# Patient Record
Sex: MALE | Race: WHITE | NOT HISPANIC OR LATINO | Employment: OTHER | URBAN - NONMETROPOLITAN AREA
[De-identification: names, ages, dates, MRNs, and addresses within clinical notes are randomized per-mention and may not be internally consistent; named-entity substitution may affect disease eponyms.]

---

## 2017-01-10 NOTE — H&P
DATE OF CLINIC VISIT:  01/09/2017    CHIEF COMPLAINT: Umbilical hernia.     HISTORY OF PRESENT ILLNESS: The patient is a 76-year-old gentleman who presents complaining of umbilical hernia. He states it has progressively increased in size over time and intermittently he has to reduce it. He denies any GI symptoms or overlying skin changes.     PAST MEDICAL HISTORY:  1.  Chronic anemia.   2.  History of colonic polyps.   3.  Hypertension.  4.  Diabetes mellitus type 2.   5.  Gastroesophageal reflux disease.   6.  Chronic low back pain.   7.  Mitral valve prolapse.   8.  Hyperlipidemia.   9.  Vitamin D deficiency.     PAST SURGICAL HISTORY:  1.  Tonsillectomy.   2.  Left inguinal hernia repair.    HOME MEDICATIONS:  1.  Calcium.  2.  Aspirin.   3.  Atorvastatin.    4.  Potassium.    5.  Chloride.    6.  Verapamil.    7.  Hydrochlorothiazide.    8.  Spironolactone.    9.  Losartan.    10.  Fluticasone.    11.  Glipizide.    12.  Temazepam.     ALLERGIES: No known drug allergies.     SOCIAL HISTORY:  He denies tobacco or illicit drug use.     FAMILY HISTORY: His father has history of myocardial infarction.     REVIEW OF SYSTEMS:  CONSTITUTIONAL: No weight changes, fevers, or chills.   HEENT: Sinusitis. No vision or hearing changes.   PULMONARY: No shortness of breath, cough, or hoarseness.   CARDIOVASCULAR: No chest pain, palpitations, or arrhythmias.   GASTROINTESTINAL: No hematemesis, bright red blood per rectum, melena, or hematochezia.   GENITOURINARY: No dysuria or hematuria.   ENDOCRINE: Diabetes mellitus. No history of thyroid disorder.   PSYCHIATRIC: No complaint.   NEUROLOGIC: No complaint.   MUSCULOSKELETAL: No complaint.   INTEGUMENTARY:  No complaint.   HEMATOLOGIC: No history of bleeding tendency or hypercoagulable state.     PHYSICAL EXAMINATION:  VITAL SIGNS: The patient is 6 feet tall, weighs 205 pounds, BMI is 27.8.  Temperature is 92.1, blood pressure 122/78, and pulse 88.   GENERAL: The patient is a  well-developed, well-nourished gentleman, in no acute distress.   HEENT: Normocephalic, atraumatic.   NECK: Supple.   PULMONARY: Clear to auscultation bilaterally.   CARDIOVASCULAR: Regular rate and rhythm.   GASTROINTESTINAL: The abdomen is soft.  Very shallow umbilical ring.  Easily reducible 1.5 to 2 cm fascial defect without overlying skin changes.   EXTREMITIES: No pedal edema.   NEUROLOGIC: Alert and oriented. No gross sensory changes.     ASSESSMENT: Umbilical hernia.     PLAN:   The patient will present to [facility] for open umbilical hernia repair with possible placement of mesh. The risks, benefits, complications, and possible alternatives of the above procedure were discussed with the patient who agreed to proceed.         cc:          Kavitha CUEVA/[id number]  D:  01/10/2017 11:59:00(Eastern Time)  T:  01/10/2017 12:51:15(Eastern Time)  Voice ID:  [id number]/Document ID:  [id number]

## 2017-01-11 ENCOUNTER — APPOINTMENT (OUTPATIENT)
Dept: PREADMISSION TESTING | Facility: HOSPITAL | Age: 77
End: 2017-01-11

## 2017-01-11 VITALS
RESPIRATION RATE: 14 BRPM | DIASTOLIC BLOOD PRESSURE: 92 MMHG | BODY MASS INDEX: 29.54 KG/M2 | HEART RATE: 95 BPM | SYSTOLIC BLOOD PRESSURE: 147 MMHG | OXYGEN SATURATION: 94 % | WEIGHT: 211 LBS | HEIGHT: 71 IN

## 2017-01-11 LAB
ANION GAP SERPL CALCULATED.3IONS-SCNC: 17 MMOL/L (ref 4–13)
BUN BLD-MCNC: 23 MG/DL (ref 5–21)
BUN/CREAT SERPL: 23.7 (ref 7–25)
CALCIUM SPEC-SCNC: 9.8 MG/DL (ref 8.4–10.4)
CHLORIDE SERPL-SCNC: 96 MMOL/L (ref 98–110)
CO2 SERPL-SCNC: 24 MMOL/L (ref 24–31)
CREAT BLD-MCNC: 0.97 MG/DL (ref 0.5–1.4)
DEPRECATED RDW RBC AUTO: 38.6 FL (ref 40–54)
ERYTHROCYTE [DISTWIDTH] IN BLOOD BY AUTOMATED COUNT: 12.6 % (ref 12–15)
GFR SERPL CREATININE-BSD FRML MDRD: 76 ML/MIN/1.73
GLUCOSE BLD-MCNC: 288 MG/DL (ref 70–100)
HCT VFR BLD AUTO: 40.1 % (ref 40–52)
HGB BLD-MCNC: 14.1 G/DL (ref 14–18)
MCH RBC QN AUTO: 29.9 PG (ref 28–32)
MCHC RBC AUTO-ENTMCNC: 35.2 G/DL (ref 33–36)
MCV RBC AUTO: 85 FL (ref 82–95)
PLATELET # BLD AUTO: 138 10*3/MM3 (ref 130–400)
PMV BLD AUTO: 10.5 FL (ref 6–12)
POTASSIUM BLD-SCNC: 4.2 MMOL/L (ref 3.5–5.3)
RBC # BLD AUTO: 4.72 10*6/MM3 (ref 4.8–5.9)
SODIUM BLD-SCNC: 137 MMOL/L (ref 135–145)
WBC NRBC COR # BLD: 5.92 10*3/MM3 (ref 4.8–10.8)

## 2017-01-11 PROCEDURE — 93010 ELECTROCARDIOGRAM REPORT: CPT | Performed by: INTERNAL MEDICINE

## 2017-01-11 PROCEDURE — 80048 BASIC METABOLIC PNL TOTAL CA: CPT | Performed by: SPECIALIST

## 2017-01-11 PROCEDURE — 85027 COMPLETE CBC AUTOMATED: CPT | Performed by: SPECIALIST

## 2017-01-11 PROCEDURE — 36415 COLL VENOUS BLD VENIPUNCTURE: CPT

## 2017-01-11 PROCEDURE — 93005 ELECTROCARDIOGRAM TRACING: CPT

## 2017-01-11 RX ORDER — SPIRONOLACTONE 25 MG/1
25 TABLET ORAL DAILY
COMMUNITY

## 2017-01-11 RX ORDER — VERAPAMIL HYDROCHLORIDE 240 MG/1
240 TABLET, FILM COATED, EXTENDED RELEASE ORAL 2 TIMES DAILY
COMMUNITY
Start: 2016-05-04

## 2017-01-11 RX ORDER — POTASSIUM CHLORIDE 750 MG/1
10 TABLET, EXTENDED RELEASE ORAL 2 TIMES DAILY
COMMUNITY
Start: 2016-05-04

## 2017-01-11 RX ORDER — ATORVASTATIN CALCIUM 20 MG/1
10 TABLET, FILM COATED ORAL DAILY
COMMUNITY
Start: 2016-05-04

## 2017-01-11 RX ORDER — HYDROCHLOROTHIAZIDE 25 MG/1
25 TABLET ORAL DAILY
COMMUNITY
Start: 2016-05-04

## 2017-01-11 RX ORDER — FLUTICASONE PROPIONATE 50 MCG
1 SPRAY, SUSPENSION (ML) NASAL DAILY PRN
COMMUNITY
Start: 2016-05-04

## 2017-01-11 RX ORDER — LOSARTAN POTASSIUM 100 MG/1
100 TABLET ORAL DAILY
COMMUNITY
Start: 2016-05-04

## 2017-01-11 RX ORDER — PANTOPRAZOLE SODIUM 40 MG/1
40 TABLET, DELAYED RELEASE ORAL DAILY PRN
COMMUNITY
End: 2021-12-28

## 2017-01-11 NOTE — MR AVS SNAPSHOT
Mike Kay   2017 1:30 PM   Appointment    Provider:  [facility] PAT 34 RM 4   Department:  [facility] PREADMISSION T   Dept Phone:  [phone]                Your Full Care Plan           To Do List     2017     ECG:  ECG 12 Lead             Your Updated Medication List          This list is accurate as of: 17  1:44 PM.  Always use your most recent med list.                aspirin 81 MG tablet       atorvastatin 20 MG tablet   Commonly known as:  LIPITOR       fluticasone 50 MCG/ACT nasal spray   Commonly known as:  FLONASE       hydrochlorothiazide 25 MG tablet   Commonly known as:  HYDRODIURIL       losartan 100 MG tablet   Commonly known as:  COZAAR       pantoprazole 40 MG EC tablet   Commonly known as:  PROTONIX       potassium chloride 10 MEQ CR tablet   Commonly known as:  K-DUR,KLOR-CON       spironolactone 25 MG tablet   Commonly known as:  ALDACTONE       verapamil  MG CR tablet   Commonly known as:  CALAN-SR               [employer] Signup     [facility] App [employer] allows you to send messages to your doctor, view your test results, renew your prescriptions, schedule appointments, and more. To sign up, go to [employer] and click on the Sign Up Now link in the New User? box. Enter your [employer] Activation Code exactly as it appears below along with the last four digits of your Social Security Number and your Date of Birth () to complete the sign-up process. If you do not sign up before the expiration date, you must request a new code.    [employer] Activation Code: [email or web address]  Expires: 2017  1:44 PM    If you have questions, you can email [email or web address] or call [phone] to talk to our [employer] staff. Remember, [employer] is NOT to be used for urgent needs. For medical emergencies, dial 911.               Other Info from Your Visit           Allergies     No Known Allergies      Vital Signs     Smoking Status                   Never Smoker             Discharge Instructions         DAY OF SURGERY INSTRUCTIONS        YOUR SURGEON: Kavitha FLORES    PROCEDURE: UMBILICAL HERNIA REPAIR WITH POSSIBLE MESH     DATE OF SURGERY: 01/12/17    DAY OF SURGERY TAKE ONLY THESE MEDICATIONS: VERAPAMIL        BEFORE YOU COME TO THE HOSPITAL  (Pre-op instructions)  • Do not eat, drink, smoke or chew gum after midnight the night before surgery.  This also includes no mints.  • Morning of surgery take only the medicines you have been instructed with a sip of water unless otherwise instructed  by your physician.  • Do not shave, wear makeup or dark nail polish.  • Remove all jewelry including rings.  • Leave anything you consider valuable at home.  • Leave your suitcase in the car until after your surgery.  • Bring the following with you if applicable:  o Picture ID and insurance, Medicare or Medicaid cards  o Co-pay/deductible required by insurance (cash, check, credit card)  o Medications (no narcotics) in original bottles (not a list) including all over-the-counter medications.  o Copy of advance directive, living will or power-of- documents if not brought to PAT  o CPAP or BIPAP mask and tubing  o Skin prep instruction sheet  o Relaxation aids (MP3 player, book, magazine)  • Confirm your arrival time with you surgeon the day before your surgery (surgery times are subject to change)  • On the day of surgery check in at registration located at the main entrance of the hospital.       Outpatient Surgery Guidelines, Adult  Outpatient procedures are those for which the person having the procedure is allowed to go home the same day as the procedure. Various procedures are done on an outpatient basis. You should follow some general guidelines if you will be having an outpatient procedure.  LET YOUR HEALTH CARE PROVIDER KNOW ABOUT:  · Any allergies you have.  · All medicines you are taking, including vitamins, herbs, eye drops, creams, and  over-the-counter medicines.  · Previous problems you or members of your family have had with the use of anesthetics.  · Any blood disorders you have.  · Previous surgeries you have had.  · Medical conditions you have.  RISKS AND COMPLICATIONS  Your health care provider will discuss possible risks and complications with you before surgery. Common risks and complications include:    · Problems due to the use of anesthetics.  · Blood loss and replacement (does not apply to minor surgical procedures).  · Temporary increase in pain due to surgery.  · Uncorrected pain or problems that the surgery was meant to correct.  · Infection.  · New damage.  BEFORE THE PROCEDURE  · Ask your health care provider about changing or stopping your regular medicines. You may need to stop taking certain medicines in the days or weeks before the procedure.  · Stop smoking at least 2 weeks before surgery. This lowers your risk for complications during and after surgery. Ask your health care provider for help with this if needed.  · Eat your usual meals and a light supper the day before surgery. Continue fluid intake. Do not drink alcohol.  · Do not eat or drink after midnight the night before your surgery.   · Arrange for someone to take you home and to stay with you for 24 hours after the procedure. Medicine given for your procedure may affect your ability to drive or to care for yourself.  · Call your health care provider's office if you develop an illness or problem that may prevent you from safely having your procedure.  AFTER THE PROCEDURE  After surgery, you will be taken to a recovery area, where your progress will be monitored. If there are no complications, you will be allowed to go home when you are awake, stable, and taking fluids well. You may have numbness around the surgical site. Healing will take some time. You will have tenderness at the surgical site and may have some swelling and bruising. You may also have some  nausea.  HOME CARE INSTRUCTIONS  · Do not drive for 24 hours, or as directed by your health care provider. Do not drive while taking prescription pain medicines.  · Do not drink alcohol for 24 hours.  · Do not make important decisions or sign legal documents for 24 hours.  · You may resume a normal diet and activities as directed.  · Do not lift anything heavier than 10 pounds (4.5 kg) or play contact sports until your health care provider says it is okay.  · Change your bandages (dressings) as directed.  · Only take over-the-counter or prescription medicines as directed by your health care provider.  · Follow up with your health care provider as directed.  SEEK MEDICAL CARE IF:  · You have increased bleeding (more than a small spot) from the surgical site.  · You have redness, swelling, or increasing pain in the wound.  · You see pus coming from the wound.  · You have a fever.  · You notice a bad smell coming from the wound or dressing.  · You feel lightheaded or faint.  · You develop a rash.  · You have trouble breathing.  · You develop allergies.  MAKE SURE YOU:  · Understand these instructions.  · Will watch your condition.  · Will get help right away if you are not doing well or get worse.     This information is not intended to replace advice given to you by your health care provider. Make sure you discuss any questions you have with your health care provider.     Document Released: 09/12/2002 Document Revised: 05/03/2016 Document Reviewed: 05/22/2014  [employer] Interactive Patient Education ©2016 [employer]       Fall Prevention in Hospitals, Adult  As a hospital patient, your condition and the treatments you receive can increase your risk for falls. Some additional risk factors for falls in a hospital include:  · Being in an unfamiliar environment.  · Being on bed rest.  · Your surgery.  · Taking certain medicines.  · Your tubing requirements, such as intravenous (IV) therapy or catheters.  It is important  that you learn how to decrease fall risks while at the hospital. Below are important tips that can help prevent falls.  SAFETY TIPS FOR PREVENTING FALLS  Talk about your risk of falling.  · Ask your health care provider why you are at risk for falling. Is it your medicine, illness, tubing placement, or something else?  · Make a plan with your health care provider to keep you safe from falls.  · Ask your health care provider or pharmacist about side effects of your medicines. Some medicines can make you dizzy or affect your coordination.  Ask for help.  · Ask for help before getting out of bed. You may need to press your call button.  · Ask for assistance in getting safely to the toilet.  · Ask for a walker or cane to be put at your bedside. Ask that most of the side rails on your bed be placed up before your health care provider leaves the room.  · Ask family or friends to sit with you.  · Ask for things that are out of your reach, such as your glasses, hearing aids, telephone, bedside table, or call button.  Follow these tips to avoid falling:  · Stay lying or seated, rather than standing, while waiting for help.  · Wear rubber-soled slippers or shoes whenever you walk in the hospital.  · Avoid quick, sudden movements.  ¨ Change positions slowly.  ¨ Sit on the side of your bed before standing.  ¨ Stand up slowly and wait before you start to walk.  · Let your health care provider know if there is a spill on the floor.  · Pay careful attention to the medical equipment, electrical cords, and tubes around you.  · When you need help, use your call button by your bed or in the bathroom. Wait for one of your health care providers to help you.  · If you feel dizzy or unsure of your footing, return to bed and wait for assistance.  · Avoid being distracted by the TV, telephone, or another person in your room.  · Do not lean or support yourself on rolling objects, such as IV poles or bedside tables.     This information is  not intended to replace advice given to you by your health care provider. Make sure you discuss any questions you have with your health care provider.     Document Released: 12/15/2001 Document Revised: 01/08/2016 Document Reviewed: 08/25/2013  [employer] Interactive Patient Education ©2016 [employer]       Surgical Site Infections FAQs  What is a Surgical Site Infection (SSI)?  A surgical site infection is an infection that occurs after surgery in the part of the body where the surgery took place. Most patients who have surgery do not develop an infection. However, infections develop in about 1 to 3 out of every 100 patients who have surgery.  Some of the common symptoms of a surgical site infection are:  · Redness and pain around the area where you had surgery  · Drainage of cloudy fluid from your surgical wound  · Fever  Can SSIs be treated?  Yes. Most surgical site infections can be treated with antibiotics. The antibiotic given to you depends on the bacteria (germs) causing the infection. Sometimes patients with SSIs also need another surgery to treat the infection.  What are some of the things that hospitals are doing to prevent SSIs?  To prevent SSIs, doctors, nurses, and other healthcare providers:  · Clean their hands and arms up to their elbows with an antiseptic agent just before the surgery.  · Clean their hands with soap and water or an alcohol-based hand rub before and after caring for each patient.  · May remove some of your hair immediately before your surgery using electric clippers if the hair is in the same area where the procedure will occur. They should not shave you with a razor.  · Wear special hair covers, masks, gowns, and gloves during surgery to keep the surgery area clean.  · Give you antibiotics before your surgery starts. In most cases, you should get antibiotics within 60 minutes before the surgery starts and the antibiotics should be stopped within 24 hours after surgery.  · Clean  the skin at the site of your surgery with a special soap that kills germs.  What can I do to help prevent SSIs?  Before your surgery:  · Tell your doctor about other medical problems you may have. Health problems such as allergies, diabetes, and obesity could affect your surgery and your treatment.  · Quit smoking. Patients who smoke get more infections. Talk to your doctor about how you can quit before your surgery.  · Do not shave near where you will have surgery. Shaving with a razor can irritate your skin and make it easier to develop an infection.  At the time of your surgery:  · Speak up if someone tries to shave you with a razor before surgery. Ask why you need to be shaved and talk with your surgeon if you have any concerns.  · Ask if you will get antibiotics before surgery.  After your surgery:  · Make sure that your healthcare providers clean their hands before examining you, either with soap and water or an alcohol-based hand rub.  · If you do not see your providers clean their hands, please ask them to do so.  · Family and friends who visit you should not touch the surgical wound or dressings.  · Family and friends should clean their hands with soap and water or an alcohol-based hand rub before and after visiting you. If you do not see them clean their hands, ask them to clean their hands.  What do I need to do when I go home from the hospital?  · Before you go home, your doctor or nurse should explain everything you need to know about taking care of your wound. Make sure you understand how to care for your wound before you leave the hospital.  · Always clean your hands before and after caring for your wound.  · Before you go home, make sure you know who to contact if you have questions or problems after you get home.  · If you have any symptoms of an infection, such as redness and pain at the surgery site, drainage, or fever, call your doctor immediately.  If you have additional questions, please ask  your doctor or nurse.  Developed and co-sponsored by The Society for Healthcare Epidemiology of [place] (SHEA); Infectious Diseases Society of [place] (IDSA); American Hospital Association; Association for Professionals in Infection Control and Epidemiology (APIC); Centers for Disease Control and Prevention (CDC); and The Joint Commission.     This information is not intended to replace advice given to you by your health care provider. Make sure you discuss any questions you have with your health care provider.     Document Released: 12/23/2014 Document Revised: 01/08/2016 Document Reviewed: 03/02/2016  [employer] Interactive Patient Education ©2016 [employer]       [facility]  CHG 4% Patient Instruction Sheet    Preparing the Skin Before Surgery  Preparing or “prepping” skin before surgery can reduce the risk of infection at the surgical site. To make the process easier,[facility] has chosen 4% Chlorhexidine Gluconate (CHG) antiseptic solution.   The steps below outline the prepping process and should be carefully followed.                                                                                                                                                      Prep the skin at the following time(s):                                                      We recommend you shower the night before surgery, and again the morning of surgery with the 4% CHG antiseptic solution using half of the bottle and a cloth each time.  Dress in clean clothes/sleepwear after showering.  See instructions below for application.          Do not apply any lotions or moisturizers.       Do not shave the area to be prepped for at least 2 days prior to surgery.    Clipping the hair may be done immediately prior to your surgery at the hospital    if needed.    Directions:  Thoroughly rinse your body with water.  Apply 4% CHG to a cloth and wash skin gently, paying special attention to the operative site.  Rinse again  thoroughly.  Once you have begun using this product do not apply anything else to your skin. If itching or redness persists, rinse affected areas and discontinue use.    When using this product:  • Keep out of eyes, ears, and mouth.  • If solution should contact these areas, rinse out promptly and thoroughly with water.  • For external use only.  • Do not use in genital area, on your face or head.      PATIENT VERBALIZES UNDERSTANDING OF ABOVE EDUCATION       SYMPTOMS OF A STROKE    Call 911 or have someone take you to the Emergency Department if you have any of the following:    · Sudden numbness or weakness of your face, arm or leg especially on one side of the body  · Sudden confusion, diffiiculty speaking or trouble understanding   · Changes in your vision or loss of sight in one eye  · Sudden severe headache with no known cause  · sudden dizziness, trouble walking, loss of balance or coordination    It is important to seek emergency care right away if you have further stroke symptoms. If you get emergency help quickly, the powerful clot-dissolving medicines can reduce the disabilities caused by a stroke.     For more information:    American Stroke Association  4-683-0-STROKE  www.strokeassociation.org           IF YOU SMOKE OR USE TOBACCO PLEASE READ THE FOLLOWING:    Why is smoking bad for me?  Smoking increases the risk of heart disease, lung disease, vascular disease, stroke, and cancer.     If you smoke, STOP!    If you would like more information on quitting smoking, please visit the [employer] website: [email or web address]/healthier-[email or web address]/smoke   This link will provide additional resources including the QUIT line and the [employer] support groups.     For more information:    American Cancer Society  ([phone] 586-3564    American Heart Association  1-704.422.5185

## 2017-01-11 NOTE — DISCHARGE INSTRUCTIONS
DAY OF SURGERY INSTRUCTIONS        YOUR SURGEON: April MARK    PROCEDURE: UMBILICAL HERNIA REPAIR WITH POSSIBLE MESH     DATE OF SURGERY: 01/12/17    DAY OF SURGERY TAKE ONLY THESE MEDICATIONS: VERAPAMIL        BEFORE YOU COME TO THE HOSPITAL  (Pre-op instructions)  • Do not eat, drink, smoke or chew gum after midnight the night before surgery.  This also includes no mints.  • Morning of surgery take only the medicines you have been instructed with a sip of water unless otherwise instructed  by your physician.  • Do not shave, wear makeup or dark nail polish.  • Remove all jewelry including rings.  • Leave anything you consider valuable at home.  • Leave your suitcase in the car until after your surgery.  • Bring the following with you if applicable:  o Picture ID and insurance, Medicare or Medicaid cards  o Co-pay/deductible required by insurance (cash, check, credit card)  o Medications (no narcotics) in original bottles (not a list) including all over-the-counter medications.  o Copy of advance directive, living will or power-of- documents if not brought to PAT  o CPAP or BIPAP mask and tubing  o Skin prep instruction sheet  o Relaxation aids (MP3 player, book, magazine)  • Confirm your arrival time with you surgeon the day before your surgery (surgery times are subject to change)  • On the day of surgery check in at registration located at the main entrance of the hospital.       Outpatient Surgery Guidelines, Adult  Outpatient procedures are those for which the person having the procedure is allowed to go home the same day as the procedure. Various procedures are done on an outpatient basis. You should follow some general guidelines if you will be having an outpatient procedure.  LET YOUR HEALTH CARE PROVIDER KNOW ABOUT:  · Any allergies you have.  · All medicines you are taking, including vitamins, herbs, eye drops, creams, and over-the-counter medicines.  · Previous problems you or members of  your family have had with the use of anesthetics.  · Any blood disorders you have.  · Previous surgeries you have had.  · Medical conditions you have.  RISKS AND COMPLICATIONS  Your health care provider will discuss possible risks and complications with you before surgery. Common risks and complications include:    · Problems due to the use of anesthetics.  · Blood loss and replacement (does not apply to minor surgical procedures).  · Temporary increase in pain due to surgery.  · Uncorrected pain or problems that the surgery was meant to correct.  · Infection.  · New damage.  BEFORE THE PROCEDURE  · Ask your health care provider about changing or stopping your regular medicines. You may need to stop taking certain medicines in the days or weeks before the procedure.  · Stop smoking at least 2 weeks before surgery. This lowers your risk for complications during and after surgery. Ask your health care provider for help with this if needed.  · Eat your usual meals and a light supper the day before surgery. Continue fluid intake. Do not drink alcohol.  · Do not eat or drink after midnight the night before your surgery.   · Arrange for someone to take you home and to stay with you for 24 hours after the procedure. Medicine given for your procedure may affect your ability to drive or to care for yourself.  · Call your health care provider's office if you develop an illness or problem that may prevent you from safely having your procedure.  AFTER THE PROCEDURE  After surgery, you will be taken to a recovery area, where your progress will be monitored. If there are no complications, you will be allowed to go home when you are awake, stable, and taking fluids well. You may have numbness around the surgical site. Healing will take some time. You will have tenderness at the surgical site and may have some swelling and bruising. You may also have some nausea.  HOME CARE INSTRUCTIONS  · Do not drive for 24 hours, or as directed  by your health care provider. Do not drive while taking prescription pain medicines.  · Do not drink alcohol for 24 hours.  · Do not make important decisions or sign legal documents for 24 hours.  · You may resume a normal diet and activities as directed.  · Do not lift anything heavier than 10 pounds (4.5 kg) or play contact sports until your health care provider says it is okay.  · Change your bandages (dressings) as directed.  · Only take over-the-counter or prescription medicines as directed by your health care provider.  · Follow up with your health care provider as directed.  SEEK MEDICAL CARE IF:  · You have increased bleeding (more than a small spot) from the surgical site.  · You have redness, swelling, or increasing pain in the wound.  · You see pus coming from the wound.  · You have a fever.  · You notice a bad smell coming from the wound or dressing.  · You feel lightheaded or faint.  · You develop a rash.  · You have trouble breathing.  · You develop allergies.  MAKE SURE YOU:  · Understand these instructions.  · Will watch your condition.  · Will get help right away if you are not doing well or get worse.     This information is not intended to replace advice given to you by your health care provider. Make sure you discuss any questions you have with your health care provider.     Document Released: 09/12/2002 Document Revised: 05/03/2016 Document Reviewed: 05/22/2014  [employer] Interactive Patient Education ©2016 [employer]       Fall Prevention in Hospitals, Adult  As a hospital patient, your condition and the treatments you receive can increase your risk for falls. Some additional risk factors for falls in a hospital include:  · Being in an unfamiliar environment.  · Being on bed rest.  · Your surgery.  · Taking certain medicines.  · Your tubing requirements, such as intravenous (IV) therapy or catheters.  It is important that you learn how to decrease fall risks while at the hospital. Below are  important tips that can help prevent falls.  SAFETY TIPS FOR PREVENTING FALLS  Talk about your risk of falling.  · Ask your health care provider why you are at risk for falling. Is it your medicine, illness, tubing placement, or something else?  · Make a plan with your health care provider to keep you safe from falls.  · Ask your health care provider or pharmacist about side effects of your medicines. Some medicines can make you dizzy or affect your coordination.  Ask for help.  · Ask for help before getting out of bed. You may need to press your call button.  · Ask for assistance in getting safely to the toilet.  · Ask for a walker or cane to be put at your bedside. Ask that most of the side rails on your bed be placed up before your health care provider leaves the room.  · Ask family or friends to sit with you.  · Ask for things that are out of your reach, such as your glasses, hearing aids, telephone, bedside table, or call button.  Follow these tips to avoid falling:  · Stay lying or seated, rather than standing, while waiting for help.  · Wear rubber-soled slippers or shoes whenever you walk in the hospital.  · Avoid quick, sudden movements.  ¨ Change positions slowly.  ¨ Sit on the side of your bed before standing.  ¨ Stand up slowly and wait before you start to walk.  · Let your health care provider know if there is a spill on the floor.  · Pay careful attention to the medical equipment, electrical cords, and tubes around you.  · When you need help, use your call button by your bed or in the bathroom. Wait for one of your health care providers to help you.  · If you feel dizzy or unsure of your footing, return to bed and wait for assistance.  · Avoid being distracted by the TV, telephone, or another person in your room.  · Do not lean or support yourself on rolling objects, such as IV poles or bedside tables.     This information is not intended to replace advice given to you by your health care provider.  Make sure you discuss any questions you have with your health care provider.     Document Released: 12/15/2001 Document Revised: 01/08/2016 Document Reviewed: 08/25/2013  [employer] Interactive Patient Education ©2016 [employer]       Surgical Site Infections FAQs  What is a Surgical Site Infection (SSI)?  A surgical site infection is an infection that occurs after surgery in the part of the body where the surgery took place. Most patients who have surgery do not develop an infection. However, infections develop in about 1 to 3 out of every 100 patients who have surgery.  Some of the common symptoms of a surgical site infection are:  · Redness and pain around the area where you had surgery  · Drainage of cloudy fluid from your surgical wound  · Fever  Can SSIs be treated?  Yes. Most surgical site infections can be treated with antibiotics. The antibiotic given to you depends on the bacteria (germs) causing the infection. Sometimes patients with SSIs also need another surgery to treat the infection.  What are some of the things that hospitals are doing to prevent SSIs?  To prevent SSIs, doctors, nurses, and other healthcare providers:  · Clean their hands and arms up to their elbows with an antiseptic agent just before the surgery.  · Clean their hands with soap and water or an alcohol-based hand rub before and after caring for each patient.  · May remove some of your hair immediately before your surgery using electric clippers if the hair is in the same area where the procedure will occur. They should not shave you with a razor.  · Wear special hair covers, masks, gowns, and gloves during surgery to keep the surgery area clean.  · Give you antibiotics before your surgery starts. In most cases, you should get antibiotics within 60 minutes before the surgery starts and the antibiotics should be stopped within 24 hours after surgery.  · Clean the skin at the site of your surgery with a special soap that kills  germs.  What can I do to help prevent SSIs?  Before your surgery:  · Tell your doctor about other medical problems you may have. Health problems such as allergies, diabetes, and obesity could affect your surgery and your treatment.  · Quit smoking. Patients who smoke get more infections. Talk to your doctor about how you can quit before your surgery.  · Do not shave near where you will have surgery. Shaving with a razor can irritate your skin and make it easier to develop an infection.  At the time of your surgery:  · Speak up if someone tries to shave you with a razor before surgery. Ask why you need to be shaved and talk with your surgeon if you have any concerns.  · Ask if you will get antibiotics before surgery.  After your surgery:  · Make sure that your healthcare providers clean their hands before examining you, either with soap and water or an alcohol-based hand rub.  · If you do not see your providers clean their hands, please ask them to do so.  · Family and friends who visit you should not touch the surgical wound or dressings.  · Family and friends should clean their hands with soap and water or an alcohol-based hand rub before and after visiting you. If you do not see them clean their hands, ask them to clean their hands.  What do I need to do when I go home from the hospital?  · Before you go home, your doctor or nurse should explain everything you need to know about taking care of your wound. Make sure you understand how to care for your wound before you leave the hospital.  · Always clean your hands before and after caring for your wound.  · Before you go home, make sure you know who to contact if you have questions or problems after you get home.  · If you have any symptoms of an infection, such as redness and pain at the surgery site, drainage, or fever, call your doctor immediately.  If you have additional questions, please ask your doctor or nurse.  Developed and co-sponsored by The Society for  Healthcare Epidemiology of [place] (SHEA); Infectious Diseases Society of [place] (IDSA); American Hospital Association; Association for Professionals in Infection Control and Epidemiology (APIC); Centers for Disease Control and Prevention (CDC); and The Joint Commission.     This information is not intended to replace advice given to you by your health care provider. Make sure you discuss any questions you have with your health care provider.     Document Released: 12/23/2014 Document Revised: 01/08/2016 Document Reviewed: 03/02/2016  [employer] Interactive Patient Education ©2016 [employer]       [facility]  CHG 4% Patient Instruction Sheet    Preparing the Skin Before Surgery  Preparing or “prepping” skin before surgery can reduce the risk of infection at the surgical site. To make the process easier,[facility] has chosen 4% Chlorhexidine Gluconate (CHG) antiseptic solution.   The steps below outline the prepping process and should be carefully followed.                                                                                                                                                      Prep the skin at the following time(s):                                                      We recommend you shower the night before surgery, and again the morning of surgery with the 4% CHG antiseptic solution using half of the bottle and a cloth each time.  Dress in clean clothes/sleepwear after showering.  See instructions below for application.          Do not apply any lotions or moisturizers.       Do not shave the area to be prepped for at least 2 days prior to surgery.    Clipping the hair may be done immediately prior to your surgery at the hospital    if needed.    Directions:  Thoroughly rinse your body with water.  Apply 4% CHG to a cloth and wash skin gently, paying special attention to the operative site.  Rinse again thoroughly.  Once you have begun using this product do not apply anything else  to your skin. If itching or redness persists, rinse affected areas and discontinue use.    When using this product:  • Keep out of eyes, ears, and mouth.  • If solution should contact these areas, rinse out promptly and thoroughly with water.  • For external use only.  • Do not use in genital area, on your face or head.      PATIENT VERBALIZES UNDERSTANDING OF ABOVE EDUCATION

## 2017-01-12 ENCOUNTER — ANESTHESIA (OUTPATIENT)
Dept: PERIOP | Facility: HOSPITAL | Age: 77
End: 2017-01-12

## 2017-01-12 ENCOUNTER — ANESTHESIA EVENT (OUTPATIENT)
Dept: PERIOP | Facility: HOSPITAL | Age: 77
End: 2017-01-12

## 2017-01-12 ENCOUNTER — HOSPITAL ENCOUNTER (OUTPATIENT)
Facility: HOSPITAL | Age: 77
Setting detail: HOSPITAL OUTPATIENT SURGERY
Discharge: HOME | End: 2017-01-12
Attending: SPECIALIST | Admitting: SPECIALIST

## 2017-01-12 VITALS
TEMPERATURE: 98.1 F | DIASTOLIC BLOOD PRESSURE: 73 MMHG | SYSTOLIC BLOOD PRESSURE: 140 MMHG | OXYGEN SATURATION: 92 % | HEART RATE: 87 BPM | RESPIRATION RATE: 14 BRPM

## 2017-01-12 LAB
GLUCOSE BLDC GLUCOMTR-MCNC: 229 MG/DL (ref 70–130)
GLUCOSE BLDC GLUCOMTR-MCNC: 312 MG/DL (ref 70–130)

## 2017-01-12 PROCEDURE — 82962 GLUCOSE BLOOD TEST: CPT

## 2017-01-12 PROCEDURE — 25010000002 PROPOFOL 10 MG/ML EMULSION: Performed by: NURSE ANESTHETIST, CERTIFIED REGISTERED

## 2017-01-12 PROCEDURE — 25010000002 MIDAZOLAM PER 1 MG: Performed by: ANESTHESIOLOGY

## 2017-01-12 PROCEDURE — 25010000002 FENTANYL CITRATE (PF) 100 MCG/2ML SOLUTION: Performed by: NURSE ANESTHETIST, CERTIFIED REGISTERED

## 2017-01-12 PROCEDURE — 63710000001 INSULIN REGULAR HUMAN PER 5 UNITS: Performed by: ANESTHESIOLOGY

## 2017-01-12 PROCEDURE — 25010000002 DEXAMETHASONE PER 1 MG: Performed by: NURSE ANESTHETIST, CERTIFIED REGISTERED

## 2017-01-12 PROCEDURE — 25010000002 ONDANSETRON PER 1 MG: Performed by: NURSE ANESTHETIST, CERTIFIED REGISTERED

## 2017-01-12 PROCEDURE — 25010000002 NEOSTIGMINE PER 0.5 MG: Performed by: NURSE ANESTHETIST, CERTIFIED REGISTERED

## 2017-01-12 RX ORDER — SODIUM CHLORIDE 0.9 % (FLUSH) 0.9 %
1-10 SYRINGE (ML) INJECTION AS NEEDED
Status: DISCONTINUED | OUTPATIENT
Start: 2017-01-12 | End: 2017-01-12 | Stop reason: HOSPADM

## 2017-01-12 RX ORDER — FENTANYL CITRATE 50 UG/ML
INJECTION, SOLUTION INTRAMUSCULAR; INTRAVENOUS AS NEEDED
Status: DISCONTINUED | OUTPATIENT
Start: 2017-01-12 | End: 2017-01-12 | Stop reason: SURG

## 2017-01-12 RX ORDER — HYDROCODONE BITARTRATE AND ACETAMINOPHEN 5; 325 MG/1; MG/1
1-2 TABLET ORAL EVERY 4 HOURS PRN
Qty: 30 TABLET | Refills: 0 | Status: SHIPPED | OUTPATIENT
Start: 2017-01-12 | End: 2021-12-28

## 2017-01-12 RX ORDER — SODIUM CHLORIDE, SODIUM LACTATE, POTASSIUM CHLORIDE, CALCIUM CHLORIDE 600; 310; 30; 20 MG/100ML; MG/100ML; MG/100ML; MG/100ML
100 INJECTION, SOLUTION INTRAVENOUS CONTINUOUS
Status: DISCONTINUED | OUTPATIENT
Start: 2017-01-12 | End: 2017-01-12 | Stop reason: HOSPADM

## 2017-01-12 RX ORDER — ONDANSETRON 2 MG/ML
4 INJECTION INTRAMUSCULAR; INTRAVENOUS AS NEEDED
Status: DISCONTINUED | OUTPATIENT
Start: 2017-01-12 | End: 2017-01-12 | Stop reason: HOSPADM

## 2017-01-12 RX ORDER — MIDAZOLAM HYDROCHLORIDE 1 MG/ML
1 INJECTION INTRAMUSCULAR; INTRAVENOUS
Status: DISCONTINUED | OUTPATIENT
Start: 2017-01-12 | End: 2017-01-12 | Stop reason: HOSPADM

## 2017-01-12 RX ORDER — PROPOFOL 10 MG/ML
VIAL (ML) INTRAVENOUS AS NEEDED
Status: DISCONTINUED | OUTPATIENT
Start: 2017-01-12 | End: 2017-01-12 | Stop reason: SURG

## 2017-01-12 RX ORDER — GLYCOPYRROLATE 0.2 MG/ML
INJECTION INTRAMUSCULAR; INTRAVENOUS AS NEEDED
Status: DISCONTINUED | OUTPATIENT
Start: 2017-01-12 | End: 2017-01-12 | Stop reason: SURG

## 2017-01-12 RX ORDER — IPRATROPIUM BROMIDE AND ALBUTEROL SULFATE 2.5; .5 MG/3ML; MG/3ML
3 SOLUTION RESPIRATORY (INHALATION) ONCE AS NEEDED
Status: DISCONTINUED | OUTPATIENT
Start: 2017-01-12 | End: 2017-01-12 | Stop reason: HOSPADM

## 2017-01-12 RX ORDER — DEXAMETHASONE SODIUM PHOSPHATE 4 MG/ML
INJECTION, SOLUTION INTRA-ARTICULAR; INTRALESIONAL; INTRAMUSCULAR; INTRAVENOUS; SOFT TISSUE AS NEEDED
Status: DISCONTINUED | OUTPATIENT
Start: 2017-01-12 | End: 2017-01-12 | Stop reason: SURG

## 2017-01-12 RX ORDER — METOCLOPRAMIDE HYDROCHLORIDE 5 MG/ML
5 INJECTION INTRAMUSCULAR; INTRAVENOUS
Status: DISCONTINUED | OUTPATIENT
Start: 2017-01-12 | End: 2017-01-12 | Stop reason: HOSPADM

## 2017-01-12 RX ORDER — MEPERIDINE HYDROCHLORIDE 25 MG/ML
12.5 INJECTION INTRAMUSCULAR; INTRAVENOUS; SUBCUTANEOUS
Status: DISCONTINUED | OUTPATIENT
Start: 2017-01-12 | End: 2017-01-12 | Stop reason: HOSPADM

## 2017-01-12 RX ORDER — FLUMAZENIL 0.1 MG/ML
0.2 INJECTION INTRAVENOUS AS NEEDED
Status: DISCONTINUED | OUTPATIENT
Start: 2017-01-12 | End: 2017-01-12 | Stop reason: HOSPADM

## 2017-01-12 RX ORDER — ONDANSETRON 2 MG/ML
INJECTION INTRAMUSCULAR; INTRAVENOUS AS NEEDED
Status: DISCONTINUED | OUTPATIENT
Start: 2017-01-12 | End: 2017-01-12 | Stop reason: SURG

## 2017-01-12 RX ORDER — FENTANYL CITRATE 50 UG/ML
25 INJECTION, SOLUTION INTRAMUSCULAR; INTRAVENOUS AS NEEDED
Status: DISCONTINUED | OUTPATIENT
Start: 2017-01-12 | End: 2017-01-12 | Stop reason: HOSPADM

## 2017-01-12 RX ORDER — MAGNESIUM HYDROXIDE 1200 MG/15ML
LIQUID ORAL AS NEEDED
Status: DISCONTINUED | OUTPATIENT
Start: 2017-01-12 | End: 2017-01-12 | Stop reason: HOSPADM

## 2017-01-12 RX ORDER — HYDROCODONE BITARTRATE AND ACETAMINOPHEN 5; 325 MG/1; MG/1
1 TABLET ORAL EVERY 4 HOURS PRN
Status: DISCONTINUED | OUTPATIENT
Start: 2017-01-12 | End: 2017-01-12 | Stop reason: HOSPADM

## 2017-01-12 RX ORDER — BUPIVACAINE HYDROCHLORIDE AND EPINEPHRINE 2.5; 5 MG/ML; UG/ML
INJECTION, SOLUTION INFILTRATION; PERINEURAL AS NEEDED
Status: DISCONTINUED | OUTPATIENT
Start: 2017-01-12 | End: 2017-01-12 | Stop reason: HOSPADM

## 2017-01-12 RX ORDER — PHENYLEPHRINE HCL IN 0.9% NACL 0.8MG/10ML
SYRINGE (ML) INTRAVENOUS AS NEEDED
Status: DISCONTINUED | OUTPATIENT
Start: 2017-01-12 | End: 2017-01-12 | Stop reason: SURG

## 2017-01-12 RX ORDER — ROCURONIUM BROMIDE 10 MG/ML
INJECTION, SOLUTION INTRAVENOUS AS NEEDED
Status: DISCONTINUED | OUTPATIENT
Start: 2017-01-12 | End: 2017-01-12 | Stop reason: SURG

## 2017-01-12 RX ORDER — MIDAZOLAM HYDROCHLORIDE 1 MG/ML
2 INJECTION INTRAMUSCULAR; INTRAVENOUS
Status: DISCONTINUED | OUTPATIENT
Start: 2017-01-12 | End: 2017-01-12 | Stop reason: HOSPADM

## 2017-01-12 RX ORDER — MORPHINE SULFATE 2 MG/ML
2 INJECTION, SOLUTION INTRAMUSCULAR; INTRAVENOUS AS NEEDED
Status: DISCONTINUED | OUTPATIENT
Start: 2017-01-12 | End: 2017-01-12 | Stop reason: HOSPADM

## 2017-01-12 RX ORDER — NALOXONE HCL 0.4 MG/ML
0.04 VIAL (ML) INJECTION AS NEEDED
Status: DISCONTINUED | OUTPATIENT
Start: 2017-01-12 | End: 2017-01-12 | Stop reason: HOSPADM

## 2017-01-12 RX ADMIN — DEXAMETHASONE SODIUM PHOSPHATE 4 MG: 4 INJECTION, SOLUTION INTRAMUSCULAR; INTRAVENOUS at 10:16

## 2017-01-12 RX ADMIN — MIDAZOLAM HYDROCHLORIDE 2 MG: 1 INJECTION, SOLUTION INTRAMUSCULAR; INTRAVENOUS at 09:37

## 2017-01-12 RX ADMIN — SODIUM CHLORIDE, POTASSIUM CHLORIDE, SODIUM LACTATE AND CALCIUM CHLORIDE 100 ML/HR: 600; 310; 30; 20 INJECTION, SOLUTION INTRAVENOUS at 09:35

## 2017-01-12 RX ADMIN — Medication 4 MG: at 10:33

## 2017-01-12 RX ADMIN — EPHEDRINE SULFATE 20 MG: 50 INJECTION INTRAMUSCULAR; INTRAVENOUS; SUBCUTANEOUS at 10:16

## 2017-01-12 RX ADMIN — LIDOCAINE HYDROCHLORIDE 0.5 ML: 10 INJECTION, SOLUTION EPIDURAL; INFILTRATION; INTRACAUDAL; PERINEURAL at 09:35

## 2017-01-12 RX ADMIN — ONDANSETRON 4 MG: 2 INJECTION INTRAMUSCULAR; INTRAVENOUS at 10:16

## 2017-01-12 RX ADMIN — Medication 160 MCG: at 10:16

## 2017-01-12 RX ADMIN — PROPOFOL 200 MG: 10 INJECTION, EMULSION INTRAVENOUS at 10:07

## 2017-01-12 RX ADMIN — GLYCOPYRROLATE 0.4 MG: 0.2 INJECTION, SOLUTION INTRAMUSCULAR; INTRAVENOUS at 10:33

## 2017-01-12 RX ADMIN — FENTANYL CITRATE 50 MCG: 50 INJECTION INTRAMUSCULAR; INTRAVENOUS at 10:36

## 2017-01-12 RX ADMIN — FENTANYL CITRATE 200 MCG: 50 INJECTION INTRAMUSCULAR; INTRAVENOUS at 10:07

## 2017-01-12 RX ADMIN — ROCURONIUM BROMIDE 25 MG: 10 INJECTION INTRAVENOUS at 10:07

## 2017-01-12 RX ADMIN — INSULIN HUMAN 9 UNITS: 100 INJECTION, SOLUTION PARENTERAL at 09:39

## 2017-01-12 RX ADMIN — CEFAZOLIN 1 G: 1 INJECTION, POWDER, FOR SOLUTION INTRAMUSCULAR; INTRAVENOUS; PARENTERAL at 10:13

## 2017-01-12 RX ADMIN — SODIUM CHLORIDE, POTASSIUM CHLORIDE, SODIUM LACTATE AND CALCIUM CHLORIDE 100 ML/HR: 600; 310; 30; 20 INJECTION, SOLUTION INTRAVENOUS at 11:22

## 2017-01-12 NOTE — ANESTHESIA PREPROCEDURE EVALUATION
Anesthesia Evaluation     Patient summary reviewed    History of anesthetic complications (slow to wake)   Airway   Mallampati: II  TM distance: >3 FB  Neck ROM: full  no difficulty expected  Dental      Comment: Permanent upper bridge    Pulmonary    (-) COPD, asthma, sleep apnea, not a smoker  Cardiovascular   Exercise tolerance: good (4-7 METS)  (+) hypertension, valvular problems/murmurs MVP,     ECG reviewed    Neuro/Psych  (-) seizures, TIA, CVA, weakness, numbness  GI/Hepatic/Renal/Endo    (+)  GERD, diabetes mellitus (prior diagnsosis, no meds following weight loss),   (-) liver disease, renal disease    Musculoskeletal (-) negative ROS    Abdominal    Substance History - negative use     OB/GYN          Other - negative ROS                            Anesthesia Plan    ASA 2     general     intravenous induction   Anesthetic plan and risks discussed with patient.

## 2017-01-12 NOTE — OP NOTE
DATE OF PROCEDURE:  01/12/2016     PREOPERATIVE DIAGNOSIS: Umbilical hernia.     POSTOPERATIVE DIAGNOSIS: Umbilical hernia.     PROCEDURE PERFORMED: Primary repair of umbilical hernia.     SURGEON: Kavitha Navarro MD     ANESTHESIA:  General endotracheal anesthesia with local.    ESTIMATED BLOOD LOSS: Minimal.     INTRAVENOUS FLUIDS: Please see anesthesias notes.    INDICATIONS:  The patient is a 76-year-old gentleman who presents complaining of a bulge in the area of the umbilicus. He does complain of some burning sensation he denies any overlying skin changes or GI symptoms. He presents today for repair and possible placement of mesh.  The risks benefits, complications and possible alternatives of the above procedure were discussed with the patient, who agreed to proceed.     DESCRIPTION OF PROCEDURE: The patient was taken to the operating room, laid supine on the operating room table. After general endotracheal anesthesia was obtained, the abdomen was prepped and draped in the usual sterile fashion. Using a marking pen, a curvilinear incision was drawn at the inferior umbilicus ring.  Then, 0.25% Marcaine with epinephrine was then used to  infiltrate the skin and subcutaneous tissue for local anesthesia.  The skin was then incised. Bovie electrocautery was then used to enter the deep dermis and subcutaneous tissues down to the fascial.  The defect was approximately 1 cm in diameter. It was grasped with Kocher clamps and reapproximated using 0 Prolene in a figure-of-eight fashion x4.  The wound bed was then copiously irrigated with sterile saline and suctioned dry. Deep dermis was then reapproximated with 3-0 Vicryl in interrupted fashion. The skin was approximated with 4-0 Vicryl in a running subcuticular fashion.  The wound was then cleansed, dried, Mastisol, Steri-Strips, dry dressings and Tegaderm were applied.     COMPLICATIONS: None.     DISPOSITION: Good, stable to the PACU.  FINDINGS: A 1 cm fascial  defect.          cc:               Kavitha CUEVA/[id number]  D:  01/12/2017 11:45:35(Eastern Time)  T:  01/12/2017 13:12:46(Eastern Time)  Voice ID:  [id number]/Document ID:  [id number]

## 2017-01-12 NOTE — PLAN OF CARE
Problem: Perioperative Period (Adult)  Goal: Signs and Symptoms of Listed Potential Problems Will be Absent or Manageable (Perioperative Period)  Outcome: Outcome(s) achieved Date Met:  01/12/17 01/12/17 9986   Perioperative Period   Problems Assessed (Perioperative Period) all   Problems Present (Perioperative Period) none   PT MEETS DISCHARGE CRITERIA, PT READY FOR DISCHARGE

## 2017-01-12 NOTE — DISCHARGE INSTRUCTIONS
General Anesthesia, Adult, Care After  Refer to this sheet in the next few weeks. These instructions provide you with information on caring for yourself after your procedure. Your health care provider may also give you more specific instructions. Your treatment has been planned according to current medical practices, but problems sometimes occur. Call your health care provider if you have any problems or questions after your procedure.  WHAT TO EXPECT AFTER THE PROCEDURE  After the procedure, it is typical to experience:  · Sleepiness.  · Nausea and vomiting.  HOME CARE INSTRUCTIONS  · For the first 24 hours after general anesthesia:  ¨ Have a responsible person with you.  ¨ Do not drive a car. If you are alone, do not take public transportation.  ¨ Do not drink alcohol.  ¨ Do not take medicine that has not been prescribed by your health care provider.  ¨ Do not sign important papers or make important decisions.  ¨ You may resume a normal diet and activities as directed by your health care provider.  · Change bandages (dressings) as directed.  · If you have questions or problems that seem related to general anesthesia, call the hospital and ask for the anesthetist or anesthesiologist on call.  SEEK MEDICAL CARE IF:  · You have nausea and vomiting that continue the day after anesthesia.  · You develop a rash.  SEEK IMMEDIATE MEDICAL CARE IF:    · You have difficulty breathing.  · You have chest pain.  · You have any allergic problems.     This information is not intended to replace advice given to you by your health care provider. Make sure you discuss any questions you have with your health care provider.     Document Released: 03/26/2002 Document Revised: 01/08/2016 Document Reviewed: 07/03/2014  [employer] Interactive Patient Education ©2016 [employer]    CALL YOUR PHYSICIAN IF YOU EXPERIENCE  INCREASED PAIN NOT HELPED BY YOUR PAIN MEDICATION.    IF  YOU HAVE QUESTIONS OR CONCERNS AFTER YOU ARE DISCHARGED CALL THE NURSE AT THE [facility] [phone].           Surgical Site Infections FAQs  What is a Surgical Site Infection (SSI)?  A surgical site infection is an infection that occurs after surgery in the part of the body where the surgery took place. Most patients who have surgery do not develop an infection. However, infections develop in about 1 to 3 out of every 100 patients who have surgery.  Some of the common symptoms of a surgical site infection are:  · Redness and pain around the area where you had surgery  · Drainage of cloudy fluid from your surgical wound  · Fever  Can SSIs be treated?  Yes. Most surgical site infections can be treated with antibiotics. The antibiotic given to you depends on the bacteria (germs) causing the infection. Sometimes patients with SSIs also need another surgery to treat the infection.  What are some of the things that hospitals are doing to prevent SSIs?  To prevent SSIs, doctors, nurses, and other healthcare providers:  · Clean their hands and arms up to their elbows with an antiseptic agent just before the surgery.  · Clean their hands with soap and water or an alcohol-based hand rub before and after caring for each patient.  · May remove some of your hair immediately before your surgery using electric clippers if the hair is in the same area where the procedure will occur. They should not shave you with a razor.  · Wear special hair covers, masks, gowns, and gloves during surgery to keep the surgery area clean.  · Give you antibiotics before your surgery starts. In most cases, you should get antibiotics within 60 minutes before the surgery starts and the antibiotics should be stopped within 24 hours after surgery.  · Clean the skin at the site of your surgery with a special soap that kills germs.  What can I do to help prevent SSIs?  Before your surgery:  · Tell your doctor about other medical problems you  may have. Health problems such as allergies, diabetes, and obesity could affect your surgery and your treatment.  · Quit smoking. Patients who smoke get more infections. Talk to your doctor about how you can quit before your surgery.  · Do not shave near where you will have surgery. Shaving with a razor can irritate your skin and make it easier to develop an infection.  At the time of your surgery:  · Speak up if someone tries to shave you with a razor before surgery. Ask why you need to be shaved and talk with your surgeon if you have any concerns.  · Ask if you will get antibiotics before surgery.  After your surgery:  · Make sure that your healthcare providers clean their hands before examining you, either with soap and water or an alcohol-based hand rub.  · If you do not see your providers clean their hands, please ask them to do so.  · Family and friends who visit you should not touch the surgical wound or dressings.  · Family and friends should clean their hands with soap and water or an alcohol-based hand rub before and after visiting you. If you do not see them clean their hands, ask them to clean their hands.  What do I need to do when I go home from the hospital?  · Before you go home, your doctor or nurse should explain everything you need to know about taking care of your wound. Make sure you understand how to care for your wound before you leave the hospital.  · Always clean your hands before and after caring for your wound.  · Before you go home, make sure you know who to contact if you have questions or problems after you get home.  · If you have any symptoms of an infection, such as redness and pain at the surgery site, drainage, or fever, call your doctor immediately.  If you have additional questions, please ask your doctor or nurse.  Developed and co-sponsored by The Society for Healthcare Epidemiology of [place] (SHEA); Infectious Diseases Society of [place] (IDSA); American Hospital Association;  Association for Professionals in Infection Control and Epidemiology (APIC); Centers for Disease Control and Prevention (CDC); and The Joint Commission.     This information is not intended to replace advice given to you by your health care provider. Make sure you discuss any questions you have with your health care provider.     Document Released: 12/23/2014 Document Revised: 01/08/2016 Document Reviewed: 03/02/2016  [employer] Interactive Patient Education ©2016 [employer]         Fall Prevention in the Home      Falls can cause injuries. They can happen to people of all ages. There are many things you can do to make your home safe and to help prevent falls.    WHAT CAN I DO ON THE OUTSIDE OF MY HOME?  · Regularly fix the edges of walkways and driveways and fix any cracks.  · Remove anything that might make you trip as you walk through a door, such as a raised step or threshold.  · Trim any bushes or trees on the path to your home.  · Use bright outdoor lighting.  · Clear any walking paths of anything that might make someone trip, such as rocks or tools.  · Regularly check to see if handrails are loose or broken. Make sure that both sides of any steps have handrails.  · Any raised decks and porches should have guardrails on the edges.  · Have any leaves, snow, or ice cleared regularly.  · Use sand or salt on walking paths during winter.  · Clean up any spills in your garage right away. This includes oil or grease spills.  WHAT CAN I DO IN THE BATHROOM?    · Use night lights.  · Install grab bars by the toilet and in the tub and shower. Do not use towel bars as grab bars.  · Use non-skid mats or decals in the tub or shower.  · If you need to sit down in the shower, use a plastic, non-slip stool.  · Keep the floor dry. Clean up any water that spills on the floor as soon as it happens.  · Remove soap buildup in the tub or shower regularly.  · Attach bath mats securely with double-sided non-slip rug tape.  · Do not  have throw rugs and other things on the floor that can make you trip.  WHAT CAN I DO IN THE BEDROOM?  · Use night lights.  · Make sure that you have a light by your bed that is easy to reach.  · Do not use any sheets or blankets that are too big for your bed. They should not hang down onto the floor.  · Have a firm chair that has side arms. You can use this for support while you get dressed.  · Do not have throw rugs and other things on the floor that can make you trip.  WHAT CAN I DO IN THE KITCHEN?  · Clean up any spills right away.  · Avoid walking on wet floors.  · Keep items that you use a lot in easy-to-reach places.  · If you need to reach something above you, use a strong step stool that has a grab bar.  · Keep electrical cords out of the way.  · Do not use floor polish or wax that makes floors slippery. If you must use wax, use non-skid floor wax.  · Do not have throw rugs and other things on the floor that can make you trip.  WHAT CAN I DO WITH MY STAIRS?  · Do not leave any items on the stairs.  · Make sure that there are handrails on both sides of the stairs and use them. Fix handrails that are broken or loose. Make sure that handrails are as long as the stairways.  · Check any carpeting to make sure that it is firmly attached to the stairs. Fix any carpet that is loose or worn.  · Avoid having throw rugs at the top or bottom of the stairs. If you do have throw rugs, attach them to the floor with carpet tape.  · Make sure that you have a light switch at the top of the stairs and the bottom of the stairs. If you do not have them, ask someone to add them for you.  WHAT ELSE CAN I DO TO HELP PREVENT FALLS?  · Wear shoes that:  ¨ Do not have high heels.  ¨ Have rubber bottoms.  ¨ Are comfortable and fit you well.  ¨ Are closed at the toe. Do not wear sandals.  · If you use a stepladder:  ¨ Make sure that it is fully opened. Do not climb a closed stepladder.  ¨ Make sure that both sides of the stepladder are  locked into place.  ¨ Ask someone to hold it for you, if possible.  · Clearly hamida and make sure that you can see:  ¨ Any grab bars or handrails.  ¨ First and last steps.  ¨ Where the edge of each step is.  · Use tools that help you move around (mobility aids) if they are needed. These include:  ¨ Canes.  ¨ Walkers.  ¨ Scooters.  ¨ Crutches.  · Turn on the lights when you go into a dark area. Replace any light bulbs as soon as they burn out.  · Set up your furniture so you have a clear path. Avoid moving your furniture around.  · If any of your floors are uneven, fix them.  · If there are any pets around you, be aware of where they are.  · Review your medicines with your doctor. Some medicines can make you feel dizzy. This can increase your chance of falling.  Ask your doctor what other things that you can do to help prevent falls.     This information is not intended to replace advice given to you by your health care provider. Make sure you discuss any questions you have with your health care provider.     Document Released: 10/14/2010 Document Revised: 05/03/2016 Document Reviewed: 01/22/2016  [employer] Interactive Patient Education ©2016 [employer]     PATIENT VERBALIZES UNDERSTANDING OF ABOVE EDUCATION

## 2017-01-12 NOTE — IP AVS SNAPSHOT
AFTER VISIT SUMMARY             Mike Kay           About your hospitalization     You were admitted on:  January 12, 2017 You last received care in the:  [facility] OR       Procedures & Surgeries      Procedure(s) (LRB):  UMBILICAL HERNIA REPAIR (N/A)     1/12/2017     Surgeon(s):  Kavitha Navarro MD  -------------------      Medications    If you or your caregiver advised us that you are currently taking a medication and that medication is marked below as “Resume”, this simply indicates that we have reviewed those medications to make sure our new therapy recommendations do not interfere.  If you have concerns about medications other than those new ones which we are prescribing today, please consult the physician who prescribed them (or your primary physician).  Our review of your home medications is not meant to indicate that we are directing their use.             Your Medications      START taking these medications     HYDROcodone-acetaminophen 5-325 MG per tablet   Take 1-2 tablets by mouth Every 4 (Four) Hours As Needed (Pain).   Commonly known as:  NORCO             CONTINUE taking these medications     aspirin 81 MG tablet   Take 81 mg by mouth Daily.           atorvastatin 20 MG tablet   Take 10 mg by mouth Daily. PT TAKES 1/2 20 MG TABLET TO EQUAL 10 MG DAILY   Commonly known as:  LIPITOR           fluticasone 50 MCG/ACT nasal spray   1 spray by Each Nare route Daily As Needed for rhinitis or allergies.   Commonly known as:  FLONASE           hydrochlorothiazide 25 MG tablet   Take 25 mg by mouth Daily.   Commonly known as:  HYDRODIURIL           losartan 100 MG tablet   Take 100 mg by mouth Daily.   Commonly known as:  COZAAR           pantoprazole 40 MG EC tablet   Take 40 mg by mouth Daily As Needed.   Commonly known as:  PROTONIX           potassium chloride 10 MEQ CR tablet   Take 10 mEq by mouth 2 (Two) Times a Day.   Commonly known as:  K-DUR,KLOR-CON           spironolactone  25 MG tablet   Take 25 mg by mouth Daily.   Commonly known as:  ALDACTONE           verapamil  MG CR tablet   Take 240 mg by mouth 2 (Two) Times a Day.   Commonly known as:  CALAN-SR                Where to Get Your Medications      You can get these medications from any pharmacy     Bring a paper prescription for each of these medications     HYDROcodone-acetaminophen 5-325 MG per tablet                  Your Medications      Your Medication List           Morning Noon Evening Bedtime As Needed    aspirin 81 MG tablet   Take 81 mg by mouth Daily.                                atorvastatin 20 MG tablet   Take 10 mg by mouth Daily. PT TAKES 1/2 20 MG TABLET TO EQUAL 10 MG DAILY   Commonly known as:  LIPITOR                                fluticasone 50 MCG/ACT nasal spray   1 spray by Each Nare route Daily As Needed for rhinitis or allergies.   Commonly known as:  FLONASE                                hydrochlorothiazide 25 MG tablet   Take 25 mg by mouth Daily.   Commonly known as:  HYDRODIURIL                                HYDROcodone-acetaminophen 5-325 MG per tablet   Take 1-2 tablets by mouth Every 4 (Four) Hours As Needed (Pain).   Commonly known as:  NORCO                                losartan 100 MG tablet   Take 100 mg by mouth Daily.   Commonly known as:  COZAAR                                pantoprazole 40 MG EC tablet   Take 40 mg by mouth Daily As Needed.   Commonly known as:  PROTONIX                                potassium chloride 10 MEQ CR tablet   Take 10 mEq by mouth 2 (Two) Times a Day.   Commonly known as:  K-DURKLOR-CON                                spironolactone 25 MG tablet   Take 25 mg by mouth Daily.   Commonly known as:  ALDACTONE                                verapamil  MG CR tablet   Take 240 mg by mouth 2 (Two) Times a Day.   Commonly known as:  CALAN-SR                                         Instructions for After Discharge        Other Instructions     Wound  Care       Remove dressings 48hr, leave steri strips               Discharge Instructions                                                                     General Anesthesia, Adult, Care After  Refer to this sheet in the next few weeks. These instructions provide you with information on caring for yourself after your procedure. Your health care provider may also give you more specific instructions. Your treatment has been planned according to current medical practices, but problems sometimes occur. Call your health care provider if you have any problems or questions after your procedure.  WHAT TO EXPECT AFTER THE PROCEDURE  After the procedure, it is typical to experience:  · Sleepiness.  · Nausea and vomiting.  HOME CARE INSTRUCTIONS  · For the first 24 hours after general anesthesia:  ¨ Have a responsible person with you.  ¨ Do not drive a car. If you are alone, do not take public transportation.  ¨ Do not drink alcohol.  ¨ Do not take medicine that has not been prescribed by your health care provider.  ¨ Do not sign important papers or make important decisions.  ¨ You may resume a normal diet and activities as directed by your health care provider.  · Change bandages (dressings) as directed.  · If you have questions or problems that seem related to general anesthesia, call the hospital and ask for the anesthetist or anesthesiologist on call.  SEEK MEDICAL CARE IF:  · You have nausea and vomiting that continue the day after anesthesia.  · You develop a rash.  SEEK IMMEDIATE MEDICAL CARE IF:    · You have difficulty breathing.  · You have chest pain.  · You have any allergic problems.     This information is not intended to replace advice given to you by your health care provider. Make sure you discuss any questions you have with your health care provider.     Document Released: 03/26/2002 Document Revised: 01/08/2016 Document Reviewed: 07/03/2014  Elsevier Interactive Patient Education ©2016 Elsevier  Inc.    CALL YOUR PHYSICIAN IF YOU EXPERIENCE  INCREASED PAIN NOT HELPED BY YOUR PAIN MEDICATION.    IF YOU HAVE QUESTIONS OR CONCERNS AFTER YOU ARE DISCHARGED CALL THE NURSE AT THE [facility] LINE [phone].           Surgical Site Infections FAQs  What is a Surgical Site Infection (SSI)?  A surgical site infection is an infection that occurs after surgery in the part of the body where the surgery took place. Most patients who have surgery do not develop an infection. However, infections develop in about 1 to 3 out of every 100 patients who have surgery.  Some of the common symptoms of a surgical site infection are:  · Redness and pain around the area where you had surgery  · Drainage of cloudy fluid from your surgical wound  · Fever  Can SSIs be treated?  Yes. Most surgical site infections can be treated with antibiotics. The antibiotic given to you depends on the bacteria (germs) causing the infection. Sometimes patients with SSIs also need another surgery to treat the infection.  What are some of the things that hospitals are doing to prevent SSIs?  To prevent SSIs, doctors, nurses, and other healthcare providers:  · Clean their hands and arms up to their elbows with an antiseptic agent just before the surgery.  · Clean their hands with soap and water or an alcohol-based hand rub before and after caring for each patient.  · May remove some of your hair immediately before your surgery using electric clippers if the hair is in the same area where the procedure will occur. They should not shave you with a razor.  · Wear special hair covers, masks, gowns, and gloves during surgery to keep the surgery area clean.  · Give you antibiotics before your surgery starts. In most cases, you should get antibiotics within 60 minutes before the surgery starts and the antibiotics should be stopped within 24 hours after surgery.  · Clean the skin at the site of your surgery with a special soap that kills germs.  What can  I do to help prevent SSIs?  Before your surgery:  · Tell your doctor about other medical problems you may have. Health problems such as allergies, diabetes, and obesity could affect your surgery and your treatment.  · Quit smoking. Patients who smoke get more infections. Talk to your doctor about how you can quit before your surgery.  · Do not shave near where you will have surgery. Shaving with a razor can irritate your skin and make it easier to develop an infection.  At the time of your surgery:  · Speak up if someone tries to shave you with a razor before surgery. Ask why you need to be shaved and talk with your surgeon if you have any concerns.  · Ask if you will get antibiotics before surgery.  After your surgery:  · Make sure that your healthcare providers clean their hands before examining you, either with soap and water or an alcohol-based hand rub.  · If you do not see your providers clean their hands, please ask them to do so.  · Family and friends who visit you should not touch the surgical wound or dressings.  · Family and friends should clean their hands with soap and water or an alcohol-based hand rub before and after visiting you. If you do not see them clean their hands, ask them to clean their hands.  What do I need to do when I go home from the hospital?  · Before you go home, your doctor or nurse should explain everything you need to know about taking care of your wound. Make sure you understand how to care for your wound before you leave the hospital.  · Always clean your hands before and after caring for your wound.  · Before you go home, make sure you know who to contact if you have questions or problems after you get home.  · If you have any symptoms of an infection, such as redness and pain at the surgery site, drainage, or fever, call your doctor immediately.  If you have additional questions, please ask your doctor or nurse.  Developed and co-sponsored by The Society for Healthcare  Epidemiology of [place] (SHEA); Infectious Diseases Society of [place] (IDSA); American Hospital Association; Association for Professionals in Infection Control and Epidemiology (APIC); Centers for Disease Control and Prevention (CDC); and The Joint Commission.     This information is not intended to replace advice given to you by your health care provider. Make sure you discuss any questions you have with your health care provider.     Document Released: 12/23/2014 Document Revised: 01/08/2016 Document Reviewed: 03/02/2016  [employer] Interactive Patient Education ©2016 Elsevier Inc.         Fall Prevention in the Home      Falls can cause injuries. They can happen to people of all ages. There are many things you can do to make your home safe and to help prevent falls.    WHAT CAN I DO ON THE OUTSIDE OF MY HOME?  · Regularly fix the edges of walkways and driveways and fix any cracks.  · Remove anything that might make you trip as you walk through a door, such as a raised step or threshold.  · Trim any bushes or trees on the path to your home.  · Use bright outdoor lighting.  · Clear any walking paths of anything that might make someone trip, such as rocks or tools.  · Regularly check to see if handrails are loose or broken. Make sure that both sides of any steps have handrails.  · Any raised decks and porches should have guardrails on the edges.  · Have any leaves, snow, or ice cleared regularly.  · Use sand or salt on walking paths during winter.  · Clean up any spills in your garage right away. This includes oil or grease spills.  WHAT CAN I DO IN THE BATHROOM?    · Use night lights.  · Install grab bars by the toilet and in the tub and shower. Do not use towel bars as grab bars.  · Use non-skid mats or decals in the tub or shower.  · If you need to sit down in the shower, use a plastic, non-slip stool.  · Keep the floor dry. Clean up any water that spills on the floor as soon as it happens.  · Remove soap buildup  in the tub or shower regularly.  · Attach bath mats securely with double-sided non-slip rug tape.  · Do not have throw rugs and other things on the floor that can make you trip.  WHAT CAN I DO IN THE BEDROOM?  · Use night lights.  · Make sure that you have a light by your bed that is easy to reach.  · Do not use any sheets or blankets that are too big for your bed. They should not hang down onto the floor.  · Have a firm chair that has side arms. You can use this for support while you get dressed.  · Do not have throw rugs and other things on the floor that can make you trip.  WHAT CAN I DO IN THE KITCHEN?  · Clean up any spills right away.  · Avoid walking on wet floors.  · Keep items that you use a lot in easy-to-reach places.  · If you need to reach something above you, use a strong step stool that has a grab bar.  · Keep electrical cords out of the way.  · Do not use floor polish or wax that makes floors slippery. If you must use wax, use non-skid floor wax.  · Do not have throw rugs and other things on the floor that can make you trip.  WHAT CAN I DO WITH MY STAIRS?  · Do not leave any items on the stairs.  · Make sure that there are handrails on both sides of the stairs and use them. Fix handrails that are broken or loose. Make sure that handrails are as long as the stairways.  · Check any carpeting to make sure that it is firmly attached to the stairs. Fix any carpet that is loose or worn.  · Avoid having throw rugs at the top or bottom of the stairs. If you do have throw rugs, attach them to the floor with carpet tape.  · Make sure that you have a light switch at the top of the stairs and the bottom of the stairs. If you do not have them, ask someone to add them for you.  WHAT ELSE CAN I DO TO HELP PREVENT FALLS?  · Wear shoes that:  ¨ Do not have high heels.  ¨ Have rubber bottoms.  ¨ Are comfortable and fit you well.  ¨ Are closed at the toe. Do not wear sandals.  · If you use a stepladder:  ¨ Make sure  that it is fully opened. Do not climb a closed stepladder.  ¨ Make sure that both sides of the stepladder are locked into place.  ¨ Ask someone to hold it for you, if possible.  · Clearly hamida and make sure that you can see:  ¨ Any grab bars or handrails.  ¨ First and last steps.  ¨ Where the edge of each step is.  · Use tools that help you move around (mobility aids) if they are needed. These include:  ¨ Canes.  ¨ Walkers.  ¨ Scooters.  ¨ Crutches.  · Turn on the lights when you go into a dark area. Replace any light bulbs as soon as they burn out.  · Set up your furniture so you have a clear path. Avoid moving your furniture around.  · If any of your floors are uneven, fix them.  · If there are any pets around you, be aware of where they are.  · Review your medicines with your doctor. Some medicines can make you feel dizzy. This can increase your chance of falling.  Ask your doctor what other things that you can do to help prevent falls.     This information is not intended to replace advice given to you by your health care provider. Make sure you discuss any questions you have with your health care provider.     Document Released: 10/14/2010 Document Revised: 05/03/2016 Document Reviewed: 01/22/2016  [employer] Interactive Patient Education ©2016 [employer]     PATIENT VERBALIZES UNDERSTANDING OF ABOVE EDUCATION    Discharge References/Attachments     UMBILICAL HERNIORRHAPHY, CARE AFTER (ENGLISH)    ACETAMINOPHEN; HYDROCODONE TABLETS OR CAPSULES (ENGLISH)       Follow-ups for After Discharge        Follow-up Information     Follow up with Kavitha Navarro MD Follow up on 2/6/2017.    Specialty:  General Surgery    Why:  YOUR APPT. TIME IS AT 9:00AM    Contact information:    [address] [place] [address]  [facility] [address]  [phone]        ReadWorks Signup     [facility] ReadWorks allows you to send messages to your doctor, view your test results, renew your prescriptions, schedule appointments,  and more. To sign up, go to [employer] and click on the Sign Up Now link in the New User? box. Enter your [employer] Activation Code exactly as it appears below along with the last four digits of your Social Security Number and your Date of Birth () to complete the sign-up process. If you do not sign up before the expiration date, you must request a new code.    [employer] Activation Code: [email or web address]  Expires: 2017  1:44 PM    If you have questions, you can email [email or web address] or call [phone] to talk to our [employer] staff. Remember, [employer] is NOT to be used for urgent needs. For medical emergencies, dial 911.           Summary of Your Hospitalization        Reason for Hospitalization     Your primary diagnosis was:  Not on File      Care Providers     Provider Service Role Specialty    Kavitha Navarro MD -- Attending Provider General Surgery    Kavitha Navarro MD -- Surgeon  General Surgery      Your Allergies  Date Reviewed: 2017    No active allergies      Patient Belongings Returned     Document Return of Belongings Flowsheet     Were the patient bedside belongings sent home?   --   Belongings Retrieved from Security & Sent Home   --    Belongings Sent to Safe   --   Medications Retrieved from Pharmacy & Sent Home   --              More Information      Umbilical Herniorrhaphy, Care After  Refer to this sheet in the next few weeks. These instructions provide you with information on caring for yourself after your procedure. Your health care provider may also give you more specific instructions. Your treatment has been planned according to current medical practices, but problems sometimes occur. Call your health care provider if you have any problems or questions after your procedure.  HOME CARE INSTRUCTIONS  · If you are given antibiotic medicine, take it as directed. Finish it even if you start to feel better.  · Only take over-the-counter or prescription  medicines for pain, fever, or discomfort as directed by your health care provider. Do not take aspirin. It can cause bleeding.  · Do not get your surgical cut (incision) area wet unless your health care provider says it is okay.  · Avoid lifting objects heavier than 10 lb (4.5 kg) for 8 weeks after surgery.  · Avoid sexual activity for 5 weeks after surgery or as directed by your health care provider.  · Do not drive while taking prescription pain medicine.  · You may return to your other normal, daily activities after 3 days or as directed by your health care provider.  SEEK MEDICAL CARE IF:  · You notice blood or fluid leaking from the surgical site.  · Your incision area becomes red or swollen.  · Your pain at the surgical site becomes worse or is not relieved by medicine.  · You have problems urinating.  · You feel nauseous or vomit more than 2 days after surgery.  · You notice the bulge in your abdomen returns after the procedure.  SEEK IMMEDIATE MEDICAL CARE IF:  · You have a fever.  · You have nausea or vomiting that will not stop.     This information is not intended to replace advice given to you by your health care provider. Make sure you discuss any questions you have with your health care provider.     Document Released: 06/18/2013 Document Revised: 01/08/2016 Document Reviewed: 06/18/2013  [employer] Interactive Patient Education ©2016 Elsevier Inc.          Acetaminophen; Hydrocodone tablets or capsules  What is this medicine?  ACETAMINOPHEN; HYDROCODONE (a set a DORIE gavin fen; tara droe KOE done) is a pain reliever. It is used to treat moderate to severe pain.  This medicine may be used for other purposes; ask your health care provider or pharmacist if you have questions.  What should I tell my health care provider before I take this medicine?  They need to know if you have any of these conditions:  -brain tumor  -Crohn's disease, inflammatory bowel disease, or ulcerative colitis  -drug abuse or  addiction  -head injury  -heart or circulation problems  -if you often drink alcohol  -kidney disease or problems going to the bathroom  -liver disease  -lung disease, asthma, or breathing problems  -an unusual or allergic reaction to acetaminophen, hydrocodone, other opioid analgesics, other medicines, foods, dyes, or preservatives  -pregnant or trying to get pregnant  -breast-feeding  How should I use this medicine?  Take this medicine by mouth. Swallow it with a full glass of water. Follow the directions on the prescription label. If the medicine upsets your stomach, take the medicine with food or milk. Do not take more than you are told to take.  Talk to your pediatrician regarding the use of this medicine in children. This medicine is not approved for use in children.  Patients over 65 years may have a stronger reaction and need a smaller dose.  Overdosage: If you think you have taken too much of this medicine contact a poison control center or emergency room at once.  NOTE: This medicine is only for you. Do not share this medicine with others.  What if I miss a dose?  If you miss a dose, take it as soon as you can. If it is almost time for your next dose, take only that dose. Do not take double or extra doses.  What may interact with this medicine?  -alcohol  -antihistamines  -isoniazid  -medicines for depression, anxiety, or psychotic disturbances  -medicines for sleep  -muscle relaxants  -naltrexone  -narcotic medicines (opiates) for pain  -phenobarbital  -ritonavir  -tramadol  This list may not describe all possible interactions. Give your health care provider a list of all the medicines, herbs, non-prescription drugs, or dietary supplements you use. Also tell them if you smoke, drink alcohol, or use illegal drugs. Some items may interact with your medicine.  What should I watch for while using this medicine?  Tell your doctor or health care professional if your pain does not go away, if it gets worse, or  if you have new or a different type of pain. You may develop tolerance to the medicine. Tolerance means that you will need a higher dose of the medicine for pain relief. Tolerance is normal and is expected if you take the medicine for a long time.  Do not suddenly stop taking your medicine because you may develop a severe reaction. Your body becomes used to the medicine. This does NOT mean you are addicted. Addiction is a behavior related to getting and using a drug for a non-medical reason. If you have pain, you have a medical reason to take pain medicine. Your doctor will tell you how much medicine to take. If your doctor wants you to stop the medicine, the dose will be slowly lowered over time to avoid any side effects.  You may get drowsy or dizzy when you first start taking the medicine or change doses. Do not drive, use machinery, or do anything that may be dangerous until you know how the medicine affects you. Stand or sit up slowly.  There are different types of narcotic medicines (opiates) for pain. If you take more than one type at the same time, you may have more side effects. Give your health care provider a list of all medicines you use. Your doctor will tell you how much medicine to take. Do not take more medicine than directed. Call emergency for help if you have problems breathing.  The medicine will cause constipation. Try to have a bowel movement at least every 2 to 3 days. If you do not have a bowel movement for 3 days, call your doctor or health care professional.  Too much acetaminophen can be very dangerous. Do not take Tylenol (acetaminophen) or medicines that contain acetaminophen with this medicine. Many non-prescription medicines contain acetaminophen. Always read the labels carefully.  What side effects may I notice from receiving this medicine?  Side effects that you should report to your doctor or health care professional as soon as possible:  -allergic reactions like skin rash, itching  or hives, swelling of the face, lips, or tongue  -breathing problems  -confusion  -feeling faint or lightheaded, falls  -stomach pain  -yellowing of the eyes or skin  Side effects that usually do not require medical attention (report to your doctor or health care professional if they continue or are bothersome):  -nausea, vomiting  -stomach upset  This list may not describe all possible side effects. Call your doctor for medical advice about side effects. You may report side effects to FDA at 4-587-ZWD-0599.  Where should I keep my medicine?  Keep out of the reach of children. This medicine can be abused. Keep your medicine in a safe place to protect it from theft. Do not share this medicine with anyone. Selling or giving away this medicine is dangerous and against the law.  This medicine may cause accidental overdose and death if it taken by other adults, children, or pets. Mix any unused medicine with a substance like cat litter or coffee grounds. Then throw the medicine away in a sealed container like a sealed bag or a coffee can with a lid. Do not use the medicine after the expiration date.  Store at room temperature between 15 and 30 degrees C (59 and 86 degrees F).  NOTE: This sheet is a summary. It may not cover all possible information. If you have questions about this medicine, talk to your doctor, pharmacist, or health care provider.     © 2016, Elsevier/Gold Standard. (2015-11-18 15:29:20)         PREVENTING SURGICAL SITE INFECTIONS     Surgical Site Infections FAQs  What is a Surgical Site Infection (SSI)?  A surgical site infection is an infection that occurs after surgery in the part of the body where the surgery took place. Most patients who have surgery do not develop an infection. However, infections develop in about 1 to 3 out of every 100 patients who have surgery.  Some of the common symptoms of a surgical site infection are:  · Redness and pain around the area where you had surgery  · Drainage  of cloudy fluid from your surgical wound  · Fever  Can SSIs be treated?  Yes. Most surgical site infections can be treated with antibiotics. The antibiotic given to you depends on the bacteria (germs) causing the infection. Sometimes patients with SSIs also need another surgery to treat the infection.  What are some of the things that hospitals are doing to prevent SSIs?  To prevent SSIs, doctors, nurses, and other healthcare providers:  · Clean their hands and arms up to their elbows with an antiseptic agent just before the surgery.  · Clean their hands with soap and water or an alcohol-based hand rub before and after caring for each patient.  · May remove some of your hair immediately before your surgery using electric clippers if the hair is in the same area where the procedure will occur. They should not shave you with a razor.  · Wear special hair covers, masks, gowns, and gloves during surgery to keep the surgery area clean.  · Give you antibiotics before your surgery starts. In most cases, you should get antibiotics within 60 minutes before the surgery starts and the antibiotics should be stopped within 24 hours after surgery.  · Clean the skin at the site of your surgery with a special soap that kills germs.  What can I do to help prevent SSIs?  Before your surgery:  · Tell your doctor about other medical problems you may have. Health problems such as allergies, diabetes, and obesity could affect your surgery and your treatment.  · Quit smoking. Patients who smoke get more infections. Talk to your doctor about how you can quit before your surgery.  · Do not shave near where you will have surgery. Shaving with a razor can irritate your skin and make it easier to develop an infection.  At the time of your surgery:  · Speak up if someone tries to shave you with a razor before surgery. Ask why you need to be shaved and talk with your surgeon if you have any concerns.  · Ask if you will get antibiotics before  surgery.  After your surgery:  · Make sure that your healthcare providers clean their hands before examining you, either with soap and water or an alcohol-based hand rub.    If you do not see your providers clean their hands, please ask them to do so.  · Family and friends who visit you should not touch the surgical wound or dressings.  · Family and friends should clean their hands with soap and water or an alcohol-based hand rub before and after visiting you. If you do not see them clean their hands, ask them to clean their hands.  What do I need to do when I go home from the hospital?  · Before you go home, your doctor or nurse should explain everything you need to know about taking care of your wound. Make sure you understand how to care for your wound before you leave the hospital.  · Always clean your hands before and after caring for your wound.  · Before you go home, make sure you know who to contact if you have questions or problems after you get home.  · If you have any symptoms of an infection, such as redness and pain at the surgery site, drainage, or fever, call your doctor immediately.  If you have additional questions, please ask your doctor or nurse.  Developed and co-sponsored by The Society for Healthcare Epidemiology of [place] (SHEA); Infectious Diseases Society of [place] (IDSA); American Hospital Association; Association for Professionals in Infection Control and Epidemiology (APIC); Centers for Disease Control and Prevention (CDC); and The Joint Commission.     This information is not intended to replace advice given to you by your health care provider. Make sure you discuss any questions you have with your health care provider.     Document Released: 12/23/2014 Document Revised: 01/08/2016 Document Reviewed: 03/02/2016  [employer] Interactive Patient Education ©2016 [employer]             SYMPTOMS OF A STROKE    Call 911 or have someone take you to the Emergency Department if you have any of  the following:    · Sudden numbness or weakness of your face, arm or leg especially on one side of the body  · Sudden confusion, diffiiculty speaking or trouble understanding   · Changes in your vision or loss of sight in one eye  · Sudden severe headache with no known cause  · sudden dizziness, trouble walking, loss of balance or coordination    It is important to seek emergency care right away if you have further stroke symptoms. If you get emergency help quickly, the powerful clot-dissolving medicines can reduce the disabilities caused by a stroke.     For more information:    American Stroke Association  7-449-2-STROKE  www.strokeassociation.org           IF YOU SMOKE OR USE TOBACCO PLEASE READ THE FOLLOWING:    Why is smoking bad for me?  Smoking increases the risk of heart disease, lung disease, vascular disease, stroke, and cancer.     If you smoke, STOP!    If you would like more information on quitting smoking, please visit the [employer] website: [email or web address]   This link will provide additional resources including the QUIT line and the [employer] support groups.     For more information:    American Cancer Society  ([phone]    American Heart Association  1-856.885.6935               YOU ARE THE MOST IMPORTANT FACTOR IN YOUR RECOVERY.     Follow all instructions carefully.     I have reviewed my discharge instructions with my nurse, including the following information, if applicable:     Information about my illness and diagnosis   Follow up appointments (including lab draws)   Wound Care   Equipment Needs   Medications (new and continuing) along with side effects   Preventative information such as vaccines and smoking cessations   Diet   Pain   I know when to contact my Doctor's office or seek emergency care      I want my nurse to describe the side effects of my medications: YES NO   If the answer is no, I understand the side effects of my  medications: YES NO   My nurse described the side effects of my medications in a way that I could understand: YES NO   I have taken my personal belongings and my own medications with me at discharge: YES NO            I have received this information and my questions have been answered. I have discussed any concerns I see with this plan with the nurse or physician. I understand these instructions.    Signature of Patient or Responsible Person: _____________________________________    Date: _________________  Time: __________________    Signature of Healthcare Provider: _______________________________________  Date: _________________  Time: __________________

## 2017-01-12 NOTE — ANESTHESIA POSTPROCEDURE EVALUATION
Patient: Mike Kay    Procedure Summary     Date Anesthesia Start Anesthesia Stop Room / Location    01/12/17 1006 1048  [facility] OR 06 / [facility] OR       Procedure Diagnosis Surgeon Provider    UMBILICAL HERNIA REPAIR (N/A Abdomen) (UMBILICAL HERNIA ) MD STEPHANIA Carson CRNA          Anesthesia Type: general  Last vitals  BP      Temp      Pulse     Resp      SpO2        Post Anesthesia Care and Evaluation    Patient location during evaluation: PACU  Patient participation: complete - patient participated  Level of consciousness: awake and alert  Pain management: adequate  Airway patency: patent  Anesthetic complications: No anesthetic complications    Cardiovascular status: acceptable and hemodynamically stable  Respiratory status: acceptable  Hydration status: acceptable

## 2017-01-12 NOTE — ANESTHESIA PROCEDURE NOTES
Airway  Urgency: elective    Airway not difficult    General Information and Staff    Patient location during procedure: OR  CRNA: STEPHANIA SNYDER    Indications and Patient Condition  Indications for airway management: airway protection    Preoxygenated: yes  MILS maintained throughout  Mask difficulty assessment: 1 - vent by mask    Final Airway Details  Final airway type: endotracheal airway      Successful airway: ETT  Cuffed: yes   Successful intubation technique: direct laryngoscopy and video laryngoscopy  Facilitating devices/methods: intubating stylet  Endotracheal tube insertion site: oral  Blade: López  Blade size: #2  ETT size: 7.0 mm  Placement verified by: chest auscultation and capnometry   Inital cuff pressure (cm H2O): 21  Cuff volume (mL): 7  Measured from: gums  Number of attempts at approach: 2    Additional Comments  Initial DL w López 2, grade3 view, called for glidesscope  Easy mask ventilation, Glidesscope w good view lta to cords, 7.0ett easliy passed .  Atraumatic teeath as preop

## 2017-01-23 ENCOUNTER — OFFICE VISIT (OUTPATIENT)
Dept: URGENT CARE | Age: 77
End: 2017-01-23
Payer: MEDICARE

## 2017-01-23 VITALS
DIASTOLIC BLOOD PRESSURE: 76 MMHG | OXYGEN SATURATION: 97 % | RESPIRATION RATE: 18 BRPM | HEART RATE: 76 BPM | SYSTOLIC BLOOD PRESSURE: 147 MMHG | TEMPERATURE: 97.8 F | HEIGHT: 72 IN | BODY MASS INDEX: 28.42 KG/M2 | WEIGHT: 209.8 LBS

## 2017-01-23 DIAGNOSIS — R05.9 COUGH: ICD-10-CM

## 2017-01-23 DIAGNOSIS — J01.11 ACUTE RECURRENT FRONTAL SINUSITIS: Primary | ICD-10-CM

## 2017-01-23 PROCEDURE — 1123F ACP DISCUSS/DSCN MKR DOCD: CPT | Performed by: NURSE PRACTITIONER

## 2017-01-23 PROCEDURE — G8420 CALC BMI NORM PARAMETERS: HCPCS | Performed by: NURSE PRACTITIONER

## 2017-01-23 PROCEDURE — G8427 DOCREV CUR MEDS BY ELIG CLIN: HCPCS | Performed by: NURSE PRACTITIONER

## 2017-01-23 PROCEDURE — 3017F COLORECTAL CA SCREEN DOC REV: CPT | Performed by: NURSE PRACTITIONER

## 2017-01-23 PROCEDURE — 96372 THER/PROPH/DIAG INJ SC/IM: CPT | Performed by: NURSE PRACTITIONER

## 2017-01-23 PROCEDURE — 4040F PNEUMOC VAC/ADMIN/RCVD: CPT | Performed by: NURSE PRACTITIONER

## 2017-01-23 PROCEDURE — 1036F TOBACCO NON-USER: CPT | Performed by: NURSE PRACTITIONER

## 2017-01-23 PROCEDURE — G8484 FLU IMMUNIZE NO ADMIN: HCPCS | Performed by: NURSE PRACTITIONER

## 2017-01-23 PROCEDURE — 99213 OFFICE O/P EST LOW 20 MIN: CPT | Performed by: NURSE PRACTITIONER

## 2017-01-23 RX ORDER — SPIRONOLACTONE 25 MG/1
25 TABLET ORAL DAILY
COMMUNITY
End: 2017-06-22

## 2017-01-23 RX ORDER — BENZONATATE 100 MG/1
100 CAPSULE ORAL 3 TIMES DAILY PRN
Qty: 21 CAPSULE | Refills: 0 | Status: SHIPPED | OUTPATIENT
Start: 2017-01-23 | End: 2017-01-30

## 2017-01-23 RX ORDER — ERGOCALCIFEROL 1.25 MG/1
50000 CAPSULE ORAL WEEKLY
COMMUNITY
End: 2017-06-26 | Stop reason: DRUGHIGH

## 2017-01-23 RX ORDER — AMOXICILLIN AND CLAVULANATE POTASSIUM 875; 125 MG/1; MG/1
1 TABLET, FILM COATED ORAL 2 TIMES DAILY
Qty: 20 TABLET | Refills: 0 | Status: SHIPPED | OUTPATIENT
Start: 2017-01-23 | End: 2017-02-02

## 2017-01-23 RX ORDER — CALCIUM CARBONATE 500(1250)
500 TABLET ORAL DAILY
COMMUNITY
End: 2018-07-03

## 2017-01-23 RX ORDER — CHLORAL HYDRATE 500 MG
1200 CAPSULE ORAL 2 TIMES DAILY
COMMUNITY

## 2017-01-23 RX ORDER — GLIPIZIDE 10 MG/1
10 TABLET, FILM COATED, EXTENDED RELEASE ORAL DAILY
COMMUNITY
End: 2018-01-16 | Stop reason: SDUPTHER

## 2017-01-23 RX ORDER — TEMAZEPAM 30 MG/1
30 CAPSULE ORAL NIGHTLY PRN
COMMUNITY
End: 2017-11-09 | Stop reason: SDUPTHER

## 2017-01-23 RX ORDER — INSULIN GLARGINE 100 [IU]/ML
INJECTION, SOLUTION SUBCUTANEOUS NIGHTLY
COMMUNITY
End: 2017-06-26 | Stop reason: DRUGHIGH

## 2017-01-23 RX ORDER — METHYLPREDNISOLONE ACETATE 80 MG/ML
80 INJECTION, SUSPENSION INTRA-ARTICULAR; INTRALESIONAL; INTRAMUSCULAR; SOFT TISSUE ONCE
Status: COMPLETED | OUTPATIENT
Start: 2017-01-23 | End: 2017-01-23

## 2017-01-23 RX ADMIN — METHYLPREDNISOLONE ACETATE 80 MG: 80 INJECTION, SUSPENSION INTRA-ARTICULAR; INTRALESIONAL; INTRAMUSCULAR; SOFT TISSUE at 11:11

## 2017-01-23 ASSESSMENT — ENCOUNTER SYMPTOMS
COUGH: 1
EYES NEGATIVE: 1
ABDOMINAL PAIN: 1

## 2017-06-10 ENCOUNTER — OFFICE VISIT (OUTPATIENT)
Dept: URGENT CARE | Age: 77
End: 2017-06-10
Payer: MEDICARE

## 2017-06-10 VITALS
WEIGHT: 220 LBS | RESPIRATION RATE: 18 BRPM | DIASTOLIC BLOOD PRESSURE: 70 MMHG | SYSTOLIC BLOOD PRESSURE: 135 MMHG | HEIGHT: 72 IN | OXYGEN SATURATION: 94 % | BODY MASS INDEX: 29.8 KG/M2 | HEART RATE: 78 BPM | TEMPERATURE: 98.5 F
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
## 2017-06-10 PROCEDURE — 99213 OFFICE O/P EST LOW 20 MIN: CPT | Performed by: PHYSICIAN ASSISTANT

## 2017-06-10 PROCEDURE — 3017F COLORECTAL CA SCREEN DOC REV: CPT | Performed by: PHYSICIAN ASSISTANT

## 2017-06-10 PROCEDURE — 1123F ACP DISCUSS/DSCN MKR DOCD: CPT | Performed by: PHYSICIAN ASSISTANT

## 2017-06-10 PROCEDURE — G8420 CALC BMI NORM PARAMETERS: HCPCS | Performed by: PHYSICIAN ASSISTANT

## 2017-06-10 PROCEDURE — 1036F TOBACCO NON-USER: CPT | Performed by: PHYSICIAN ASSISTANT

## 2017-06-10 PROCEDURE — 4040F PNEUMOC VAC/ADMIN/RCVD: CPT | Performed by: PHYSICIAN ASSISTANT

## 2017-06-10 PROCEDURE — G8427 DOCREV CUR MEDS BY ELIG CLIN: HCPCS | Performed by: PHYSICIAN ASSISTANT

## 2017-06-10 RX ORDER — DOXYCYCLINE HYCLATE 100 MG/1
100 CAPSULE ORAL 2 TIMES DAILY
Qty: 28 CAPSULE | Refills: 0 | Status: SHIPPED | OUTPATIENT
Start: 2017-06-10 | End: 2017-06-24

## 2017-06-10 ASSESSMENT — ENCOUNTER SYMPTOMS
GASTROINTESTINAL NEGATIVE: 1
RESPIRATORY NEGATIVE: 1

## 2017-06-19 LAB
ALBUMIN SERPL-MCNC: 4.2 G/DL (ref 3.5–5.2)
ALP BLD-CCNC: 57 U/L (ref 40–130)
ALT SERPL-CCNC: 37 U/L (ref 5–41)
ANION GAP SERPL CALCULATED.3IONS-SCNC: 17 MMOL/L (ref 7–19)
AST SERPL-CCNC: 27 U/L (ref 5–40)
BASOPHILS ABSOLUTE: 0.1 K/UL (ref 0–0.2)
BASOPHILS RELATIVE PERCENT: 0.8 % (ref 0–1)
BILIRUB SERPL-MCNC: 0.5 MG/DL (ref 0.2–1.2)
BUN BLDV-MCNC: 20 MG/DL (ref 8–23)
CALCIUM SERPL-MCNC: 9.2 MG/DL (ref 8.8–10.2)
CHLORIDE BLD-SCNC: 103 MMOL/L (ref 98–111)
CHOLESTEROL, TOTAL: 140 MG/DL (ref 160–199)
CO2: 26 MMOL/L (ref 22–29)
CREAT SERPL-MCNC: 1 MG/DL (ref 0.5–1.2)
EOSINOPHILS ABSOLUTE: 0.5 K/UL (ref 0–0.6)
EOSINOPHILS RELATIVE PERCENT: 7.6 % (ref 0–5)
GFR NON-AFRICAN AMERICAN: >60
GLUCOSE BLD-MCNC: 75 MG/DL (ref 74–109)
HBA1C MFR BLD: 5.9 %
HCT VFR BLD CALC: 40.9 % (ref 42–52)
HDLC SERPL-MCNC: 37 MG/DL (ref 55–121)
HEMOGLOBIN: 13.8 G/DL (ref 14–18)
LDL CHOLESTEROL CALCULATED: 72 MG/DL
LYMPHOCYTES ABSOLUTE: 1.8 K/UL (ref 1.1–4.5)
LYMPHOCYTES RELATIVE PERCENT: 30.6 % (ref 20–40)
MCH RBC QN AUTO: 29.8 PG (ref 27–31)
MCHC RBC AUTO-ENTMCNC: 33.7 G/DL (ref 33–37)
MCV RBC AUTO: 88.3 FL (ref 80–94)
MONOCYTES ABSOLUTE: 0.4 K/UL (ref 0–0.9)
MONOCYTES RELATIVE PERCENT: 7.3 % (ref 0–10)
NEUTROPHILS ABSOLUTE: 3.2 K/UL (ref 1.5–7.5)
NEUTROPHILS RELATIVE PERCENT: 53.5 % (ref 50–65)
PDW BLD-RTO: 12.8 % (ref 11.5–14.5)
PLATELET # BLD: 144 K/UL (ref 130–400)
PMV BLD AUTO: 10.6 FL (ref 9.4–12.4)
POTASSIUM SERPL-SCNC: 3.1 MMOL/L (ref 3.5–5)
RBC # BLD: 4.63 M/UL (ref 4.7–6.1)
SODIUM BLD-SCNC: 146 MMOL/L (ref 136–145)
TOTAL PROTEIN: 7 G/DL (ref 6.6–8.7)
TRIGL SERPL-MCNC: 156 MG/DL (ref 150–199)
VITAMIN D 25-HYDROXY: 54.9 NG/ML
WBC # BLD: 5.9 K/UL (ref 4.8–10.8)

## 2017-06-22 ENCOUNTER — TELEPHONE (OUTPATIENT)
Dept: INTERNAL MEDICINE | Age: 77
End: 2017-06-22

## 2017-06-22 DIAGNOSIS — E87.6 HYPOKALEMIA: Primary | ICD-10-CM

## 2017-06-22 RX ORDER — PANTOPRAZOLE SODIUM 40 MG/1
TABLET, DELAYED RELEASE ORAL
COMMUNITY
Start: 2017-05-05 | End: 2018-01-16 | Stop reason: SDUPTHER

## 2017-06-22 RX ORDER — VERAPAMIL HYDROCHLORIDE 240 MG/1
240 TABLET, FILM COATED, EXTENDED RELEASE ORAL
COMMUNITY
Start: 2016-05-04 | End: 2017-11-09 | Stop reason: SDUPTHER

## 2017-06-22 RX ORDER — HYDROCHLOROTHIAZIDE 25 MG/1
25 TABLET ORAL
COMMUNITY
Start: 2016-05-04 | End: 2017-06-22

## 2017-06-22 RX ORDER — POTASSIUM CHLORIDE 750 MG/1
10 TABLET, EXTENDED RELEASE ORAL
COMMUNITY
Start: 2016-05-04 | End: 2018-07-03

## 2017-06-22 RX ORDER — FLUTICASONE PROPIONATE 50 MCG
1 SPRAY, SUSPENSION (ML) NASAL
COMMUNITY
Start: 2016-05-04 | End: 2018-01-16 | Stop reason: SDUPTHER

## 2017-06-22 RX ORDER — HYDROCODONE BITARTRATE AND ACETAMINOPHEN 5; 325 MG/1; MG/1
TABLET ORAL
COMMUNITY
Start: 2017-05-18 | End: 2018-07-03

## 2017-06-22 RX ORDER — LOSARTAN POTASSIUM 100 MG/1
100 TABLET ORAL
COMMUNITY
Start: 2016-05-04 | End: 2018-05-21 | Stop reason: SDUPTHER

## 2017-06-22 RX ORDER — PANTOPRAZOLE SODIUM 40 MG/1
40 TABLET, DELAYED RELEASE ORAL
COMMUNITY
End: 2017-06-22

## 2017-06-22 RX ORDER — SPIRONOLACTONE 25 MG/1
25 TABLET ORAL
COMMUNITY
End: 2017-06-22

## 2017-06-22 RX ORDER — ATORVASTATIN CALCIUM 20 MG/1
10 TABLET, FILM COATED ORAL
COMMUNITY
Start: 2016-05-04 | End: 2018-01-16 | Stop reason: SDUPTHER

## 2017-06-26 ENCOUNTER — OFFICE VISIT (OUTPATIENT)
Dept: INTERNAL MEDICINE | Age: 77
End: 2017-06-26
Payer: MEDICARE

## 2017-06-26 VITALS
SYSTOLIC BLOOD PRESSURE: 128 MMHG | TEMPERATURE: 98.1 F | RESPIRATION RATE: 18 BRPM | OXYGEN SATURATION: 94 % | WEIGHT: 215 LBS | HEART RATE: 70 BPM | BODY MASS INDEX: 29.12 KG/M2 | DIASTOLIC BLOOD PRESSURE: 68 MMHG | HEIGHT: 72 IN

## 2017-06-26 DIAGNOSIS — E13.9 SECONDARY DM WITHOUT COMPLICATIONS (HCC): ICD-10-CM

## 2017-06-26 DIAGNOSIS — E55.9 VITAMIN D DEFICIENCY: ICD-10-CM

## 2017-06-26 DIAGNOSIS — E87.6 HYPOKALEMIA: ICD-10-CM

## 2017-06-26 DIAGNOSIS — Z00.00 HEALTH CARE MAINTENANCE: Primary | ICD-10-CM

## 2017-06-26 LAB
ANION GAP SERPL CALCULATED.3IONS-SCNC: 18 MMOL/L (ref 7–19)
BUN BLDV-MCNC: 21 MG/DL (ref 8–23)
CALCIUM SERPL-MCNC: 9.6 MG/DL (ref 8.8–10.2)
CHLORIDE BLD-SCNC: 102 MMOL/L (ref 98–111)
CO2: 25 MMOL/L (ref 22–29)
CREAT SERPL-MCNC: 1 MG/DL (ref 0.5–1.2)
GFR NON-AFRICAN AMERICAN: >60
GLUCOSE BLD-MCNC: 87 MG/DL (ref 74–109)
POTASSIUM SERPL-SCNC: 3.4 MMOL/L (ref 3.5–5)
SODIUM BLD-SCNC: 145 MMOL/L (ref 136–145)

## 2017-06-26 PROCEDURE — G8419 CALC BMI OUT NRM PARAM NOF/U: HCPCS | Performed by: NURSE PRACTITIONER

## 2017-06-26 PROCEDURE — 1123F ACP DISCUSS/DSCN MKR DOCD: CPT | Performed by: NURSE PRACTITIONER

## 2017-06-26 PROCEDURE — G8427 DOCREV CUR MEDS BY ELIG CLIN: HCPCS | Performed by: NURSE PRACTITIONER

## 2017-06-26 PROCEDURE — 99213 OFFICE O/P EST LOW 20 MIN: CPT | Performed by: NURSE PRACTITIONER

## 2017-06-26 PROCEDURE — 1036F TOBACCO NON-USER: CPT | Performed by: NURSE PRACTITIONER

## 2017-06-26 PROCEDURE — 3017F COLORECTAL CA SCREEN DOC REV: CPT | Performed by: NURSE PRACTITIONER

## 2017-06-26 PROCEDURE — 4040F PNEUMOC VAC/ADMIN/RCVD: CPT | Performed by: NURSE PRACTITIONER

## 2017-06-26 ASSESSMENT — PATIENT HEALTH QUESTIONNAIRE - PHQ9
1. LITTLE INTEREST OR PLEASURE IN DOING THINGS: 0
SUM OF ALL RESPONSES TO PHQ QUESTIONS 1-9: 0
SUM OF ALL RESPONSES TO PHQ9 QUESTIONS 1 & 2: 0
2. FEELING DOWN, DEPRESSED OR HOPELESS: 0

## 2017-06-26 ASSESSMENT — ENCOUNTER SYMPTOMS
EYE DISCHARGE: 0
BLOOD IN STOOL: 0
WHEEZING: 0
SORE THROAT: 0
DIARRHEA: 0
COLOR CHANGE: 0
ABDOMINAL DISTENTION: 0
CHOKING: 0
TROUBLE SWALLOWING: 0
STRIDOR: 0
EYE ITCHING: 0
SHORTNESS OF BREATH: 0
CONSTIPATION: 0
ABDOMINAL PAIN: 0
NAUSEA: 0
VOMITING: 0
COUGH: 0

## 2017-08-23 ENCOUNTER — OFFICE VISIT (OUTPATIENT)
Dept: URGENT CARE | Age: 77
End: 2017-08-23
Payer: MEDICARE

## 2017-08-23 VITALS
TEMPERATURE: 98.1 F | HEIGHT: 72 IN | WEIGHT: 215.8 LBS | SYSTOLIC BLOOD PRESSURE: 134 MMHG | DIASTOLIC BLOOD PRESSURE: 79 MMHG | RESPIRATION RATE: 18 BRPM | BODY MASS INDEX: 29.23 KG/M2 | OXYGEN SATURATION: 96 % | HEART RATE: 63 BPM

## 2017-08-23 DIAGNOSIS — J01.01 ACUTE RECURRENT MAXILLARY SINUSITIS: Primary | ICD-10-CM

## 2017-08-23 PROCEDURE — 1123F ACP DISCUSS/DSCN MKR DOCD: CPT | Performed by: PHYSICIAN ASSISTANT

## 2017-08-23 PROCEDURE — 99213 OFFICE O/P EST LOW 20 MIN: CPT | Performed by: PHYSICIAN ASSISTANT

## 2017-08-23 PROCEDURE — 1036F TOBACCO NON-USER: CPT | Performed by: PHYSICIAN ASSISTANT

## 2017-08-23 PROCEDURE — 96372 THER/PROPH/DIAG INJ SC/IM: CPT | Performed by: PHYSICIAN ASSISTANT

## 2017-08-23 PROCEDURE — G8419 CALC BMI OUT NRM PARAM NOF/U: HCPCS | Performed by: PHYSICIAN ASSISTANT

## 2017-08-23 PROCEDURE — 3017F COLORECTAL CA SCREEN DOC REV: CPT | Performed by: PHYSICIAN ASSISTANT

## 2017-08-23 PROCEDURE — 4040F PNEUMOC VAC/ADMIN/RCVD: CPT | Performed by: PHYSICIAN ASSISTANT

## 2017-08-23 PROCEDURE — G8427 DOCREV CUR MEDS BY ELIG CLIN: HCPCS | Performed by: PHYSICIAN ASSISTANT

## 2017-08-23 RX ORDER — CEFTRIAXONE 500 MG/1
1 INJECTION, POWDER, FOR SOLUTION INTRAMUSCULAR; INTRAVENOUS ONCE
Status: COMPLETED | OUTPATIENT
Start: 2017-08-23 | End: 2017-08-23

## 2017-08-23 RX ORDER — DEXAMETHASONE SODIUM PHOSPHATE 10 MG/ML
10 INJECTION INTRAMUSCULAR; INTRAVENOUS ONCE
Status: COMPLETED | OUTPATIENT
Start: 2017-08-23 | End: 2017-08-23

## 2017-08-23 RX ADMIN — CEFTRIAXONE 1 G: 500 INJECTION, POWDER, FOR SOLUTION INTRAMUSCULAR; INTRAVENOUS at 08:08

## 2017-08-23 RX ADMIN — DEXAMETHASONE SODIUM PHOSPHATE 10 MG: 10 INJECTION INTRAMUSCULAR; INTRAVENOUS at 08:09

## 2017-08-23 ASSESSMENT — ENCOUNTER SYMPTOMS
DIARRHEA: 0
ABDOMINAL PAIN: 0
NAUSEA: 0
SORE THROAT: 0
VOMITING: 0
COUGH: 1
SINUS PRESSURE: 1
RHINORRHEA: 0

## 2017-11-10 RX ORDER — VERAPAMIL HYDROCHLORIDE 240 MG/1
240 TABLET, FILM COATED, EXTENDED RELEASE ORAL NIGHTLY
Qty: 30 TABLET | Refills: 3 | Status: SHIPPED | OUTPATIENT
Start: 2017-11-10 | End: 2017-11-10 | Stop reason: SDUPTHER

## 2017-11-10 RX ORDER — TEMAZEPAM 30 MG/1
30 CAPSULE ORAL NIGHTLY PRN
Qty: 30 CAPSULE | Refills: 2 | Status: SHIPPED | OUTPATIENT
Start: 2017-11-10 | End: 2018-04-16 | Stop reason: SDUPTHER

## 2017-11-10 RX ORDER — TEMAZEPAM 30 MG/1
30 CAPSULE ORAL NIGHTLY PRN
Qty: 30 CAPSULE | Refills: 2 | Status: SHIPPED | OUTPATIENT
Start: 2017-11-10 | End: 2017-11-10 | Stop reason: SDUPTHER

## 2017-11-10 RX ORDER — VERAPAMIL HYDROCHLORIDE 240 MG/1
240 TABLET, FILM COATED, EXTENDED RELEASE ORAL NIGHTLY
Qty: 30 TABLET | Refills: 3 | Status: SHIPPED | OUTPATIENT
Start: 2017-11-10 | End: 2017-11-27 | Stop reason: SDUPTHER

## 2017-11-27 RX ORDER — VERAPAMIL HYDROCHLORIDE 240 MG/1
240 TABLET, FILM COATED, EXTENDED RELEASE ORAL 2 TIMES DAILY
Qty: 180 TABLET | Refills: 1 | Status: SHIPPED | OUTPATIENT
Start: 2017-11-27 | End: 2017-11-28 | Stop reason: SDUPTHER

## 2017-11-28 RX ORDER — VERAPAMIL HYDROCHLORIDE 240 MG/1
240 TABLET, FILM COATED, EXTENDED RELEASE ORAL 2 TIMES DAILY
Qty: 180 TABLET | Refills: 1 | Status: SHIPPED | OUTPATIENT
Start: 2017-11-28 | End: 2018-05-27 | Stop reason: SDUPTHER

## 2018-01-02 ENCOUNTER — OFFICE VISIT (OUTPATIENT)
Dept: INTERNAL MEDICINE | Age: 78
End: 2018-01-02
Payer: MEDICARE

## 2018-01-02 VITALS
HEART RATE: 62 BPM | HEIGHT: 72 IN | WEIGHT: 215 LBS | SYSTOLIC BLOOD PRESSURE: 126 MMHG | DIASTOLIC BLOOD PRESSURE: 78 MMHG | RESPIRATION RATE: 18 BRPM | BODY MASS INDEX: 29.12 KG/M2

## 2018-01-02 DIAGNOSIS — G47.00 INSOMNIA, UNSPECIFIED TYPE: ICD-10-CM

## 2018-01-02 DIAGNOSIS — I10 ESSENTIAL HYPERTENSION: ICD-10-CM

## 2018-01-02 DIAGNOSIS — G89.29 OTHER CHRONIC PAIN: ICD-10-CM

## 2018-01-02 DIAGNOSIS — I10 ESSENTIAL HYPERTENSION: Primary | ICD-10-CM

## 2018-01-02 DIAGNOSIS — Q67.0 FACIAL ASYMMETRY: ICD-10-CM

## 2018-01-02 DIAGNOSIS — Z00.00 HEALTH CARE MAINTENANCE: ICD-10-CM

## 2018-01-02 DIAGNOSIS — E78.2 MIXED HYPERLIPIDEMIA: ICD-10-CM

## 2018-01-02 DIAGNOSIS — R35.1 NOCTURIA: ICD-10-CM

## 2018-01-02 DIAGNOSIS — E11.9 TYPE 2 DIABETES MELLITUS WITHOUT COMPLICATION, WITHOUT LONG-TERM CURRENT USE OF INSULIN (HCC): ICD-10-CM

## 2018-01-02 LAB
ALBUMIN SERPL-MCNC: 4.5 G/DL (ref 3.5–5.2)
ALP BLD-CCNC: 70 U/L (ref 40–130)
ALT SERPL-CCNC: 35 U/L (ref 5–41)
ANION GAP SERPL CALCULATED.3IONS-SCNC: 17 MMOL/L (ref 7–19)
AST SERPL-CCNC: 28 U/L (ref 5–40)
BILIRUB SERPL-MCNC: 0.5 MG/DL (ref 0.2–1.2)
BUN BLDV-MCNC: 34 MG/DL (ref 8–23)
CALCIUM SERPL-MCNC: 9.8 MG/DL (ref 8.8–10.2)
CHLORIDE BLD-SCNC: 100 MMOL/L (ref 98–111)
CHOLESTEROL, TOTAL: 165 MG/DL (ref 160–199)
CO2: 26 MMOL/L (ref 22–29)
CREAT SERPL-MCNC: 1.1 MG/DL (ref 0.5–1.2)
GFR NON-AFRICAN AMERICAN: >60
GLUCOSE BLD-MCNC: 152 MG/DL (ref 74–109)
HBA1C MFR BLD: 6.6 %
HCT VFR BLD CALC: 42.5 % (ref 42–52)
HDLC SERPL-MCNC: 39 MG/DL (ref 55–121)
HEMOGLOBIN: 14.2 G/DL (ref 14–18)
LDL CHOLESTEROL CALCULATED: 88 MG/DL
MCH RBC QN AUTO: 29.8 PG (ref 27–31)
MCHC RBC AUTO-ENTMCNC: 33.4 G/DL (ref 33–37)
MCV RBC AUTO: 89.1 FL (ref 80–94)
PDW BLD-RTO: 13.1 % (ref 11.5–14.5)
PLATELET # BLD: 164 K/UL (ref 130–400)
PMV BLD AUTO: 10.4 FL (ref 9.4–12.4)
POTASSIUM SERPL-SCNC: 4.3 MMOL/L (ref 3.5–5)
RBC # BLD: 4.77 M/UL (ref 4.7–6.1)
SODIUM BLD-SCNC: 143 MMOL/L (ref 136–145)
TOTAL PROTEIN: 7.7 G/DL (ref 6.6–8.7)
TRIGL SERPL-MCNC: 188 MG/DL (ref 0–149)
WBC # BLD: 6.3 K/UL (ref 4.8–10.8)

## 2018-01-02 PROCEDURE — 99214 OFFICE O/P EST MOD 30 MIN: CPT | Performed by: NURSE PRACTITIONER

## 2018-01-02 RX ORDER — SPIRONOLACTONE 25 MG/1
TABLET ORAL
COMMUNITY
Start: 2017-11-09 | End: 2018-01-02

## 2018-01-02 RX ORDER — SUCRALFATE 1 G/10ML
SUSPENSION ORAL
COMMUNITY
Start: 2017-12-27 | End: 2018-07-03

## 2018-01-02 ASSESSMENT — PATIENT HEALTH QUESTIONNAIRE - PHQ9
SUM OF ALL RESPONSES TO PHQ QUESTIONS 1-9: 0
SUM OF ALL RESPONSES TO PHQ9 QUESTIONS 1 & 2: 0
1. LITTLE INTEREST OR PLEASURE IN DOING THINGS: 0
2. FEELING DOWN, DEPRESSED OR HOPELESS: 0

## 2018-01-02 ASSESSMENT — ENCOUNTER SYMPTOMS
CONSTIPATION: 0
BACK PAIN: 1
COLOR CHANGE: 0
ABDOMINAL DISTENTION: 0
CHOKING: 0
WHEEZING: 0
NAUSEA: 0
SHORTNESS OF BREATH: 0
VOMITING: 0
COUGH: 0
STRIDOR: 0
ABDOMINAL PAIN: 0
BLOOD IN STOOL: 0
EYE ITCHING: 0
SORE THROAT: 0
TROUBLE SWALLOWING: 0
DIARRHEA: 0
EYE DISCHARGE: 0
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
## 2018-01-17 RX ORDER — PANTOPRAZOLE SODIUM 40 MG/1
TABLET, DELAYED RELEASE ORAL
Qty: 90 TABLET | Refills: 3 | Status: SHIPPED | OUTPATIENT
Start: 2018-01-17

## 2018-01-17 RX ORDER — FLUTICASONE PROPIONATE 50 MCG
SPRAY, SUSPENSION (ML) NASAL
Qty: 48 G | Refills: 3 | Status: SHIPPED | OUTPATIENT
Start: 2018-01-17

## 2018-01-17 RX ORDER — ATORVASTATIN CALCIUM 20 MG/1
TABLET, FILM COATED ORAL
Qty: 90 TABLET | Refills: 3 | Status: SHIPPED | OUTPATIENT
Start: 2018-01-17 | End: 2019-07-01 | Stop reason: SDUPTHER

## 2018-01-17 RX ORDER — SPIRONOLACTONE 25 MG/1
TABLET ORAL
Qty: 90 TABLET | Refills: 3 | Status: SHIPPED | OUTPATIENT
Start: 2018-01-17 | End: 2019-04-01 | Stop reason: SDUPTHER

## 2018-01-17 RX ORDER — POTASSIUM CHLORIDE 750 MG/1
TABLET, FILM COATED, EXTENDED RELEASE ORAL
Qty: 180 TABLET | Refills: 3 | Status: SHIPPED | OUTPATIENT
Start: 2018-01-17 | End: 2018-08-03 | Stop reason: SDUPTHER

## 2018-01-17 RX ORDER — GLIPIZIDE 10 MG/1
TABLET, FILM COATED, EXTENDED RELEASE ORAL
Qty: 90 TABLET | Refills: 3 | Status: SHIPPED | OUTPATIENT
Start: 2018-01-17 | End: 2018-07-24 | Stop reason: ALTCHOICE

## 2018-01-17 RX ORDER — HYDROCHLOROTHIAZIDE 25 MG/1
TABLET ORAL
Qty: 90 TABLET | Refills: 3 | Status: SHIPPED | OUTPATIENT
Start: 2018-01-17 | End: 2019-04-01 | Stop reason: SDUPTHER

## 2018-02-21 ENCOUNTER — OFFICE VISIT (OUTPATIENT)
Dept: INTERNAL MEDICINE | Age: 78
End: 2018-02-21
Payer: MEDICARE

## 2018-02-21 VITALS
OXYGEN SATURATION: 96 % | DIASTOLIC BLOOD PRESSURE: 84 MMHG | WEIGHT: 218 LBS | SYSTOLIC BLOOD PRESSURE: 140 MMHG | RESPIRATION RATE: 16 BRPM | HEIGHT: 72 IN | BODY MASS INDEX: 29.53 KG/M2 | HEART RATE: 76 BPM

## 2018-02-21 DIAGNOSIS — I10 ESSENTIAL HYPERTENSION: ICD-10-CM

## 2018-02-21 DIAGNOSIS — R42 VERTIGO: Primary | ICD-10-CM

## 2018-02-21 PROCEDURE — 4040F PNEUMOC VAC/ADMIN/RCVD: CPT | Performed by: NURSE PRACTITIONER

## 2018-02-21 PROCEDURE — G8484 FLU IMMUNIZE NO ADMIN: HCPCS | Performed by: NURSE PRACTITIONER

## 2018-02-21 PROCEDURE — 96372 THER/PROPH/DIAG INJ SC/IM: CPT | Performed by: NURSE PRACTITIONER

## 2018-02-21 PROCEDURE — 1036F TOBACCO NON-USER: CPT | Performed by: NURSE PRACTITIONER

## 2018-02-21 PROCEDURE — 3017F COLORECTAL CA SCREEN DOC REV: CPT | Performed by: NURSE PRACTITIONER

## 2018-02-21 PROCEDURE — 1123F ACP DISCUSS/DSCN MKR DOCD: CPT | Performed by: NURSE PRACTITIONER

## 2018-02-21 PROCEDURE — G8419 CALC BMI OUT NRM PARAM NOF/U: HCPCS | Performed by: NURSE PRACTITIONER

## 2018-02-21 PROCEDURE — 99213 OFFICE O/P EST LOW 20 MIN: CPT | Performed by: NURSE PRACTITIONER

## 2018-02-21 PROCEDURE — G8427 DOCREV CUR MEDS BY ELIG CLIN: HCPCS | Performed by: NURSE PRACTITIONER

## 2018-02-21 RX ORDER — MECLIZINE HYDROCHLORIDE 25 MG/1
25 TABLET ORAL 3 TIMES DAILY PRN
Qty: 30 TABLET | Refills: 2 | Status: SHIPPED | OUTPATIENT
Start: 2018-02-21 | End: 2018-07-06 | Stop reason: SDUPTHER

## 2018-02-21 RX ORDER — METHYLPREDNISOLONE ACETATE 80 MG/ML
80 INJECTION, SUSPENSION INTRA-ARTICULAR; INTRALESIONAL; INTRAMUSCULAR; SOFT TISSUE ONCE
Status: COMPLETED | OUTPATIENT
Start: 2018-02-21 | End: 2018-02-21

## 2018-02-21 RX ADMIN — METHYLPREDNISOLONE ACETATE 80 MG: 80 INJECTION, SUSPENSION INTRA-ARTICULAR; INTRALESIONAL; INTRAMUSCULAR; SOFT TISSUE at 13:12

## 2018-02-21 ASSESSMENT — ENCOUNTER SYMPTOMS
DIARRHEA: 0
CHOKING: 0
CONSTIPATION: 0
COUGH: 0
COLOR CHANGE: 0
EYE ITCHING: 0
TROUBLE SWALLOWING: 0
SORE THROAT: 0
VOMITING: 0
STRIDOR: 0
EYE DISCHARGE: 0
WHEEZING: 0
BLOOD IN STOOL: 0
ABDOMINAL DISTENTION: 0
ABDOMINAL PAIN: 0
SHORTNESS OF BREATH: 0
NAUSEA: 0

## 2018-02-21 NOTE — PROGRESS NOTES
[facility]  [facility] [facility]  [address] [place] [address]  Dept: [phone]  Dept Fax: [phone]  Loc: [phone]    Beti Murillo is a 78 y.o. male who presents today for his medical conditions/complaints as noted below. Beti Murillo is c/o of Dizziness (Patient states having dizziness when going from laying postition to sitting position.)        HPI:     HPI   1. Vertigo; He was in the floor yesterday when trying to get up he felt he was going to pass out; Sitting to standing is not a problem; But at chiropractor had near syncope after getting  Up off the table   He is not orthostatic ;  Sitting 140/78 standing the same;    Chief Complaint   Patient presents with    Dizziness     Patient states having dizziness when going from laying postition to sitting position.        Past Medical History:   Diagnosis Date    BPH with obstruction/lower urinary tract symptoms     Hyperglycemia     Hyperlipidemia     Hypertension     Testicular hypofunction       Past Surgical History:   Procedure Laterality Date    ANKLE SURGERY      HERNIA REPAIR      TONSILLECTOMY         Vitals 2/21/2018 2/21/2018 2/21/2018 1/2/2018 8/23/2017 8/00/2809   SYSTOLIC 361 161 [phone]   DIASTOLIC 84 82 92 [phone] 79 68   Site - Left Arm Left Arm - Right Arm Left Arm   Position - Standing Sitting - Sitting Sitting   Cuff Size - Large Adult Large Adult - Large Adult Large Adult   Pulse - - 76 62 63 70   Temp - - - - 98.1 98.1   Resp - - 16 18 18 18   Weight - - 218 lb 215 lb 215 lb 12.8 oz 215 lb   Height - - 6' 0\" 6' 0\" 6' 0\" 6' 0\"   BMI (wt*703/ht~2) - - 29.56 kg/m2 29.16 kg/m2 29.26 kg/m2 29.16 kg/m2   Some recent data might be hidden       Family History   Problem Relation Age of Onset    No Known Problems Mother     Heart Attack Father        Social History   Substance Use Topics    Smoking status: Never Smoker    Smokeless tobacco: Never Used    Alcohol use No      Current Outpatient Prescriptions   Medication Sig Dispense Refill    meclizine (ANTIVERT) 25 MG tablet Take 1 tablet by mouth 3 times daily as needed for Dizziness 30 tablet 2    potassium chloride (KLOR-CON) 10 MEQ extended release tablet TAKE 2 TABLETS EVERY  tablet 3    atorvastatin (LIPITOR) 20 MG tablet TAKE 1 TABLET AT BEDTIME 90 tablet 3    glipiZIDE (GLUCOTROL XL) 10 MG extended release tablet TAKE 1 TABLET IN THE MORNING 90 tablet 3    fluticasone (FLONASE) 50 MCG/ACT nasal spray USE 1 SPRAY IN EACH NOSTRIL TWICE DAILY 48 g 3    pantoprazole (PROTONIX) 40 MG tablet TAKE 1 TABLET EVERY DAY 90 tablet 3    spironolactone (ALDACTONE) 25 MG tablet TAKE 1 TABLET EVERY DAY 90 tablet 3    hydrochlorothiazide (HYDRODIURIL) 25 MG tablet TAKE 1 TABLET EVERY DAY 90 tablet 3    CARAFATE 1 GM/10ML suspension       verapamil (CALAN SR) 240 MG extended release tablet Take 1 tablet by mouth 2 times daily 180 tablet 1    temazepam (RESTORIL) 30 MG capsule Take 1 capsule by mouth nightly as needed for Sleep . 30 capsule 2    losartan (COZAAR) 100 MG tablet Take 100 mg by mouth      HYDROcodone-acetaminophen (NORCO) 5-325 MG per tablet       potassium chloride (KLOR-CON M) 10 MEQ extended release tablet Take 10 mEq by mouth      aspirin 81 MG tablet Take 81 mg by mouth daily      calcium carbonate (OSCAL) 500 MG TABS tablet Take 500 mg by mouth daily      Omega-3 Fatty Acids (FISH OIL) 1000 MG CAPS Take 3,000 mg by mouth 3 times daily       No current facility-administered medications for this visit.       No Known Allergies    Health Maintenance   Topic Date Due    Diabetic foot exam  [birth year]16/1952    Diabetic retinal exam  12/16/1952    DTaP/Tdap/Td vaccine (1 - Tdap) 12/16/1961    Shingles Vaccine (1 of 2 - 2 Dose Series) 12/16/1992    Colon cancer screen colonoscopy  05/12/2019 (Originally 12/16/1992)    A1C test (Diabetic or Prediabetic)  01/02/2019    Lipid screen  01/02/2019   

## 2018-03-31 ENCOUNTER — OFFICE VISIT (OUTPATIENT)
Dept: URGENT CARE | Age: 78
End: 2018-03-31
Payer: MEDICARE

## 2018-03-31 VITALS
WEIGHT: 217 LBS | HEIGHT: 72 IN | RESPIRATION RATE: 18 BRPM | SYSTOLIC BLOOD PRESSURE: 140 MMHG | BODY MASS INDEX: 29.39 KG/M2 | OXYGEN SATURATION: 96 % | TEMPERATURE: 97.1 F | DIASTOLIC BLOOD PRESSURE: 89 MMHG | HEART RATE: 69 BPM

## 2018-03-31 DIAGNOSIS — R05.9 COUGH: ICD-10-CM

## 2018-03-31 DIAGNOSIS — J01.41 ACUTE RECURRENT PANSINUSITIS: Primary | ICD-10-CM

## 2018-03-31 LAB — S PYO AG THROAT QL: NORMAL

## 2018-03-31 PROCEDURE — 87880 STREP A ASSAY W/OPTIC: CPT | Performed by: NURSE PRACTITIONER

## 2018-03-31 PROCEDURE — 99213 OFFICE O/P EST LOW 20 MIN: CPT | Performed by: NURSE PRACTITIONER

## 2018-03-31 PROCEDURE — 96372 THER/PROPH/DIAG INJ SC/IM: CPT | Performed by: NURSE PRACTITIONER

## 2018-03-31 RX ORDER — DEXAMETHASONE SODIUM PHOSPHATE 10 MG/ML
10 INJECTION INTRAMUSCULAR; INTRAVENOUS ONCE
Status: COMPLETED | OUTPATIENT
Start: 2018-03-31 | End: 2018-03-31

## 2018-03-31 RX ORDER — CEFTRIAXONE 1 G/1
1 INJECTION, POWDER, FOR SOLUTION INTRAMUSCULAR; INTRAVENOUS ONCE
Status: COMPLETED | OUTPATIENT
Start: 2018-03-31 | End: 2018-03-31

## 2018-03-31 RX ADMIN — DEXAMETHASONE SODIUM PHOSPHATE 10 MG: 10 INJECTION INTRAMUSCULAR; INTRAVENOUS at 09:14

## 2018-03-31 RX ADMIN — CEFTRIAXONE 1 G: 1 INJECTION, POWDER, FOR SOLUTION INTRAMUSCULAR; INTRAVENOUS at 09:13

## 2018-03-31 ASSESSMENT — ENCOUNTER SYMPTOMS
SINUS PRESSURE: 1
NAUSEA: 0
VOMITING: 0
CHEST TIGHTNESS: 0
WHEEZING: 0
SORE THROAT: 1
ABDOMINAL PAIN: 0
COUGH: 1
EYES NEGATIVE: 1
SHORTNESS OF BREATH: 0

## 2018-03-31 NOTE — PROGRESS NOTES
[address] [facility] [place]  [address] [address]  Dept: [phone]  Loc: [phone]    Lorenzo Hill is a 78 y.o. male who presents today for his medical conditions/complaints as noted below. Lorenzo Hill is c/o of Nasal Congestion; Cough; and Pharyngitis (\"tickle in throat from drainage\")        HPI:     HPI  Radha Ojeda presents today with a complaint of nasal congestion, cough, and pharyngitis. Symptoms started about a week ago. He denies any fever. He denies any NVD. He does report a headache. He states that he has been using a nasal spray and some benadryl with some mild relief of symptoms. He states that an antibiotic shot and steroid shot are usually what he gets when these symptoms come on and that it fixes the problem.    Past Medical History:   Diagnosis Date    BPH with obstruction/lower urinary tract symptoms     Hyperglycemia     Hyperlipidemia     Hypertension     Testicular hypofunction     Type 2 diabetes mellitus without complication (HCC)       Past Surgical History:   Procedure Laterality Date    ANKLE SURGERY      HERNIA REPAIR      TONSILLECTOMY         Family History   Problem Relation Age of Onset    No Known Problems Mother     Heart Attack Father        Social History   Substance Use Topics    Smoking status: Never Smoker    Smokeless tobacco: Never Used    Alcohol use No      Current Outpatient Prescriptions   Medication Sig Dispense Refill    potassium chloride (KLOR-CON) 10 MEQ extended release tablet TAKE 2 TABLETS EVERY  tablet 3    atorvastatin (LIPITOR) 20 MG tablet TAKE 1 TABLET AT BEDTIME 90 tablet 3    glipiZIDE (GLUCOTROL XL) 10 MG extended release tablet TAKE 1 TABLET IN THE MORNING 90 tablet 3    fluticasone (FLONASE) 50 MCG/ACT nasal spray USE 1 SPRAY IN EACH NOSTRIL TWICE DAILY 48 g 3    pantoprazole (PROTONIX) 40 MG tablet TAKE 1 TABLET EVERY DAY 90 tablet 3    spironolactone (ALDACTONE) benadryl and flonase  4. Monitor for fever and treat as needed with tylenol or ibuprofen   5. Strep in office was negative. 6  If patient is not improving or developing any new/worsening symptoms then RTC, prn or go to ER. Patient is to follow up with PCP as needed. Patient verbalizes understanding. Orders Placed This Encounter   Procedures    POCT rapid strep A     Results for orders placed or performed in visit on 03/31/18   POCT rapid strep A   Result Value Ref Range    Strep A Ag None Detected None Detected       Return if symptoms worsen or fail to improve. Orders Placed This Encounter   Medications    dexamethasone (DECADRON) injection 10 mg    cefTRIAXone (ROCEPHIN) injection 1 g       Patient given educational materials - see patient instructions. Discussed use, benefit, and side effects of prescribed medications. All patient questions answered. Pt voiced understanding. Reviewed health maintenance. Instructed to continue current medications, diet and exercise. Patient agreed with treatment plan. Follow up as directed. Patient Instructions       Patient Education     1. Rest and increase fluid intak  2. Monitor for fever and treat as needed with tylenol or ibuprofen  3. If patient is not improving or developing any new/worsening symptoms then return to clinic as needed or follow up with PCP    Sinusitis: Care Instructions  Your Care Instructions    Sinusitis is an infection of the lining of the sinus cavities in your head. Sinusitis often follows a cold. It causes pain and pressure in your head and face. In most cases, sinusitis gets better on its own in 1 to 2 weeks. But some mild symptoms may last for several weeks. Sometimes antibiotics are needed. Follow-up care is a key part of your treatment and safety. Be sure to make and go to all appointments, and call your doctor if you are having problems.  It's also a good idea to know your test results and keep a list of the medicines you

## 2018-03-31 NOTE — PROGRESS NOTES
DEXAMETHASONE 10 MG GIVEN INTRAMUSCULARLY IN LEFT  GLUTEAL. PT TOLERATED WELL. PT given 1 gram Rocephin IM to , right gluteal. Pt observed for 15 minutes without any reaction noted.  Pt discharged in stable condition

## 2018-04-16 ENCOUNTER — OFFICE VISIT (OUTPATIENT)
Dept: INTERNAL MEDICINE | Age: 78
End: 2018-04-16
Payer: MEDICARE

## 2018-04-16 VITALS
HEART RATE: 68 BPM | OXYGEN SATURATION: 96 % | WEIGHT: 214 LBS | SYSTOLIC BLOOD PRESSURE: 138 MMHG | BODY MASS INDEX: 28.99 KG/M2 | HEIGHT: 72 IN | RESPIRATION RATE: 16 BRPM | DIASTOLIC BLOOD PRESSURE: 72 MMHG

## 2018-04-16 DIAGNOSIS — J20.9 ACUTE BRONCHITIS, UNSPECIFIED ORGANISM: ICD-10-CM

## 2018-04-16 DIAGNOSIS — L98.9 SKIN LESION: ICD-10-CM

## 2018-04-16 DIAGNOSIS — J01.90 ACUTE NON-RECURRENT SINUSITIS, UNSPECIFIED LOCATION: Primary | ICD-10-CM

## 2018-04-16 PROCEDURE — 1123F ACP DISCUSS/DSCN MKR DOCD: CPT | Performed by: NURSE PRACTITIONER

## 2018-04-16 PROCEDURE — G8419 CALC BMI OUT NRM PARAM NOF/U: HCPCS | Performed by: NURSE PRACTITIONER

## 2018-04-16 PROCEDURE — 4040F PNEUMOC VAC/ADMIN/RCVD: CPT | Performed by: NURSE PRACTITIONER

## 2018-04-16 PROCEDURE — G8427 DOCREV CUR MEDS BY ELIG CLIN: HCPCS | Performed by: NURSE PRACTITIONER

## 2018-04-16 PROCEDURE — 1036F TOBACCO NON-USER: CPT | Performed by: NURSE PRACTITIONER

## 2018-04-16 PROCEDURE — 3017F COLORECTAL CA SCREEN DOC REV: CPT | Performed by: NURSE PRACTITIONER

## 2018-04-16 PROCEDURE — 99214 OFFICE O/P EST MOD 30 MIN: CPT | Performed by: NURSE PRACTITIONER

## 2018-04-16 RX ORDER — TEMAZEPAM 30 MG/1
30 CAPSULE ORAL NIGHTLY PRN
Qty: 30 CAPSULE | Refills: 2 | Status: SHIPPED | OUTPATIENT
Start: 2018-04-16 | End: 2018-05-16

## 2018-04-16 RX ORDER — CEFDINIR 300 MG/1
300 CAPSULE ORAL 2 TIMES DAILY
Qty: 14 CAPSULE | Refills: 0 | Status: SHIPPED | OUTPATIENT
Start: 2018-04-16 | End: 2018-04-23

## 2018-04-16 ASSESSMENT — ENCOUNTER SYMPTOMS
ABDOMINAL DISTENTION: 0
SHORTNESS OF BREATH: 0
TROUBLE SWALLOWING: 0
VOMITING: 0
COUGH: 1
NAUSEA: 0
COLOR CHANGE: 0
CHOKING: 0
SORE THROAT: 0
STRIDOR: 0
WHEEZING: 0
EYE ITCHING: 0
DIARRHEA: 0
SINUS PRESSURE: 1
ABDOMINAL PAIN: 0
EYE DISCHARGE: 0
BLOOD IN STOOL: 0
CONSTIPATION: 0

## 2018-05-21 RX ORDER — LOSARTAN POTASSIUM 100 MG/1
TABLET ORAL
Qty: 90 TABLET | Refills: 3 | Status: SHIPPED | OUTPATIENT
Start: 2018-05-21 | End: 2018-12-21 | Stop reason: SDUPTHER

## 2018-05-29 RX ORDER — VERAPAMIL HYDROCHLORIDE 240 MG/1
TABLET, FILM COATED, EXTENDED RELEASE ORAL
Qty: 180 TABLET | Refills: 1 | Status: SHIPPED | OUTPATIENT
Start: 2018-05-29 | End: 2018-11-26 | Stop reason: SDUPTHER

## 2018-06-25 DIAGNOSIS — E78.2 MIXED HYPERLIPIDEMIA: ICD-10-CM

## 2018-06-25 DIAGNOSIS — I10 ESSENTIAL HYPERTENSION: ICD-10-CM

## 2018-06-25 DIAGNOSIS — R35.1 NOCTURIA: ICD-10-CM

## 2018-06-25 DIAGNOSIS — E11.9 TYPE 2 DIABETES MELLITUS WITHOUT COMPLICATION, WITHOUT LONG-TERM CURRENT USE OF INSULIN (HCC): ICD-10-CM

## 2018-06-25 DIAGNOSIS — Z00.00 HEALTH CARE MAINTENANCE: ICD-10-CM

## 2018-06-25 LAB
ALBUMIN SERPL-MCNC: 4.4 G/DL (ref 3.5–5.2)
ALP BLD-CCNC: 58 U/L (ref 40–130)
ALT SERPL-CCNC: 36 U/L (ref 5–41)
ANION GAP SERPL CALCULATED.3IONS-SCNC: 14 MMOL/L (ref 7–19)
AST SERPL-CCNC: 28 U/L (ref 5–40)
BILIRUB SERPL-MCNC: 0.5 MG/DL (ref 0.2–1.2)
BUN BLDV-MCNC: 19 MG/DL (ref 8–23)
CALCIUM SERPL-MCNC: 9.5 MG/DL (ref 8.8–10.2)
CHLORIDE BLD-SCNC: 101 MMOL/L (ref 98–111)
CHOLESTEROL, TOTAL: 191 MG/DL (ref 160–199)
CO2: 28 MMOL/L (ref 22–29)
CREAT SERPL-MCNC: 0.9 MG/DL (ref 0.5–1.2)
GFR NON-AFRICAN AMERICAN: >60
GLUCOSE BLD-MCNC: 159 MG/DL (ref 74–109)
HBA1C MFR BLD: 5.5 %
HCT VFR BLD CALC: 41.3 % (ref 42–52)
HDLC SERPL-MCNC: 37 MG/DL (ref 55–121)
HEMOGLOBIN: 13.8 G/DL (ref 14–18)
LDL CHOLESTEROL CALCULATED: 120 MG/DL
MCH RBC QN AUTO: 29.1 PG (ref 27–31)
MCHC RBC AUTO-ENTMCNC: 33.4 G/DL (ref 33–37)
MCV RBC AUTO: 87.1 FL (ref 80–94)
PDW BLD-RTO: 12.6 % (ref 11.5–14.5)
PLATELET # BLD: 144 K/UL (ref 130–400)
PMV BLD AUTO: 10.1 FL (ref 9.4–12.4)
POTASSIUM SERPL-SCNC: 3.3 MMOL/L (ref 3.5–5)
PROSTATE SPECIFIC ANTIGEN: 0.45 NG/ML (ref 0–4)
RBC # BLD: 4.74 M/UL (ref 4.7–6.1)
SODIUM BLD-SCNC: 143 MMOL/L (ref 136–145)
TOTAL PROTEIN: 6.9 G/DL (ref 6.6–8.7)
TRIGL SERPL-MCNC: 169 MG/DL (ref 0–149)
WBC # BLD: 5.8 K/UL (ref 4.8–10.8)

## 2018-07-03 ENCOUNTER — OFFICE VISIT (OUTPATIENT)
Dept: INTERNAL MEDICINE | Age: 78
End: 2018-07-03
Payer: MEDICARE

## 2018-07-03 VITALS
OXYGEN SATURATION: 98 % | WEIGHT: 205 LBS | HEART RATE: 74 BPM | DIASTOLIC BLOOD PRESSURE: 80 MMHG | HEIGHT: 72 IN | BODY MASS INDEX: 27.77 KG/M2 | SYSTOLIC BLOOD PRESSURE: 138 MMHG

## 2018-07-03 DIAGNOSIS — E78.2 MIXED HYPERLIPIDEMIA: ICD-10-CM

## 2018-07-03 DIAGNOSIS — M89.9 DISORDER OF BONE: ICD-10-CM

## 2018-07-03 DIAGNOSIS — Z23 NEED FOR PROPHYLACTIC VACCINATION AND INOCULATION AGAINST VARICELLA: ICD-10-CM

## 2018-07-03 DIAGNOSIS — Z23 NEED FOR PROPHYLACTIC VACCINATION AGAINST DIPHTHERIA-TETANUS-PERTUSSIS (DTP): ICD-10-CM

## 2018-07-03 DIAGNOSIS — Z00.00 HEALTHCARE MAINTENANCE: ICD-10-CM

## 2018-07-03 DIAGNOSIS — I10 ESSENTIAL HYPERTENSION: Primary | ICD-10-CM

## 2018-07-03 DIAGNOSIS — E11.9 TYPE 2 DIABETES MELLITUS WITHOUT COMPLICATION, WITHOUT LONG-TERM CURRENT USE OF INSULIN (HCC): ICD-10-CM

## 2018-07-03 PROCEDURE — 3044F HG A1C LEVEL LT 7.0%: CPT | Performed by: NURSE PRACTITIONER

## 2018-07-03 PROCEDURE — 1123F ACP DISCUSS/DSCN MKR DOCD: CPT | Performed by: NURSE PRACTITIONER

## 2018-07-03 PROCEDURE — G8427 DOCREV CUR MEDS BY ELIG CLIN: HCPCS | Performed by: NURSE PRACTITIONER

## 2018-07-03 PROCEDURE — 4040F PNEUMOC VAC/ADMIN/RCVD: CPT | Performed by: NURSE PRACTITIONER

## 2018-07-03 PROCEDURE — 2022F DILAT RTA XM EVC RTNOPTHY: CPT | Performed by: NURSE PRACTITIONER

## 2018-07-03 PROCEDURE — 3017F COLORECTAL CA SCREEN DOC REV: CPT | Performed by: NURSE PRACTITIONER

## 2018-07-03 PROCEDURE — 1036F TOBACCO NON-USER: CPT | Performed by: NURSE PRACTITIONER

## 2018-07-03 PROCEDURE — 99214 OFFICE O/P EST MOD 30 MIN: CPT | Performed by: NURSE PRACTITIONER

## 2018-07-03 PROCEDURE — G8419 CALC BMI OUT NRM PARAM NOF/U: HCPCS | Performed by: NURSE PRACTITIONER

## 2018-07-03 ASSESSMENT — ENCOUNTER SYMPTOMS
CONSTIPATION: 0
WHEEZING: 0
SHORTNESS OF BREATH: 0
SORE THROAT: 0
VOMITING: 0
CHOKING: 0
ABDOMINAL PAIN: 0
NAUSEA: 0
ABDOMINAL DISTENTION: 0
COLOR CHANGE: 0
STRIDOR: 0
EYE DISCHARGE: 0
DIARRHEA: 0
BLOOD IN STOOL: 0
EYE ITCHING: 0
COUGH: 0
TROUBLE SWALLOWING: 0

## 2018-07-03 NOTE — PROGRESS NOTES
Some recent data might be hidden       Family History   Problem Relation Age of Onset    No Known Problems Mother     Heart Attack Father        Social History   Substance Use Topics    Smoking status: Never Smoker    Smokeless tobacco: Never Used    Alcohol use No      Current Outpatient Prescriptions   Medication Sig Dispense Refill    zoster recombinant adjuvanted vaccine (SHINGRIX) 50 MCG SUSR injection Inject 0.5 mLs into the muscle once for 1 dose 1 each 0    verapamil (CALAN SR) 240 MG extended release tablet TAKE ONE TABLET BY MOUTH TWICE DAILY 180 tablet 1    losartan (COZAAR) 100 MG tablet TAKE ONE TABLET BY MOUTH ONCE DAILY 90 tablet 3    potassium chloride (KLOR-CON) 10 MEQ extended release tablet TAKE 2 TABLETS EVERY  tablet 3    atorvastatin (LIPITOR) 20 MG tablet TAKE 1 TABLET AT BEDTIME 90 tablet 3    glipiZIDE (GLUCOTROL XL) 10 MG extended release tablet TAKE 1 TABLET IN THE MORNING 90 tablet 3    fluticasone (FLONASE) 50 MCG/ACT nasal spray USE 1 SPRAY IN EACH NOSTRIL TWICE DAILY 48 g 3    pantoprazole (PROTONIX) 40 MG tablet TAKE 1 TABLET EVERY DAY 90 tablet 3    spironolactone (ALDACTONE) 25 MG tablet TAKE 1 TABLET EVERY DAY 90 tablet 3    hydrochlorothiazide (HYDRODIURIL) 25 MG tablet TAKE 1 TABLET EVERY DAY 90 tablet 3    aspirin 81 MG tablet Take 81 mg by mouth daily      Omega-3 Fatty Acids (FISH OIL) 1000 MG CAPS Take 1,200 mg by mouth 2 times daily       No current facility-administered medications for this visit.       No Known Allergies    Health Maintenance   Topic Date Due    Diabetic foot exam  12/16/1952    Diabetic retinal exam  12/16/1952    DTaP/Tdap/Td vaccine (1 - Tdap) 12/16/1961    Shingles Vaccine (1 of 2 - 2 Dose Series) 12/16/1992    Colon cancer screen colonoscopy  05/12/2019 (Originally 12/16/1992)    Flu vaccine (1) 09/01/2018    A1C test (Diabetic or Prediabetic)  06/25/2019    Lipid screen  06/25/2019    Potassium monitoring  06/25/2019 carotid at a life screening; Fu in 6 months wit labs     Electronically signed by MARISELA Ortiz on 7/3/2018 at 11:09 AM    EMR Dragon/transcription disclaimer:  Much of this encounter note is electronic transcription/translation of spoken language to printed texts. The electronic translation of spoken language may be erroneous, or at times, nonsensical words or phrases may be inadvertently transcribed.   Although I have reviewed the note for such errors, some may still exist.

## 2018-07-06 RX ORDER — MECLIZINE HYDROCHLORIDE 25 MG/1
25 TABLET ORAL 3 TIMES DAILY PRN
Qty: 30 TABLET | Refills: 2 | Status: SHIPPED | OUTPATIENT
Start: 2018-07-06 | End: 2018-07-16

## 2018-07-24 ENCOUNTER — OFFICE VISIT (OUTPATIENT)
Dept: INTERNAL MEDICINE | Age: 78
End: 2018-07-24
Payer: MEDICARE

## 2018-07-24 VITALS
WEIGHT: 199 LBS | BODY MASS INDEX: 26.95 KG/M2 | DIASTOLIC BLOOD PRESSURE: 72 MMHG | HEIGHT: 72 IN | HEART RATE: 58 BPM | RESPIRATION RATE: 16 BRPM | SYSTOLIC BLOOD PRESSURE: 122 MMHG | OXYGEN SATURATION: 98 %

## 2018-07-24 DIAGNOSIS — E87.6 HYPOKALEMIA: ICD-10-CM

## 2018-07-24 DIAGNOSIS — I10 ESSENTIAL HYPERTENSION: ICD-10-CM

## 2018-07-24 DIAGNOSIS — R42 VERTIGO: Primary | ICD-10-CM

## 2018-07-24 DIAGNOSIS — R63.4 WEIGHT LOSS: ICD-10-CM

## 2018-07-24 DIAGNOSIS — E11.9 TYPE 2 DIABETES MELLITUS WITHOUT COMPLICATION, WITHOUT LONG-TERM CURRENT USE OF INSULIN (HCC): ICD-10-CM

## 2018-07-24 PROCEDURE — 4040F PNEUMOC VAC/ADMIN/RCVD: CPT | Performed by: NURSE PRACTITIONER

## 2018-07-24 PROCEDURE — 1036F TOBACCO NON-USER: CPT | Performed by: NURSE PRACTITIONER

## 2018-07-24 PROCEDURE — G8427 DOCREV CUR MEDS BY ELIG CLIN: HCPCS | Performed by: NURSE PRACTITIONER

## 2018-07-24 PROCEDURE — 1101F PT FALLS ASSESS-DOCD LE1/YR: CPT | Performed by: NURSE PRACTITIONER

## 2018-07-24 PROCEDURE — G8419 CALC BMI OUT NRM PARAM NOF/U: HCPCS | Performed by: NURSE PRACTITIONER

## 2018-07-24 PROCEDURE — 3017F COLORECTAL CA SCREEN DOC REV: CPT | Performed by: NURSE PRACTITIONER

## 2018-07-24 PROCEDURE — 1123F ACP DISCUSS/DSCN MKR DOCD: CPT | Performed by: NURSE PRACTITIONER

## 2018-07-24 PROCEDURE — 99213 OFFICE O/P EST LOW 20 MIN: CPT | Performed by: NURSE PRACTITIONER

## 2018-07-24 PROCEDURE — 2022F DILAT RTA XM EVC RTNOPTHY: CPT | Performed by: NURSE PRACTITIONER

## 2018-07-24 PROCEDURE — 3044F HG A1C LEVEL LT 7.0%: CPT | Performed by: NURSE PRACTITIONER

## 2018-07-24 RX ORDER — MECLIZINE HYDROCHLORIDE 25 MG/1
25 TABLET ORAL 3 TIMES DAILY PRN
Qty: 21 TABLET | Refills: 3 | Status: SHIPPED | OUTPATIENT
Start: 2018-07-24 | End: 2018-07-31

## 2018-07-24 ASSESSMENT — ENCOUNTER SYMPTOMS
CHOKING: 0
SHORTNESS OF BREATH: 0
DIARRHEA: 0
CONSTIPATION: 0
ABDOMINAL PAIN: 0
TROUBLE SWALLOWING: 0
ABDOMINAL DISTENTION: 0
STRIDOR: 0
SORE THROAT: 0
BLOOD IN STOOL: 0
COLOR CHANGE: 0
EYE ITCHING: 0
WHEEZING: 0
COUGH: 0
EYE DISCHARGE: 0
VOMITING: 0
NAUSEA: 0

## 2018-07-24 NOTE — PROGRESS NOTES
[facility]  [facility] [address]  Dept: [phone]  Dept Fax: [phone]  Loc: [phone]    Abner Butcher is a 78 y.o. male who presents today for his medical conditions/complaints as noted below. Abner Butcher is c/o of Dizziness (Patient states dizziness in the mornings and he is concerned BP is running low.)        HPI:     HPI   1. Vertigo upon awakening ; It is after sleeping all night; Then yesterday was up on a scaffold and then going up and down he had some dizziness   2. Weight loss; He has intentionally lost weight has even stopped his DM meds ; he was doing the keto diet; and has even stopped those supplements;   3.  HTN;    HTN:  Stable on current meds; No side effects of the meds; Takes as directed; takes blood pressure 3-4 times a week   4. TYpe II DM; Has had fasting sugars 70-80 with weight loss;  ; has stopped his dm meds and doing well;    Chief Complaint   Patient presents with    Dizziness     Patient states dizziness in the mornings and he is concerned BP is running low.        Past Medical History:   Diagnosis Date    BPH with obstruction/lower urinary tract symptoms     Hyperglycemia     Hyperlipidemia     Hypertension     Testicular hypofunction     Type 2 diabetes mellitus without complication (HCC)       Past Surgical History:   Procedure Laterality Date    ANKLE SURGERY      HERNIA REPAIR      TONSILLECTOMY         Vitals 7/24/2018 7/3/2018 7/3/2018 4/16/2018 3/31/2018 1/10/6206   SYSTOLIC 261 186 [phone]   DIASTOLIC 72 80 84 72 89 78   Site - - - - - -   Position - - - - - -   Cuff Size - - - - - -   Pulse 58 - 74 68 - 69   Temp - - - - - 97.1   Resp 16 - - 16 - 18   Weight 199 lb - 205 lb 214 lb - 217 lb   Height 6' 0\" - 6' 0\" 6' 0\" - 6' 0\"   BMI (wt*703/ht~2) 26.99 kg/m2 - 27.8 kg/m2 29.02 kg/m2 - 29.43 kg/m2   Some recent data might be hidden       Family History unexpected weight change. HENT: Negative for ear discharge, ear pain, mouth sores, sore throat and trouble swallowing. Eyes: Negative for discharge, itching and visual disturbance. Respiratory: Negative for cough, choking, shortness of breath, wheezing and stridor. Cardiovascular: Negative for chest pain, palpitations and leg swelling. Gastrointestinal: Negative for abdominal distention, abdominal pain, blood in stool, constipation, diarrhea, nausea and vomiting. Endocrine: Negative for cold intolerance, polydipsia and polyuria. Genitourinary: Negative for difficulty urinating, dysuria, frequency and urgency. Musculoskeletal: Negative for arthralgias and gait problem. Skin: Negative for color change and rash. Allergic/Immunologic: Negative for food allergies and immunocompromised state. Neurological: Positive for light-headedness. Negative for dizziness, tremors, syncope, speech difficulty, weakness and headaches. Hematological: Negative for adenopathy. Does not bruise/bleed easily. Psychiatric/Behavioral: Negative for confusion and hallucinations. Objective:     Physical Exam   Constitutional: He is oriented to person, place, and time. He appears well-developed and well-nourished. No distress. HENT:   Head: Normocephalic and atraumatic. Eyes: Pupils are equal, round, and reactive to light. Right eye exhibits no discharge. Left eye exhibits no discharge. No scleral icterus. Neck: Normal range of motion. Neck supple. No JVD present. No thyromegaly present. Cardiovascular: Normal rate, regular rhythm and normal heart sounds. No murmur heard. Pulmonary/Chest: Effort normal and breath sounds normal. No respiratory distress. He has no wheezes. He has no rales. Abdominal: Soft. Bowel sounds are normal. He exhibits no distension and no mass. There is no tenderness. There is no rebound and no guarding. Musculoskeletal: Normal range of motion.  He exhibits no edema or tenderness. Neurological: He is alert and oriented to person, place, and time. He has normal reflexes. No cranial nerve deficit. Coordination normal.   Skin: Skin is warm and dry. No rash noted. No erythema. Psychiatric: He has a normal mood and affect. His behavior is normal. Judgment and thought content normal. He does not exhibit a depressed mood. /72   Pulse 58   Resp 16   Ht 6' (1.829 m)   Wt 199 lb (90.3 kg)   SpO2 98%   BMI 26.99 kg/m²     Assessment:       Diagnosis Orders   1. Vertigo     2. Hypokalemia  Comprehensive Metabolic Panel   3. Weight loss     4. Essential hypertension     5. Type 2 diabetes mellitus without complication, without long-term current use of insulin ([facility] [address]         Plan:        Patient given educational materials - see patient instructions. Discussed use, benefit, and side effects of prescribed medications. All patient questions answered. Pt voiced understanding. Reviewed health maintenance. Instructed to continue current medications, diet and exercise. Patient agreed with treatment plan. Follow up as directed. MEDICATIONS:  Orders Placed This Encounter   Medications    meclizine (ANTIVERT) 25 MG tablet     Sig: Take 1 tablet by mouth 3 times daily as needed (dizziness)     Dispense:  21 tablet     Refill:  3         ORDERS:  Orders Placed This Encounter   Procedures    Comprehensive Metabolic Panel       Follow-up:  Return in about 1 week (around 7/31/2018). PATIENT INSTRUCTIONS:  Patient Instructions   1  Vertigo;  Meclizine 25 up to 3 times a day as needed for dizziness  2. Weight loss; Stable  You are doing great   3. HTN;   Take 1/2 losartan , stop the spironalactone ;   4.  Type II DM;  Keep doing accuchecks to make sure you are stable   Recheck next week with CMP     Electronically signed by MARISELA Valladares on 7/24/2018 at 12:19 PM    EMR Dragon/transcription disclaimer:  Much of this encounter note is electronic transcription/translation of spoken language to printed texts. The electronic translation of spoken language may be erroneous, or at times, nonsensical words or phrases may be inadvertently transcribed.   Although I have reviewed the note for such errors, some may still exist.

## 2018-08-01 DIAGNOSIS — E87.6 HYPOKALEMIA: ICD-10-CM

## 2018-08-01 DIAGNOSIS — E87.6 HYPOKALEMIA: Primary | ICD-10-CM

## 2018-08-01 LAB
ALBUMIN SERPL-MCNC: 4.3 G/DL (ref 3.5–5.2)
ALP BLD-CCNC: 61 U/L (ref 40–130)
ALT SERPL-CCNC: 28 U/L (ref 5–41)
ANION GAP SERPL CALCULATED.3IONS-SCNC: 14 MMOL/L (ref 7–19)
AST SERPL-CCNC: 23 U/L (ref 5–40)
BILIRUB SERPL-MCNC: 0.5 MG/DL (ref 0.2–1.2)
BUN BLDV-MCNC: 20 MG/DL (ref 8–23)
CALCIUM SERPL-MCNC: 9.5 MG/DL (ref 8.8–10.2)
CHLORIDE BLD-SCNC: 99 MMOL/L (ref 98–111)
CO2: 29 MMOL/L (ref 22–29)
CREAT SERPL-MCNC: 0.8 MG/DL (ref 0.5–1.2)
GFR NON-AFRICAN AMERICAN: >60
GLUCOSE BLD-MCNC: 76 MG/DL (ref 74–109)
POTASSIUM SERPL-SCNC: 2.9 MMOL/L (ref 3.5–5)
SODIUM BLD-SCNC: 142 MMOL/L (ref 136–145)
TOTAL PROTEIN: 6.9 G/DL (ref 6.6–8.7)

## 2018-08-03 ENCOUNTER — OFFICE VISIT (OUTPATIENT)
Dept: INTERNAL MEDICINE | Age: 78
End: 2018-08-03
Payer: MEDICARE

## 2018-08-03 VITALS
DIASTOLIC BLOOD PRESSURE: 62 MMHG | RESPIRATION RATE: 16 BRPM | SYSTOLIC BLOOD PRESSURE: 116 MMHG | OXYGEN SATURATION: 96 % | HEART RATE: 54 BPM | BODY MASS INDEX: 27.5 KG/M2 | HEIGHT: 72 IN | WEIGHT: 203 LBS

## 2018-08-03 DIAGNOSIS — R42 CHRONIC VERTIGO: ICD-10-CM

## 2018-08-03 DIAGNOSIS — E87.6 CHRONIC HYPOKALEMIA: ICD-10-CM

## 2018-08-03 DIAGNOSIS — E87.6 HYPOKALEMIA: ICD-10-CM

## 2018-08-03 DIAGNOSIS — I10 ESSENTIAL HYPERTENSION: Primary | ICD-10-CM

## 2018-08-03 DIAGNOSIS — M89.9 DISORDER OF BONE: ICD-10-CM

## 2018-08-03 LAB
ANION GAP SERPL CALCULATED.3IONS-SCNC: 15 MMOL/L (ref 7–19)
BUN BLDV-MCNC: 21 MG/DL (ref 8–23)
CALCIUM SERPL-MCNC: 9.1 MG/DL (ref 8.8–10.2)
CHLORIDE BLD-SCNC: 102 MMOL/L (ref 98–111)
CO2: 28 MMOL/L (ref 22–29)
CREAT SERPL-MCNC: 0.9 MG/DL (ref 0.5–1.2)
GFR NON-AFRICAN AMERICAN: >60
GLUCOSE BLD-MCNC: 122 MG/DL (ref 74–109)
POTASSIUM SERPL-SCNC: 3.2 MMOL/L (ref 3.5–5)
SODIUM BLD-SCNC: 145 MMOL/L (ref 136–145)

## 2018-08-03 PROCEDURE — 4040F PNEUMOC VAC/ADMIN/RCVD: CPT | Performed by: NURSE PRACTITIONER

## 2018-08-03 PROCEDURE — 3017F COLORECTAL CA SCREEN DOC REV: CPT | Performed by: NURSE PRACTITIONER

## 2018-08-03 PROCEDURE — 1101F PT FALLS ASSESS-DOCD LE1/YR: CPT | Performed by: NURSE PRACTITIONER

## 2018-08-03 PROCEDURE — G8427 DOCREV CUR MEDS BY ELIG CLIN: HCPCS | Performed by: NURSE PRACTITIONER

## 2018-08-03 PROCEDURE — G8419 CALC BMI OUT NRM PARAM NOF/U: HCPCS | Performed by: NURSE PRACTITIONER

## 2018-08-03 PROCEDURE — 1036F TOBACCO NON-USER: CPT | Performed by: NURSE PRACTITIONER

## 2018-08-03 PROCEDURE — 1123F ACP DISCUSS/DSCN MKR DOCD: CPT | Performed by: NURSE PRACTITIONER

## 2018-08-03 PROCEDURE — 99214 OFFICE O/P EST MOD 30 MIN: CPT | Performed by: NURSE PRACTITIONER

## 2018-08-03 RX ORDER — POTASSIUM CHLORIDE 1500 MG/1
20 TABLET, FILM COATED, EXTENDED RELEASE ORAL 2 TIMES DAILY
Qty: 180 TABLET | Refills: 3 | Status: SHIPPED | OUTPATIENT
Start: 2018-08-03 | End: 2018-11-05 | Stop reason: SDUPTHER

## 2018-08-03 ASSESSMENT — ENCOUNTER SYMPTOMS
VOMITING: 0
CONSTIPATION: 0
SHORTNESS OF BREATH: 0
STRIDOR: 0
WHEEZING: 0
TROUBLE SWALLOWING: 0
BLOOD IN STOOL: 0
EYE ITCHING: 0
CHOKING: 0
COLOR CHANGE: 0
COUGH: 0
ABDOMINAL PAIN: 0
DIARRHEA: 0
EYE DISCHARGE: 0
NAUSEA: 0
SORE THROAT: 0
ABDOMINAL DISTENTION: 0

## 2018-08-03 NOTE — PROGRESS NOTES
constipation, diarrhea, nausea and vomiting. Endocrine: Negative for cold intolerance, polydipsia and polyuria. Genitourinary: Negative for difficulty urinating, dysuria, frequency and urgency. Musculoskeletal: Negative for arthralgias and gait problem. Skin: Negative for color change and rash. Allergic/Immunologic: Negative for food allergies and immunocompromised state. Neurological: Positive for dizziness. Negative for tremors, syncope, speech difficulty, weakness and headaches. Hematological: Negative for adenopathy. Does not bruise/bleed easily. Psychiatric/Behavioral: Negative for confusion and hallucinations. Objective:     Physical Exam   Constitutional: He is oriented to person, place, and time. He appears well-developed and well-nourished. No distress. HENT:   Head: Normocephalic and atraumatic. Eyes: Pupils are equal, round, and reactive to light. Right eye exhibits no discharge. Left eye exhibits no discharge. No scleral icterus. Neck: Normal range of motion. Neck supple. No JVD present. No thyromegaly present. Cardiovascular: Normal rate, regular rhythm and normal heart sounds. No murmur heard. Pulmonary/Chest: Effort normal and breath sounds normal. No respiratory distress. He has no wheezes. He has no rales. Abdominal: Soft. Bowel sounds are normal. He exhibits no distension and no mass. There is no tenderness. There is no rebound and no guarding. Musculoskeletal: Normal range of motion. He exhibits no edema or tenderness. Neurological: He is alert and oriented to person, place, and time. He has normal reflexes. No cranial nerve deficit. Coordination normal.   Skin: Skin is warm and dry. No rash noted. No erythema. Psychiatric: He has a normal mood and affect. His behavior is normal. Judgment and thought content normal. He does not exhibit a depressed mood.      /62   Pulse 54   Resp 16   Ht 6' (1.829 m)   Wt 203 lb (92.1 kg)   SpO2 96%   BMI 27.53 kg/m²     Assessment:       Diagnosis Orders   1. Essential hypertension     2. Chronic hypokalemia     3. Chronic vertigo       Labs reviewed from 2 days ago for repeat K+   Plan:        Patient given educational materials - see patient instructions. Discussed use, benefit, and side effects of prescribed medications. All patient questions answered. Pt voiced understanding. Reviewed health maintenance. Instructed to continue current medications, diet and exercise. Patient agreed with treatment plan. Follow up as directed. MEDICATIONS:  No orders of the defined types were placed in this encounter. ORDERS:  No orders of the defined types were placed in this encounter. Follow-up:  Return in about 3 months (around 11/3/2018). PATIENT INSTRUCTIONS:  Patient Instructions   1. HTN;  Stable today; If continues to drop you can cut the b/p med in 1/2  2. Chronic hypokalemia; Stable with addition of the extra K+  I have sent s new script for 20 BID;   3. Chronic vertigo; Stable no changes     Electronically signed by MARISELA Mendez on 8/3/2018 at 9:53 AM    EMR Dragon/transcription disclaimer:  Much of this encounter note is electronic transcription/translation of spoken language to printed texts. The electronic translation of spoken language may be erroneous, or at times, nonsensical words or phrases may be inadvertently transcribed.   Although I have reviewed the note for such errors, some may still exist.

## 2018-08-08 ENCOUNTER — OFFICE VISIT (OUTPATIENT)
Dept: INTERNAL MEDICINE | Age: 78
End: 2018-08-08
Payer: MEDICARE

## 2018-08-08 VITALS
OXYGEN SATURATION: 95 % | SYSTOLIC BLOOD PRESSURE: 138 MMHG | BODY MASS INDEX: 27.36 KG/M2 | RESPIRATION RATE: 16 BRPM | HEART RATE: 62 BPM | DIASTOLIC BLOOD PRESSURE: 78 MMHG | WEIGHT: 202 LBS | HEIGHT: 72 IN

## 2018-08-08 DIAGNOSIS — R42 VERTIGO: Primary | ICD-10-CM

## 2018-08-08 DIAGNOSIS — R42 CHRONIC VERTIGO: ICD-10-CM

## 2018-08-08 PROCEDURE — G8427 DOCREV CUR MEDS BY ELIG CLIN: HCPCS | Performed by: NURSE PRACTITIONER

## 2018-08-08 PROCEDURE — 4040F PNEUMOC VAC/ADMIN/RCVD: CPT | Performed by: NURSE PRACTITIONER

## 2018-08-08 PROCEDURE — 1101F PT FALLS ASSESS-DOCD LE1/YR: CPT | Performed by: NURSE PRACTITIONER

## 2018-08-08 PROCEDURE — 1036F TOBACCO NON-USER: CPT | Performed by: NURSE PRACTITIONER

## 2018-08-08 PROCEDURE — 3017F COLORECTAL CA SCREEN DOC REV: CPT | Performed by: NURSE PRACTITIONER

## 2018-08-08 PROCEDURE — 1123F ACP DISCUSS/DSCN MKR DOCD: CPT | Performed by: NURSE PRACTITIONER

## 2018-08-08 PROCEDURE — 99213 OFFICE O/P EST LOW 20 MIN: CPT | Performed by: NURSE PRACTITIONER

## 2018-08-08 PROCEDURE — G8419 CALC BMI OUT NRM PARAM NOF/U: HCPCS | Performed by: NURSE PRACTITIONER

## 2018-08-08 RX ORDER — GLIPIZIDE 10 MG/1
TABLET, FILM COATED, EXTENDED RELEASE ORAL
COMMUNITY
Start: 2018-08-06 | End: 2019-04-01 | Stop reason: SDUPTHER

## 2018-08-08 RX ORDER — TEMAZEPAM 30 MG/1
CAPSULE ORAL
COMMUNITY
Start: 2018-08-02 | End: 2018-11-05 | Stop reason: SDUPTHER

## 2018-08-08 ASSESSMENT — ENCOUNTER SYMPTOMS
TROUBLE SWALLOWING: 0
CHOKING: 0
CONSTIPATION: 0
COLOR CHANGE: 0
WHEEZING: 0
STRIDOR: 0
DIARRHEA: 0
SORE THROAT: 0
VOMITING: 0
EYE ITCHING: 0
BLOOD IN STOOL: 0
SHORTNESS OF BREATH: 0
ABDOMINAL PAIN: 0
COUGH: 0
EYE DISCHARGE: 0
ABDOMINAL DISTENTION: 0
NAUSEA: 0

## 2018-08-08 NOTE — PATIENT INSTRUCTIONS
1.  Dizziness;   We will refer to Pt for vestibular rehab  For a trial; if this is not helping then we may need to do MRI /MRA  Of the head and neck

## 2018-08-08 NOTE — PROGRESS NOTES
release tablet       potassium chloride (KLOR-CON M) 20 MEQ TBCR extended release tablet Take 1 tablet by mouth 2 times daily 180 tablet 3    verapamil (CALAN SR) 240 MG extended release tablet TAKE ONE TABLET BY MOUTH TWICE DAILY 180 tablet 1    losartan (COZAAR) 100 MG tablet TAKE ONE TABLET BY MOUTH ONCE DAILY 90 tablet 3    atorvastatin (LIPITOR) 20 MG tablet TAKE 1 TABLET AT BEDTIME 90 tablet 3    fluticasone (FLONASE) 50 MCG/ACT nasal spray USE 1 SPRAY IN EACH NOSTRIL TWICE DAILY 48 g 3    pantoprazole (PROTONIX) 40 MG tablet TAKE 1 TABLET EVERY DAY 90 tablet 3    spironolactone (ALDACTONE) 25 MG tablet TAKE 1 TABLET EVERY DAY 90 tablet 3    hydrochlorothiazide (HYDRODIURIL) 25 MG tablet TAKE 1 TABLET EVERY DAY 90 tablet 3    aspirin 81 MG tablet Take 81 mg by mouth daily      Omega-3 Fatty Acids (FISH OIL) 1000 MG CAPS Take 1,200 mg by mouth 2 times daily       No current facility-administered medications for this visit. No Known Allergies    Health Maintenance   Topic Date Due    Diabetic foot exam  12/16/1952    DTaP/Tdap/Td vaccine (1 - Tdap) 12/16/1961    Shingles Vaccine (1 of 2 - 2 Dose Series) 12/16/1992    Colon cancer screen colonoscopy  05/12/2019 (Originally 12/16/1992)    Flu vaccine (1) 09/01/2018    Diabetic retinal exam  05/10/2019    A1C test (Diabetic or Prediabetic)  06/25/2019    Lipid screen  06/25/2019    Potassium monitoring  08/03/2019    Creatinine monitoring  08/03/2019    Pneumococcal low/med risk  Completed       Subjective:      Review of Systems   Constitutional: Negative for fatigue, fever and unexpected weight change. HENT: Negative for ear discharge, ear pain, mouth sores, sore throat and trouble swallowing. Eyes: Negative for discharge, itching and visual disturbance. Respiratory: Negative for cough, choking, shortness of breath, wheezing and stridor. Cardiovascular: Negative for chest pain, palpitations and leg swelling.    Gastrointestinal: Pulse 62   Resp 16   Ht 6' (1.829 m)   Wt 202 lb (91.6 kg)   SpO2 95%   BMI 27.40 kg/m²     Assessment:       Diagnosis Orders   1. Vertigo  [facility]   2. Chronic vertigo         Diagnostics reviewed from march with normal carotids   Plan:        Patient given educational materials - see patient instructions. Discussed use, benefit, and side effects of prescribed medications. All patient questions answered. Pt voiced understanding. Reviewed health maintenance. Instructed to continue current medications, diet and exercise. Patient agreed with treatment plan. Follow up as directed. MEDICATIONS:  No orders of the defined types were placed in this encounter. ORDERS:  Orders Placed This Encounter   Procedures   [facility] Vestibular Treatment       Follow-up:  Return for keep fu appt. PATIENT INSTRUCTIONS:  Patient Instructions   1. Dizziness; We will refer to Pt for vestibular rehab  For a trial; if this is not helping then we may need to do MRI /MRA  Of the head and neck     Electronically signed by MARISELA Hanna on 8/8/2018 at 3:11 PM    EMR Dragon/transcription disclaimer:  Much of this encounter note is electronic transcription/translation of spoken language to printed texts. The electronic translation of spoken language may be erroneous, or at times, nonsensical words or phrases may be inadvertently transcribed.   Although I have reviewed the note for such errors, some may still exist.

## 2018-08-24 ENCOUNTER — HOSPITAL ENCOUNTER (OUTPATIENT)
Dept: PHYSICAL THERAPY | Age: 78
Setting detail: THERAPIES SERIES
Discharge: HOME | End: 2018-08-24
Payer: MEDICARE

## 2018-08-24 PROCEDURE — G8978 MOBILITY CURRENT STATUS: HCPCS

## 2018-08-24 PROCEDURE — 95992 CANALITH REPOSITIONING PROC: CPT

## 2018-08-24 PROCEDURE — G8979 MOBILITY GOAL STATUS: HCPCS

## 2018-08-24 PROCEDURE — 97162 PT EVAL MOD COMPLEX 30 MIN: CPT

## 2018-08-24 NOTE — PROGRESS NOTES
Physical Therapy  Initial Assessment  Date: 2018  Patient Name: Sang Ott  MRN: [id number]  : 1940     Treatment Diagnosis: vertigo          Subjective   General  Chart Reviewed: Yes  Patient assessed for rehabilitation services?: Yes  Response To Previous Treatment: Not applicable  Family / Caregiver Present: No  Referring Practitioner: Saúl Ernst  Referral Date : 18  Diagnosis: Vertigo R42  Follows Commands: Within Functional Limits  PT Visit Information  Onset Date: 18  PT Insurance Information: Medicare/ [employer] Supp  Total # of Visits Approved: 8  Total # of Visits to Date: 1  Plan of Care/Certification Expiration Date: 18  Progress Note Due Date: 18  Subjective  Subjective: Patient states that he has been having dizziness for the last 3 months. But, he says that it has been better the last few days and that he hasn't had a dizzy spell in about 3-4 days. Patient states he has has history of chronic sinus issues. He says the dizziness occurs when he looks up and if he has slept more than 5-6 hours. If he gets up and walks around the dizziness goes away.             Orientation  Orientation  Overall Orientation Status: Within Normal Limits    Social/Functional History  Social/Functional History  Lives With: Spouse  Type of Home: House  Home Layout: Two level  ADL Assistance: Independent (increased dizziness with closing eyes and washing hair)  Homemaking Assistance: Independent  Active : Yes  Occupation: Retired  IADL Comments: some increased dizziness with climbing up scaffolding when he bends over  Objective     Observation/Palpation  Posture: Fair (forward shoulders and head )  Observation: no nystagmus with smooth pursuit or gaze holding, patient had slight saccade noted with vor cancellation head movements, but no other saccades noted during fast or slow head movement or with horizontal nose to finger saccade test    Spine  Special Tests: increased dizziness with bilateral katy chalo pike for < 30 seconds, no dizziness with bilateral horizontal roll test    Strength RLE  R Hip Flexion: 5/5  R Knee Flexion: 5/5  R Knee Extension: 5/5  R Ankle Dorsiflexion: 5/5  R Ankle Plantar flexion: 5/5  Strength LLE  L Hip Flexion: 5/5  L Knee Flexion: 5/5  L Knee Extension: 5/5  L Ankle Dorsiflexion: 5/5  L Ankle Plantar Flexion: 5/5        Sensation  Overall Sensation Status: WNL (normal to light touch, but patient reports intermitten left LE numbness from back issues)           Balance  Posture: Fair  Sitting - Static: Good  Sitting - Dynamic: Good  Standing - Static: Good  Standing - Dynamic: Good  Comments: Patient had no lob with DGI, but had slight veering to side with horizontal and vertical head turns  Exercises  Exercise 1: cone weaves x 0   Exercise 2: box step with ball toss x0   Exercise 3: figure 8 with ball toss x 0   Exercise 4: 360 degree turns x0   Exercise 5: pertebations all directions x 0   Exercise 6: static stance on foam pads eyes closed x 0  Exercise 7: forward bend with cone weave between legs x 0  Exercise 8: sit to stand with 360 degree turn x 0   Exercise 9: gaze stabilization x 0   Exercise 10: advanced gaze stabilization x 0   Exercise 11: eye head coordination x0   Exercise 12: imaginary target x0   Exercise 13: ruiz daroff habituation x 0   Exercise 14: right canalith with seated 10 min rest                       Assessment   Conditions Requiring Skilled Therapeutic Intervention  Body structures, Functions, Activity limitations: Decreased functional mobility ; Decreased ADL status  Assessment: Patient has recent occurrance of dizziness that has occured multiple times. Patient has increased dizziness with positional changes and slight gait abnormality with DGI testing. Patient had increased dizziness with positional testing with bilateral katy chalo pik maneuver, but no nystagmus noted and right side was more symptomatic.   Patient will benefit from

## 2018-08-28 ENCOUNTER — HOSPITAL ENCOUNTER (OUTPATIENT)
Dept: PHYSICAL THERAPY | Age: 78
Setting detail: THERAPIES SERIES
Discharge: HOME | End: 2018-08-28
Payer: MEDICARE

## 2018-08-28 PROCEDURE — 97112 NEUROMUSCULAR REEDUCATION: CPT

## 2018-08-28 NOTE — PROGRESS NOTES
of ex's for home.   Treatment Diagnosis: vertigo                                                Goals:  Short term goals  Time Frame for Short term goals: 2 Weeks  Short term goal 1: Patient to report no occurrance of dizzines in one weeks time  Short term goal 2: Patient to be independent with habituation HEP   Short term goal 3: Patient to be indpendent with home canalith as needed  Long term goals  Time Frame for Long term goals : 4 Weeks  Long term goal 1: Patient to report no dizziness after sleeping > 5 hours   Long term goal 2: Patient to report no dizziness with looking up   Long term goal 3: Patient to demo increased function by scoring <=5% impairment on Dizziness Handicap Inventory  Patient Goals   Patient goals : decrease dizziness    Plan:    Plan  Times per week: 2x/week   Plan weeks: 4 weeks   Current Treatment Recommendations: ROM, Neuromuscular Re-education, Home Exercise Program, Safety Education & Training, Manual Therapy - Joint Manipulation, Modalities  Timed Code Treatment Minutes: 40 Minutes     Therapy Time   Individual Concurrent Group Co-treatment   Time In 3947         Time Out 1120         Minutes 40         Timed Code Treatment Minutes: Michelle Flores, PT     Electronically signed by Abhijeet Luna PT on 8/28/2018 at 11:31 AM

## 2018-08-31 ENCOUNTER — HOSPITAL ENCOUNTER (OUTPATIENT)
Dept: PHYSICAL THERAPY | Age: 78
Setting detail: THERAPIES SERIES
Discharge: HOME | End: 2018-08-31
Payer: MEDICARE

## 2018-08-31 PROCEDURE — 97112 NEUROMUSCULAR REEDUCATION: CPT

## 2018-08-31 NOTE — PROGRESS NOTES
Physical Therapy  Daily Treatment Note  Date: 2018  Patient Name: Carolina Pastrana  MRN: [id number]     :   1940    Subjective:   General  Chart Reviewed: Yes  Response To Previous Treatment: Not applicable  Family / Caregiver Present: No  Referring Practitioner: Makenna Rae  PT Visit Information  Onset Date: 18  PT Insurance Information: Medicare/ [employer] Supp  Total # of Visits Approved: 8  Total # of Visits to Date: 3  Plan of Care/Certification Expiration Date: 18  Subjective  Subjective: Patient states he has not had any dizziness and says he did sleep for about 5.5 hours without dizziness occurring. Treatment Activities:                                      Exercises  Exercise 1: cone weaves 1 x 10   Exercise 2: box step with ball toss 1 x 10   Exercise 3: figure 8 with 1x 10   Exercise 4: 360 degree turns x0   Exercise 5: pertebations all directions x 1 min on foam pads  Exercise 6: static stance on foam pads eyes closed x 1 min   Exercise 7: forward bend with cone weave between legs 1 x 5  Exercise 8: sit to stand with 360 degree turn x 3 ea direction   Exercise 9: gaze stabilization 1 x 10   Exercise 10: advanced gaze stabilization 1 x 10   Exercise 11: eye head coordination 1 x 3   Exercise 12: imaginary target 1 x 3 *difficulty to perform max v.c. and demo- unable to perform   Exercise 13: ruiz daroff habituation 1 x 10  Exercise 14: right canalith with seated 10 min rest - NOT TODAY                                    Assessment:   Conditions Requiring Skilled Therapeutic Intervention  Assessment: Patient did well with tx today with no lob and no dizziness. Patient has met all goals except slight dizziness with katy chalo pik at last visit. Patient reports he has had no dizziness and has been independent with HEP.   Treatment Diagnosis: vertigo                                                Goals:  Short term goals  Time Frame for Short term goals: 2 Weeks  Short term goal 1:

## 2018-11-01 DIAGNOSIS — R42 CHRONIC VERTIGO: ICD-10-CM

## 2018-11-01 DIAGNOSIS — E87.6 CHRONIC HYPOKALEMIA: ICD-10-CM

## 2018-11-01 DIAGNOSIS — M89.9 DISORDER OF BONE: ICD-10-CM

## 2018-11-01 DIAGNOSIS — I10 ESSENTIAL HYPERTENSION: ICD-10-CM

## 2018-11-01 LAB
ALBUMIN SERPL-MCNC: 4.3 G/DL (ref 3.5–5.2)
ALP BLD-CCNC: 64 U/L (ref 40–130)
ALT SERPL-CCNC: 28 U/L (ref 5–41)
ANION GAP SERPL CALCULATED.3IONS-SCNC: 13 MMOL/L (ref 7–19)
AST SERPL-CCNC: 26 U/L (ref 5–40)
BILIRUB SERPL-MCNC: 0.6 MG/DL (ref 0.2–1.2)
BUN BLDV-MCNC: 24 MG/DL (ref 8–23)
CALCIUM SERPL-MCNC: 9.5 MG/DL (ref 8.8–10.2)
CHLORIDE BLD-SCNC: 100 MMOL/L (ref 98–111)
CO2: 31 MMOL/L (ref 22–29)
CREAT SERPL-MCNC: 0.9 MG/DL (ref 0.5–1.2)
GFR NON-AFRICAN AMERICAN: >60
GLUCOSE BLD-MCNC: 140 MG/DL (ref 74–109)
HCT VFR BLD CALC: 40.9 % (ref 42–52)
HEMOGLOBIN: 13.9 G/DL (ref 14–18)
MCH RBC QN AUTO: 29.4 PG (ref 27–31)
MCHC RBC AUTO-ENTMCNC: 34 G/DL (ref 33–37)
MCV RBC AUTO: 86.5 FL (ref 80–94)
PDW BLD-RTO: 12.9 % (ref 11.5–14.5)
PLATELET # BLD: 148 K/UL (ref 130–400)
PMV BLD AUTO: 10.2 FL (ref 9.4–12.4)
POTASSIUM SERPL-SCNC: 3.3 MMOL/L (ref 3.5–5)
RBC # BLD: 4.73 M/UL (ref 4.7–6.1)
SODIUM BLD-SCNC: 144 MMOL/L (ref 136–145)
TOTAL PROTEIN: 6.9 G/DL (ref 6.6–8.7)
TSH SERPL DL<=0.05 MIU/L-ACNC: 2.67 UIU/ML (ref 0.27–4.2)
VITAMIN D 25-HYDROXY: 40.3 NG/ML
WBC # BLD: 5.5 K/UL (ref 4.8–10.8)

## 2018-11-05 ENCOUNTER — OFFICE VISIT (OUTPATIENT)
Dept: INTERNAL MEDICINE | Age: 78
End: 2018-11-05
Payer: MEDICARE

## 2018-11-05 VITALS
DIASTOLIC BLOOD PRESSURE: 76 MMHG | SYSTOLIC BLOOD PRESSURE: 132 MMHG | HEART RATE: 58 BPM | OXYGEN SATURATION: 97 % | HEIGHT: 72 IN | BODY MASS INDEX: 27.22 KG/M2 | WEIGHT: 201 LBS | RESPIRATION RATE: 16 BRPM

## 2018-11-05 DIAGNOSIS — E87.6 CHRONIC HYPOKALEMIA: ICD-10-CM

## 2018-11-05 DIAGNOSIS — I10 ESSENTIAL HYPERTENSION: ICD-10-CM

## 2018-11-05 DIAGNOSIS — G47.00 INSOMNIA, UNSPECIFIED TYPE: ICD-10-CM

## 2018-11-05 DIAGNOSIS — M89.9 DISORDER OF BONE: ICD-10-CM

## 2018-11-05 DIAGNOSIS — R42 CHRONIC VERTIGO: Primary | ICD-10-CM

## 2018-11-05 DIAGNOSIS — E11.9 TYPE 2 DIABETES MELLITUS WITHOUT COMPLICATION, WITHOUT LONG-TERM CURRENT USE OF INSULIN (HCC): ICD-10-CM

## 2018-11-05 DIAGNOSIS — E78.2 MIXED HYPERLIPIDEMIA: ICD-10-CM

## 2018-11-05 DIAGNOSIS — Z00.00 HEALTHCARE MAINTENANCE: ICD-10-CM

## 2018-11-05 LAB — HBA1C MFR BLD: 5.2 %

## 2018-11-05 PROCEDURE — 1036F TOBACCO NON-USER: CPT | Performed by: NURSE PRACTITIONER

## 2018-11-05 PROCEDURE — 3044F HG A1C LEVEL LT 7.0%: CPT | Performed by: NURSE PRACTITIONER

## 2018-11-05 PROCEDURE — 83036 HEMOGLOBIN GLYCOSYLATED A1C: CPT | Performed by: NURSE PRACTITIONER

## 2018-11-05 PROCEDURE — 4040F PNEUMOC VAC/ADMIN/RCVD: CPT | Performed by: NURSE PRACTITIONER

## 2018-11-05 PROCEDURE — 1123F ACP DISCUSS/DSCN MKR DOCD: CPT | Performed by: NURSE PRACTITIONER

## 2018-11-05 PROCEDURE — G8427 DOCREV CUR MEDS BY ELIG CLIN: HCPCS | Performed by: NURSE PRACTITIONER

## 2018-11-05 PROCEDURE — 99214 OFFICE O/P EST MOD 30 MIN: CPT | Performed by: NURSE PRACTITIONER

## 2018-11-05 PROCEDURE — 1101F PT FALLS ASSESS-DOCD LE1/YR: CPT | Performed by: NURSE PRACTITIONER

## 2018-11-05 PROCEDURE — 2022F DILAT RTA XM EVC RTNOPTHY: CPT | Performed by: NURSE PRACTITIONER

## 2018-11-05 PROCEDURE — G8419 CALC BMI OUT NRM PARAM NOF/U: HCPCS | Performed by: NURSE PRACTITIONER

## 2018-11-05 PROCEDURE — 3017F COLORECTAL CA SCREEN DOC REV: CPT | Performed by: NURSE PRACTITIONER

## 2018-11-05 PROCEDURE — G8484 FLU IMMUNIZE NO ADMIN: HCPCS | Performed by: NURSE PRACTITIONER

## 2018-11-05 RX ORDER — MECLIZINE HYDROCHLORIDE 25 MG/1
TABLET ORAL
COMMUNITY
Start: 2018-08-10 | End: 2020-02-11

## 2018-11-05 RX ORDER — POTASSIUM CHLORIDE 750 MG/1
20 TABLET, FILM COATED, EXTENDED RELEASE ORAL 2 TIMES DAILY
Qty: 360 TABLET | Refills: 2 | Status: SHIPPED | OUTPATIENT
Start: 2018-11-05 | End: 2018-12-21 | Stop reason: SDUPTHER

## 2018-11-05 RX ORDER — TEMAZEPAM 30 MG/1
30 CAPSULE ORAL NIGHTLY PRN
Qty: 30 CAPSULE | Refills: 0 | Status: SHIPPED | OUTPATIENT
Start: 2018-11-05 | End: 2018-12-03 | Stop reason: SDUPTHER

## 2018-11-05 ASSESSMENT — ENCOUNTER SYMPTOMS
STRIDOR: 0
COLOR CHANGE: 0
SHORTNESS OF BREATH: 0
ABDOMINAL DISTENTION: 0
TROUBLE SWALLOWING: 0
CONSTIPATION: 0
NAUSEA: 0
WHEEZING: 0
ABDOMINAL PAIN: 0
CHOKING: 0
COUGH: 0
EYE ITCHING: 0
VOMITING: 0
SORE THROAT: 0
BLOOD IN STOOL: 0
EYE DISCHARGE: 0
DIARRHEA: 0

## 2018-11-05 NOTE — PROGRESS NOTES
 Diabetic retinal exam  05/10/2019    A1C test (Diabetic or Prediabetic)  06/25/2019    Lipid screen  06/25/2019    Potassium monitoring  11/01/2019    Creatinine monitoring  11/01/2019    Colon cancer screen colonoscopy  05/12/2020    Pneumococcal low/med risk  Completed       Subjective:      Review of Systems   Constitutional: Negative for fatigue, fever and unexpected weight change. HENT: Negative for ear discharge, ear pain, mouth sores, sore throat and trouble swallowing. Eyes: Negative for discharge, itching and visual disturbance. Respiratory: Negative for cough, choking, shortness of breath, wheezing and stridor. Cardiovascular: Negative for chest pain, palpitations and leg swelling. Gastrointestinal: Negative for abdominal distention, abdominal pain, blood in stool, constipation, diarrhea, nausea and vomiting. Endocrine: Negative for cold intolerance, polydipsia and polyuria. Genitourinary: Negative for difficulty urinating, dysuria, frequency and urgency. Musculoskeletal: Negative for arthralgias and gait problem. Skin: Negative for color change and rash. Allergic/Immunologic: Negative for food allergies and immunocompromised state. Neurological: Positive for dizziness. Negative for tremors, syncope, speech difficulty, weakness and headaches. Insomnia     Hematological: Negative for adenopathy. Does not bruise/bleed easily. Psychiatric/Behavioral: Negative for confusion and hallucinations. Objective:     Physical Exam   Constitutional: He is oriented to person, place, and time. He appears well-developed and well-nourished. No distress. HENT:   Head: Normocephalic and atraumatic. Eyes: Pupils are equal, round, and reactive to light. Right eye exhibits no discharge. Left eye exhibits no discharge. No scleral icterus. Neck: Normal range of motion. Neck supple. No JVD present. No thyromegaly present.    Cardiovascular: Normal rate, regular rhythm and normal

## 2018-11-26 RX ORDER — VERAPAMIL HYDROCHLORIDE 240 MG/1
TABLET, FILM COATED, EXTENDED RELEASE ORAL
Qty: 180 TABLET | Refills: 1 | Status: SHIPPED | OUTPATIENT
Start: 2018-11-26 | End: 2018-12-21 | Stop reason: SDUPTHER

## 2018-12-03 DIAGNOSIS — G47.00 INSOMNIA, UNSPECIFIED TYPE: ICD-10-CM

## 2018-12-03 RX ORDER — TEMAZEPAM 30 MG/1
30 CAPSULE ORAL NIGHTLY PRN
Qty: 30 CAPSULE | Refills: 0 | Status: SHIPPED | OUTPATIENT
Start: 2018-12-05 | End: 2019-02-05 | Stop reason: SDUPTHER

## 2018-12-04 DIAGNOSIS — F51.01 PRIMARY INSOMNIA: Primary | ICD-10-CM

## 2018-12-04 RX ORDER — TEMAZEPAM 15 MG/1
CAPSULE ORAL
Qty: 60 CAPSULE | Refills: 0 | Status: SHIPPED | OUTPATIENT
Start: 2018-12-04 | End: 2020-01-07 | Stop reason: SDUPTHER

## 2018-12-21 RX ORDER — POTASSIUM CHLORIDE 750 MG/1
20 TABLET, FILM COATED, EXTENDED RELEASE ORAL 2 TIMES DAILY
Qty: 360 TABLET | Refills: 1 | Status: SHIPPED | OUTPATIENT
Start: 2018-12-21 | End: 2020-02-11

## 2018-12-21 RX ORDER — VERAPAMIL HYDROCHLORIDE 240 MG/1
TABLET, FILM COATED, EXTENDED RELEASE ORAL
Qty: 180 TABLET | Refills: 1 | Status: SHIPPED | OUTPATIENT
Start: 2018-12-21 | End: 2019-07-01 | Stop reason: SDUPTHER

## 2018-12-21 RX ORDER — LOSARTAN POTASSIUM 100 MG/1
TABLET ORAL
Qty: 90 TABLET | Refills: 1 | Status: SHIPPED | OUTPATIENT
Start: 2018-12-21 | End: 2019-07-01 | Stop reason: SDUPTHER

## 2019-01-31 DIAGNOSIS — Z00.00 HEALTHCARE MAINTENANCE: ICD-10-CM

## 2019-01-31 DIAGNOSIS — M89.9 DISORDER OF BONE: ICD-10-CM

## 2019-01-31 LAB
ALBUMIN SERPL-MCNC: 4.5 G/DL (ref 3.5–5.2)
ALP BLD-CCNC: 66 U/L (ref 40–130)
ALT SERPL-CCNC: 27 U/L (ref 5–41)
ANION GAP SERPL CALCULATED.3IONS-SCNC: 15 MMOL/L (ref 7–19)
AST SERPL-CCNC: 24 U/L (ref 5–40)
BILIRUB SERPL-MCNC: 0.8 MG/DL (ref 0.2–1.2)
BUN BLDV-MCNC: 23 MG/DL (ref 8–23)
CALCIUM SERPL-MCNC: 9.7 MG/DL (ref 8.8–10.2)
CHLORIDE BLD-SCNC: 101 MMOL/L (ref 98–111)
CHOLESTEROL, TOTAL: 131 MG/DL (ref 160–199)
CO2: 28 MMOL/L (ref 22–29)
CREAT SERPL-MCNC: 0.9 MG/DL (ref 0.5–1.2)
GFR NON-AFRICAN AMERICAN: >60
GLUCOSE BLD-MCNC: 122 MG/DL (ref 74–109)
HBA1C MFR BLD: 5 % (ref 4–6)
HCT VFR BLD CALC: 43 % (ref 42–52)
HDLC SERPL-MCNC: 42 MG/DL (ref 55–121)
HEMOGLOBIN: 14.5 G/DL (ref 14–18)
LDL CHOLESTEROL CALCULATED: 74 MG/DL
MCH RBC QN AUTO: 29.7 PG (ref 27–31)
MCHC RBC AUTO-ENTMCNC: 33.7 G/DL (ref 33–37)
MCV RBC AUTO: 88.1 FL (ref 80–94)
PDW BLD-RTO: 12.3 % (ref 11.5–14.5)
PLATELET # BLD: 144 K/UL (ref 130–400)
PMV BLD AUTO: 10.1 FL (ref 9.4–12.4)
POTASSIUM SERPL-SCNC: 3.9 MMOL/L (ref 3.5–5)
RBC # BLD: 4.88 M/UL (ref 4.7–6.1)
SODIUM BLD-SCNC: 144 MMOL/L (ref 136–145)
TOTAL PROTEIN: 7 G/DL (ref 6.6–8.7)
TRIGL SERPL-MCNC: 74 MG/DL (ref 0–149)
TSH SERPL DL<=0.05 MIU/L-ACNC: 2.41 UIU/ML (ref 0.27–4.2)
VITAMIN D 25-HYDROXY: 36.1 NG/ML
WBC # BLD: 5.9 K/UL (ref 4.8–10.8)

## 2019-02-05 ENCOUNTER — OFFICE VISIT (OUTPATIENT)
Dept: INTERNAL MEDICINE | Age: 79
End: 2019-02-05
Payer: MEDICARE

## 2019-02-05 VITALS
DIASTOLIC BLOOD PRESSURE: 80 MMHG | HEART RATE: 58 BPM | HEIGHT: 72 IN | WEIGHT: 201 LBS | RESPIRATION RATE: 16 BRPM | BODY MASS INDEX: 27.22 KG/M2 | SYSTOLIC BLOOD PRESSURE: 122 MMHG | OXYGEN SATURATION: 98 %

## 2019-02-05 DIAGNOSIS — E78.2 MIXED HYPERLIPIDEMIA: ICD-10-CM

## 2019-02-05 DIAGNOSIS — G47.00 INSOMNIA, UNSPECIFIED TYPE: ICD-10-CM

## 2019-02-05 DIAGNOSIS — I10 ESSENTIAL HYPERTENSION: Primary | ICD-10-CM

## 2019-02-05 DIAGNOSIS — Z12.5 ENCOUNTER FOR SCREENING FOR MALIGNANT NEOPLASM OF PROSTATE: ICD-10-CM

## 2019-02-05 DIAGNOSIS — E11.9 TYPE 2 DIABETES MELLITUS WITHOUT COMPLICATION, WITHOUT LONG-TERM CURRENT USE OF INSULIN (HCC): ICD-10-CM

## 2019-02-05 DIAGNOSIS — M89.9 DISORDER OF BONE: ICD-10-CM

## 2019-02-05 DIAGNOSIS — Z00.00 HEALTH CARE MAINTENANCE: ICD-10-CM

## 2019-02-05 PROCEDURE — 1101F PT FALLS ASSESS-DOCD LE1/YR: CPT | Performed by: NURSE PRACTITIONER

## 2019-02-05 PROCEDURE — 99214 OFFICE O/P EST MOD 30 MIN: CPT | Performed by: NURSE PRACTITIONER

## 2019-02-05 PROCEDURE — G8484 FLU IMMUNIZE NO ADMIN: HCPCS | Performed by: NURSE PRACTITIONER

## 2019-02-05 PROCEDURE — G8419 CALC BMI OUT NRM PARAM NOF/U: HCPCS | Performed by: NURSE PRACTITIONER

## 2019-02-05 PROCEDURE — 4040F PNEUMOC VAC/ADMIN/RCVD: CPT | Performed by: NURSE PRACTITIONER

## 2019-02-05 PROCEDURE — G8428 CUR MEDS NOT DOCUMENT: HCPCS | Performed by: NURSE PRACTITIONER

## 2019-02-05 PROCEDURE — 1036F TOBACCO NON-USER: CPT | Performed by: NURSE PRACTITIONER

## 2019-02-05 PROCEDURE — 1123F ACP DISCUSS/DSCN MKR DOCD: CPT | Performed by: NURSE PRACTITIONER

## 2019-02-05 RX ORDER — TEMAZEPAM 30 MG/1
30 CAPSULE ORAL NIGHTLY PRN
Qty: 30 CAPSULE | Refills: 0 | Status: SHIPPED | OUTPATIENT
Start: 2019-02-05 | End: 2019-03-19 | Stop reason: SDUPTHER

## 2019-02-05 ASSESSMENT — ENCOUNTER SYMPTOMS
DIARRHEA: 0
SHORTNESS OF BREATH: 0
ABDOMINAL DISTENTION: 0
COLOR CHANGE: 0
TROUBLE SWALLOWING: 0
ABDOMINAL PAIN: 0
EYE DISCHARGE: 0
STRIDOR: 0
CONSTIPATION: 0
EYE ITCHING: 0
VOMITING: 0
BLOOD IN STOOL: 0
CHOKING: 0
WHEEZING: 0
COUGH: 0
SORE THROAT: 0
NAUSEA: 0

## 2019-03-19 DIAGNOSIS — G47.00 INSOMNIA, UNSPECIFIED TYPE: ICD-10-CM

## 2019-03-19 RX ORDER — TEMAZEPAM 30 MG/1
30 CAPSULE ORAL NIGHTLY PRN
Qty: 30 CAPSULE | Refills: 0 | Status: SHIPPED | OUTPATIENT
Start: 2019-03-19 | End: 2019-07-12 | Stop reason: SDUPTHER

## 2019-04-01 RX ORDER — GLIPIZIDE 10 MG/1
TABLET, FILM COATED, EXTENDED RELEASE ORAL
Qty: 90 TABLET | Refills: 3 | Status: SHIPPED | OUTPATIENT
Start: 2019-04-01 | End: 2020-03-16

## 2019-04-01 RX ORDER — HYDROCHLOROTHIAZIDE 25 MG/1
TABLET ORAL
Qty: 90 TABLET | Refills: 3 | Status: SHIPPED | OUTPATIENT
Start: 2019-04-01 | End: 2020-03-16

## 2019-04-01 RX ORDER — SPIRONOLACTONE 25 MG/1
TABLET ORAL
Qty: 90 TABLET | Refills: 3 | Status: SHIPPED | OUTPATIENT
Start: 2019-04-01 | End: 2020-03-16

## 2019-04-01 NOTE — TELEPHONE ENCOUNTER
Kacy Rule called requesting a refill of the below medication which has been pended for you:     Requested Prescriptions     Pending Prescriptions Disp Refills    hydrochlorothiazide (HYDRODIURIL) 25 MG tablet [Pharmacy Med Name: HYDROCHLOROTHIAZIDE 25 MG Tablet] 90 tablet 3     Sig: TAKE 1 TABLET EVERY DAY    spironolactone (ALDACTONE) 25 MG tablet [Pharmacy Med Name: SPIRONOLACTONE 25 MG Tablet] 90 tablet 3     Sig: TAKE 1 TABLET EVERY DAY    glipiZIDE (GLUCOTROL XL) 10 MG extended release tablet [Pharmacy Med Name: GLIPIZIDE ER 10 MG Tablet Extended Release 24 Hour] 90 tablet 3     Sig: TAKE 1 TABLET IN THE MORNING       Last Appointment Date: 2/5/2019  Next Appointment Date: 5/6/2019    No Known Allergies

## 2019-05-02 DIAGNOSIS — Z12.5 ENCOUNTER FOR SCREENING FOR MALIGNANT NEOPLASM OF PROSTATE: ICD-10-CM

## 2019-05-02 DIAGNOSIS — I10 ESSENTIAL HYPERTENSION: ICD-10-CM

## 2019-05-02 DIAGNOSIS — E11.9 TYPE 2 DIABETES MELLITUS WITHOUT COMPLICATION, WITHOUT LONG-TERM CURRENT USE OF INSULIN (HCC): ICD-10-CM

## 2019-05-02 DIAGNOSIS — M89.9 DISORDER OF BONE: ICD-10-CM

## 2019-05-02 DIAGNOSIS — Z00.00 HEALTH CARE MAINTENANCE: ICD-10-CM

## 2019-05-02 LAB
ALBUMIN SERPL-MCNC: 4.2 G/DL (ref 3.5–5.2)
ALP BLD-CCNC: 64 U/L (ref 40–130)
ALT SERPL-CCNC: 25 U/L (ref 5–41)
ANION GAP SERPL CALCULATED.3IONS-SCNC: 10 MMOL/L (ref 7–19)
AST SERPL-CCNC: 20 U/L (ref 5–40)
BASOPHILS ABSOLUTE: 0 K/UL (ref 0–0.2)
BASOPHILS RELATIVE PERCENT: 0.7 % (ref 0–1)
BILIRUB SERPL-MCNC: 0.5 MG/DL (ref 0.2–1.2)
BUN BLDV-MCNC: 28 MG/DL (ref 8–23)
CALCIUM SERPL-MCNC: 9.1 MG/DL (ref 8.8–10.2)
CHLORIDE BLD-SCNC: 106 MMOL/L (ref 98–111)
CO2: 28 MMOL/L (ref 22–29)
CREAT SERPL-MCNC: 0.9 MG/DL (ref 0.5–1.2)
EOSINOPHILS ABSOLUTE: 0.4 K/UL (ref 0–0.6)
EOSINOPHILS RELATIVE PERCENT: 6.2 % (ref 0–5)
GFR NON-AFRICAN AMERICAN: >60
GLUCOSE BLD-MCNC: 101 MG/DL (ref 74–109)
HBA1C MFR BLD: 4.7 % (ref 4–6)
HCT VFR BLD CALC: 41.6 % (ref 42–52)
HEMOGLOBIN: 14.1 G/DL (ref 14–18)
LYMPHOCYTES ABSOLUTE: 1.4 K/UL (ref 1.1–4.5)
LYMPHOCYTES RELATIVE PERCENT: 24.1 % (ref 20–40)
MCH RBC QN AUTO: 29.7 PG (ref 27–31)
MCHC RBC AUTO-ENTMCNC: 33.9 G/DL (ref 33–37)
MCV RBC AUTO: 87.6 FL (ref 80–94)
MONOCYTES ABSOLUTE: 0.4 K/UL (ref 0–0.9)
MONOCYTES RELATIVE PERCENT: 7.2 % (ref 0–10)
NEUTROPHILS ABSOLUTE: 3.6 K/UL (ref 1.5–7.5)
NEUTROPHILS RELATIVE PERCENT: 61.5 % (ref 50–65)
PDW BLD-RTO: 12.4 % (ref 11.5–14.5)
PLATELET # BLD: 133 K/UL (ref 130–400)
PMV BLD AUTO: 10.3 FL (ref 9.4–12.4)
POTASSIUM SERPL-SCNC: 3.5 MMOL/L (ref 3.5–5)
PROSTATE SPECIFIC ANTIGEN: 0.41 NG/ML (ref 0–4)
RBC # BLD: 4.75 M/UL (ref 4.7–6.1)
SODIUM BLD-SCNC: 144 MMOL/L (ref 136–145)
TOTAL PROTEIN: 6.6 G/DL (ref 6.6–8.7)
TSH SERPL DL<=0.05 MIU/L-ACNC: 3.23 UIU/ML (ref 0.27–4.2)
VITAMIN D 25-HYDROXY: 35.8 NG/ML
WBC # BLD: 5.8 K/UL (ref 4.8–10.8)

## 2019-05-06 ENCOUNTER — OFFICE VISIT (OUTPATIENT)
Dept: INTERNAL MEDICINE | Age: 79
End: 2019-05-06
Payer: MEDICARE

## 2019-05-06 VITALS
BODY MASS INDEX: 25.6 KG/M2 | HEIGHT: 72 IN | HEART RATE: 56 BPM | WEIGHT: 189 LBS | RESPIRATION RATE: 18 BRPM | OXYGEN SATURATION: 97 % | SYSTOLIC BLOOD PRESSURE: 124 MMHG | DIASTOLIC BLOOD PRESSURE: 74 MMHG

## 2019-05-06 DIAGNOSIS — Z00.00 HEALTH CARE MAINTENANCE: ICD-10-CM

## 2019-05-06 DIAGNOSIS — E78.2 MIXED HYPERLIPIDEMIA: Primary | ICD-10-CM

## 2019-05-06 DIAGNOSIS — E11.9 TYPE 2 DIABETES MELLITUS WITHOUT COMPLICATION, WITHOUT LONG-TERM CURRENT USE OF INSULIN (HCC): ICD-10-CM

## 2019-05-06 DIAGNOSIS — I10 ESSENTIAL HYPERTENSION: ICD-10-CM

## 2019-05-06 DIAGNOSIS — K21.9 GASTROESOPHAGEAL REFLUX DISEASE WITHOUT ESOPHAGITIS: ICD-10-CM

## 2019-05-06 DIAGNOSIS — E87.6 CHRONIC HYPOKALEMIA: ICD-10-CM

## 2019-05-06 DIAGNOSIS — M89.9 DISORDER OF BONE: ICD-10-CM

## 2019-05-06 PROCEDURE — 4040F PNEUMOC VAC/ADMIN/RCVD: CPT | Performed by: NURSE PRACTITIONER

## 2019-05-06 PROCEDURE — G8427 DOCREV CUR MEDS BY ELIG CLIN: HCPCS | Performed by: NURSE PRACTITIONER

## 2019-05-06 PROCEDURE — 1123F ACP DISCUSS/DSCN MKR DOCD: CPT | Performed by: NURSE PRACTITIONER

## 2019-05-06 PROCEDURE — 99214 OFFICE O/P EST MOD 30 MIN: CPT | Performed by: NURSE PRACTITIONER

## 2019-05-06 PROCEDURE — 1036F TOBACCO NON-USER: CPT | Performed by: NURSE PRACTITIONER

## 2019-05-06 PROCEDURE — G8419 CALC BMI OUT NRM PARAM NOF/U: HCPCS | Performed by: NURSE PRACTITIONER

## 2019-05-06 ASSESSMENT — ENCOUNTER SYMPTOMS
SHORTNESS OF BREATH: 0
TROUBLE SWALLOWING: 0
EYE DISCHARGE: 0
COLOR CHANGE: 0
EYE ITCHING: 0
BLOOD IN STOOL: 0
ABDOMINAL PAIN: 0
DIARRHEA: 0
CHOKING: 0
CONSTIPATION: 0
SORE THROAT: 0
ABDOMINAL DISTENTION: 0
WHEEZING: 0
NAUSEA: 0
VOMITING: 0
STRIDOR: 0
COUGH: 0

## 2019-05-06 ASSESSMENT — PATIENT HEALTH QUESTIONNAIRE - PHQ9
SUM OF ALL RESPONSES TO PHQ QUESTIONS 1-9: 0
2. FEELING DOWN, DEPRESSED OR HOPELESS: 0
1. LITTLE INTEREST OR PLEASURE IN DOING THINGS: 0
SUM OF ALL RESPONSES TO PHQ QUESTIONS 1-9: 0
SUM OF ALL RESPONSES TO PHQ9 QUESTIONS 1 & 2: 0

## 2019-05-06 NOTE — PROGRESS NOTES
[facility]  [facility] [facility] [address]  Dept: [phone]  Dept Fax: [phone]: [phone]    Brigid Holter is a 70 y.o. male who presents today for his medical conditions/complaints as noted below. Brigid Holter is c/loly Hypertension (Patient is here for routine follow up visit and review of labs done 5/2/19.)        HPI:     HPI   1.  HTN;  HTN:  Stable on current meds; No side effects of the meds; Takes as directed; takes blood pressure 3-4 times a week   2. Health maintenance; he will be getting c-scope in June   3. TYPe II DM;  a1c is 4.7; He only takes glipizide occasionally ; he has had no hypoglycemia   He is on ARB  We will get microalbumin next visit   4. Hypokalemia; Stable on current meds  5. GERD;  Stable on current dose of protonix   6. Hyperlipidemia-  Stable on current meds; Takes as directed; No side effects of the meds; He is very active;  [facility]   Chief Complaint   Patient presents with    Hypertension     Patient is here for routine follow up visit and review of labs done 5/2/19.        Past Medical History:   Diagnosis Date    BPH with obstruction/lower urinary tract symptoms     Gastroesophageal reflux disease without esophagitis 5/6/2019    Hyperglycemia     Hyperlipidemia     Hypertension     Testicular hypofunction     Type 2 diabetes mellitus without complication [facility])       Past Surgical History:   Procedure Laterality Date    ANKLE SURGERY      HERNIA REPAIR      TONSILLECTOMY         Vitals 5/6/2019 2/5/2019 11/5/2018 8/8/2018 8/8/2018 2/5/5004   SYSTOLIC 209 714 199 [phone]   DIASTOLIC 74 80 76 78 78 62   Site - - - Left Arm Right Arm -   Position - - - Sitting Sitting -   Cuff Size - - - - - -   Pulse 56 58 58 - 62 54   Temp - - - - - -   Resp 18 16 16 - 16 16   Weight 189 lb 201 lb 201 lb - 202 lb 203 lb   Height 6' 0\" 6' 0\" 6' 0\" - 6' 0\" Lab Results   Component Value Date    LABA1C 4.7 05/02/2019     Lab Results   Component Value Date    PSA 0.41 05/02/2019    PSA 0.45 06/25/2018     TSH   Date Value Ref Range Status   05/02/2019 3.230 0.270 - 4.200 uIU/mL Final   ]  Lab Results   Component Value Date     05/02/2019    K 3.5 05/02/2019     05/02/2019    CO2 28 05/02/2019    BUN 28 (H) 05/02/2019    CREATININE 0.9 05/02/2019    GLUCOSE 101 05/02/2019    CALCIUM 9.1 05/02/2019    PROT 6.6 05/02/2019    LABALBU 4.2 05/02/2019    BILITOT 0.5 05/02/2019    ALKPHOS 64 05/02/2019    AST 20 05/02/2019    ALT 25 05/02/2019    LABGLOM >60 05/02/2019     Lab Results   Component Value Date    CHOL 131 (L) 01/31/2019    CHOL 191 06/25/2018    CHOL 165 01/02/2018     Lab Results   Component Value Date    TRIG 74 01/31/2019    TRIG 169 (H) 06/25/2018    TRIG 188 (H) 01/02/2018     Lab Results   Component Value Date    HDL 42 (L) 01/31/2019    HDL 37 (L) 06/25/2018    HDL 39 (L) 01/02/2018     Lab Results   Component Value Date    LDLCALC 74 01/31/2019    LDLCALC 120 06/25/2018    LDLCALC 88 01/02/2018     Lab Results   Component Value Date     05/02/2019    K 3.5 05/02/2019     05/02/2019    CO2 28 05/02/2019    BUN 28 05/02/2019    CREATININE 0.9 05/02/2019    GLUCOSE 101 05/02/2019    CALCIUM 9.1 05/02/2019      Lab Results   Component Value Date    WBC 5.8 05/02/2019    HGB 14.1 05/02/2019    HCT 41.6 (L) 05/02/2019    MCV 87.6 05/02/2019     05/02/2019    LABLYMP 1.36 04/07/2015    LYMPHOPCT 24.1 05/02/2019    RBC 4.75 05/02/2019    MCH 29.7 05/02/2019    MCHC 33.9 05/02/2019    RDW 12.4 05/02/2019     Lab Results   Component Value Date    VITD25 35.8 05/02/2019       Subjective:      Review of Systems   Constitutional: Negative for fatigue, fever and unexpected weight change. HENT: Negative for ear discharge, ear pain, mouth sores, sore throat and trouble swallowing.     Eyes: Negative for discharge, itching and visual disturbance. Respiratory: Negative for cough, choking, shortness of breath, wheezing and stridor. Cardiovascular: Negative for chest pain, palpitations and leg swelling. Gastrointestinal: Negative for abdominal distention, abdominal pain, blood in stool, constipation, diarrhea, nausea and vomiting. Endocrine: Negative for cold intolerance, polydipsia and polyuria. Genitourinary: Negative for difficulty urinating, dysuria, frequency and urgency. Musculoskeletal: Negative for arthralgias and gait problem. Chronic right foot pain from old injury   Skin: Negative for color change and rash. Allergic/Immunologic: Negative for food allergies and immunocompromised state. Neurological: Negative for dizziness, tremors, syncope, speech difficulty, weakness and headaches. Hematological: Negative for adenopathy. Does not bruise/bleed easily. Psychiatric/Behavioral: Negative for confusion and hallucinations. Objective:     Physical Exam   Constitutional: He is oriented to person, place, and time. He appears well-developed and well-nourished. No distress. HENT:   Head: Normocephalic and atraumatic. Eyes: Pupils are equal, round, and reactive to light. Right eye exhibits no discharge. Left eye exhibits no discharge. No scleral icterus. Neck: Normal range of motion. Neck supple. No JVD present. No thyromegaly present. Cardiovascular: Regular rhythm and normal heart sounds. No murmur heard. Sinus bradycardia   Pulmonary/Chest: Effort normal and breath sounds normal. No respiratory distress. He has no wheezes. He has no rales. Abdominal: Soft. Bowel sounds are normal. He exhibits no distension and no mass. There is no tenderness. There is no rebound and no guarding. Musculoskeletal: Normal range of motion. He exhibits no edema or tenderness. Neurological: He is alert and oriented to person, place, and time. He has normal reflexes. He displays normal reflexes. No cranial nerve deficit. Coordination normal.   Skin: Skin is warm and dry. No rash noted. No erythema. Psychiatric: His behavior is normal. Judgment and thought content normal. He does not exhibit a depressed mood. /74   Pulse 56   Resp 18   Ht 6' (1.829 m)   Wt 189 lb (85.7 kg)   SpO2 97%   BMI 25.63 kg/m²     Assessment:       Diagnosis Orders   1. Mixed hyperlipidemia  Lipid Panel   2. Essential hypertension  CBC Auto Differential    Comprehensive Metabolic Panel   3. Type 2 diabetes mellitus without complication, without long-term current use of insulin ([facility])  Hemoglobin A1C   4. Chronic hypokalemia     5. Gastroesophageal reflux disease without esophagitis     6. Health care maintenance  Vitamin D 25 Hydroxy    Urinalysis Reflex to Culture    TSH without Reflex   7. Disorder of bone   Vitamin D 25 Hydroxy     Labs reviewedfrom 4/30/19  Diagnostics reviewed from 2014  c-scope   Plan:        Patient given educational materials - see patient instructions. Discussed use, benefit, and side effects of prescribed medications. Allpatient questions answered. Pt voiced understanding. Reviewed health maintenance. Instructed to continue current medications, diet and exercise. Patient agreed with treatment plan. Follow up as directed. MEDICATIONS:  No orders of the defined types were placed in this encounter. ORDERS:  No orders of the defined types were placed in this encounter. Follow-up:  Return in about 3 months (around 8/6/2019) for yearly physical, awv. PATIENT INSTRUCTIONS:  Patient Instructions   1. HTN; The current medical regimen is effective;  continue present plan and medications. 2.  Health maintenance;  Keep appt with dr Sheron Wolff  3. TYpe II DM; Stable ; You can omit the glipizide;    4. Hypokalemia; The current medical regimen is effective;  continue present plan and medications. 5.  GERD; The current medical regimen is effective;  continue present plan and medications.   6. Hyperlipidemia; The current medical regimen is effective;  continue present plan and medications. Electronically signed by MARISELA Bryant on 5/6/2019 at 8:12 AM    EMRDragon/transcription disclaimer:  Much of this encounter note is electronic transcription/translation of spoken language to printed texts. The electronic translation of spoken language may be erroneous, or at times,nonsensical words or phrases may be inadvertently transcribed.   Although I have reviewed the note for such errors, some may still exist.

## 2019-06-04 ENCOUNTER — OFFICE VISIT (OUTPATIENT)
Dept: GASTROENTEROLOGY | Facility: CLINIC | Age: 79
End: 2019-06-04

## 2019-06-04 VITALS
OXYGEN SATURATION: 98 % | HEIGHT: 72 IN | WEIGHT: 191 LBS | DIASTOLIC BLOOD PRESSURE: 70 MMHG | HEART RATE: 59 BPM | BODY MASS INDEX: 25.87 KG/M2 | SYSTOLIC BLOOD PRESSURE: 128 MMHG

## 2019-06-04 DIAGNOSIS — I10 HTN (HYPERTENSION), BENIGN: ICD-10-CM

## 2019-06-04 DIAGNOSIS — Z86.010 HX OF ADENOMATOUS COLONIC POLYPS: Primary | ICD-10-CM

## 2019-06-04 PROBLEM — Z86.0101 HX OF ADENOMATOUS COLONIC POLYPS: Status: ACTIVE | Noted: 2019-06-04

## 2019-06-04 PROCEDURE — S0260 H&P FOR SURGERY: HCPCS | Performed by: CLINICAL NURSE SPECIALIST

## 2019-06-04 RX ORDER — SODIUM, POTASSIUM,MAG SULFATES 17.5-3.13G
SOLUTION, RECONSTITUTED, ORAL ORAL
Qty: 2 BOTTLE | Refills: 0 | Status: ON HOLD | OUTPATIENT
Start: 2019-06-04 | End: 2020-02-20

## 2019-06-04 NOTE — PROGRESS NOTES
Mike Kay  1940 6/4/2019  Chief Complaint   Patient presents with   • Colonoscopy     Subjective   HPI  Mike Kay is a 78 y.o. male who presents as a referral for preventative maintenance. He has no complaints of nausea or vomiting. No change in bowels. No wt loss. No BRBPR. No melena. There is NO family hx for colon cancer. No abdominal pain.  Past Medical History:   Diagnosis Date   • Acid reflux    • Diabetes mellitus type 2, diet-controlled (CMS/HCC)    • Hay fever    • High cholesterol     BORDERLINE   • Hx of colonic polyp    • Hypertension    • Mitral valve prolapse    • Umbilical hernia      Past Surgical History:   Procedure Laterality Date   • COLONOSCOPY W/ POLYPECTOMY  05/12/2014    Adenomatous polyp at 30 cm, Hyperplastic polyp cecum, Diverticulosis repeat exam in 5 years   • FOOT SURGERY Left 2015    R/T FRACTURE, PLATE AND SCREWS    • INGUINAL HERNIA REPAIR Left 2010   • OTHER SURGICAL HISTORY      VOCAL CORD    • TONSILLECTOMY  1991   • UMBILICAL HERNIA REPAIR N/A 1/12/2017    Procedure: UMBILICAL HERNIA REPAIR;  Surgeon: Kavitha Navarro MD;  Location: [facility] OR;  Service:      Outpatient Medications Marked as Taking for the 6/4/19 encounter (Office Visit) with Kassandra Pollard APRN   Medication Sig Dispense Refill   • aspirin 81 MG tablet Take 81 mg by mouth Daily.     • atorvastatin (LIPITOR) 20 MG tablet Take 10 mg by mouth Daily. PT TAKES 1/2 20 MG TABLET TO EQUAL 10 MG DAILY     • fluticasone (FLONASE) 50 MCG/ACT nasal spray 1 spray by Each Nare route Daily As Needed for rhinitis or allergies.     • glipiZIDE (GLUCOTROL XL) 10 MG 24 hr tablet Take 10 mg by mouth.     • hydrochlorothiazide (HYDRODIURIL) 25 MG tablet Take 25 mg by mouth Daily.     • losartan (COZAAR) 100 MG tablet Take 100 mg by mouth Daily.     • pantoprazole (PROTONIX) 40 MG EC tablet Take 40 mg by mouth Daily As Needed.     • potassium chloride (K-DUR,KLOR-CON) 10 MEQ CR tablet Take 10 mEq by mouth 2  "(Two) Times a Day.     • spironolactone (ALDACTONE) 25 MG tablet Take 25 mg by mouth Daily.     • verapamil SR (CALAN-SR) 240 MG CR tablet Take 240 mg by mouth 2 (Two) Times a Day.       No Known Allergies  Social History     Socioeconomic History   • Marital status: Single     Spouse name: Not on file   • Number of children: Not on file   • Years of education: Not on file   • Highest education level: Not on file   Tobacco Use   • Smoking status: Never Smoker   • Smokeless tobacco: Never Used   Substance and Sexual Activity   • Alcohol use: No   • Drug use: No     Family History   Problem Relation Age of Onset   • Colon cancer Neg Hx    • Colon polyps Neg Hx      Health Maintenance   Topic Date Due   • URINE MICROALBUMIN  1940   • TDAP/TD VACCINES (1 - Tdap) 12/16/1961   • ZOSTER VACCINE (1 of 2) 12/16/1992   • PNEUMOCOCCAL VACCINES (65+ LOW/MEDIUM RISK) (2 of 2 - PPSV23) 12/15/2016   • LIPID PANEL  12/27/2017   • MEDICARE ANNUAL WELLNESS  03/12/2019   • HEMOGLOBIN A1C  05/05/2019   • INFLUENZA VACCINE  08/01/2019   • COLONOSCOPY  05/12/2024       REVIEW OF SYSTEMS  General: well appearing, no fever chills or sweats, no unexplained wt loss  HEENT: no acute visual or hearing disturbances  Cardiovascular: No chest pain or palpitations  Pulmonary: No shortness of breath, coughing, wheezing or hemoptysis  : No burning, urgency, hematuria, or dysuria  Musculoskeletal: No joint pain or stiffness  Peripheral: no edema  Skin: No lesions or rashes  Neuro: No dizziness, headaches, stroke, syncope  Endocrine: No hot or cold intolerances  Hematological: No blood dyscrasias    Objective   Vitals:    06/04/19 0847   BP: 128/70   Pulse: 59   SpO2: 98%   Weight: 86.6 kg (191 lb)   Height: 182.9 cm (72\")     Body mass index is 25.9 kg/m².  Patient's Body mass index is 25.9 kg/m². BMI is above normal parameters. Recommendations include: nutrition counseling.      PHYSICAL EXAM  General: age appropriate well nourished well " appearing, no acute distress  Head: normocephalic and atraumatic  Global assessment-supple  Neck-No JVD noted, no lymphadenopathy  Pulmonary-clear to auscultation bilaterally, normal respiratory effort  Cardiovascular-normal rate and rhythm, normal heart sounds, S1 and S2 noted  Abdomen-soft, non tender, non distended, normal bowel sounds all 4 quadrants, no hepatosplenomegaly noted  Extremities-No clubbing cyanosis or edema  Neuro-Non focal, converses appropriately, awake, alert, oriented    Assessment/Plan     Mike was seen today for colonoscopy.    Diagnoses and all orders for this visit:    Hx of adenomatous colonic polyps  -     Case Request; Standing  -     Follow Anesthesia Guidelines / Standing Orders; Future  -     Obtain Informed Consent; Future  -     Implement Anesthesia Orders Day of Procedure; Standing  -     Obtain Informed Consent; Standing  -     Verify bowel prep was successful; Standing  -     Case Request        COLONOSCOPY WITH ANESTHESIA (N/A)  Body mass index is 25.9 kg/m².    Patient instructions on prep prior to procedure provided to the patient.    All risks, benefits, alternatives, and indications of colonoscopy procedure have been discussed with the patient. Risks to include perforation of the colon requiring possible surgery or colostomy, risk of bleeding from biopsies or removal of colon tissue, possibility of missing a colon polyp or cancer, or adverse drug reaction.  Benefits to include the diagnosis and management of disease of the colon and rectum. Alternatives to include barium enema, radiographic evaluation, lab testing or no intervention. Pt verbalizes understanding and agrees.     Kassandra Pollard, VILMA  2019  9:01 AM      IF YOU SMOKE OR USE TOBACCO PLEASE READ THE FOLLOWIN minutes reading provided    Why is smoking bad for me?  Smoking increases the risk of heart disease, lung disease, vascular disease, stroke, and cancer.     If you smoke, STOP!    If you would  like more information on quitting smoking, please visit the [employer] website: [email or web address]/[email or web address]   This link will provide additional resources including the QUIT line and the Beat the Pack support groups.     For more information:    Quit Now [place]  1-800-QUIT-NOW  https://[facility]uanH[email or web address] University of Pennsylvaniaregis.quitlogix.org/en-US/    Obesity, Adult  Obesity is the condition of having too much total body fat. Being overweight or obese means that your weight is greater than what is considered healthy for your body size. Obesity is determined by a measurement called BMI. BMI is an estimate of body fat and is calculated from height and weight. For adults, a BMI of 30 or higher is considered obese.  Obesity can eventually lead to other health concerns and major illnesses, including:  · Stroke.  · Coronary artery disease (CAD).  · Type 2 diabetes.  · Some types of cancer, including cancers of the colon, breast, uterus, and gallbladder.  · Osteoarthritis.  · High blood pressure (hypertension).  · High cholesterol.  · Sleep apnea.  · Gallbladder stones.  · Infertility problems.  What are the causes?  The main cause of obesity is taking in (consuming) more calories than your body uses for energy. Other factors that contribute to this condition may include:  · Being born with genes that make you more likely to become obese.  · Having a medical condition that causes obesity. These conditions include:  ¨ Hypothyroidism.  ¨ Polycystic ovarian syndrome (PCOS).  ¨ Binge-eating disorder.  ¨ Cushing syndrome.  · Taking certain medicines, such as steroids, antidepressants, and seizure medicines.  · Not being physically active (sedentary lifestyle).  · Living where there are limited places to exercise safely or buy healthy foods.  · Not getting enough sleep.  What increases the risk?  The following factors may increase your risk of this condition:  · Having a family history of obesity.  · Being a woman of  -American descent.  · Being a man of  descent.  What are the signs or symptoms?  Having excessive body fat is the main symptom of this condition.  How is this diagnosed?  This condition may be diagnosed based on:  · Your symptoms.  · Your medical history.  · A physical exam. Your health care provider may measure:  ¨ Your BMI. If you are an adult with a BMI between 25 and less than 30, you are considered overweight. If you are an adult with a BMI of 30 or higher, you are considered obese.  ¨ The distances around your hips and your waist (circumferences). These may be compared to each other to help diagnose your condition.  ¨ Your skinfold thickness. Your health care provider may gently pinch a fold of your skin and measure it.  How is this treated?  Treatment for this condition often includes changing your lifestyle. Treatment may include some or all of the following:  · Dietary changes. Work with your health care provider and a dietitian to set a weight-loss goal that is healthy and reasonable for you. Dietary changes may include eating:  ¨ Smaller portions. A portion size is the amount of a particular food that is healthy for you to eat at one time. This varies from person to person.  ¨ Low-calorie or low-fat options.  ¨ More whole grains, fruits, and vegetables.  · Regular physical activity. This may include aerobic activity (cardio) and strength training.  · Medicine to help you lose weight. Your health care provider may prescribe medicine if you are unable to lose 1 pound a week after 6 weeks of eating more healthily and doing more physical activity.  · Surgery. Surgical options may include gastric banding and gastric bypass. Surgery may be done if:  ¨ Other treatments have not helped to improve your condition.  ¨ You have a BMI of 40 or higher.  ¨ You have life-threatening health problems related to obesity.  Follow these instructions at home:     Eating and drinking     · Follow recommendations  from your health care provider about what you eat and drink. Your health care provider may advise you to:  ¨ Limit fast foods, sweets, and processed snack foods.  ¨ Choose low-fat options, such as low-fat milk instead of whole milk.  ¨ Eat 5 or more servings of fruits or vegetables every day.  ¨ Eat at home more often. This gives you more control over what you eat.  ¨ Choose healthy foods when you eat out.  ¨ Learn what a healthy portion size is.  ¨ Keep low-fat snacks on hand.  ¨ Avoid sugary drinks, such as soda, fruit juice, iced tea sweetened with sugar, and flavored milk.  ¨ Eat a healthy breakfast.  · Drink enough water to keep your urine clear or pale yellow.  · Do not go without eating for long periods of time (do not fast) or follow a fad diet. Fasting and fad diets can be unhealthy and even dangerous.  Physical Activity   · Exercise regularly, as told by your health care provider. Ask your health care provider what types of exercise are safe for you and how often you should exercise.  · Warm up and stretch before being active.  · Cool down and stretch after being active.  · Rest between periods of activity.  Lifestyle   · Limit the time that you spend in front of your TV, computer, or video game system.  · Find ways to reward yourself that do not involve food.  · Limit alcohol intake to no more than 1 drink a day for nonpregnant women and 2 drinks a day for men. One drink equals 12 oz of beer, 5 oz of wine, or 1½ oz of hard liquor.  General instructions   · Keep a weight loss journal to keep track of the food you eat and how much you exercise you get.  · Take over-the-counter and prescription medicines only as told by your health care provider.  · Take vitamins and supplements only as told by your health care provider.  · Consider joining a support group. Your health care provider may be able to recommend a support group.  · Keep all follow-up visits as told by your health care provider. This is  important.  Contact a health care provider if:  · You are unable to meet your weight loss goal after 6 weeks of dietary and lifestyle changes.  This information is not intended to replace advice given to you by your health care provider. Make sure you discuss any questions you have with your health care provider.  Document Released: 01/25/2006 Document Revised: 05/22/2017 Document Reviewed: 10/05/2016  Elsevier Interactive Patient Education © 2017 Elsevier Inc.

## 2019-07-01 RX ORDER — POTASSIUM CHLORIDE 750 MG/1
TABLET, EXTENDED RELEASE ORAL
Qty: 360 TABLET | Refills: 1 | Status: SHIPPED | OUTPATIENT
Start: 2019-07-01 | End: 2019-11-06 | Stop reason: SDUPTHER

## 2019-07-01 RX ORDER — LOSARTAN POTASSIUM 100 MG/1
TABLET ORAL
Qty: 90 TABLET | Refills: 1 | Status: SHIPPED | OUTPATIENT
Start: 2019-07-01 | End: 2019-12-30

## 2019-07-01 RX ORDER — VERAPAMIL HYDROCHLORIDE 240 MG/1
TABLET, FILM COATED, EXTENDED RELEASE ORAL
Qty: 180 TABLET | Refills: 1 | Status: SHIPPED | OUTPATIENT
Start: 2019-07-01 | End: 2019-12-30

## 2019-07-01 RX ORDER — ATORVASTATIN CALCIUM 20 MG/1
TABLET, FILM COATED ORAL
Qty: 90 TABLET | Refills: 3 | Status: SHIPPED | OUTPATIENT
Start: 2019-07-01 | End: 2020-09-28

## 2019-07-12 DIAGNOSIS — G47.00 INSOMNIA, UNSPECIFIED TYPE: ICD-10-CM

## 2019-07-12 RX ORDER — TEMAZEPAM 30 MG/1
30 CAPSULE ORAL NIGHTLY PRN
Qty: 30 CAPSULE | Refills: 0 | Status: SHIPPED | OUTPATIENT
Start: 2019-07-12 | End: 2019-10-09 | Stop reason: SDUPTHER

## 2019-08-01 DIAGNOSIS — E78.2 MIXED HYPERLIPIDEMIA: ICD-10-CM

## 2019-08-01 DIAGNOSIS — E11.9 TYPE 2 DIABETES MELLITUS WITHOUT COMPLICATION, WITHOUT LONG-TERM CURRENT USE OF INSULIN (HCC): ICD-10-CM

## 2019-08-01 DIAGNOSIS — Z00.00 HEALTH CARE MAINTENANCE: ICD-10-CM

## 2019-08-01 DIAGNOSIS — M89.9 DISORDER OF BONE: ICD-10-CM

## 2019-08-01 DIAGNOSIS — I10 ESSENTIAL HYPERTENSION: ICD-10-CM

## 2019-08-01 LAB
ALBUMIN SERPL-MCNC: 4.5 G/DL (ref 3.5–5.2)
ALP BLD-CCNC: 66 U/L (ref 40–130)
ALT SERPL-CCNC: 27 U/L (ref 5–41)
ANION GAP SERPL CALCULATED.3IONS-SCNC: 14 MMOL/L (ref 7–19)
AST SERPL-CCNC: 20 U/L (ref 5–40)
BASOPHILS ABSOLUTE: 0.1 K/UL (ref 0–0.2)
BASOPHILS RELATIVE PERCENT: 0.9 % (ref 0–1)
BILIRUB SERPL-MCNC: 0.5 MG/DL (ref 0.2–1.2)
BILIRUBIN URINE: NEGATIVE
BLOOD, URINE: NEGATIVE
BUN BLDV-MCNC: 31 MG/DL (ref 8–23)
CALCIUM SERPL-MCNC: 9.5 MG/DL (ref 8.8–10.2)
CHLORIDE BLD-SCNC: 103 MMOL/L (ref 98–111)
CHOLESTEROL, TOTAL: 145 MG/DL (ref 160–199)
CLARITY: CLEAR
CO2: 27 MMOL/L (ref 22–29)
COLOR: YELLOW
CREAT SERPL-MCNC: 0.9 MG/DL (ref 0.5–1.2)
CREATININE URINE: 124.4 MG/DL (ref 4.2–622)
EOSINOPHILS ABSOLUTE: 0.4 K/UL (ref 0–0.6)
EOSINOPHILS RELATIVE PERCENT: 7.5 % (ref 0–5)
GFR NON-AFRICAN AMERICAN: >60
GLUCOSE BLD-MCNC: 79 MG/DL (ref 74–109)
GLUCOSE URINE: NEGATIVE MG/DL
HBA1C MFR BLD: 5.1 % (ref 4–6)
HCT VFR BLD CALC: 44.6 % (ref 42–52)
HDLC SERPL-MCNC: 44 MG/DL (ref 55–121)
HEMOGLOBIN: 14.7 G/DL (ref 14–18)
KETONES, URINE: NEGATIVE MG/DL
LDL CHOLESTEROL CALCULATED: 87 MG/DL
LEUKOCYTE ESTERASE, URINE: NEGATIVE
LYMPHOCYTES ABSOLUTE: 1.5 K/UL (ref 1.1–4.5)
LYMPHOCYTES RELATIVE PERCENT: 27.6 % (ref 20–40)
MCH RBC QN AUTO: 29.8 PG (ref 27–31)
MCHC RBC AUTO-ENTMCNC: 33 G/DL (ref 33–37)
MCV RBC AUTO: 90.5 FL (ref 80–94)
MICROALBUMIN UR-MCNC: <1.2 MG/DL (ref 0–19)
MICROALBUMIN/CREAT UR-RTO: NORMAL MG/G
MONOCYTES ABSOLUTE: 0.5 K/UL (ref 0–0.9)
MONOCYTES RELATIVE PERCENT: 8.4 % (ref 0–10)
NEUTROPHILS ABSOLUTE: 3.1 K/UL (ref 1.5–7.5)
NEUTROPHILS RELATIVE PERCENT: 55.4 % (ref 50–65)
NITRITE, URINE: NEGATIVE
PDW BLD-RTO: 13 % (ref 11.5–14.5)
PH UA: 5.5 (ref 5–8)
PLATELET # BLD: 135 K/UL (ref 130–400)
PMV BLD AUTO: 9.9 FL (ref 9.4–12.4)
POTASSIUM SERPL-SCNC: 4 MMOL/L (ref 3.5–5)
PROTEIN UA: NEGATIVE MG/DL
RBC # BLD: 4.93 M/UL (ref 4.7–6.1)
SODIUM BLD-SCNC: 144 MMOL/L (ref 136–145)
SPECIFIC GRAVITY UA: 1.03 (ref 1–1.03)
TOTAL PROTEIN: 7.1 G/DL (ref 6.6–8.7)
TRIGL SERPL-MCNC: 69 MG/DL (ref 0–149)
TSH SERPL DL<=0.05 MIU/L-ACNC: 3.47 UIU/ML (ref 0.27–4.2)
URINE REFLEX TO CULTURE: NORMAL
UROBILINOGEN, URINE: 0.2 E.U./DL
VITAMIN D 25-HYDROXY: 38 NG/ML
WBC # BLD: 5.6 K/UL (ref 4.8–10.8)

## 2019-08-06 ENCOUNTER — OFFICE VISIT (OUTPATIENT)
Dept: INTERNAL MEDICINE | Age: 79
End: 2019-08-06
Payer: MEDICARE

## 2019-08-06 VITALS
SYSTOLIC BLOOD PRESSURE: 136 MMHG | HEIGHT: 72 IN | OXYGEN SATURATION: 96 % | HEART RATE: 62 BPM | BODY MASS INDEX: 26.01 KG/M2 | WEIGHT: 192 LBS | RESPIRATION RATE: 18 BRPM | DIASTOLIC BLOOD PRESSURE: 62 MMHG

## 2019-08-06 DIAGNOSIS — E11.9 TYPE 2 DIABETES MELLITUS WITHOUT COMPLICATION, WITHOUT LONG-TERM CURRENT USE OF INSULIN (HCC): Primary | ICD-10-CM

## 2019-08-06 DIAGNOSIS — K21.9 GASTROESOPHAGEAL REFLUX DISEASE WITHOUT ESOPHAGITIS: ICD-10-CM

## 2019-08-06 DIAGNOSIS — Z00.00 ROUTINE GENERAL MEDICAL EXAMINATION AT A HEALTH CARE FACILITY: ICD-10-CM

## 2019-08-06 DIAGNOSIS — I10 ESSENTIAL HYPERTENSION: ICD-10-CM

## 2019-08-06 DIAGNOSIS — E87.6 CHRONIC HYPOKALEMIA: ICD-10-CM

## 2019-08-06 DIAGNOSIS — E78.2 MIXED HYPERLIPIDEMIA: ICD-10-CM

## 2019-08-06 DIAGNOSIS — N40.0 BENIGN PROSTATIC HYPERPLASIA WITHOUT LOWER URINARY TRACT SYMPTOMS: ICD-10-CM

## 2019-08-06 PROCEDURE — 1036F TOBACCO NON-USER: CPT | Performed by: NURSE PRACTITIONER

## 2019-08-06 PROCEDURE — 99214 OFFICE O/P EST MOD 30 MIN: CPT | Performed by: NURSE PRACTITIONER

## 2019-08-06 PROCEDURE — 4040F PNEUMOC VAC/ADMIN/RCVD: CPT | Performed by: NURSE PRACTITIONER

## 2019-08-06 PROCEDURE — G0438 PPPS, INITIAL VISIT: HCPCS | Performed by: NURSE PRACTITIONER

## 2019-08-06 PROCEDURE — G8427 DOCREV CUR MEDS BY ELIG CLIN: HCPCS | Performed by: NURSE PRACTITIONER

## 2019-08-06 PROCEDURE — 1123F ACP DISCUSS/DSCN MKR DOCD: CPT | Performed by: NURSE PRACTITIONER

## 2019-08-06 PROCEDURE — G8419 CALC BMI OUT NRM PARAM NOF/U: HCPCS | Performed by: NURSE PRACTITIONER

## 2019-08-06 ASSESSMENT — ENCOUNTER SYMPTOMS
NAUSEA: 0
SORE THROAT: 0
VOMITING: 0
BLOOD IN STOOL: 0
WHEEZING: 0
ABDOMINAL DISTENTION: 0
EYE ITCHING: 0
CONSTIPATION: 0
TROUBLE SWALLOWING: 0
DIARRHEA: 0
COLOR CHANGE: 0
EYE DISCHARGE: 0
CHOKING: 0
COUGH: 0
STRIDOR: 0
SHORTNESS OF BREATH: 0
ABDOMINAL PAIN: 0

## 2019-08-06 ASSESSMENT — PATIENT HEALTH QUESTIONNAIRE - PHQ9
SUM OF ALL RESPONSES TO PHQ QUESTIONS 1-9: 0
SUM OF ALL RESPONSES TO PHQ QUESTIONS 1-9: 0

## 2019-08-06 ASSESSMENT — LIFESTYLE VARIABLES: HOW OFTEN DO YOU HAVE A DRINK CONTAINING ALCOHOL: 0

## 2019-08-06 NOTE — PROGRESS NOTES
[facility] INTERNAL MEDICINE  [address] [address] [place] [address]  Dept: [phone]  Dept Fax: [phone]  Loc: [phone]    Barron Carrel is a 70 y.o. male who presents today for his medical conditions/complaints as noted below. Barron Carrel is c/loly Medicare AWV (Patient is here for Medicare wellness visit.) and Annual Exam (Patient is here for yaerly physical and review of labs done 8/1/19.)        HPI:     HPI   1. Type  II DM a1c of 5.1  Only on gipizide   2. GERD;  Stable no changes takes meds as directed; No side effects    3. Hyperlipidemia-  Stable on current meds; Takes as directed; No side effects of the meds;  4. BPH;  Stable  Symptoms; On no meds;    5. Health maintenance; He has oct appt for colon with Dr Jasmyn Eugene;    6. Chronic hypokalemia; Stable with K+ 20 and aldactone;  \  7. HTN:  Stable on current meds; No side effects of the meds; Takes as directed; takes blood pressure 3-4 times a week   Chief Complaint   Patient presents with   [facility]     Patient is here for Medicare wellness visit.  Annual Exam     Patient is here for yaerly physical and review of labs done 8/1/19.        Past Medical History:   Diagnosis Date    BPH with obstruction/lower urinary tract symptoms     Gastroesophageal reflux disease without esophagitis 5/6/2019    Hyperglycemia     Hyperlipidemia     Hypertension     Testicular hypofunction     Type 2 diabetes mellitus without complication [facility])       Past Surgical History:   Procedure Laterality Date    ANKLE SURGERY      HERNIA REPAIR      TONSILLECTOMY         Vitals 8/6/2019 5/6/2019 2/5/2019 11/5/2018 8/8/2018 1/8/0683   SYSTOLIC [phone]   DIASTOLIC 62 74 80 76 78 78   Site - - - - Left Arm Right Arm   Position - - - - Sitting Sitting   Cuff Size - - - - - -   Pulse 62 56 58 58 - 62   Temp - - - - - -   Resp 18 18 16 16 - 16   SpO2 96 97 98 97 - 95   Weight 192 lb 189 necessary  7. Hypertension stable no changes are necessary    Electronically signed by MARISELA Rapp on 2019 at 10:18 AM    EMRDragon/transcription disclaimer:  Much of this encounter note is electronic transcription/translation of spoken language to printed texts. The electronic translation of spoken language may be erroneous, or at times,nonsensical words or phrases may be inadvertently transcribed. Although I have reviewed the note for such errors, some may still exist.  Medicare Annual Wellness Visit  Name: Diego Pollock Date: 2019   MRN: [id number] Sex: Male   Age: 70 y.o. Ethnicity: Non-/Non    : 1940 Race: Baldo Ahn is here for Medicare AWV (Patient is here for Medicare wellness visit.) and Annual Exam (Patient is here for [facility] physical and review of labs done 19.)    Screenings for behavioral, psychosocial and functional/safety risks, and cognitive dysfunction are all negative except as indicated below. These results, as well as other patient data from the [address] form, are documented in Flowsheets linked to this Encounter. No Known Allergies    Prior to Visit Medications    Medication Sig Taking? Authorizing Provider   temazepam (RESTORIL) 30 MG capsule Take 1 capsule by mouth nightly as needed for Sleep for up to 30 days.  Yes MARISELA Rapp   verapamil (CALAN SR) 240 MG extended release tablet TAKE 1 TABLET TWICE DAILY Yes MARISELA Rapp   potassium chloride (KLOR-CON M) 10 MEQ extended release tablet TAKE 2 TABLETS BY MOUTH 2 TIMES DAILY Yes MARISELA Rapp   losartan (COZAAR) 100 MG tablet TAKE 1 TABLET EVERY DAY Yes MARISELA Rapp   atorvastatin (LIPITOR) 20 MG tablet TAKE 1 TABLET AT BEDTIME Yes MARISELA Rapp   hydrochlorothiazide (HYDRODIURIL) 25 MG tablet TAKE 1 TABLET EVERY DAY Yes MARISELA Rapp   spironolactone (ALDACTONE) 25 MG tablet TAKE 1 TABLET EVERY DAY Yes Jb Acevedo MARISELA Petty   glipiZIDE (GLUCOTROL XL) 10 MG extended release tablet TAKE 1 TABLET IN THE MORNING Yes MARISELA Persaud   potassium chloride (KLOR-CON) 10 MEQ extended release tablet Take 2 tablets by mouth 2 times daily Yes MARISELA Persaud   meclizine (ANTIVERT) 25 MG tablet  Yes Historical Provider, MD   fluticasone (FLONASE) 50 MCG/ACT nasal spray USE 1 SPRAY IN EACH NOSTRIL TWICE DAILY Yes MARISELA Persaud   pantoprazole (PROTONIX) 40 MG tablet TAKE 1 TABLET EVERY DAY Yes MARISELA Persaud   aspirin 81 MG tablet Take 81 mg by mouth daily Yes Historical Provider, MD   Omega-3 Fatty Acids (FISH OIL) 1000 MG CAPS Take 1,200 mg by mouth 2 times daily Yes Historical Provider, MD       Past Medical History:   Diagnosis Date    BPH with obstruction/lower urinary tract symptoms     Gastroesophageal reflux disease without esophagitis 5/6/2019    Hyperglycemia     Hyperlipidemia     Hypertension     Testicular hypofunction     Type 2 diabetes mellitus without complication ([facility] [address]      Past Surgical History:   Procedure Laterality Date    ANKLE SURGERY      HERNIA REPAIR      TONSILLECTOMY         Family History   Problem Relation Age of Onset    No Known Problems Mother     Heart Attack Father        CareTeam (Including outside providers/suppliers regularly involved in providing care):   Patient Care Team:  MARISELA Persaud as PCP - General (Nurse Practitioner Acute Care)  MARISELA Persaud as PCP - REHABILITATION [facility] Empaneled Provider    Wt Readings from Last 3 Encounters:   08/06/19 192 lb (87.1 kg)   05/06/19 189 lb (85.7 kg)   02/05/19 201 lb (91.2 kg)     Vitals:    08/06/19 0939   BP: 136/62   Pulse: 62   Resp: 18   SpO2: 96%   Weight: 192 lb (87.1 kg)   Height: 6' (1.829 m)     Body mass index is 26.04 kg/m². Based upon direct observation of the patient, evaluation of cognition reveals recent and remote memory intact.         Patient's complete Health Risk Assessment and screening values have

## 2019-08-17 ENCOUNTER — OFFICE VISIT (OUTPATIENT)
Dept: URGENT CARE | Age: 79
End: 2019-08-17
Payer: MEDICARE

## 2019-08-17 VITALS
WEIGHT: 194.2 LBS | SYSTOLIC BLOOD PRESSURE: 149 MMHG | HEIGHT: 72 IN | OXYGEN SATURATION: 98 % | BODY MASS INDEX: 26.3 KG/M2 | RESPIRATION RATE: 18 BRPM | DIASTOLIC BLOOD PRESSURE: 80 MMHG | TEMPERATURE: 98.7 F | HEART RATE: 68 BPM

## 2019-08-17 DIAGNOSIS — J01.90 ACUTE SINUSITIS, RECURRENCE NOT SPECIFIED, UNSPECIFIED LOCATION: Primary | ICD-10-CM

## 2019-08-17 PROCEDURE — 96372 THER/PROPH/DIAG INJ SC/IM: CPT | Performed by: NURSE PRACTITIONER

## 2019-08-17 PROCEDURE — 1036F TOBACCO NON-USER: CPT | Performed by: NURSE PRACTITIONER

## 2019-08-17 PROCEDURE — G8427 DOCREV CUR MEDS BY ELIG CLIN: HCPCS | Performed by: NURSE PRACTITIONER

## 2019-08-17 PROCEDURE — 99213 OFFICE O/P EST LOW 20 MIN: CPT | Performed by: NURSE PRACTITIONER

## 2019-08-17 PROCEDURE — 4040F PNEUMOC VAC/ADMIN/RCVD: CPT | Performed by: NURSE PRACTITIONER

## 2019-08-17 PROCEDURE — 1123F ACP DISCUSS/DSCN MKR DOCD: CPT | Performed by: NURSE PRACTITIONER

## 2019-08-17 PROCEDURE — G8419 CALC BMI OUT NRM PARAM NOF/U: HCPCS | Performed by: NURSE PRACTITIONER

## 2019-08-17 RX ORDER — DEXAMETHASONE SODIUM PHOSPHATE 10 MG/ML
5 INJECTION INTRAMUSCULAR; INTRAVENOUS ONCE
Status: COMPLETED | OUTPATIENT
Start: 2019-08-17 | End: 2019-08-17

## 2019-08-17 RX ORDER — AMOXICILLIN AND CLAVULANATE POTASSIUM 875; 125 MG/1; MG/1
1 TABLET, FILM COATED ORAL 2 TIMES DAILY
Qty: 20 TABLET | Refills: 0 | Status: SHIPPED | OUTPATIENT
Start: 2019-08-17 | End: 2019-08-27

## 2019-08-17 RX ORDER — CEFTRIAXONE 1 G/1
1 INJECTION, POWDER, FOR SOLUTION INTRAMUSCULAR; INTRAVENOUS ONCE
Status: COMPLETED | OUTPATIENT
Start: 2019-08-17 | End: 2019-08-17

## 2019-08-17 RX ADMIN — CEFTRIAXONE 1 G: 1 INJECTION, POWDER, FOR SOLUTION INTRAMUSCULAR; INTRAVENOUS at 08:34

## 2019-08-17 RX ADMIN — DEXAMETHASONE SODIUM PHOSPHATE 5 MG: 10 INJECTION INTRAMUSCULAR; INTRAVENOUS at 08:36

## 2019-08-17 ASSESSMENT — ENCOUNTER SYMPTOMS
SINUS PRESSURE: 1
SORE THROAT: 0
COUGH: 1
SHORTNESS OF BREATH: 0

## 2019-08-17 NOTE — PROGRESS NOTES
[facility] [address] [facility] URGENT CARE  [address]  [address] [place] [place] [address]  Dept: [phone]  Loc: [phone]    Phong Qiu is a 70 y.o. male who presents today for his medical conditions/complaintsas noted below. Phong Qiu is c/o of Nasal Congestion        HPI:     Sinusitis   This is a new problem. The current episode started in the past 7 days. The problem has been gradually worsening since onset. There has been no fever. The pain is mild. Associated symptoms include congestion, coughing and sinus pressure. Pertinent negatives include no chills, ear pain, headaches, shortness of breath or sore throat. Past treatments include oral decongestants. The treatment provided no relief.        Past Medical History:   Diagnosis Date    BPH with obstruction/lower urinary tract symptoms     Gastroesophageal reflux disease without esophagitis 5/6/2019    Hyperglycemia     Hyperlipidemia     Hypertension     Testicular hypofunction     Type 2 diabetes mellitus without complication (HCC)      Past Surgical History:   Procedure Laterality Date    ANKLE SURGERY      HERNIA REPAIR      TONSILLECTOMY         Family History   Problem Relation Age of Onset    No Known Problems Mother     Heart Attack Father        Social History     Tobacco Use    Smoking status: Never Smoker    Smokeless tobacco: Never Used   Substance Use Topics    Alcohol use: No      Current Outpatient Medications   Medication Sig Dispense Refill    amoxicillin-clavulanate (AUGMENTIN) 875-125 MG per tablet Take 1 tablet by mouth 2 times daily for 10 days 20 tablet 0    verapamil (CALAN SR) 240 MG extended release tablet TAKE 1 TABLET TWICE DAILY 180 tablet 1    potassium chloride (KLOR-CON M) 10 MEQ extended release tablet TAKE 2 TABLETS BY MOUTH 2 TIMES DAILY 360 tablet 1    losartan (COZAAR) 100 MG tablet TAKE 1 TABLET EVERY DAY 90 tablet 1    atorvastatin (LIPITOR) 20 MG tablet TAKE 1 TABLET other systems reviewed and are negative.      :Objective      Physical Exam   Constitutional: He is oriented to person, place, and time. He appears well-developed and well-nourished. No distress. HENT:   Head: Normocephalic and atraumatic. Right Ear: Hearing, tympanic membrane, external ear and ear canal normal.   Left Ear: Hearing, tympanic membrane, external ear and ear canal normal.   Nose: Nose normal.   Mouth/Throat: Uvula is midline, oropharynx is clear and moist and mucous membranes are normal. No posterior oropharyngeal erythema. Eyes: Pupils are equal, round, and reactive to light. Cardiovascular: Normal rate, regular rhythm and normal heart sounds. No murmur heard. Pulmonary/Chest: Effort normal and breath sounds normal. No respiratory distress. He has no wheezes. Neurological: He is alert and oriented to person, place, and time. Skin: Skin is warm and dry. No rash noted. He is not diaphoretic. Psychiatric: He has a normal mood and affect. His behavior is normal.   Nursing note and vitals reviewed. BP (!) 149/80   Pulse 68   Temp 98.7 °F (37.1 °C) (Oral)   Resp 18   Ht 6' (1.829 m)   Wt 194 lb 3.2 oz (88.1 kg)   SpO2 98%   BMI 26.34 kg/m²     :Assessment       Diagnosis Orders   1. Acute sinusitis, recurrence not specified, unspecified location  cefTRIAXone (ROCEPHIN) injection 1 g    dexamethasone (DECADRON) injection 5 mg    amoxicillin-clavulanate (AUGMENTIN) 875-125 MG per tablet       :Plan   Pt requests antibiotic injection and a steroid injection. He states that it what it takes for him to \"get over this. \"     1. Take full course of antibiotics  2. Rest   3. Stay hydrated  4. Monitor fever and treat as needed  5. May use OTC claritin/zyrtec and nasal spray such as flonase to help symptoms  6.  If patient is not improving or developing any new/worsening symptoms then return to clinic as needed or follow up with PCP     No orders of the defined types were placed in this of antibiotics  2. Rest   3. Stay hydrated  4. Monitor fever and treat as needed  5. May use OTC claritin/zyrtec and nasal spray such as flonase to help symptoms  6.  If patient is not improving or developing any new/worsening symptoms then return to clinic as needed or follow up with PCP           Electronically signed by MARISELA Lutz on 8/17/2019 at 8:34 AM

## 2019-10-09 DIAGNOSIS — G47.00 INSOMNIA, UNSPECIFIED TYPE: ICD-10-CM

## 2019-10-10 RX ORDER — TEMAZEPAM 30 MG/1
30 CAPSULE ORAL NIGHTLY PRN
Qty: 30 CAPSULE | Refills: 1 | Status: SHIPPED | OUTPATIENT
Start: 2019-10-10 | End: 2019-11-09

## 2019-10-28 DIAGNOSIS — M89.9 DISORDER OF BONE: ICD-10-CM

## 2019-10-28 DIAGNOSIS — E87.6 CHRONIC HYPOKALEMIA: ICD-10-CM

## 2019-10-28 DIAGNOSIS — I10 ESSENTIAL HYPERTENSION: Primary | ICD-10-CM

## 2019-10-28 DIAGNOSIS — I10 ESSENTIAL HYPERTENSION: ICD-10-CM

## 2019-10-28 DIAGNOSIS — E11.9 TYPE 2 DIABETES MELLITUS WITHOUT COMPLICATION, WITHOUT LONG-TERM CURRENT USE OF INSULIN (HCC): ICD-10-CM

## 2019-10-28 LAB
ALBUMIN SERPL-MCNC: 4.3 G/DL (ref 3.5–5.2)
ALP BLD-CCNC: 65 U/L (ref 40–130)
ALT SERPL-CCNC: 38 U/L (ref 5–41)
ANION GAP SERPL CALCULATED.3IONS-SCNC: 11 MMOL/L (ref 7–19)
AST SERPL-CCNC: 23 U/L (ref 5–40)
BILIRUB SERPL-MCNC: 0.4 MG/DL (ref 0.2–1.2)
BUN BLDV-MCNC: 26 MG/DL (ref 8–23)
CALCIUM SERPL-MCNC: 9.3 MG/DL (ref 8.8–10.2)
CHLORIDE BLD-SCNC: 106 MMOL/L (ref 98–111)
CO2: 28 MMOL/L (ref 22–29)
CREAT SERPL-MCNC: 0.8 MG/DL (ref 0.5–1.2)
GFR NON-AFRICAN AMERICAN: >60
GLUCOSE BLD-MCNC: 138 MG/DL (ref 74–109)
HBA1C MFR BLD: 4.8 % (ref 4–6)
HCT VFR BLD CALC: 43.1 % (ref 42–52)
HEMOGLOBIN: 14.2 G/DL (ref 14–18)
MCH RBC QN AUTO: 29.7 PG (ref 27–31)
MCHC RBC AUTO-ENTMCNC: 32.9 G/DL (ref 33–37)
MCV RBC AUTO: 90.2 FL (ref 80–94)
PDW BLD-RTO: 12.3 % (ref 11.5–14.5)
PLATELET # BLD: 128 K/UL (ref 130–400)
PMV BLD AUTO: 9.9 FL (ref 9.4–12.4)
POTASSIUM SERPL-SCNC: 4.5 MMOL/L (ref 3.5–5)
RBC # BLD: 4.78 M/UL (ref 4.7–6.1)
SODIUM BLD-SCNC: 145 MMOL/L (ref 136–145)
TOTAL PROTEIN: 6.9 G/DL (ref 6.6–8.7)
VITAMIN D 25-HYDROXY: 35.9 NG/ML
WBC # BLD: 5.6 K/UL (ref 4.8–10.8)

## 2019-11-06 ENCOUNTER — OFFICE VISIT (OUTPATIENT)
Dept: INTERNAL MEDICINE | Age: 79
End: 2019-11-06
Payer: MEDICARE

## 2019-11-06 VITALS
BODY MASS INDEX: 26.68 KG/M2 | OXYGEN SATURATION: 98 % | SYSTOLIC BLOOD PRESSURE: 140 MMHG | HEIGHT: 72 IN | HEART RATE: 70 BPM | RESPIRATION RATE: 18 BRPM | DIASTOLIC BLOOD PRESSURE: 82 MMHG | WEIGHT: 197 LBS

## 2019-11-06 DIAGNOSIS — E11.9 TYPE 2 DIABETES MELLITUS WITHOUT COMPLICATION, WITHOUT LONG-TERM CURRENT USE OF INSULIN (HCC): Primary | ICD-10-CM

## 2019-11-06 DIAGNOSIS — E78.2 MIXED HYPERLIPIDEMIA: ICD-10-CM

## 2019-11-06 DIAGNOSIS — Z00.00 HEALTH CARE MAINTENANCE: ICD-10-CM

## 2019-11-06 DIAGNOSIS — K21.9 GASTROESOPHAGEAL REFLUX DISEASE WITHOUT ESOPHAGITIS: ICD-10-CM

## 2019-11-06 DIAGNOSIS — I10 ESSENTIAL HYPERTENSION: ICD-10-CM

## 2019-11-06 PROCEDURE — G8427 DOCREV CUR MEDS BY ELIG CLIN: HCPCS | Performed by: NURSE PRACTITIONER

## 2019-11-06 PROCEDURE — G8484 FLU IMMUNIZE NO ADMIN: HCPCS | Performed by: NURSE PRACTITIONER

## 2019-11-06 PROCEDURE — 1123F ACP DISCUSS/DSCN MKR DOCD: CPT | Performed by: NURSE PRACTITIONER

## 2019-11-06 PROCEDURE — G8417 CALC BMI ABV UP PARAM F/U: HCPCS | Performed by: NURSE PRACTITIONER

## 2019-11-06 PROCEDURE — 1036F TOBACCO NON-USER: CPT | Performed by: NURSE PRACTITIONER

## 2019-11-06 PROCEDURE — 4040F PNEUMOC VAC/ADMIN/RCVD: CPT | Performed by: NURSE PRACTITIONER

## 2019-11-06 PROCEDURE — 99214 OFFICE O/P EST MOD 30 MIN: CPT | Performed by: NURSE PRACTITIONER

## 2019-11-06 ASSESSMENT — ENCOUNTER SYMPTOMS
SORE THROAT: 0
ABDOMINAL PAIN: 0
STRIDOR: 0
WHEEZING: 0
DIARRHEA: 0
COLOR CHANGE: 0
COUGH: 0
TROUBLE SWALLOWING: 0
EYE DISCHARGE: 0
CHOKING: 0
ABDOMINAL DISTENTION: 0
CONSTIPATION: 0
VOMITING: 0
BLOOD IN STOOL: 0
SHORTNESS OF BREATH: 0
EYE ITCHING: 0
NAUSEA: 0

## 2019-12-30 RX ORDER — POTASSIUM CHLORIDE 750 MG/1
TABLET, EXTENDED RELEASE ORAL
Qty: 360 TABLET | Refills: 3 | Status: SHIPPED | OUTPATIENT
Start: 2019-12-30 | End: 2020-09-28

## 2019-12-30 RX ORDER — LOSARTAN POTASSIUM 100 MG/1
TABLET ORAL
Qty: 90 TABLET | Refills: 3 | Status: SHIPPED | OUTPATIENT
Start: 2019-12-30 | End: 2020-09-28

## 2019-12-30 RX ORDER — VERAPAMIL HYDROCHLORIDE 240 MG/1
TABLET, FILM COATED, EXTENDED RELEASE ORAL
Qty: 180 TABLET | Refills: 3 | Status: SHIPPED | OUTPATIENT
Start: 2019-12-30 | End: 2020-09-28

## 2020-01-07 RX ORDER — TEMAZEPAM 15 MG/1
30 CAPSULE ORAL NIGHTLY PRN
Qty: 60 CAPSULE | Refills: 0 | Status: SHIPPED | OUTPATIENT
Start: 2020-01-07 | End: 2020-05-11 | Stop reason: SDUPTHER

## 2020-02-07 DIAGNOSIS — E11.9 TYPE 2 DIABETES MELLITUS WITHOUT COMPLICATION, WITHOUT LONG-TERM CURRENT USE OF INSULIN (HCC): ICD-10-CM

## 2020-02-07 DIAGNOSIS — E78.2 MIXED HYPERLIPIDEMIA: ICD-10-CM

## 2020-02-07 DIAGNOSIS — I10 ESSENTIAL HYPERTENSION: ICD-10-CM

## 2020-02-07 LAB
ALBUMIN SERPL-MCNC: 4.8 G/DL (ref 3.5–5.2)
ALP BLD-CCNC: 76 U/L (ref 40–130)
ALT SERPL-CCNC: 34 U/L (ref 5–41)
ANION GAP SERPL CALCULATED.3IONS-SCNC: 18 MMOL/L (ref 7–19)
AST SERPL-CCNC: 25 U/L (ref 5–40)
BASOPHILS ABSOLUTE: 0.1 K/UL (ref 0–0.2)
BASOPHILS RELATIVE PERCENT: 1 % (ref 0–1)
BILIRUB SERPL-MCNC: 0.6 MG/DL (ref 0.2–1.2)
BUN BLDV-MCNC: 26 MG/DL (ref 8–23)
CALCIUM SERPL-MCNC: 9.7 MG/DL (ref 8.8–10.2)
CHLORIDE BLD-SCNC: 100 MMOL/L (ref 98–111)
CHOLESTEROL, TOTAL: 151 MG/DL (ref 160–199)
CO2: 26 MMOL/L (ref 22–29)
CREAT SERPL-MCNC: 0.8 MG/DL (ref 0.5–1.2)
EOSINOPHILS ABSOLUTE: 0.3 K/UL (ref 0–0.6)
EOSINOPHILS RELATIVE PERCENT: 4.5 % (ref 0–5)
GFR NON-AFRICAN AMERICAN: >60
GLUCOSE BLD-MCNC: 124 MG/DL (ref 74–109)
HBA1C MFR BLD: 5.1 % (ref 4–6)
HCT VFR BLD CALC: 46.6 % (ref 42–52)
HDLC SERPL-MCNC: 49 MG/DL (ref 55–121)
HEMOGLOBIN: 15.4 G/DL (ref 14–18)
IMMATURE GRANULOCYTES #: 0 K/UL
LDL CHOLESTEROL CALCULATED: 86 MG/DL
LYMPHOCYTES ABSOLUTE: 1.5 K/UL (ref 1.1–4.5)
LYMPHOCYTES RELATIVE PERCENT: 25.5 % (ref 20–40)
MCH RBC QN AUTO: 29.2 PG (ref 27–31)
MCHC RBC AUTO-ENTMCNC: 33 G/DL (ref 33–37)
MCV RBC AUTO: 88.4 FL (ref 80–94)
MONOCYTES ABSOLUTE: 0.4 K/UL (ref 0–0.9)
MONOCYTES RELATIVE PERCENT: 7.3 % (ref 0–10)
NEUTROPHILS ABSOLUTE: 3.7 K/UL (ref 1.5–7.5)
NEUTROPHILS RELATIVE PERCENT: 61.5 % (ref 50–65)
PDW BLD-RTO: 12.8 % (ref 11.5–14.5)
PLATELET # BLD: 153 K/UL (ref 130–400)
PMV BLD AUTO: 10.1 FL (ref 9.4–12.4)
POTASSIUM SERPL-SCNC: 3.8 MMOL/L (ref 3.5–5)
RBC # BLD: 5.27 M/UL (ref 4.7–6.1)
SODIUM BLD-SCNC: 144 MMOL/L (ref 136–145)
TOTAL PROTEIN: 7.2 G/DL (ref 6.6–8.7)
TRIGL SERPL-MCNC: 81 MG/DL (ref 0–149)
WBC # BLD: 6.1 K/UL (ref 4.8–10.8)

## 2020-02-11 ENCOUNTER — OFFICE VISIT (OUTPATIENT)
Dept: INTERNAL MEDICINE | Age: 80
End: 2020-02-11
Payer: MEDICARE

## 2020-02-11 VITALS
RESPIRATION RATE: 20 BRPM | HEIGHT: 72 IN | BODY MASS INDEX: 27.22 KG/M2 | SYSTOLIC BLOOD PRESSURE: 128 MMHG | DIASTOLIC BLOOD PRESSURE: 72 MMHG | OXYGEN SATURATION: 95 % | HEART RATE: 68 BPM | WEIGHT: 201 LBS

## 2020-02-11 PROBLEM — F51.01 PRIMARY INSOMNIA: Status: ACTIVE | Noted: 2020-02-11

## 2020-02-11 PROCEDURE — G8417 CALC BMI ABV UP PARAM F/U: HCPCS | Performed by: NURSE PRACTITIONER

## 2020-02-11 PROCEDURE — 1123F ACP DISCUSS/DSCN MKR DOCD: CPT | Performed by: NURSE PRACTITIONER

## 2020-02-11 PROCEDURE — 1036F TOBACCO NON-USER: CPT | Performed by: NURSE PRACTITIONER

## 2020-02-11 PROCEDURE — G8482 FLU IMMUNIZE ORDER/ADMIN: HCPCS | Performed by: NURSE PRACTITIONER

## 2020-02-11 PROCEDURE — G8427 DOCREV CUR MEDS BY ELIG CLIN: HCPCS | Performed by: NURSE PRACTITIONER

## 2020-02-11 PROCEDURE — 99214 OFFICE O/P EST MOD 30 MIN: CPT | Performed by: NURSE PRACTITIONER

## 2020-02-11 PROCEDURE — 4040F PNEUMOC VAC/ADMIN/RCVD: CPT | Performed by: NURSE PRACTITIONER

## 2020-02-11 ASSESSMENT — PATIENT HEALTH QUESTIONNAIRE - PHQ9
2. FEELING DOWN, DEPRESSED OR HOPELESS: 0
SUM OF ALL RESPONSES TO PHQ QUESTIONS 1-9: 0
SUM OF ALL RESPONSES TO PHQ QUESTIONS 1-9: 0
1. LITTLE INTEREST OR PLEASURE IN DOING THINGS: 0
SUM OF ALL RESPONSES TO PHQ9 QUESTIONS 1 & 2: 0

## 2020-02-11 ASSESSMENT — ENCOUNTER SYMPTOMS
COUGH: 0
EYE ITCHING: 0
STRIDOR: 0
DIARRHEA: 0
SORE THROAT: 0
ABDOMINAL DISTENTION: 0
VOMITING: 0
SHORTNESS OF BREATH: 0
TROUBLE SWALLOWING: 0
BLOOD IN STOOL: 0
CONSTIPATION: 0
NAUSEA: 0
CHOKING: 0
EYE DISCHARGE: 0
WHEEZING: 0
ABDOMINAL PAIN: 0
COLOR CHANGE: 0

## 2020-02-11 NOTE — PROGRESS NOTES
LABA1C 5.1 02/07/2020     Lab Results   Component Value Date    PSA 0.41 05/02/2019    PSA 0.45 06/25/2018     TSH   Date Value Ref Range Status   08/01/2019 3.470 0.270 - 4.200 uIU/mL Final   ]  Lab Results   Component Value Date     02/07/2020    K 3.8 02/07/2020     02/07/2020    CO2 26 02/07/2020    BUN 26 (H) 02/07/2020    CREATININE 0.8 02/07/2020    GLUCOSE 124 (H) 02/07/2020    CALCIUM 9.7 02/07/2020    PROT 7.2 02/07/2020    LABALBU 4.8 02/07/2020    BILITOT 0.6 02/07/2020    ALKPHOS 76 02/07/2020    AST 25 02/07/2020    ALT 34 02/07/2020    LABGLOM >60 02/07/2020     Lab Results   Component Value Date    CHOL 151 (L) 02/07/2020    CHOL 145 (L) 08/01/2019    CHOL 131 (L) 01/31/2019     Lab Results   Component Value Date    TRIG 81 02/07/2020    TRIG 69 08/01/2019    TRIG 74 01/31/2019     Lab Results   Component Value Date    HDL 49 (L) 02/07/2020    HDL 44 (L) 08/01/2019    HDL 42 (L) 01/31/2019     Lab Results   Component Value Date    LDLCALC 86 02/07/2020    LDLCALC 87 08/01/2019    LDLCALC 74 01/31/2019     Lab Results   Component Value Date     02/07/2020    K 3.8 02/07/2020     02/07/2020    CO2 26 02/07/2020    BUN 26 02/07/2020    CREATININE 0.8 02/07/2020    GLUCOSE 124 02/07/2020    CALCIUM 9.7 02/07/2020      Lab Results   Component Value Date    WBC 6.1 02/07/2020    HGB 15.4 02/07/2020    HCT 46.6 02/07/2020    MCV 88.4 02/07/2020     02/07/2020    LABLYMP 1.36 04/07/2015    LYMPHOPCT 25.5 02/07/2020    RBC 5.27 02/07/2020    MCH 29.2 02/07/2020    MCHC 33.0 02/07/2020    RDW 12.8 02/07/2020     Lab Results   Component Value Date    VITD25 35.9 10/28/2019       Subjective:      Review of Systems   Constitutional: Negative for fatigue, fever and unexpected weight change. HENT: Negative for ear discharge, ear pain, mouth sores, sore throat and trouble swallowing. Eyes: Negative for discharge, itching and visual disturbance.    Respiratory: Negative for cough, choking, shortness of breath, wheezing and stridor. Cardiovascular: Negative for chest pain, palpitations and leg swelling. Gastrointestinal: Negative for abdominal distention, abdominal pain, blood in stool, constipation, diarrhea, nausea and vomiting. Endocrine: Negative for cold intolerance, polydipsia and polyuria. Genitourinary: Negative for difficulty urinating, dysuria, frequency and urgency. Musculoskeletal: Negative for arthralgias and gait problem. Skin: Negative for color change and rash. Allergic/Immunologic: Negative for food allergies and immunocompromised state. Neurological: Negative for dizziness, tremors, syncope, speech difficulty, weakness and headaches. Hematological: Negative for adenopathy. Does not bruise/bleed easily. Psychiatric/Behavioral: Negative for confusion and hallucinations. Objective:     Physical Exam  Constitutional:       General: He is not in acute distress. Appearance: He is well-developed. HENT:      Head: Normocephalic and atraumatic. Eyes:      General: No scleral icterus. Right eye: No discharge. Left eye: No discharge. Pupils: Pupils are equal, round, and reactive to light. Neck:      Musculoskeletal: Normal range of motion and neck supple. Thyroid: No thyromegaly. Vascular: No JVD. Cardiovascular:      Rate and Rhythm: Normal rate and regular rhythm. Heart sounds: Normal heart sounds. No murmur. Pulmonary:      Effort: Pulmonary effort is normal. No respiratory distress. Breath sounds: Normal breath sounds. No wheezing or rales. Abdominal:      General: Bowel sounds are normal. There is no distension. Palpations: Abdomen is soft. There is no mass. Tenderness: There is no abdominal tenderness. There is no guarding or rebound. Musculoskeletal: Normal range of motion. General: No tenderness. Skin:     General: Skin is warm and dry. Findings: No erythema or rash. Neurological:      Mental Status: He is alert and oriented to person, place, and time. Cranial Nerves: No cranial nerve deficit. Coordination: Coordination normal.      Deep Tendon Reflexes: Reflexes are normal and symmetric. Reflexes normal.   Psychiatric:         Mood and Affect: Mood is not depressed. Behavior: Behavior normal.         Thought Content: Thought content normal.         Judgment: Judgment normal.       /72   Pulse 68   Resp 20   Ht 6' (1.829 m)   Wt 201 lb (91.2 kg)   SpO2 95%   BMI 27.26 kg/m²     Assessment:       Diagnosis Orders   1. Essential hypertension  Comprehensive Metabolic Panel    CBC Auto Differential    Vitamin D 25 Hydroxy    Urinalysis Reflex to Culture    TSH without Reflex   2. Mixed hyperlipidemia  Lipid Panel   3. Type 2 diabetes mellitus without complication, without long-term current use of insulin (HCC)  Hemoglobin A1C   4. Primary insomnia     5. Screening for prostate cancer  Psa screening   6. Disorder of bone, unspecified   Vitamin D 25 Hydroxy     Labs reviewedfrom 2/7/2020    Plan:        Patient given educational materials - see patient instructions. Discussed use, benefit, and side effects of prescribed medications. Allpatient questions answered. Pt voiced understanding. Reviewed health maintenance. Instructed to continue current medications, diet and exercise. Patient agreed with treatment plan. Follow up as directed. MEDICATIONS:  No orders of the defined types were placed in this encounter. ORDERS:  Orders Placed This Encounter   Procedures    Comprehensive Metabolic Panel    CBC Auto Differential    Hemoglobin A1C    Lipid Panel    Vitamin D 25 Hydroxy    Urinalysis Reflex to Culture    TSH without Reflex    Psa screening       Follow-up:  Return in about 3 months (around 5/11/2020) for have labs done prior to appt. PATIENT INSTRUCTIONS:  Patient Instructions   1.   Type 2 diabetes mellitus with his weight loss and diet control he he essentially has normal blood sugars at this point  2. Hyperlipidemia continue to take medication as directed no changes are necessary  3. Hypertension blood pressure is good no changes are necessary  4. Insomnia we will check on the Restoril for you with [facility] about the 90-day prescription and let you know. Electronically signed by MARISELA Zaidi on 2/11/2020 at 9:20 AM    EMRDragon/transcription disclaimer:  Much of this encounter note is electronic transcription/translation of spoken language to printed texts. The electronic translation of spoken language may be erroneous, or at times,nonsensical words or phrases may be inadvertently transcribed.   Although I have reviewed the note for such errors, some may still exist.

## 2020-02-20 ENCOUNTER — ANESTHESIA (OUTPATIENT)
Dept: GASTROENTEROLOGY | Facility: HOSPITAL | Age: 80
End: 2020-02-20

## 2020-02-20 ENCOUNTER — ANESTHESIA EVENT (OUTPATIENT)
Dept: GASTROENTEROLOGY | Facility: HOSPITAL | Age: 80
End: 2020-02-20

## 2020-02-20 ENCOUNTER — HOSPITAL ENCOUNTER (OUTPATIENT)
Facility: HOSPITAL | Age: 80
Setting detail: HOSPITAL OUTPATIENT SURGERY
Discharge: HOME | End: 2020-02-20
Attending: INTERNAL MEDICINE | Admitting: INTERNAL MEDICINE

## 2020-02-20 VITALS
TEMPERATURE: 97.8 F | OXYGEN SATURATION: 99 % | DIASTOLIC BLOOD PRESSURE: 73 MMHG | BODY MASS INDEX: 26.14 KG/M2 | HEIGHT: 72 IN | WEIGHT: 193 LBS | SYSTOLIC BLOOD PRESSURE: 121 MMHG | HEART RATE: 63 BPM | RESPIRATION RATE: 16 BRPM

## 2020-02-20 DIAGNOSIS — Z86.010 HX OF ADENOMATOUS COLONIC POLYPS: ICD-10-CM

## 2020-02-20 LAB — GLUCOSE BLDC GLUCOMTR-MCNC: 99 MG/DL (ref 70–130)

## 2020-02-20 PROCEDURE — 25010000002 PROPOFOL 10 MG/ML EMULSION: Performed by: NURSE ANESTHETIST, CERTIFIED REGISTERED

## 2020-02-20 PROCEDURE — 45385 COLONOSCOPY W/LESION REMOVAL: CPT | Performed by: INTERNAL MEDICINE

## 2020-02-20 PROCEDURE — 88305 TISSUE EXAM BY PATHOLOGIST: CPT | Performed by: INTERNAL MEDICINE

## 2020-02-20 PROCEDURE — 82962 GLUCOSE BLOOD TEST: CPT

## 2020-02-20 DEVICE — DEV CLIP ENDO RESOLUTION360 CONTRL ROT 235CM: Type: IMPLANTABLE DEVICE | Site: ASCENDING COLON | Status: FUNCTIONAL

## 2020-02-20 RX ORDER — PROPOFOL 10 MG/ML
VIAL (ML) INTRAVENOUS AS NEEDED
Status: DISCONTINUED | OUTPATIENT
Start: 2020-02-20 | End: 2020-02-20 | Stop reason: SURG

## 2020-02-20 RX ORDER — SODIUM CHLORIDE 0.9 % (FLUSH) 0.9 %
10 SYRINGE (ML) INJECTION AS NEEDED
Status: DISCONTINUED | OUTPATIENT
Start: 2020-02-20 | End: 2020-02-20 | Stop reason: HOSPADM

## 2020-02-20 RX ORDER — SODIUM CHLORIDE 9 MG/ML
500 INJECTION, SOLUTION INTRAVENOUS CONTINUOUS PRN
Status: DISCONTINUED | OUTPATIENT
Start: 2020-02-20 | End: 2020-02-20 | Stop reason: HOSPADM

## 2020-02-20 RX ADMIN — PROPOFOL 50 MG: 10 INJECTION, EMULSION INTRAVENOUS at 10:08

## 2020-02-20 RX ADMIN — PROPOFOL 50 MG: 10 INJECTION, EMULSION INTRAVENOUS at 09:51

## 2020-02-20 RX ADMIN — PROPOFOL 50 MG: 10 INJECTION, EMULSION INTRAVENOUS at 10:10

## 2020-02-20 RX ADMIN — PROPOFOL 50 MG: 10 INJECTION, EMULSION INTRAVENOUS at 10:04

## 2020-02-20 RX ADMIN — PROPOFOL 50 MG: 10 INJECTION, EMULSION INTRAVENOUS at 09:58

## 2020-02-20 RX ADMIN — SODIUM CHLORIDE 500 ML: 9 INJECTION, SOLUTION INTRAVENOUS at 08:30

## 2020-02-20 RX ADMIN — PROPOFOL 50 MG: 10 INJECTION, EMULSION INTRAVENOUS at 09:53

## 2020-02-20 RX ADMIN — PROPOFOL 50 MG: 10 INJECTION, EMULSION INTRAVENOUS at 10:01

## 2020-02-20 RX ADMIN — PROPOFOL 50 MG: 10 INJECTION, EMULSION INTRAVENOUS at 10:06

## 2020-02-20 RX ADMIN — PROPOFOL 50 MG: 10 INJECTION, EMULSION INTRAVENOUS at 09:55

## 2020-02-20 RX ADMIN — PROPOFOL 100 MG: 10 INJECTION, EMULSION INTRAVENOUS at 09:48

## 2020-02-20 RX ADMIN — LIDOCAINE HYDROCHLORIDE 100 MG: 20 INJECTION, SOLUTION INTRAVENOUS at 09:48

## 2020-02-20 NOTE — ANESTHESIA PREPROCEDURE EVALUATION
Anesthesia Evaluation     Patient summary reviewed   history of anesthetic complications (slow to wake): prolonged sedation  NPO Solid Status: > 8 hours  NPO Liquid Status: > 2 hours           Airway   Mallampati: II  TM distance: >3 FB  Neck ROM: full  no difficulty expected  Dental    (+) partials    Comment: Permanent upper bridge    Pulmonary    (-) COPD, asthma, sleep apnea, not a smoker  Cardiovascular   Exercise tolerance: good (4-7 METS)    ECG reviewed    (+) hypertension, valvular problems/murmurs MVP, hyperlipidemia,   (-) past MI, dysrhythmias, CHF, cardiac stents      Neuro/Psych  (-) seizures, TIA, CVA, weakness, numbness  GI/Hepatic/Renal/Endo    (+)  GERD,  diabetes mellitus,   (-) liver disease, no renal disease    Musculoskeletal (-) negative ROS    Abdominal    Substance History - negative use     OB/GYN          Other - negative ROS                         Anesthesia Plan    ASA 2     MAC     intravenous induction     Anesthetic plan, all risks, benefits, and alternatives have been provided, discussed and informed consent has been obtained with: patient.

## 2020-02-20 NOTE — DISCHARGE INSTRUCTIONS
Clip placed at the ascending colon, instructions give to pt & girlfriend.  Repeat colonoscopy in one year: office will notify you.

## 2020-02-20 NOTE — H&P
[facility]  Pre Procedure History & Physical    Chief Complaint:   History of polyps    Subjective     HPI:   Here for colonoscopy.  History of polyps    Past Medical History:   Past Medical History:   Diagnosis Date   • Acid reflux    • Diabetes mellitus type 2, diet-controlled (CMS/HCC)    • Hay fever    • High cholesterol     BORDERLINE   • Hx of colonic polyp    • Hypertension    • Mitral valve prolapse    • Umbilical hernia        Past Surgical History:  Past Surgical History:   Procedure Laterality Date   • COLONOSCOPY W/ POLYPECTOMY  05/12/2014    Adenomatous polyp at 30 cm, Hyperplastic polyp cecum, Diverticulosis repeat exam in 5 years   • FOOT SURGERY Left 2015    R/T FRACTURE, PLATE AND SCREWS    • INGUINAL HERNIA REPAIR Left 2010   • OTHER SURGICAL HISTORY      VOCAL CORD    • TONSILLECTOMY  1991   • UMBILICAL HERNIA REPAIR N/A 1/12/2017    Procedure: UMBILICAL HERNIA REPAIR;  Surgeon: Kavitha Navarro MD;  Location: [facility];  Service:        Family History:  Family History   Problem Relation Age of Onset   • Colon cancer Neg Hx    • Colon polyps Neg Hx        Social History:   reports that he has never smoked. He has never used smokeless tobacco. He reports that he does not drink alcohol or use drugs.    Medications:   Prior to Admission medications    Medication Sig Start Date End Date Taking? Authorizing Provider   losartan (COZAAR) 100 MG tablet Take 100 mg by mouth Daily. 5/4/16  Yes Yasmin Oliveira MD   pantoprazole (PROTONIX) 40 MG EC tablet Take 40 mg by mouth Daily As Needed.   Yes Yasmin Oliveira MD   potassium chloride (K-DUR,KLOR-CON) 10 MEQ CR tablet Take 10 mEq by mouth 2 (Two) Times a Day. 5/4/16  Yes Yasmin Oliveira MD   verapamil SR (CALAN-SR) 240 MG CR tablet Take 240 mg by mouth 2 (Two) Times a Day. 5/4/16  Yes Yasmin Oliveira MD   SUPREP BOWEL PREP KIT 17.5-3.13-1.6 GM/177ML solution oral solution Take as directed by office instructions  "provided 6/4/19 2/20/20 Yes Kassandra Pollard APRN   aspirin 81 MG tablet Take 81 mg by mouth Daily.    ProviderYasmin MD   atorvastatin (LIPITOR) 20 MG tablet Take 10 mg by mouth Daily. PT TAKES 1/2 20 MG TABLET TO EQUAL 10 MG DAILY 5/4/16   Yasmin Oliveira MD   fluticasone (FLONASE) 50 MCG/ACT nasal spray 1 spray by Each Nare route Daily As Needed for rhinitis or allergies. 5/4/16   Yasmin Oliveira MD   glipiZIDE (GLUCOTROL XL) 10 MG 24 hr tablet Take 10 mg by mouth.    Emergency, Nurse Epic, RN   hydrochlorothiazide (HYDRODIURIL) 25 MG tablet Take 25 mg by mouth Daily. 5/4/16   Yasmin Oliveira MD   HYDROcodone-acetaminophen (NORCO) 5-325 MG per tablet Take 1-2 tablets by mouth Every 4 (Four) Hours As Needed (Pain). 1/12/17   Kavitha Navarro MD   spironolactone (ALDACTONE) 25 MG tablet Take 25 mg by mouth Daily.    ProviderYasmin MD   sucralfate (CARAFATE) 1 GM/10ML suspension Take 10 mL by mouth 4 (Four) Times a Day With Meals & at Bedtime. 12/27/17   Naveen Zuniga MD       Allergies:  Patient has no known allergies.    Objective     Blood pressure 149/59, pulse 62, temperature 97.8 °F (36.6 °C), temperature source Temporal, resp. rate 20, height 182.9 cm (72\"), weight 87.5 kg (193 lb), SpO2 96 %.    Physical Exam   Constitutional: Pt is oriented to person, place, and in no distress.   HENT: Mouth/Throat: Oropharynx is clear.   Cardiovascular: Normal rate, regular rhythm.    Pulmonary/Chest: Effort normal. No respiratory distress. No  wheezes.   Abdominal: Soft. Non-distended.  Skin: Skin is warm and dry.   Psychiatric: Mood, memory, affect and judgment appear normal.     Assessment/Plan     Diagnosis:  History of polyps    Anticipated Surgical Procedure:    Proceed with colonoscopy as scheduled    The following major R/B/A were discussed with the patient, however the list is not all inclusive . Risk:  Bleeding (immediate and delayed), perforation (rupture or tear), " reaction to medication, missed lesion/cancer, pain during the procedure, infection, need for surgery, need for ostomy, need for mechanical ventilation (breathing machine), death.  Benefits: removal of polyp/tissue, burn/clip/or inject to stop bleeding, removal of foreign body, dilate any stricture.  Alternatives: Xray or CT, surgery, do nothing with associated risk   The patient was given time to ask question and received explanation, and agrees to proceed as per History and Physical.   No guarantee given or expressed.    EMR Dragon/transcription disclaimer: Much of this encounter note is an electronic transcription/translation of spoken language to printed text.  The electronic translation of spoken language may permit erroneous, or at times, nonsensical words or phrases to be inadvertently transcribed.  Although I have reviewed the note for such errors, some may still exist.    Mohan Quinn MD  9:50 AM  2/20/2020

## 2020-02-20 NOTE — ANESTHESIA POSTPROCEDURE EVALUATION
Patient: Mike Kay    Procedure Summary     Date:  02/20/20 Room / Location:  [facility] ENDOSCOPY 6 /  [facility]    Anesthesia Start:  0942 Anesthesia Stop:  1015    Procedure:  COLONOSCOPY WITH ANESTHESIA (N/A ) Diagnosis:       Hx of adenomatous colonic polyps      (Hx of adenomatous colonic polyps [Z86.010])    Surgeon:  Mohan Quinn MD Provider:  STEPHANIA Dao CRNA    Anesthesia Type:  MAC ASA Status:  2          Anesthesia Type: MAC    Vitals  No vitals data found for the desired time range.          Post Anesthesia Care and Evaluation    Patient location during evaluation: PACU  Patient participation: complete - patient participated  Level of consciousness: awake and alert  Pain score: 0  Pain management: adequate  Airway patency: patent  Anesthetic complications: No anesthetic complications    Cardiovascular status: acceptable and stable  Respiratory status: acceptable and unassisted  Hydration status: acceptable

## 2020-02-21 ENCOUNTER — OFFICE VISIT (OUTPATIENT)
Dept: URGENT CARE | Age: 80
End: 2020-02-21
Payer: MEDICARE

## 2020-02-21 VITALS
RESPIRATION RATE: 18 BRPM | BODY MASS INDEX: 26.33 KG/M2 | TEMPERATURE: 98.9 F | OXYGEN SATURATION: 98 % | HEIGHT: 72 IN | HEART RATE: 64 BPM | WEIGHT: 194.4 LBS | SYSTOLIC BLOOD PRESSURE: 138 MMHG | DIASTOLIC BLOOD PRESSURE: 88 MMHG

## 2020-02-21 LAB
CYTO UR: NORMAL
INFLUENZA A ANTIBODY: NEGATIVE
INFLUENZA B ANTIBODY: NEGATIVE
LAB AP CASE REPORT: NORMAL
PATH REPORT.FINAL DX SPEC: NORMAL
PATH REPORT.GROSS SPEC: NORMAL
S PYO AG THROAT QL: NORMAL

## 2020-02-21 PROCEDURE — 4040F PNEUMOC VAC/ADMIN/RCVD: CPT | Performed by: NURSE PRACTITIONER

## 2020-02-21 PROCEDURE — 1036F TOBACCO NON-USER: CPT | Performed by: NURSE PRACTITIONER

## 2020-02-21 PROCEDURE — 87804 INFLUENZA ASSAY W/OPTIC: CPT | Performed by: NURSE PRACTITIONER

## 2020-02-21 PROCEDURE — 87880 STREP A ASSAY W/OPTIC: CPT | Performed by: NURSE PRACTITIONER

## 2020-02-21 PROCEDURE — G8427 DOCREV CUR MEDS BY ELIG CLIN: HCPCS | Performed by: NURSE PRACTITIONER

## 2020-02-21 PROCEDURE — 96372 THER/PROPH/DIAG INJ SC/IM: CPT | Performed by: NURSE PRACTITIONER

## 2020-02-21 PROCEDURE — 1123F ACP DISCUSS/DSCN MKR DOCD: CPT | Performed by: NURSE PRACTITIONER

## 2020-02-21 PROCEDURE — 99213 OFFICE O/P EST LOW 20 MIN: CPT | Performed by: NURSE PRACTITIONER

## 2020-02-21 PROCEDURE — G8482 FLU IMMUNIZE ORDER/ADMIN: HCPCS | Performed by: NURSE PRACTITIONER

## 2020-02-21 PROCEDURE — G8417 CALC BMI ABV UP PARAM F/U: HCPCS | Performed by: NURSE PRACTITIONER

## 2020-02-21 RX ORDER — AMOXICILLIN AND CLAVULANATE POTASSIUM 875; 125 MG/1; MG/1
1 TABLET, FILM COATED ORAL 2 TIMES DAILY
Qty: 20 TABLET | Refills: 0 | Status: SHIPPED | OUTPATIENT
Start: 2020-02-21 | End: 2020-03-02

## 2020-02-21 RX ORDER — DEXAMETHASONE SODIUM PHOSPHATE 10 MG/ML
5 INJECTION INTRAMUSCULAR; INTRAVENOUS ONCE
Status: COMPLETED | OUTPATIENT
Start: 2020-02-21 | End: 2020-02-21

## 2020-02-21 RX ORDER — CEFTRIAXONE 1 G/1
1 INJECTION, POWDER, FOR SOLUTION INTRAMUSCULAR; INTRAVENOUS ONCE
Status: COMPLETED | OUTPATIENT
Start: 2020-02-21 | End: 2020-02-21

## 2020-02-21 RX ADMIN — DEXAMETHASONE SODIUM PHOSPHATE 5 MG: 10 INJECTION INTRAMUSCULAR; INTRAVENOUS at 07:44

## 2020-02-21 RX ADMIN — CEFTRIAXONE 1 G: 1 INJECTION, POWDER, FOR SOLUTION INTRAMUSCULAR; INTRAVENOUS at 07:44

## 2020-02-21 ASSESSMENT — ENCOUNTER SYMPTOMS
COUGH: 1
RHINORRHEA: 0
CONSTIPATION: 0
ABDOMINAL PAIN: 0
SORE THROAT: 0
DIARRHEA: 0
VOMITING: 0
NAUSEA: 0
SINUS PRESSURE: 1
SHORTNESS OF BREATH: 0

## 2020-02-21 NOTE — PROGRESS NOTES
take them as directed. Do not stop taking them just because you feel better. You need to take the full course of antibiotics. · Be careful when taking over-the-counter cold or flu medicines and Tylenol at the same time. Many of these medicines have acetaminophen, which is Tylenol. Read the labels to make sure that you are not taking more than the recommended dose. Too much acetaminophen (Tylenol) can be harmful. · Breathe warm, moist air from a steamy shower, a hot bath, or a sink filled with hot water. Avoid cold, dry air. Using a humidifier in your home may help. Follow the directions for cleaning the machine. · Use saline (saltwater) nasal washes to help keep your nasal passages open and wash out mucus and bacteria. You can buy saline nose drops at a grocery store or drugstore. Or you can make your own at home by adding 1 teaspoon of salt and 1 teaspoon of baking soda to 2 cups of distilled water. If you make your own, fill a bulb syringe with the solution, insert the tip into your nostril, and squeeze gently. Octavia Janene your nose. · Put a hot, wet towel or a warm gel pack on your face 3 or 4 times a day for 5 to 10 minutes each time. · Try a decongestant nasal spray like oxymetazoline (Afrin). Do not use it for more than 3 days in a row. Using it for more than 3 days can make your congestion worse. When should you call for help? Call your doctor now or seek immediate medical care if:    · You have new or worse swelling or redness in your face or around your eyes.     · You have a new or higher fever.    Watch closely for changes in your health, and be sure to contact your doctor if:    · You have new or worse facial pain.     · The mucus from your nose becomes thicker (like pus) or has new blood in it.     · You are not getting better as expected. Where can you learn more? Go to [email or web address].[email or web address]. org and sign in to your [employer] account.  Enter [id number] in the [employer] box to

## 2020-03-16 RX ORDER — HYDROCHLOROTHIAZIDE 25 MG/1
TABLET ORAL
Qty: 90 TABLET | Refills: 3 | Status: SHIPPED | OUTPATIENT
Start: 2020-03-16 | End: 2021-04-28

## 2020-03-16 RX ORDER — GLIPIZIDE 10 MG/1
TABLET, FILM COATED, EXTENDED RELEASE ORAL
Qty: 90 TABLET | Refills: 3 | Status: SHIPPED | OUTPATIENT
Start: 2020-03-16 | End: 2021-04-28

## 2020-03-16 RX ORDER — SPIRONOLACTONE 25 MG/1
TABLET ORAL
Qty: 90 TABLET | Refills: 3 | Status: SHIPPED | OUTPATIENT
Start: 2020-03-16 | End: 2021-04-28

## 2020-03-26 ENCOUNTER — TELEPHONE (OUTPATIENT)
Dept: INTERNAL MEDICINE | Age: 80
End: 2020-03-26

## 2020-05-08 DIAGNOSIS — Z12.5 SCREENING FOR PROSTATE CANCER: ICD-10-CM

## 2020-05-08 DIAGNOSIS — E11.9 TYPE 2 DIABETES MELLITUS WITHOUT COMPLICATION, WITHOUT LONG-TERM CURRENT USE OF INSULIN (HCC): ICD-10-CM

## 2020-05-08 DIAGNOSIS — E78.2 MIXED HYPERLIPIDEMIA: ICD-10-CM

## 2020-05-08 DIAGNOSIS — M89.9 DISORDER OF BONE, UNSPECIFIED: ICD-10-CM

## 2020-05-08 DIAGNOSIS — I10 ESSENTIAL HYPERTENSION: ICD-10-CM

## 2020-05-08 LAB
ALBUMIN SERPL-MCNC: 4.8 G/DL (ref 3.5–5.2)
ALP BLD-CCNC: 72 U/L (ref 40–130)
ALT SERPL-CCNC: 48 U/L (ref 5–41)
ANION GAP SERPL CALCULATED.3IONS-SCNC: 15 MMOL/L (ref 7–19)
AST SERPL-CCNC: 34 U/L (ref 5–40)
BASOPHILS ABSOLUTE: 0.1 K/UL (ref 0–0.2)
BASOPHILS RELATIVE PERCENT: 1 % (ref 0–1)
BILIRUB SERPL-MCNC: 0.8 MG/DL (ref 0.2–1.2)
BILIRUBIN URINE: NEGATIVE
BLOOD, URINE: NEGATIVE
BUN BLDV-MCNC: 29 MG/DL (ref 8–23)
CALCIUM SERPL-MCNC: 9.9 MG/DL (ref 8.8–10.2)
CHLORIDE BLD-SCNC: 102 MMOL/L (ref 98–111)
CHOLESTEROL, TOTAL: 151 MG/DL (ref 160–199)
CLARITY: CLEAR
CO2: 26 MMOL/L (ref 22–29)
COLOR: YELLOW
CREAT SERPL-MCNC: 0.9 MG/DL (ref 0.5–1.2)
EOSINOPHILS ABSOLUTE: 0.4 K/UL (ref 0–0.6)
EOSINOPHILS RELATIVE PERCENT: 6.7 % (ref 0–5)
GFR NON-AFRICAN AMERICAN: >60
GLUCOSE BLD-MCNC: 133 MG/DL (ref 74–109)
GLUCOSE URINE: NEGATIVE MG/DL
HBA1C MFR BLD: 5.5 % (ref 4–6)
HCT VFR BLD CALC: 45.7 % (ref 42–52)
HDLC SERPL-MCNC: 44 MG/DL (ref 55–121)
HEMOGLOBIN: 15 G/DL (ref 14–18)
IMMATURE GRANULOCYTES #: 0 K/UL
KETONES, URINE: NEGATIVE MG/DL
LDL CHOLESTEROL CALCULATED: 86 MG/DL
LEUKOCYTE ESTERASE, URINE: NEGATIVE
LYMPHOCYTES ABSOLUTE: 1.7 K/UL (ref 1.1–4.5)
LYMPHOCYTES RELATIVE PERCENT: 29.8 % (ref 20–40)
MCH RBC QN AUTO: 29.1 PG (ref 27–31)
MCHC RBC AUTO-ENTMCNC: 32.8 G/DL (ref 33–37)
MCV RBC AUTO: 88.7 FL (ref 80–94)
MONOCYTES ABSOLUTE: 0.4 K/UL (ref 0–0.9)
MONOCYTES RELATIVE PERCENT: 7.6 % (ref 0–10)
NEUTROPHILS ABSOLUTE: 3.2 K/UL (ref 1.5–7.5)
NEUTROPHILS RELATIVE PERCENT: 54.6 % (ref 50–65)
NITRITE, URINE: NEGATIVE
PDW BLD-RTO: 12.4 % (ref 11.5–14.5)
PH UA: 5.5 (ref 5–8)
PLATELET # BLD: 145 K/UL (ref 130–400)
PMV BLD AUTO: 10 FL (ref 9.4–12.4)
POTASSIUM SERPL-SCNC: 4.5 MMOL/L (ref 3.5–5)
PROSTATE SPECIFIC ANTIGEN: 0.47 NG/ML (ref 0–4)
PROTEIN UA: NEGATIVE MG/DL
RBC # BLD: 5.15 M/UL (ref 4.7–6.1)
SODIUM BLD-SCNC: 143 MMOL/L (ref 136–145)
SPECIFIC GRAVITY UA: 1.02 (ref 1–1.03)
TOTAL PROTEIN: 7.3 G/DL (ref 6.6–8.7)
TRIGL SERPL-MCNC: 103 MG/DL (ref 0–149)
TSH SERPL DL<=0.05 MIU/L-ACNC: 2.16 UIU/ML (ref 0.27–4.2)
UROBILINOGEN, URINE: 0.2 E.U./DL
VITAMIN D 25-HYDROXY: 26.3 NG/ML
WBC # BLD: 5.8 K/UL (ref 4.8–10.8)

## 2020-05-11 ENCOUNTER — OFFICE VISIT (OUTPATIENT)
Dept: INTERNAL MEDICINE | Age: 80
End: 2020-05-11
Payer: MEDICARE

## 2020-05-11 VITALS
OXYGEN SATURATION: 97 % | BODY MASS INDEX: 28.07 KG/M2 | SYSTOLIC BLOOD PRESSURE: 136 MMHG | DIASTOLIC BLOOD PRESSURE: 72 MMHG | WEIGHT: 207 LBS | HEART RATE: 76 BPM

## 2020-05-11 PROBLEM — E55.9 VITAMIN D DEFICIENCY: Status: ACTIVE | Noted: 2020-05-11

## 2020-05-11 PROBLEM — R00.0 TACHYCARDIA: Status: ACTIVE | Noted: 2020-05-11

## 2020-05-11 PROCEDURE — 1123F ACP DISCUSS/DSCN MKR DOCD: CPT | Performed by: NURSE PRACTITIONER

## 2020-05-11 PROCEDURE — G8427 DOCREV CUR MEDS BY ELIG CLIN: HCPCS | Performed by: NURSE PRACTITIONER

## 2020-05-11 PROCEDURE — G8417 CALC BMI ABV UP PARAM F/U: HCPCS | Performed by: NURSE PRACTITIONER

## 2020-05-11 PROCEDURE — 99214 OFFICE O/P EST MOD 30 MIN: CPT | Performed by: NURSE PRACTITIONER

## 2020-05-11 PROCEDURE — 69210 REMOVE IMPACTED EAR WAX UNI: CPT | Performed by: NURSE PRACTITIONER

## 2020-05-11 PROCEDURE — 1036F TOBACCO NON-USER: CPT | Performed by: NURSE PRACTITIONER

## 2020-05-11 PROCEDURE — 4040F PNEUMOC VAC/ADMIN/RCVD: CPT | Performed by: NURSE PRACTITIONER

## 2020-05-11 RX ORDER — TEMAZEPAM 15 MG/1
30 CAPSULE ORAL NIGHTLY PRN
Qty: 60 CAPSULE | Refills: 0 | Status: SHIPPED | OUTPATIENT
Start: 2020-05-11 | End: 2020-07-01 | Stop reason: SDUPTHER

## 2020-05-11 RX ORDER — NYSTATIN 100000 U/G
CREAM TOPICAL
Qty: 30 G | Refills: 1 | Status: SHIPPED | OUTPATIENT
Start: 2020-05-11 | End: 2020-11-02

## 2020-05-11 ASSESSMENT — ENCOUNTER SYMPTOMS
COUGH: 0
BLOOD IN STOOL: 0
WHEEZING: 0
SORE THROAT: 0
EYE ITCHING: 0
VOMITING: 0
TROUBLE SWALLOWING: 0
COLOR CHANGE: 0
EYE DISCHARGE: 0
NAUSEA: 0
DIARRHEA: 0
STRIDOR: 0
SHORTNESS OF BREATH: 0
CHOKING: 0
ABDOMINAL DISTENTION: 0
ABDOMINAL PAIN: 0
CONSTIPATION: 0

## 2020-05-11 NOTE — PATIENT INSTRUCTIONS
1.  Hypertension; The current medical regimen is effective;  continue present plan and medications. 2.  Insomnia; The current medical regimen is effective;  continue present plan and medications. 3.  Type II DM; The current medical regimen is effective;  continue present plan and medications. 4. Vit d def  Get 2,000 daily   5. Mixed hyperlipideima; The current medical regimen is effective;  continue present plan and medications. 6.  Tachycardia; Stable with verapamil;   No changes

## 2020-05-11 NOTE — PROGRESS NOTES
26.72 kg/m2 26.34 kg/m2   Some recent data might be hidden       Family History   Problem Relation Age of Onset    No Known Problems Mother     Heart Attack Father        Social History     Tobacco Use    Smoking status: Never Smoker    Smokeless tobacco: Never Used   Substance Use Topics    Alcohol use: No      Current Outpatient Medications   Medication Sig Dispense Refill    temazepam (RESTORIL) 15 MG capsule Take 2 capsules by mouth nightly as needed for Sleep for up to 30 days. 60 capsule 0    nystatin (MYCOSTATIN) 972836 UNIT/GM cream Apply topically 2 times daily. 30 g 1    spironolactone (ALDACTONE) 25 MG tablet TAKE 1 TABLET EVERY DAY 90 tablet 3    hydroCHLOROthiazide (HYDRODIURIL) 25 MG tablet TAKE 1 TABLET EVERY DAY 90 tablet 3    glipiZIDE (GLUCOTROL XL) 10 MG extended release tablet TAKE 1 TABLET EVERY MORNING 90 tablet 3    losartan (COZAAR) 100 MG tablet TAKE 1 TABLET EVERY DAY 90 tablet 3    potassium chloride (KLOR-CON M) 10 MEQ extended release tablet TAKE 2 TABLETS  2 TIMES DAILY 360 tablet 3    verapamil (CALAN SR) 240 MG extended release tablet TAKE 1 TABLET TWICE DAILY 180 tablet 3    atorvastatin (LIPITOR) 20 MG tablet TAKE 1 TABLET AT BEDTIME 90 tablet 3    fluticasone (FLONASE) 50 MCG/ACT nasal spray USE 1 SPRAY IN EACH NOSTRIL TWICE DAILY 48 g 3    pantoprazole (PROTONIX) 40 MG tablet TAKE 1 TABLET EVERY DAY 90 tablet 3    aspirin 81 MG tablet Take 81 mg by mouth daily      Omega-3 Fatty Acids (FISH OIL) 1000 MG CAPS Take 1,200 mg by mouth 2 times daily       No current facility-administered medications for this visit.       No Known Allergies    Health Maintenance   Topic Date Due    DTaP/Tdap/Td vaccine (1 - Tdap) 06[birth year]01/2018 (Originally 12/16/1959)    Shingles Vaccine (1 of 2) 05/11/2021 (Originally [birth year]/16/1992)    Annual Wellness Visit (AWV)  08/06/2020    Lipid screen  05/08/2021    Potassium monitoring  05/08/2021    Creatinine monitoring  05/08/2021    Colon cancer screen colonoscopy  02/21/2025    Flu vaccine  Completed    Pneumococcal 65+ years Vaccine  Completed    Hepatitis A vaccine  Aged Out    Hib vaccine  Aged Out    Meningococcal (ACWY) vaccine  Aged Out       Lab Results   Component Value Date    LABA1C 5.5 05/08/2020     Lab Results   Component Value Date    PSA 0.47 05/08/2020    PSA 0.41 05/02/2019    PSA 0.45 06/25/2018     TSH   Date Value Ref Range Status   05/08/2020 2.160 0.270 - 4.200 uIU/mL Final   ]  Lab Results   Component Value Date     05/08/2020    K 4.5 05/08/2020     05/08/2020    CO2 26 05/08/2020    BUN 29 (H) 05/08/2020    CREATININE 0.9 05/08/2020    GLUCOSE 133 (H) 05/08/2020    CALCIUM 9.9 05/08/2020    PROT 7.3 05/08/2020    LABALBU 4.8 05/08/2020    BILITOT 0.8 05/08/2020    ALKPHOS 72 05/08/2020    AST 34 05/08/2020    ALT 48 (H) 05/08/2020    LABGLOM >60 05/08/2020     Lab Results   Component Value Date    CHOL 151 (L) 05/08/2020    CHOL 151 (L) 02/07/2020    CHOL 145 (L) 08/01/2019     Lab Results   Component Value Date    TRIG 103 05/08/2020    TRIG 81 02/07/2020    TRIG 69 08/01/2019     Lab Results   Component Value Date    HDL 44 (L) 05/08/2020    HDL 49 (L) 02/07/2020    HDL 44 (L) 08/01/2019     Lab Results   Component Value Date    LDLCALC 86 05/08/2020    LDLCALC 86 02/07/2020    LDLCALC 87 08/01/2019     Lab Results   Component Value Date     05/08/2020    K 4.5 05/08/2020     05/08/2020    CO2 26 05/08/2020    BUN 29 05/08/2020    CREATININE 0.9 05/08/2020    GLUCOSE 133 05/08/2020    CALCIUM 9.9 05/08/2020      Lab Results   Component Value Date    WBC 5.8 05/08/2020    HGB 15.0 05/08/2020    HCT 45.7 05/08/2020    MCV 88.7 05/08/2020     05/08/2020    LABLYMP 1.36 04/07/2015    LYMPHOPCT 29.8 05/08/2020    RBC 5.15 05/08/2020    MCH 29.1 05/08/2020    MCHC 32.8 (L) 05/08/2020    RDW 12.4 05/08/2020     Lab Results   Component Value Date    VITD25 26.3 (L) 05/08/2020       Subjective: Review of Systems   Constitutional: Negative for fatigue, fever and unexpected weight change. HENT: Negative for ear discharge, ear pain, mouth sores, sore throat and trouble swallowing. Eyes: Negative for discharge, itching and visual disturbance. Respiratory: Negative for cough, choking, shortness of breath, wheezing and stridor. Cardiovascular: Negative for chest pain, palpitations and leg swelling. Gastrointestinal: Negative for abdominal distention, abdominal pain, blood in stool, constipation, diarrhea, nausea and vomiting. Endocrine: Negative for cold intolerance, polydipsia and polyuria. Genitourinary: Negative for difficulty urinating, dysuria, frequency and urgency. Musculoskeletal: Negative for arthralgias and gait problem. Skin: Negative for color change and rash. Allergic/Immunologic: Negative for food allergies and immunocompromised state. Neurological: Negative for dizziness, tremors, syncope, speech difficulty, weakness and headaches. Hematological: Negative for adenopathy. Does not bruise/bleed easily. Psychiatric/Behavioral: Negative for confusion and hallucinations. Objective:     Physical Exam  Constitutional:       General: He is not in acute distress. Appearance: He is well-developed. HENT:      Head: Normocephalic and atraumatic. Ears:      Comments: Left cerumen impaction  Eyes:      General: No scleral icterus. Right eye: No discharge. Left eye: No discharge. Pupils: Pupils are equal, round, and reactive to light. Neck:      Musculoskeletal: Normal range of motion and neck supple. Thyroid: No thyromegaly. Vascular: No JVD. Cardiovascular:      Rate and Rhythm: Normal rate and regular rhythm. Heart sounds: Normal heart sounds. No murmur. Pulmonary:      Effort: Pulmonary effort is normal. No respiratory distress. Breath sounds: Normal breath sounds. No wheezing or rales.    Abdominal:      General: Bowel sounds are normal. There is no distension. Palpations: Abdomen is soft. There is no mass. Tenderness: There is no abdominal tenderness. There is no guarding or rebound. Musculoskeletal: Normal range of motion. General: No tenderness. Skin:     General: Skin is warm and dry. Findings: No erythema or rash. Neurological:      Mental Status: He is alert and oriented to person, place, and time. Cranial Nerves: No cranial nerve deficit. Coordination: Coordination normal.      Deep Tendon Reflexes: Reflexes are normal and symmetric. Reflexes normal.   Psychiatric:         Mood and Affect: Mood is not depressed. Behavior: Behavior normal.         Thought Content: Thought content normal.         Judgment: Judgment normal.     cerumen removed with currette left ear;  Right is normal   /72   Pulse 76   Wt 207 lb (93.9 kg)   SpO2 97%   BMI 28.07 kg/m²     Assessment:       Diagnosis Orders   1. Essential hypertension     2. Primary insomnia  temazepam (RESTORIL) 15 MG capsule   3. Type 2 diabetes mellitus without complication, without long-term current use of insulin ([facility] [address]     4. Vitamin D deficiency     5. Mixed hyperlipidemia     6. Tachycardia       Labs reviewedfrom 5/8/2020    Plan:        Patient given educational materials - see patient instructions. Discussed use, benefit, and side effects of prescribed medications. Allpatient questions answered. Pt voiced understanding. Reviewed health maintenance. Instructed to continue current medications, diet and exercise. Patient agreed with treatment plan. Follow up as directed. MEDICATIONS:  Orders Placed This Encounter   Medications    temazepam (RESTORIL) 15 MG capsule     Sig: Take 2 capsules by mouth nightly as needed for Sleep for up to 30 days. Dispense:  60 capsule     Refill:  0    nystatin (MYCOSTATIN) 604185 UNIT/GM cream     Sig: Apply topically 2 times daily.      Dispense:  30 g     Refill:

## 2020-06-17 ENCOUNTER — OFFICE VISIT (OUTPATIENT)
Age: 80
End: 2020-06-17

## 2020-06-17 VITALS — OXYGEN SATURATION: 96 % | TEMPERATURE: 98.5 F

## 2020-06-17 NOTE — PROGRESS NOTES
Patient was not seen//assessed by provider in the flu clinic today. Patient sent for Covid swab by Jeremy Ford due to possible exposure. Nasopharyngeal swab was collected and ordered through [place] vendor.

## 2020-06-18 LAB
REPORT: NORMAL
SARS-COV-2: NOT DETECTED
THIS TEST SENT TO: NORMAL

## 2020-06-19 ENCOUNTER — TELEPHONE (OUTPATIENT)
Age: 80
End: 2020-06-19

## 2020-06-30 NOTE — TELEPHONE ENCOUNTER
Linda Reyna called requesting a refill of the below medication which has been pended for you:     Requested Prescriptions      No prescriptions requested or ordered in this encounter       Last Appointment Date: 5/11/2020  Next Appointment Date: 8/17/2020    No Known Allergies

## 2020-07-01 RX ORDER — TEMAZEPAM 15 MG/1
30 CAPSULE ORAL NIGHTLY PRN
Qty: 60 CAPSULE | Refills: 0 | Status: SHIPPED | OUTPATIENT
Start: 2020-07-01 | End: 2020-08-17 | Stop reason: SDUPTHER

## 2020-08-05 DIAGNOSIS — R00.0 TACHYCARDIA: ICD-10-CM

## 2020-08-05 DIAGNOSIS — E55.9 VITAMIN D DEFICIENCY: ICD-10-CM

## 2020-08-05 DIAGNOSIS — E11.9 TYPE 2 DIABETES MELLITUS WITHOUT COMPLICATION, WITHOUT LONG-TERM CURRENT USE OF INSULIN (HCC): ICD-10-CM

## 2020-08-05 DIAGNOSIS — I10 ESSENTIAL HYPERTENSION: ICD-10-CM

## 2020-08-05 LAB
ALBUMIN SERPL-MCNC: 4.4 G/DL (ref 3.5–5.2)
ALP BLD-CCNC: 68 U/L (ref 40–130)
ALT SERPL-CCNC: 48 U/L (ref 5–41)
ANION GAP SERPL CALCULATED.3IONS-SCNC: 12 MMOL/L (ref 7–19)
AST SERPL-CCNC: 27 U/L (ref 5–40)
BILIRUB SERPL-MCNC: 0.5 MG/DL (ref 0.2–1.2)
BILIRUBIN URINE: NEGATIVE
BLOOD, URINE: NEGATIVE
BUN BLDV-MCNC: 28 MG/DL (ref 8–23)
CALCIUM SERPL-MCNC: 9.6 MG/DL (ref 8.8–10.2)
CHLORIDE BLD-SCNC: 105 MMOL/L (ref 98–111)
CLARITY: CLEAR
CO2: 27 MMOL/L (ref 22–29)
COLOR: YELLOW
CREAT SERPL-MCNC: 0.8 MG/DL (ref 0.5–1.2)
GFR AFRICAN AMERICAN: >59
GFR NON-AFRICAN AMERICAN: >60
GLUCOSE BLD-MCNC: 135 MG/DL (ref 74–109)
GLUCOSE URINE: NEGATIVE MG/DL
HBA1C MFR BLD: 5.5 % (ref 4–6)
HCT VFR BLD CALC: 42.8 % (ref 42–52)
HEMOGLOBIN: 14.3 G/DL (ref 14–18)
KETONES, URINE: NEGATIVE MG/DL
LEUKOCYTE ESTERASE, URINE: NEGATIVE
MCH RBC QN AUTO: 29.8 PG (ref 27–31)
MCHC RBC AUTO-ENTMCNC: 33.4 G/DL (ref 33–37)
MCV RBC AUTO: 89.2 FL (ref 80–94)
NITRITE, URINE: NEGATIVE
PDW BLD-RTO: 12.8 % (ref 11.5–14.5)
PH UA: 7 (ref 5–8)
PLATELET # BLD: 139 K/UL (ref 130–400)
PMV BLD AUTO: 10.3 FL (ref 9.4–12.4)
POTASSIUM SERPL-SCNC: 4.5 MMOL/L (ref 3.5–5)
PROTEIN UA: NEGATIVE MG/DL
RBC # BLD: 4.8 M/UL (ref 4.7–6.1)
SODIUM BLD-SCNC: 144 MMOL/L (ref 136–145)
SPECIFIC GRAVITY UA: 1.02 (ref 1–1.03)
TOTAL PROTEIN: 6.7 G/DL (ref 6.6–8.7)
TSH SERPL DL<=0.05 MIU/L-ACNC: 2.74 UIU/ML (ref 0.27–4.2)
UROBILINOGEN, URINE: 0.2 E.U./DL
VITAMIN D 25-HYDROXY: 50.3 NG/ML
WBC # BLD: 6.1 K/UL (ref 4.8–10.8)

## 2020-08-15 ENCOUNTER — APPOINTMENT (OUTPATIENT)
Dept: GENERAL RADIOLOGY | Age: 80
End: 2020-08-15
Payer: MEDICARE

## 2020-08-15 ENCOUNTER — HOSPITAL ENCOUNTER (EMERGENCY)
Age: 80
Discharge: HOME | End: 2020-08-15
Payer: MEDICARE

## 2020-08-15 VITALS
HEART RATE: 59 BPM | SYSTOLIC BLOOD PRESSURE: 156 MMHG | HEIGHT: 72 IN | WEIGHT: 200 LBS | RESPIRATION RATE: 20 BRPM | TEMPERATURE: 98.7 F | OXYGEN SATURATION: 93 % | DIASTOLIC BLOOD PRESSURE: 79 MMHG | BODY MASS INDEX: 27.09 KG/M2

## 2020-08-15 PROCEDURE — 99284 EMERGENCY DEPT VISIT MOD MDM: CPT

## 2020-08-15 PROCEDURE — 2500000003 HC RX 250 WO HCPCS: Performed by: PHYSICIAN ASSISTANT

## 2020-08-15 PROCEDURE — 6360000002 HC RX W HCPCS: Performed by: PHYSICIAN ASSISTANT

## 2020-08-15 PROCEDURE — 90715 TDAP VACCINE 7 YRS/> IM: CPT | Performed by: PHYSICIAN ASSISTANT

## 2020-08-15 PROCEDURE — 90471 IMMUNIZATION ADMIN: CPT | Performed by: PHYSICIAN ASSISTANT

## 2020-08-15 PROCEDURE — 12001 RPR S/N/AX/GEN/TRNK 2.5CM/<: CPT

## 2020-08-15 PROCEDURE — 73140 X-RAY EXAM OF FINGER(S): CPT

## 2020-08-15 RX ORDER — CEPHALEXIN 500 MG/1
500 CAPSULE ORAL 3 TIMES DAILY
Qty: 9 CAPSULE | Refills: 0 | Status: SHIPPED | OUTPATIENT
Start: 2020-08-15 | End: 2020-08-18

## 2020-08-15 RX ORDER — BUPIVACAINE HYDROCHLORIDE 5 MG/ML
30 INJECTION, SOLUTION EPIDURAL; INTRACAUDAL ONCE
Status: COMPLETED | OUTPATIENT
Start: 2020-08-15 | End: 2020-08-15

## 2020-08-15 RX ADMIN — TETANUS TOXOID, REDUCED DIPHTHERIA TOXOID AND ACELLULAR PERTUSSIS VACCINE, ADSORBED 0.5 ML: 5; 2.5; 8; 8; 2.5 SUSPENSION INTRAMUSCULAR at 15:47

## 2020-08-15 RX ADMIN — BUPIVACAINE HYDROCHLORIDE 150 MG: 5 INJECTION, SOLUTION EPIDURAL; INTRACAUDAL; PERINEURAL at 15:48

## 2020-08-15 ASSESSMENT — ENCOUNTER SYMPTOMS
EYE ITCHING: 0
COLOR CHANGE: 0
COUGH: 0
BACK PAIN: 0
SHORTNESS OF BREATH: 0
EYE DISCHARGE: 0
PHOTOPHOBIA: 0
APNEA: 0

## 2020-08-15 ASSESSMENT — PAIN SCALES - GENERAL: PAINLEVEL_OUTOF10: 3

## 2020-08-15 NOTE — ED PROVIDER NOTES
[facility] EMERGENCY DEPT  eMERGENCYdEPARTMENT eNCOUnter      Pt Name: Diego Vicente  MRN: [id number]  [place] 1940  Date of evaluation: 8/15/2020  Provider:ZEENAT Luke    CHIEF COMPLAINT       Chief Complaint   Patient presents with    Laceration     c/o left index finger laceration          HISTORY OF PRESENT ILLNESS  (Location/Symptom, Timing/Onset, Context/Setting, Quality, Duration, Modifying Factors, Severity.)   Diego Vicente is a 70 y.o. male who presents to the emergency department with  injury to left distal phalanx of index finger. Bleeding controlled. He thinks his tetanus is out of date. No ROM issues. No FB concern. No nail bed compromise. Bleeding is well controlled prior to coming to ER. He denies any bleeding disorders. [facility]    Nursing Notes were reviewed and I agree. REVIEW OF SYSTEMS    (2-9 systems for level 4, 10 or more for level 5)     Review of Systems   Constitutional: Negative for activity change, appetite change, chills and fever. HENT: Negative for congestion and dental problem. Eyes: Negative for photophobia, discharge and itching. Respiratory: Negative for apnea, cough and shortness of breath. Cardiovascular: Negative for chest pain. Musculoskeletal: Negative for arthralgias, back pain, gait problem, myalgias and neck pain. Skin: Positive for wound. Negative for color change, pallor and rash. Neurological: Negative for dizziness, seizures and syncope. Psychiatric/Behavioral: Negative for agitation. The patient is not nervous/anxious. Except as noted above the remainder of the review of systems was reviewed and negative.        PAST MEDICAL HISTORY     Past Medical History:   Diagnosis Date    BPH with obstruction/lower urinary tract symptoms     Gastroesophageal reflux disease without esophagitis 5/6/2019    Hyperglycemia     Hyperlipidemia     Hypertension     Testicular hypofunction     Type 2 diabetes mellitus without complication [facility])          SURGICAL HISTORY       Past Surgical History:   Procedure Laterality Date    ANKLE SURGERY      HERNIA REPAIR      TONSILLECTOMY           CURRENT MEDICATIONS       Discharge Medication List as of 8/15/2020  5:21 PM      CONTINUE these medications which have NOT CHANGED    Details   temazepam (RESTORIL) 15 MG capsule Take 2 capsules by mouth nightly as needed for Sleep for up to 30 days. , Disp-60 capsule,R-0Normal      nystatin (MYCOSTATIN) 240630 UNIT/GM cream Apply topically 2 times daily. , Disp-30 g, R-1, Normal      spironolactone (ALDACTONE) 25 MG tablet TAKE 1 TABLET EVERY DAY, Disp-90 tablet, R-3Normal      hydroCHLOROthiazide (HYDRODIURIL) 25 MG tablet TAKE 1 TABLET EVERY DAY, Disp-90 tablet, R-3Normal      glipiZIDE (GLUCOTROL XL) 10 MG extended release tablet TAKE 1 TABLET EVERY MORNING, Disp-90 tablet, R-3Normal      losartan (COZAAR) 100 MG tablet TAKE 1 TABLET EVERY DAY, Disp-90 tablet, R-3Normal      potassium chloride (KLOR-CON M) 10 MEQ extended release tablet TAKE 2 TABLETS  2 TIMES DAILY, Disp-360 tablet, R-3Normal      verapamil (CALAN SR) 240 MG extended release tablet TAKE 1 TABLET TWICE DAILY, Disp-180 tablet, R-3Normal      atorvastatin (LIPITOR) 20 MG tablet TAKE 1 TABLET AT BEDTIME, Disp-90 tablet, R-3Normal      fluticasone (FLONASE) 50 MCG/ACT nasal spray USE 1 SPRAY IN EACH NOSTRIL TWICE DAILY, Disp-48 g, R-3Normal      pantoprazole (PROTONIX) 40 MG tablet TAKE 1 TABLET EVERY DAY, Disp-90 tablet, R-3Normal      aspirin 81 MG tablet Take 81 mg by mouth daily      Omega-3 Fatty Acids (FISH OIL) 1000 MG CAPS Take 1,200 mg by mouth 2 times dailyHistorical Med             ALLERGIES     Patient has no known allergies.     FAMILY HISTORY       Family History   Problem Relation Age of Onset    No Known Problems Mother     Heart Attack Father           SOCIAL HISTORY       Social History     Socioeconomic History    Marital status:      Spouse name: None  Number of children: None    Years of education: None    Highest education level: None   Occupational History    None   Social Needs    Financial resource strain: None    Food insecurity     Worry: None     Inability: None    Transportation needs     Medical: None     Non-medical: None   Tobacco Use    Smoking status: Never Smoker    Smokeless tobacco: Never Used   Substance and Sexual Activity    Alcohol use: No    Drug use: No    Sexual activity: None   Lifestyle    Physical activity     Days per week: None     Minutes per session: None    Stress: None   Relationships    Social connections     Talks on phone: None     Gets together: None     Attends Yarsani service: None     Active member of club or organization: None     Attends meetings of clubs or organizations: None     Relationship status: None    Intimate partner violence     Fear of current or ex partner: None     Emotionally abused: None     Physically abused: None     Forced sexual activity: None   Other Topics Concern    None   Social History Narrative    None       SCREENINGS    [place] Coma Scale  Eye Opening: Spontaneous  Best Verbal Response: Oriented  Best Motor Response: Obeys commands  [place] Coma Scale Score: 15      PHYSICAL EXAM    (up to 7 forlevel 4, 8 or more for level 5)     ED Triage Vitals   BP Temp Temp Source Pulse Resp SpO2 Height Weight   08/15/20 1452 08/15/20 1452 08/15/20 1452 08/15/20 1452 08/15/20 1452 08/15/20 1452 08/15/20 1450 08/15/20 1450   (!) 160/83 98.7 °F (37.1 °C) Temporal 70 20 93 % 6' (1.829 m) 200 lb (90.7 kg)       Physical Exam  Vitals signs and nursing note reviewed. Constitutional:       General: He is not in acute distress. Appearance: Normal appearance. He is well-developed. He is not diaphoretic. HENT:      Head: Normocephalic and atraumatic.       Right Ear: Tympanic membrane, ear canal and external ear normal.      Left Ear: Tympanic membrane, ear canal and external ear normal. as of this dictation. RE-ASSESSMENT        EMERGENCY DEPARTMENT COURSE and DIFFERENTIAL DIAGNOSIS/MDM:   Vitals:    Vitals:    08/15/20 1450 08/15/20 1452 08/15/20 1742   BP:  (!) 160/83 (!) 156/79   Pulse:  70 59   Resp:  20    Temp:  98.7 °F (37.1 °C)    TempSrc:  Temporal    SpO2:  93%    Weight: 200 lb (90.7 kg)     Height: 6' (1.829 m)         MDM  Patient tolerated suturing well. There is no findings for foreign body or fracture on x-ray he has good range of motion actively and against resistance I am not at this time concern for any soft tissue injury or tendon involvement. Bleeding is well controlled no nailbed compromise plan for suture removal in 7 days he will go home with a few days for antibiotic prophylaxis we will discharge at this time. PROCEDURES:    Lac Repair    Date/Time: 8/15/2020 10:59 PM  Performed by: ZEENAT Hernández  Authorized by: ZEENAT Hernández     Consent:     Consent obtained:  Verbal    Consent given by:  Patient    Risks discussed:  Infection, pain and poor cosmetic result  Anesthesia (see MAR for exact dosages):      Anesthesia method:  Local infiltration    Local anesthetic:  Bupivacaine 0.5% w/o epi  Laceration details:     Location:  Finger    Finger location:  L index finger    Length (cm):  2  Repair type:     Repair type:  Simple  Pre-procedure details:     Preparation:  Patient was prepped and draped in usual sterile fashion  Exploration:     Hemostasis achieved with:  Direct pressure    Wound exploration: wound explored through full range of motion      Contaminated: no    Treatment:     Area cleansed with:  Betadine and saline    Amount of cleaning:  Standard    Visualized foreign bodies/material removed: no    Skin repair:     Repair method:  Sutures    Suture size:  5-0    Suture material:  Nylon    Suture technique:  Simple interrupted    Number of sutures:  4  Approximation:     Approximation:  Close  Post-procedure details:     Dressing:  Adhesive bandage and antibiotic ointment    Patient tolerance of procedure: Tolerated well, no immediate complications          FINAL IMPRESSION      1.  Laceration of left index finger without foreign body without damage to nail, initial encounter          DISPOSITION/PLAN   DISPOSITION Decision To Discharge 08/15/2020 05:20:39 PM      PATIENT REFERRED TO:  [facility]ER[facility]  S[place] Stroud  [phone]  In 1 week  For suture removal    [place] Joaqu[place], [address] [facility] [phone]      Keep appointment      DISCHARGE MEDICATIONS:  Discharge Medication List as of 8/15/2020  5:21 PM      START taking these medications    Details   cephALEXin (KEFLEX) 500 MG capsule Take 1 capsule by mouth 3 times daily for 3 days, Disp-9 capsule,R-0Print             (Please note that portions of this note were completed with a voice recognition program.  Efforts were made to edit the dictations but occasionallywords are mis-transcribed.)    Marcus Barrios [address] [place] [address]  08/15/20 [id number]

## 2020-08-17 ENCOUNTER — OFFICE VISIT (OUTPATIENT)
Dept: INTERNAL MEDICINE | Age: 80
End: 2020-08-17
Payer: MEDICARE

## 2020-08-17 VITALS
BODY MASS INDEX: 28.31 KG/M2 | HEIGHT: 72 IN | WEIGHT: 209 LBS | SYSTOLIC BLOOD PRESSURE: 138 MMHG | HEART RATE: 60 BPM | DIASTOLIC BLOOD PRESSURE: 68 MMHG

## 2020-08-17 PROCEDURE — G0439 PPPS, SUBSEQ VISIT: HCPCS | Performed by: NURSE PRACTITIONER

## 2020-08-17 PROCEDURE — 99214 OFFICE O/P EST MOD 30 MIN: CPT | Performed by: NURSE PRACTITIONER

## 2020-08-17 PROCEDURE — 1036F TOBACCO NON-USER: CPT | Performed by: NURSE PRACTITIONER

## 2020-08-17 PROCEDURE — 1123F ACP DISCUSS/DSCN MKR DOCD: CPT | Performed by: NURSE PRACTITIONER

## 2020-08-17 PROCEDURE — 4040F PNEUMOC VAC/ADMIN/RCVD: CPT | Performed by: NURSE PRACTITIONER

## 2020-08-17 PROCEDURE — G8417 CALC BMI ABV UP PARAM F/U: HCPCS | Performed by: NURSE PRACTITIONER

## 2020-08-17 PROCEDURE — G8427 DOCREV CUR MEDS BY ELIG CLIN: HCPCS | Performed by: NURSE PRACTITIONER

## 2020-08-17 RX ORDER — TEMAZEPAM 15 MG/1
30 CAPSULE ORAL NIGHTLY PRN
Qty: 60 CAPSULE | Refills: 0 | Status: SHIPPED | OUTPATIENT
Start: 2020-08-17 | End: 2020-09-30

## 2020-08-17 ASSESSMENT — ENCOUNTER SYMPTOMS
TROUBLE SWALLOWING: 0
SHORTNESS OF BREATH: 0
EYE ITCHING: 0
BLOOD IN STOOL: 0
ABDOMINAL DISTENTION: 0
CONSTIPATION: 0
STRIDOR: 0
ABDOMINAL PAIN: 0
NAUSEA: 0
CHOKING: 0
SORE THROAT: 0
COUGH: 0
VOMITING: 0
EYE DISCHARGE: 0
WHEEZING: 0
DIARRHEA: 0
COLOR CHANGE: 0

## 2020-08-17 ASSESSMENT — LIFESTYLE VARIABLES: HOW OFTEN DO YOU HAVE A DRINK CONTAINING ALCOHOL: 0

## 2020-08-17 NOTE — PATIENT INSTRUCTIONS
Personalized Preventive Plan for Ismael Ivan - 8/17/2020  Medicare offers a range of preventive health benefits. Some of the tests and screenings are paid in full while other may be subject to a deductible, co-insurance, and/or copay. Some of these benefits include a comprehensive review of your medical history including lifestyle, illnesses that may run in your family, and various assessments and screenings as appropriate. After reviewing your medical record and screening and assessments performed today your provider may have ordered immunizations, labs, imaging, and/or referrals for you. A list of these orders (if applicable) as well as your Preventive Care list are included within your After Visit Summary for your review. Other Preventive Recommendations:    · A preventive eye exam performed by an eye specialist is recommended every 1-2 years to screen for glaucoma; cataracts, macular degeneration, and other eye disorders. · A preventive dental visit is recommended every 6 months. · Try to get at least 150 minutes of exercise per week or 10,000 steps per day on a pedometer . · Order or download the FREE \"Exercise & Physical Activity: Your Everyday Guide\" from [employer]. Call 1-414.686.9341 or search [employer] online. · You need 8053-8388 mg of calcium and 1070-1487 IU of vitamin D per day. It is possible to meet your calcium requirement with diet alone, but a vitamin D supplement is usually necessary to meet this goal.  · When exposed to the sun, use a sunscreen that protects against both UVA and UVB radiation with an SPF of 30 or greater. Reapply every 2 to 3 hours or after sweating, drying off with a towel, or swimming. · Always wear a seat belt when traveling in a car. Always wear a helmet when riding a bicycle or motorcycle.

## 2020-08-17 NOTE — PROGRESS NOTES
[address] [facility]  [address] [address] [place] [address]  [address] [address]  Dept: [phone]  Dept Fax: [phone]  Loc: [phone]    Alex Rueda is a 70 y.o. male who presents today for his medical conditions/complaints as noted below. Alex Rueda is c/loly Medicare AWV (Patient is here for Medicare Wellness visit.) and Hypertension (Patient is here for routine follow up visit and review of labs done.)        HPI:     HPI   1. Recent lac repaired at [facility] [facility];  Looks good he is on keflex;  ;   2.  Hypertension; He is taking verapamil 240 mg BID ;  spironalactone 25 md daily losartian 100 gm dialy   3. GERD;  protonix daily he has had good results with this no breakthrough  4. Insomnia; he takes Restoril 50 mg at bedtime this works well for him he has no side effects of the meds takes as directed  5. Type II DM A1c remains at 5.5 he is doing an excellent job he is really staying on a keto type diet he has no hypoglycemia he is currently taking glipizide 10 daily  6. Left sided chest pain sharp in nature is around his left breast he says it is intermittent and is not necessarily exertional he happens several times a day. He has no shortness of breath or nausea associated with this his only cardiac history is mitral valve prolapse in the remote past  #6 mixed hyperlipidemia he is on atorvastatin 20 daily  Chief Complaint   Patient presents with    Medicare AWV     Patient is here for Medicare Wellness visit.  Hypertension     Patient is here for routine follow up visit and review of labs done.        Past Medical History:   Diagnosis Date    BPH with obstruction/lower urinary tract symptoms     Gastroesophageal reflux disease without esophagitis 5/6/2019    Hyperglycemia     Hyperlipidemia     Hypertension     Testicular hypofunction     Type 2 diabetes mellitus without complication [facility])       Past Surgical History:   Procedure Laterality Date    ANKLE SURGERY      HERNIA REPAIR      TONSILLECTOMY         Vitals 8/17/2020 8/17/2020 8/15/2020 8/15/2020 8/15/2020 2/67/1699   SYSTOLIC [phone] - 936 -   DIASTOLIC 68 68 79 - 83 -   Site - - - - - -   Position - - - - - -   Pulse - 60 59 - 70 -   Temp - - - - 98.7 -   Resp - - - - 20 -   SpO2 - - - - 93 -   Weight - 209 lb - - - 200 lb   Height - 6' 0\" - - - 6' 0\"   BMI (wt*703/ht~2) - 28.34 kg/m2 - - - 27.12 kg/m2   Pain Level - - - 3 - -   Some recent data might be hidden       Family History   Problem Relation Age of Onset    No Known Problems Mother     Heart Attack Father        Social History     Tobacco Use    Smoking status: Never Smoker    Smokeless tobacco: Never Used   Substance Use Topics    Alcohol use: No      Current Outpatient Medications   Medication Sig Dispense Refill    temazepam (RESTORIL) 15 MG capsule Take 2 capsules by mouth nightly as needed for Sleep for up to 30 days. 60 capsule 0    cephALEXin (KEFLEX) 500 MG capsule Take 1 capsule by mouth 3 times daily for 3 days 9 capsule 0    nystatin (MYCOSTATIN) 610710 UNIT/GM cream Apply topically 2 times daily.  30 g 1    spironolactone (ALDACTONE) 25 MG tablet TAKE 1 TABLET EVERY DAY 90 tablet 3    hydroCHLOROthiazide (HYDRODIURIL) 25 MG tablet TAKE 1 TABLET EVERY DAY 90 tablet 3    glipiZIDE (GLUCOTROL XL) 10 MG extended release tablet TAKE 1 TABLET EVERY MORNING 90 tablet 3    losartan (COZAAR) 100 MG tablet TAKE 1 TABLET EVERY DAY 90 tablet 3    potassium chloride (KLOR-CON M) 10 MEQ extended release tablet TAKE 2 TABLETS  2 TIMES DAILY 360 tablet 3    verapamil (CALAN SR) 240 MG extended release tablet TAKE 1 TABLET TWICE DAILY 180 tablet 3    atorvastatin (LIPITOR) 20 MG tablet TAKE 1 TABLET AT BEDTIME 90 tablet 3    fluticasone (FLONASE) 50 MCG/ACT nasal spray USE 1 SPRAY IN EACH NOSTRIL TWICE DAILY 48 g 3    pantoprazole (PROTONIX) 40 MG tablet TAKE 1 TABLET EVERY DAY 90 tablet 3    aspirin 81 MG tablet Take 81 mg by mouth daily      Omega-3 Fatty Acids (FISH OIL) 1000 MG CAPS Take 1,200 mg by mouth 2 times daily       No current facility-administered medications for this visit.       No Known Allergies    Health Maintenance   Topic Date Due    Annual Wellness Visit (AWV)  05/29/2019    Shingles Vaccine (1 of 2) 05/11/2021 (Originally 12/16/1990)    Flu vaccine (1) 09/01/2020    Lipid screen  05/08/2021    Potassium monitoring  08/05/2021    Creatinine monitoring  08/05/2021    Colon cancer screen colonoscopy  02/21/2025    DTaP/Tdap/Td vaccine (2 - Td) 08/15/2030    Pneumococcal 65+ years Vaccine  Completed    Hepatitis A vaccine  Aged Out    Hib vaccine  Aged Out    Meningococcal (ACWY) vaccine  Aged Out       Lab Results   Component Value Date    LABA1C 5.5 08/05/2020     Lab Results   Component Value Date    PSA 0.47 05/08/2020    PSA 0.41 05/02/2019    PSA 0.45 06/25/2018     TSH   Date Value Ref Range Status   08/05/2020 2.740 0.270 - 4.200 uIU/mL Final   ]  Lab Results   Component Value Date     08/05/2020    K 4.5 08/05/2020     08/05/2020    CO2 27 08/05/2020    BUN 28 (H) 08/05/2020    CREATININE 0.8 08/05/2020    GLUCOSE 135 (H) 08/05/2020    CALCIUM 9.6 08/05/2020    PROT 6.7 08/05/2020    LABALBU 4.4 08/05/2020    BILITOT 0.5 08/05/2020    ALKPHOS 68 08/05/2020    AST 27 08/05/2020    ALT 48 (H) 08/05/2020    LABGLOM >60 08/05/2020    GFRAA >59 08/05/2020     Lab Results   Component Value Date    CHOL 151 (L) 05/08/2020    CHOL 151 (L) 02/07/2020    CHOL 145 (L) 08/01/2019     Lab Results   Component Value Date    TRIG 103 05/08/2020    TRIG 81 02/07/2020    TRIG 69 08/01/2019     Lab Results   Component Value Date    HDL 44 (L) 05/08/2020    HDL 49 (L) 02/07/2020    HDL 44 (L) 08/01/2019     Lab Results   Component Value Date    LDLCALC 86 05/08/2020    LDLCALC 86 02/07/2020    LDLCALC 87 08/01/2019     Lab Results   Component Value Date     08/05/2020    K 4.5 08/05/2020     08/05/2020    CO2 27 08/05/2020    BUN 28 08/05/2020    CREATININE 0.8 08/05/2020    GLUCOSE 135 08/05/2020    CALCIUM 9.6 08/05/2020      Lab Results   Component Value Date    WBC 6.1 08/05/2020    HGB 14.3 08/05/2020    HCT 42.8 08/05/2020    MCV 89.2 08/05/2020     08/05/2020    LABLYMP 1.36 04/07/2015    LYMPHOPCT 29.8 05/08/2020    RBC 4.80 08/05/2020    MCH 29.8 08/05/2020    MCHC 33.4 08/05/2020    RDW 12.8 08/05/2020     Lab Results   Component Value Date    VITD25 50.3 08/05/2020       Subjective:      Review of Systems   Constitutional: Negative for fatigue, fever and unexpected weight change. HENT: Negative for ear discharge, ear pain, mouth sores, sore throat and trouble swallowing. Eyes: Negative for discharge, itching and visual disturbance. Respiratory: Negative for cough, choking, shortness of breath, wheezing and stridor. Cardiovascular: Positive for chest pain. Negative for palpitations and leg swelling. Gastrointestinal: Negative for abdominal distention, abdominal pain, blood in stool, constipation, diarrhea, nausea and vomiting. Endocrine: Negative for cold intolerance, polydipsia and polyuria. Genitourinary: Negative for difficulty urinating, dysuria, frequency and urgency. Musculoskeletal: Negative for arthralgias and gait problem. Skin: Negative for color change and rash. Allergic/Immunologic: Negative for food allergies and immunocompromised state. Neurological: Negative for dizziness, tremors, syncope, speech difficulty, weakness and headaches. Insomnia   Hematological: Negative for adenopathy. Does not bruise/bleed easily. Psychiatric/Behavioral: Negative for confusion and hallucinations. Objective:     Physical Exam  Constitutional:       General: He is not in acute distress. Appearance: He is well-developed. HENT:      Head: Normocephalic and atraumatic. Eyes:      General: No scleral icterus. Right eye: No discharge. Left eye: No discharge. Pupils: Pupils are equal, round, and reactive to light. Neck:      Musculoskeletal: Normal range of motion and neck supple. Thyroid: No thyromegaly. Vascular: No JVD. Cardiovascular:      Rate and Rhythm: Normal rate and regular rhythm. Heart sounds: Normal heart sounds. No murmur. Pulmonary:      Effort: Pulmonary effort is normal. No respiratory distress. Breath sounds: Normal breath sounds. No wheezing or rales. Abdominal:      General: Bowel sounds are normal. There is no distension. Palpations: Abdomen is soft. There is no mass. Tenderness: There is no abdominal tenderness. There is no guarding or rebound. Musculoskeletal: Normal range of motion. General: No tenderness. Skin:     General: Skin is warm and dry. Findings: No erythema or rash. Neurological:      Mental Status: He is alert and oriented to person, place, and time. Cranial Nerves: No cranial nerve deficit. Coordination: Coordination normal.      Deep Tendon Reflexes: Reflexes are normal and symmetric. Reflexes normal.   Psychiatric:         Mood and Affect: Mood is not depressed. Behavior: Behavior normal.         Thought Content: Thought content normal.         Judgment: Judgment normal.       /68   Pulse 60   Ht 6' (1.829 m)   Wt 209 lb (94.8 kg)   BMI 28.35 kg/m²     Assessment:       Diagnosis Orders   1. Essential hypertension  CBC Auto Differential    Comprehensive Metabolic Panel    TSH without Reflex    Urinalysis Reflex to Culture   2. Primary insomnia  temazepam (RESTORIL) 15 MG capsule   3. Chest pain, unspecified type  ECHO (Dobutamine) Pharmacological Stress Test   4. Type 2 diabetes mellitus without complication, without long-term current use of insulin ([facility])  Hemoglobin A1C   5. Vitamin D deficiency  Vitamin D 25 Hydroxy   6. Mixed hyperlipidemia  Lipid Panel   7.  Laceration of left index finger without foreign body, nail visit and review of labs done.)    Screenings for behavioral, psychosocial and functional/safety risks, and cognitive dysfunction are all negative except as indicated below. These results, as well as other patient data from the [address] form, are documented in Flowsheets linked to this Encounter. No Known Allergies      Prior to Visit Medications    Medication Sig Taking? Authorizing Provider   temazepam (RESTORIL) 15 MG capsule Take 2 capsules by mouth nightly as needed for Sleep for up to 30 days. Yes MARISELA Kessler   cephALEXin (KEFLEX) 500 MG capsule Take 1 capsule by mouth 3 times daily for 3 days Yes ZEENAT Wynn   nystatin (MYCOSTATIN) 048615 UNIT/GM cream Apply topically 2 times daily.  Yes MARISELA Kessler   spironolactone (ALDACTONE) 25 MG tablet TAKE 1 TABLET EVERY DAY Yes MARISELA Kessler   hydroCHLOROthiazide (HYDRODIURIL) 25 MG tablet TAKE 1 TABLET EVERY DAY Yes MARISELA Kessler   glipiZIDE (GLUCOTROL XL) 10 MG extended release tablet TAKE 1 TABLET EVERY MORNING Yes MARISELA Kessler   losartan (COZAAR) 100 MG tablet TAKE 1 TABLET EVERY DAY Yes MARISELA Kessler   potassium chloride (KLOR-CON M) 10 MEQ extended release tablet TAKE 2 TABLETS  2 TIMES DAILY Yes MARISELA Kessler   verapamil (CALAN SR) 240 MG extended release tablet TAKE 1 TABLET TWICE DAILY Yes MARISELA Kessler   atorvastatin (LIPITOR) 20 MG tablet TAKE 1 TABLET AT BEDTIME Yes MARISELA Kessler   fluticasone (FLONASE) 50 MCG/ACT nasal spray USE 1 SPRAY IN EACH NOSTRIL TWICE DAILY Yes MARISELA Kessler   pantoprazole (PROTONIX) 40 MG tablet TAKE 1 TABLET EVERY DAY Yes MARISELA Kessler   aspirin 81 MG tablet Take 81 mg by mouth daily Yes Historical Provider, MD   Omega-3 Fatty Acids (FISH OIL) 1000 MG CAPS Take 1,200 mg by mouth 2 times daily Yes Historical Provider, MD         Past Medical History:   Diagnosis Date    BPH with obstruction/lower urinary tract symptoms     Gastroesophageal reflux disease without esophagitis 5/6/2019    Hyperglycemia     Hyperlipidemia     Hypertension     Testicular hypofunction     Type 2 diabetes mellitus without complication (HCC)        Past Surgical History:   Procedure Laterality Date    ANKLE SURGERY      HERNIA REPAIR      TONSILLECTOMY           Family History   Problem Relation Age of Onset    No Known Problems Mother     Heart Attack Father        CareTeam (Including outside providers/suppliers regularly involved in providing care):   Patient Care Team:  MARISELA Cortez as PCP - General (Nurse Practitioner Acute Care)  [facility]    Wt Readings from Last 3 Encounters:   08/17/20 209 lb (94.8 kg)   08/15/20 200 lb (90.7 kg)   05/11/20 207 lb (93.9 kg)     Vitals:    08/17/20 0902 08/17/20 0903   BP: (!) 142/68 138/68   Pulse: 60    Weight: 209 lb (94.8 kg)    Height: 6' (1.829 m)      Body mass index is 28.35 kg/m². Based upon direct observation of the patient, evaluation of cognition reveals recent and remote memory intact. Review of Systems -   General no fever chills he has extreme fatigue and malaise  Diet he is on a low-sodium diet  Skin no rash eruption pigment changes  HEENT no sore throat or difficulty swallowing  Neck no complaint of masses or pain  Chest is persistent cough with shortness of breath and exertional dyspnea  Cardiovascular heart rate is regular murmurs noted  Abdomen his liver is enlarged ascites is apparent he has tenderness in the right upper quadrant with no gallbladder disease  Musculoskeletal has some  joint pain  No swelling  Neuro no numbness tingling clinical problems  Mental status no problems with  anxiety depression      General; alert, oriented, no acute distress. Skin no rashes or worrisome lesions. HEENT head is atraumatic. Pupils equal, reactive light and accommodation. Neck is supple without masses.   Chest is clear without rales, rhonchi. Cardiovascular S1, S2 without murmur. Blood vessels no cyanosis, clubbing peripheral edema. Abdomen is soft. Bowel sounds  present   Musculoskeletal adequate tone range of motion and strength   lymph there is no tenderness enlargement   Neuro cranial nerves II through XII are grossly intact with facial reflexes are intact psych   Blood vessels distal cyanosis clubbing or peripheral edema  Psych alert and oriented x3 appropriate mood and affect          Patient's complete Health Risk Assessment and screening values have been reviewed and are found in Flowsheets. The following problems were reviewed today and where indicated follow up appointments were made and/or referrals ordered. Positive Risk Factor Screenings with Interventions:     General Health:  General  In general, how would you say your health is?: Very Good  In the past 7 days, have you experienced any of the following? New or Increased Pain, New or Increased Fatigue, Loneliness, Social Isolation, Stress or Anger?: None of These  Do you get the social and emotional support that you need?: Yes  Do you have a Living Will?: (!) No  General Health Risk Interventions:  · No Living Will: patient declined    Health Habits/Nutrition:  Health Habits/Nutrition  Do you exercise for at least 20 minutes 2-3 times per week?: Yes  Have you lost any weight without trying in the past 3 months?: No  Do you eat fewer than 2 meals per day?: No  Have you seen a dentist within the past year?: (!) No  Body mass index is 28.35 kg/m².   Health Habits/Nutrition Interventions:  · Dental exam overdue:  patient encouraged to make appointment with his/her dentist    Hearing/Vision:  No exam data present  Hearing/Vision  Do you or your family notice any trouble with your hearing?: (!) Yes  Do you have difficulty driving, watching TV, or doing any of your daily activities because of your eyesight?: No  Have you had an eye exam within the past year?: Yes  Hearing/Vision D deficiency  -     Vitamin D 25 Hydroxy; Future    Mixed hyperlipidemia  -     Lipid Panel;  Future    Laceration of left index finger without foreign body, nail damage status unspecified, subsequent encounter    Gastroesophageal reflux disease without esophagitis

## 2020-08-25 ENCOUNTER — OFFICE VISIT (OUTPATIENT)
Dept: INTERNAL MEDICINE | Age: 80
End: 2020-08-25
Payer: MEDICARE

## 2020-08-25 VITALS — SYSTOLIC BLOOD PRESSURE: 136 MMHG | DIASTOLIC BLOOD PRESSURE: 73 MMHG | HEART RATE: 60 BPM

## 2020-08-25 PROCEDURE — G8427 DOCREV CUR MEDS BY ELIG CLIN: HCPCS | Performed by: NURSE PRACTITIONER

## 2020-08-25 PROCEDURE — 99211 OFF/OP EST MAY X REQ PHY/QHP: CPT | Performed by: NURSE PRACTITIONER

## 2020-08-25 PROCEDURE — G8417 CALC BMI ABV UP PARAM F/U: HCPCS | Performed by: NURSE PRACTITIONER

## 2020-08-25 NOTE — PATIENT INSTRUCTIONS
1.suture removal left index finger;    Keep clean ;  Wear a bandaid;   Take it off to wash your hands and replace when dry;

## 2020-08-27 ENCOUNTER — TRANSCRIBE ORDERS (OUTPATIENT)
Dept: ADMINISTRATIVE | Facility: HOSPITAL | Age: 80
End: 2020-08-27

## 2020-08-27 DIAGNOSIS — R07.9 CHEST PAIN, UNSPECIFIED TYPE: Primary | ICD-10-CM

## 2020-09-01 ENCOUNTER — HOSPITAL ENCOUNTER (OUTPATIENT)
Dept: CARDIOLOGY | Facility: HOSPITAL | Age: 80
Discharge: HOME | End: 2020-09-01
Admitting: NURSE PRACTITIONER

## 2020-09-01 VITALS
BODY MASS INDEX: 27.12 KG/M2 | DIASTOLIC BLOOD PRESSURE: 67 MMHG | HEART RATE: 60 BPM | SYSTOLIC BLOOD PRESSURE: 138 MMHG | WEIGHT: 200 LBS

## 2020-09-01 DIAGNOSIS — R07.9 CHEST PAIN, UNSPECIFIED TYPE: ICD-10-CM

## 2020-09-01 LAB
BH CV STRESS BP STAGE 1: NORMAL
BH CV STRESS BP STAGE 2: NORMAL
BH CV STRESS BP STAGE 3: NORMAL
BH CV STRESS BP STAGE 4: NORMAL
BH CV STRESS BP STAGE 5: NORMAL
BH CV STRESS DOSE DOBUTAMINE STAGE 1: 10
BH CV STRESS DOSE DOBUTAMINE STAGE 2: 20
BH CV STRESS DOSE DOBUTAMINE STAGE 3: 30
BH CV STRESS DOSE DOBUTAMINE STAGE 4: 40
BH CV STRESS DOSE DOBUTAMINE STAGE 5: 50
BH CV STRESS DURATION MIN STAGE 1: 3
BH CV STRESS DURATION MIN STAGE 2: 3
BH CV STRESS DURATION MIN STAGE 3: 3
BH CV STRESS DURATION MIN STAGE 4: 3
BH CV STRESS DURATION MIN STAGE 5: 3
BH CV STRESS DURATION SEC STAGE 1: 0
BH CV STRESS DURATION SEC STAGE 2: 0
BH CV STRESS DURATION SEC STAGE 3: 0
BH CV STRESS DURATION SEC STAGE 4: 0
BH CV STRESS DURATION SEC STAGE 5: 5
BH CV STRESS HR STAGE 1: 65
BH CV STRESS HR STAGE 2: 89
BH CV STRESS HR STAGE 3: 88
BH CV STRESS HR STAGE 4: 114
BH CV STRESS HR STAGE 5: 131
BH CV STRESS PROTOCOL 1: NORMAL
BH CV STRESS STAGE 1: 1
BH CV STRESS STAGE 2: 2
BH CV STRESS STAGE 3: 3
BH CV STRESS STAGE 4: 4
BH CV STRESS STAGE 5: 5
MAXIMAL PREDICTED HEART RATE: 143 BPM
STRESS TARGET HR: 122 BPM

## 2020-09-01 PROCEDURE — 93018 CV STRESS TEST I&R ONLY: CPT | Performed by: INTERNAL MEDICINE

## 2020-09-01 PROCEDURE — 93325 DOPPLER ECHO COLOR FLOW MAPG: CPT

## 2020-09-01 PROCEDURE — 25010000003 ATROPINE SULFATE: Performed by: NURSE PRACTITIONER

## 2020-09-01 PROCEDURE — 93017 CV STRESS TEST TRACING ONLY: CPT

## 2020-09-01 PROCEDURE — 25010000003 DOBUTAMINE PER 250 MG: Performed by: NURSE PRACTITIONER

## 2020-09-01 PROCEDURE — 93350 STRESS TTE ONLY: CPT

## 2020-09-01 PROCEDURE — 25010000002 PERFLUTREN 6.52 MG/ML SUSPENSION: Performed by: NURSE PRACTITIONER

## 2020-09-01 PROCEDURE — 93352 ADMIN ECG CONTRAST AGENT: CPT | Performed by: INTERNAL MEDICINE

## 2020-09-01 PROCEDURE — 93320 DOPPLER ECHO COMPLETE: CPT | Performed by: INTERNAL MEDICINE

## 2020-09-01 PROCEDURE — 93325 DOPPLER ECHO COLOR FLOW MAPG: CPT | Performed by: INTERNAL MEDICINE

## 2020-09-01 PROCEDURE — 93320 DOPPLER ECHO COMPLETE: CPT

## 2020-09-01 PROCEDURE — 93350 STRESS TTE ONLY: CPT | Performed by: INTERNAL MEDICINE

## 2020-09-01 RX ORDER — DOBUTAMINE HYDROCHLORIDE 100 MG/100ML
10-50 INJECTION INTRAVENOUS CONTINUOUS
Status: DISCONTINUED | OUTPATIENT
Start: 2020-09-01 | End: 2020-09-02 | Stop reason: HOSPADM

## 2020-09-01 RX ADMIN — Medication 10 MCG/KG/MIN: at 10:13

## 2020-09-01 RX ADMIN — PERFLUTREN: 6.52 INJECTION, SUSPENSION INTRAVENOUS at 09:57

## 2020-09-01 RX ADMIN — ATROPINE SULFATE 0.3 MG: 0.1 INJECTION, SOLUTION INTRAVENOUS at 10:24

## 2020-09-28 RX ORDER — LOSARTAN POTASSIUM 100 MG/1
TABLET ORAL
Qty: 90 TABLET | Refills: 3 | Status: SHIPPED | OUTPATIENT
Start: 2020-09-28 | End: 2021-11-08

## 2020-09-28 RX ORDER — POTASSIUM CHLORIDE 750 MG/1
TABLET, EXTENDED RELEASE ORAL
Qty: 360 TABLET | Refills: 3 | Status: SHIPPED | OUTPATIENT
Start: 2020-09-28 | End: 2021-11-08

## 2020-09-28 RX ORDER — VERAPAMIL HYDROCHLORIDE 240 MG/1
TABLET, FILM COATED, EXTENDED RELEASE ORAL
Qty: 180 TABLET | Refills: 3 | Status: SHIPPED | OUTPATIENT
Start: 2020-09-28 | End: 2021-11-08

## 2020-09-28 RX ORDER — ATORVASTATIN CALCIUM 20 MG/1
TABLET, FILM COATED ORAL
Qty: 90 TABLET | Refills: 3 | Status: SHIPPED | OUTPATIENT
Start: 2020-09-28 | End: 2021-11-08

## 2020-09-28 NOTE — TELEPHONE ENCOUNTER
Alea Ramirez called requesting a refill of the below medication which has been pended for you:     Requested Prescriptions     Pending Prescriptions Disp Refills    atorvastatin (LIPITOR) 20 MG tablet [Pharmacy Med Name: ATORVASTATIN CALCIUM 20 MG Tablet] 90 tablet 3     Sig: TAKE 1 TABLET AT BEDTIME    losartan (COZAAR) 100 MG tablet [Pharmacy Med Name: LOSARTAN POTASSIUM 100 MG Tablet] 90 tablet 3     Sig: TAKE 1 TABLET EVERY DAY    verapamil (CALAN SR) 240 MG extended release tablet [Pharmacy Med Name: VERAPAMIL HYDROCHLORIDE  MG Tablet Extended Release] 180 tablet 3     Sig: TAKE 1 TABLET TWICE DAILY    potassium chloride (KLOR-CON M) 10 MEQ extended release tablet [Pharmacy Med Name: POTASSIUM CHLORIDE ER 10 MEQ Tablet Extended Release] 360 tablet 3     Sig: TAKE 2 TABLETS  2 TIMES DAILY       Last Appointment Date: 8/25/2020  Next Appointment Date: 11/23/2020    No Known Allergies

## 2020-09-30 RX ORDER — TEMAZEPAM 15 MG/1
30 CAPSULE ORAL NIGHTLY PRN
Qty: 60 CAPSULE | Refills: 0 | Status: SHIPPED | OUTPATIENT
Start: 2020-09-30 | End: 2020-11-23 | Stop reason: SDUPTHER

## 2020-09-30 NOTE — TELEPHONE ENCOUNTER
Glenda Soto called requesting a refill of the below medication which has been pended for you:     Requested Prescriptions     Pending Prescriptions Disp Refills    temazepam (RESTORIL) 15 MG capsule [Pharmacy Med Name: Temazepam 15 MG Oral Capsule] 60 capsule 0     Sig: TAKE 2 CAPSULES BY MOUTH NIGHTLY AS NEEDED FOR SLEEP FOR UP TO 30 DAYS.        Last Appointment Date: 8/25/2020  Next Appointment Date: 11/23/2020    No Known Allergies

## 2020-10-28 PROCEDURE — 87635 SARS-COV-2 COVID-19 AMP PRB: CPT | Performed by: NURSE PRACTITIONER

## 2020-11-01 ENCOUNTER — OFFICE VISIT (OUTPATIENT)
Dept: URGENT CARE | Age: 80
End: 2020-11-01
Payer: MEDICARE

## 2020-11-01 VITALS
DIASTOLIC BLOOD PRESSURE: 72 MMHG | WEIGHT: 206 LBS | TEMPERATURE: 98.3 F | OXYGEN SATURATION: 94 % | BODY MASS INDEX: 27.94 KG/M2 | HEART RATE: 88 BPM | SYSTOLIC BLOOD PRESSURE: 150 MMHG | RESPIRATION RATE: 18 BRPM

## 2020-11-01 PROCEDURE — 99213 OFFICE O/P EST LOW 20 MIN: CPT | Performed by: NURSE PRACTITIONER

## 2020-11-01 PROCEDURE — 96372 THER/PROPH/DIAG INJ SC/IM: CPT | Performed by: NURSE PRACTITIONER

## 2020-11-01 RX ORDER — AMOXICILLIN AND CLAVULANATE POTASSIUM 875; 125 MG/1; MG/1
1 TABLET, FILM COATED ORAL 2 TIMES DAILY
Qty: 20 TABLET | Refills: 0 | Status: SHIPPED | OUTPATIENT
Start: 2020-11-01 | End: 2020-11-11

## 2020-11-01 RX ORDER — DEXAMETHASONE SODIUM PHOSPHATE 10 MG/ML
5 INJECTION INTRAMUSCULAR; INTRAVENOUS ONCE
Status: COMPLETED | OUTPATIENT
Start: 2020-11-01 | End: 2020-11-01

## 2020-11-01 RX ADMIN — DEXAMETHASONE SODIUM PHOSPHATE 5 MG: 10 INJECTION INTRAMUSCULAR; INTRAVENOUS at 13:17

## 2020-11-01 ASSESSMENT — ENCOUNTER SYMPTOMS
SINUS PRESSURE: 0
HOARSE VOICE: 0
ABDOMINAL PAIN: 0
SORE THROAT: 0
EYES NEGATIVE: 1
SHORTNESS OF BREATH: 0
NAUSEA: 0
SINUS COMPLAINT: 1
VOMITING: 0
COUGH: 0
DIARRHEA: 0

## 2020-11-01 ASSESSMENT — VISUAL ACUITY: OU: 1

## 2020-11-01 NOTE — PROGRESS NOTES
[address] [facility] URGENT [facility]ARE  [address] [address] [place] [address]  Dept: [phone]  Dept Fax: [phone]  Loc: [phone]    James Byrne is a 70 y.o. male who presents today for his medical conditions/complaintsas noted below. James Byrne is c/o of Drainage and Head Congestion        HPI:     Sinus Problem   This is a chronic problem. The current episode started 1 to 4 weeks ago (1-2 weeks ago, he says it is constant). The problem is unchanged. There has been no fever. The fever has been present for less than 1 day. Associated symptoms include congestion and ear pain. Pertinent negatives include no chills, coughing, headaches, hoarse voice, shortness of breath, sinus pressure, sneezing or sore throat. Treatments tried: Mucinex and Coricidin HBP. The treatment provided mild relief. He says he has a constant drainage problem with nasal and head congestion and doesn't take allergy medication regularly. His girlfriend has Skip and he was tested Wed this past week at [facility] and the test was negative. He says he has had this \"drainage\" problem for 2 weeks but really has it all the time and his PCP normally gives him an antibiotic shot and steroid shot for this. He has had no fever or cough and is eating and drinking well.   Past Medical History:   Diagnosis Date    BPH with obstruction/lower urinary tract symptoms     Gastroesophageal reflux disease without esophagitis 5/6/2019    Hyperglycemia     Hyperlipidemia     Hypertension     Testicular hypofunction     Type 2 diabetes mellitus without complication (HCC)      Past Surgical History:   Procedure Laterality Date    ANKLE SURGERY      HERNIA REPAIR      TONSILLECTOMY         Family History   Problem Relation Age of Onset    No Known Problems Mother     Heart Attack Father        Social History     Tobacco Use    Smoking status: Never Smoker    Smokeless tobacco: Never Used   Substance Use Topics    Alcohol use: No      Current Outpatient Medications   Medication Sig Dispense Refill    amoxicillin-clavulanate (AUGMENTIN) 875-125 MG per tablet Take 1 tablet by mouth 2 times daily for 10 days 20 tablet 0    atorvastatin (LIPITOR) 20 MG tablet TAKE 1 TABLET AT BEDTIME 90 tablet 3    losartan (COZAAR) 100 MG tablet TAKE 1 TABLET EVERY DAY 90 tablet 3    verapamil (CALAN SR) 240 MG extended release tablet TAKE 1 TABLET TWICE DAILY 180 tablet 3    potassium chloride (KLOR-CON M) 10 MEQ extended release tablet TAKE 2 TABLETS  2 TIMES DAILY 360 tablet 3    nystatin (MYCOSTATIN) 020459 UNIT/GM cream Apply topically 2 times daily. 30 g 1    spironolactone (ALDACTONE) 25 MG tablet TAKE 1 TABLET EVERY DAY 90 tablet 3    hydroCHLOROthiazide (HYDRODIURIL) 25 MG tablet TAKE 1 TABLET EVERY DAY 90 tablet 3    glipiZIDE (GLUCOTROL XL) 10 MG extended release tablet TAKE 1 TABLET EVERY MORNING 90 tablet 3    fluticasone (FLONASE) 50 MCG/ACT nasal spray USE 1 SPRAY IN EACH NOSTRIL TWICE DAILY 48 g 3    pantoprazole (PROTONIX) 40 MG tablet TAKE 1 TABLET EVERY DAY 90 tablet 3    aspirin 81 MG tablet Take 81 mg by mouth daily      Omega-3 Fatty Acids (FISH OIL) 1000 MG CAPS Take 1,200 mg by mouth 2 times daily      temazepam (RESTORIL) 15 MG capsule TAKE 2 CAPSULES BY MOUTH NIGHTLY AS NEEDED FOR SLEEP FOR UP TO 30 DAYS.  60 capsule 0     Current Facility-Administered Medications   Medication Dose Route Frequency Provider Last Rate Last Dose    dexamethasone (DECADRON) injection 5 mg  5 mg Intramuscular Once MARISELA Roberto - CNP         No Known Allergies    Health Maintenance   Topic Date Due    Flu vaccine (1) 09/01/2020    Shingles Vaccine (1 of 2) 05/11/2021 (Originally 12/16/1990)    Lipid screen  05/08/2021    Potassium monitoring  08/05/2021    Creatinine monitoring  08/05/2021    Annual Wellness Visit (AWV)  08/18/2021    Colon cancer screen colonoscopy  02/21/2025    DTaP/Tdap/Td vaccine (2 - Td) 08/15/2030    Pneumococcal 65+ years Vaccine  Completed    Hepatitis A vaccine  Aged Out    Hib vaccine  Aged Out    Meningococcal (ACWY) vaccine  Aged Out       Subjective:     Review of Systems   Constitutional: Negative for activity change, appetite change, chills and fever. HENT: Positive for congestion, ear pain and postnasal drip. Negative for ear discharge, hoarse voice, sinus pressure, sneezing and sore throat. Eyes: Negative. Respiratory: Negative for cough and shortness of breath. Cardiovascular: Negative. Gastrointestinal: Negative for abdominal pain, diarrhea, nausea and vomiting. Musculoskeletal: Negative for arthralgias and myalgias. Skin: Negative for rash. Allergic/Immunologic: Positive for environmental allergies. Neurological: Negative for headaches. Hematological: Negative.        :Objective      Physical Exam  Vitals signs and nursing note reviewed. Constitutional:       General: He is awake. He is not in acute distress. Appearance: Normal appearance. He is well-developed, well-groomed and normal weight. He is not ill-appearing or toxic-appearing. HENT:      Head: Normocephalic. Right Ear: Hearing, ear canal and external ear normal. A middle ear effusion is present. Tympanic membrane is erythematous and bulging. Left Ear: Hearing, ear canal and external ear normal. A middle ear effusion is present. Tympanic membrane is bulging. Nose: Congestion present. Right Sinus: No maxillary sinus tenderness or frontal sinus tenderness. Left Sinus: No maxillary sinus tenderness or frontal sinus tenderness. Mouth/Throat:      Lips: Pink. Mouth: Mucous membranes are moist.      Pharynx: Oropharynx is clear. Uvula midline. Posterior oropharyngeal erythema present. Tonsils: 0 on the right. 0 on the left. Eyes:      General: Vision grossly intact. Conjunctiva/sclera: Conjunctivae normal.   Neck:      Musculoskeletal: Neck supple. Trachea: Phonation normal.   Cardiovascular:      Rate and Rhythm: Normal rate and regular rhythm. Heart sounds: Normal heart sounds, S1 normal and S2 normal. No murmur. No friction rub. No gallop. Pulmonary:      Effort: Pulmonary effort is normal. No respiratory distress. Breath sounds: Normal breath sounds and air entry. No wheezing, rhonchi or rales. Abdominal:      General: Abdomen is flat. Palpations: Abdomen is soft. Musculoskeletal:         General: No tenderness or deformity. Lymphadenopathy:      Head:      Right side of head: No tonsillar adenopathy. Left side of head: No tonsillar adenopathy. Skin:     General: Skin is warm and dry. Capillary Refill: Capillary refill takes less than 2 seconds. Neurological:      General: No focal deficit present. Mental Status: He is alert, oriented to person, place, and time and easily aroused. Mental status is at baseline. Psychiatric:         Attention and Perception: Attention normal.         Mood and Affect: Mood normal.         Speech: Speech normal.         Behavior: Behavior normal. Behavior is cooperative. BP (!) 150/72   Pulse 88   Temp 98.3 °F (36.8 °C) (Temporal)   Resp 18   Wt 206 lb (93.4 kg)   SpO2 94%   BMI 27.94 kg/m²     :Assessment       Diagnosis Orders   1. Allergic rhinitis, unspecified seasonality, unspecified trigger  dexamethasone (DECADRON) injection 5 mg   2. Bilateral acute serous otitis media, recurrence not specified  amoxicillin-clavulanate (AUGMENTIN) 875-125 MG per tablet       :Plan    No orders of the defined types were placed in this encounter. Return if symptoms worsen or fail to improve.     Orders Placed This Encounter   Medications    dexamethasone (DECADRON) injection 5 mg    amoxicillin-clavulanate (AUGMENTIN) 875-125 MG per tablet     Sig: Take 1 tablet by mouth 2 times daily for 10 days     Dispense:  20 tablet     Refill:  0        Patient Instructions     Naseem of fluids  Rest  OTC Tylenol or Motrin as needed  OTC Zyrtec daily  OTC Flonase 2sprays each nostril at bedtime  Augmentin as directed  Dexamethasone 5 mg IM  Follow up with PCP or return to Urgent Care for worsening or unresolved symptoms. Patient Education        Seasonal Allergies: Care Instructions  Your Care Instructions     Allergies occur when your body's defense system (immune system) overreacts to certain substances. The immune system treats a harmless substance as if it were a harmful germ or virus. Many things can cause this to happen. Examples include pollens, medicine, food, dust, animal dander, and mold. Your allergies are seasonal if you have symptoms just at certain times of the year. In that case, you are probably allergic to pollens from certain trees, grasses, or weeds. Allergies can be mild or severe. Over-the-counter allergy medicine may help with some symptoms. Read and follow all instructions on the label. Managing your allergies is an important part of staying healthy. Your doctor may suggest that you have tests to help find the cause of your allergies. When you know what things trigger your symptoms, you can avoid them. This can prevent allergy symptoms and other health problems. In some cases, immunotherapy might help. For this treatment, you get shots or use pills that have a small amount of certain allergens in them. Your body \"gets used to\" the allergen, so you react less to it over time. This kind of treatment may help prevent or reduce some allergy symptoms. Follow-up care is a key part of your treatment and safety. Be sure to make and go to all appointments, and call your doctor if you are having problems. It's also a good idea to know your test results and keep a list of the medicines you take. How can you care for yourself at home? · Be safe with medicines. Take your medicines exactly as prescribed.  Call your doctor if you think you are having a problem with your this information. Patient Education        Using a Nasal Steroid Spray: Care Instructions  Your Care Instructions     Your doctor may suggest using a corticosteroid nasal spray for your allergy symptoms or sinus problems. These sprays reduce the swelling inside the nose and sinuses. Unlike decongestant nasal sprays, steroid sprays won't lead to more swelling when you stop taking them. These sprays start working in a few days, but it may take several weeks before you get the full effect. Most side effects are minor. The most common complaint is a burning feeling in the nose right after the spray is used. Some people get nosebleeds. Follow-up care is a key part of your treatment and safety. Be sure to make and go to all appointments, and call your doctor if you are having problems. It's also a good idea to know your test results and keep a list of the medicines you take. How can you care for yourself at home? Here are some tips for using these sprays:  · You may need to prime the sprayer before you use it. This means spraying it into the air a few times to make sure you get the right amount of medicine. Follow the directions on the label. · Blow your nose before you spray. This will help clear out your nostrils. · Gently sniff the medicine into your nose as you spray. Don't snort, or the medicine will go all the way into your throat where it won't do much good. · Aim the nozzle straight toward the outer wall of your nostril. This will help keep the medicine from irritating the inner walls of your nose, especially your septum (the wall that separates your left and right nostrils). · Don't blow your nose for 10 minutes or so after you spray. And try not to sneeze. · Be safe with medicines. Use this medicine exactly as prescribed. Call your doctor if you think you are having a problem with your medicine. · Clean your sprayer once a week. Read the label to learn how.   When should you call for help?  Watch closely for changes in your health, and be sure to contact your doctor if you have any problems. Where can you learn more? Go to [email or web address]://[email or web address] org and sign in to your Viewdle account. Enter C048 in the [facility] box to learn more about \"Using a Nasal Steroid Spray: Care Instructions. \"     If you do not have an account, please click on the \"Sign Up Now\" link. Current as of: April 15, 2020               Content Version: 12.6  © 6677-8711 [employer]. Care instructions adapted under license by [facility] ([facility]). If you have questions about a medical condition or this instruction, always ask your healthcare professional. [address] [address] any warranty or liability for your use of this information. Patient given educational materials- see patient instructions. Discussed use, benefit, and side effects of prescribedmedications. All patient questions answered. Pt voiced understanding.        Electronically signed by MARISELA Peters CNP on 11/1/2020 at 1:14 PM

## 2020-11-01 NOTE — PATIENT INSTRUCTIONS
Plenty of fluids  Rest  OTC Tylenol or Motrin as needed  OTC Zyrtec daily  OTC Flonase 2sprays each nostril at bedtime  Augmentin as directed  Dexamethasone 5 mg IM  Follow up with PCP or return to Urgent Care for worsening or unresolved symptoms. Patient Education        Seasonal Allergies: Care Instructions  Your Care Instructions     Allergies occur when your body's defense system (immune system) overreacts to certain substances. The immune system treats a harmless substance as if it were a harmful germ or virus. Many things can cause this to happen. Examples include pollens, medicine, food, dust, animal dander, and mold. Your allergies are seasonal if you have symptoms just at certain times of the year. In that case, you are probably allergic to pollens from certain trees, grasses, or weeds. Allergies can be mild or severe. Over-the-counter allergy medicine may help with some symptoms. Read and follow all instructions on the label. Managing your allergies is an important part of staying healthy. Your doctor may suggest that you have tests to help find the cause of your allergies. When you know what things trigger your symptoms, you can avoid them. This can prevent allergy symptoms and other health problems. In some cases, immunotherapy might help. For this treatment, you get shots or use pills that have a small amount of certain allergens in them. Your body \"gets used to\" the allergen, so you react less to it over time. This kind of treatment may help prevent or reduce some allergy symptoms. Follow-up care is a key part of your treatment and safety. Be sure to make and go to all appointments, and call your doctor if you are having problems. It's also a good idea to know your test results and keep a list of the medicines you take. How can you care for yourself at home? · Be safe with medicines. Take your medicines exactly as prescribed.  Call your doctor if you think you are having a problem with your medicine. · During your allergy season, keep windows closed. If you need to use air-conditioning, change or clean all filters every month. Take a shower and change your clothes after you have been outside. · Stay inside when pollen counts are high. Vacuum once or twice a week. Use a vacuum  with a HEPA filter or a double-thickness filter. When should you call for help? Give an epinephrine shot if:    · You think you are having a severe allergic reaction. After giving an epinephrine shot, call 911, even if you feel better. Call 911 if:    · You have symptoms of a severe allergic reaction. These may include:  ? Sudden raised, red areas (hives) all over your body. ? Swelling of the throat, mouth, lips, or tongue. ? Trouble breathing. ? Passing out (losing consciousness). Or you may feel very lightheaded or suddenly feel weak, confused, or restless.     · You have been given an epinephrine shot, even if you feel better. Call your doctor now or seek immediate medical care if:    · You have symptoms of an allergic reaction, such as:  ? A rash or hives (raised, red areas on the skin). ? Itching. ? Swelling. ? Belly pain, nausea, or vomiting. Watch closely for changes in your health, and be sure to contact your doctor if:    · You do not get better as expected. Where can you learn more? Go to [email or web address]://[email or web address].[email or web address] org and sign in to your Mag[employer] ComputacÃƒÂ­on account. Enter J912 in the [facility] box to learn more about \"Seasonal Allergies: Care Instructions. \"     If you do not have an account, please click on the \"Sign Up Now\" link. Current as of: June 29, 2020               Content Version: 12.6  © 1809-0399 [employer]. Care instructions adapted under license by [facility] ([facility]).  If you have questions about a medical condition or this instruction, always ask your healthcare professional. Cecile Gibson any warranty or liability for your use of this information. Patient Education        Using a Nasal Steroid Spray: Care Instructions  Your Care Instructions     Your doctor may suggest using a corticosteroid nasal spray for your allergy symptoms or sinus problems. These sprays reduce the swelling inside the nose and sinuses. Unlike decongestant nasal sprays, steroid sprays won't lead to more swelling when you stop taking them. These sprays start working in a few days, but it may take several weeks before you get the full effect. Most side effects are minor. The most common complaint is a burning feeling in the nose right after the spray is used. Some people get nosebleeds. Follow-up care is a key part of your treatment and safety. Be sure to make and go to all appointments, and call your doctor if you are having problems. It's also a good idea to know your test results and keep a list of the medicines you take. How can you care for yourself at home? Here are some tips for using these sprays:  · You may need to prime the sprayer before you use it. This means spraying it into the air a few times to make sure you get the right amount of medicine. Follow the directions on the label. · Blow your nose before you spray. This will help clear out your nostrils. · Gently sniff the medicine into your nose as you spray. Don't snort, or the medicine will go all the way into your throat where it won't do much good. · Aim the nozzle straight toward the outer wall of your nostril. This will help keep the medicine from irritating the inner walls of your nose, especially your septum (the wall that separates your left and right nostrils). · Don't blow your nose for 10 minutes or so after you spray. And try not to sneeze. · Be safe with medicines. Use this medicine exactly as prescribed. Call your doctor if you think you are having a problem with your medicine. · Clean your sprayer once a week. Read the label to learn how.   When should you call for help?  Watch closely for changes in your health, and be sure to contact your doctor if you have any problems. Where can you learn more? Go to [email or web address]://[email or web address].[email or web address] org and sign in to your ELVPHD account. Enter L692 in the [employer] box to learn more about \"Using a Nasal Steroid Spray: Care Instructions. \"     If you do not have an account, please click on the \"Sign Up Now\" link. Current as of: April 15, 2020               Content Version: 12.6  © 1183-2565 Nomorerack.[employer], Incorporated. Care instructions adapted under license by [facility] ([facility]). If you have questions about a medical condition or this instruction, always ask your healthcare professional. [address] any warranty or liability for your use of this information.

## 2020-11-02 RX ORDER — NYSTATIN 100000 U/G
CREAM TOPICAL
Qty: 30 G | Refills: 2 | Status: SHIPPED | OUTPATIENT
Start: 2020-11-02

## 2020-11-02 NOTE — TELEPHONE ENCOUNTER
Vidhya Nelson called requesting a refill of the below medication which has been pended for you:     Requested Prescriptions     Pending Prescriptions Disp Refills    nystatin (MYCOSTATIN) 557374 UNIT/GM cream [Pharmacy Med Name: Nystatin 015798 UNIT/GM External Cream] 30 g 2     Sig: APPLY  [address]       Last Appointment Date: 8/25/2020  Next Appointment Date: 11/23/2020    No Known Allergies

## 2020-11-06 PROCEDURE — 87635 SARS-COV-2 COVID-19 AMP PRB: CPT | Performed by: NURSE PRACTITIONER

## 2020-11-10 ENCOUNTER — VIRTUAL VISIT (OUTPATIENT)
Dept: INTERNAL MEDICINE | Age: 80
End: 2020-11-10
Payer: MEDICARE

## 2020-11-10 ENCOUNTER — TELEPHONE (OUTPATIENT)
Dept: INTERNAL MEDICINE | Age: 80
End: 2020-11-10

## 2020-11-10 PROCEDURE — 99442 PR PHYS/QHP TELEPHONE EVALUATION 11-20 MIN: CPT | Performed by: NURSE PRACTITIONER

## 2020-11-10 RX ORDER — ONDANSETRON 4 MG/1
4 TABLET, FILM COATED ORAL DAILY PRN
Qty: 30 TABLET | Refills: 0 | Status: SHIPPED | OUTPATIENT
Start: 2020-11-10 | End: 2022-03-22

## 2020-11-10 RX ORDER — METHYLPREDNISOLONE 4 MG/1
TABLET ORAL
Qty: 1 KIT | Refills: 0 | Status: SHIPPED | OUTPATIENT
Start: 2020-11-10 | End: 2020-11-16

## 2020-11-10 ASSESSMENT — ENCOUNTER SYMPTOMS
CHOKING: 0
SHORTNESS OF BREATH: 0
EYE DISCHARGE: 0
EYE ITCHING: 0
WHEEZING: 0
TROUBLE SWALLOWING: 0
ABDOMINAL PAIN: 0
DIARRHEA: 0
COUGH: 0
ABDOMINAL DISTENTION: 0
VOMITING: 0
CONSTIPATION: 0
STRIDOR: 0
COLOR CHANGE: 0
NAUSEA: 0
SORE THROAT: 0
BLOOD IN STOOL: 0

## 2020-11-10 NOTE — PROGRESS NOTES
[address] [facility]  [address] [address]  [address] [place] [address]  Dept: [phone]  Dept Fax: [phone]: [phone]    Dasiy Carpenter is a 70 y.o. male who were are performing tele health communication  for his medical conditions/complaints as noted below. Currently, our state is under umbrella of national state of emergency and we are using alternative methods of taking care of the needs of our patients so they are not exposed in our office; The patient has consented to this form of communication  Daisy Carpenter is c/loly   Cough    THE patient is at home  Judie Cortes is at office   Others in attendance his wife     HPI:     HPI   3.  covid diagnosis was 4 days ago; He was feeing poorly for several days;  ;  Today he called to report that he was just feeling terrible and is a little nauseated. He has no fever he has no cough. He was wanting medicine to make him feel better his wife really wants to have a steroid.     Chief Complaint   Patient presents with    Cough       Past Medical History:   Diagnosis Date    BPH with obstruction/lower urinary tract symptoms     Gastroesophageal reflux disease without esophagitis 5/6/2019    Hyperglycemia     Hyperlipidemia     Hypertension     Testicular hypofunction     Type 2 diabetes mellitus without complication (HCC)       Past Surgical History:   Procedure Laterality Date    ANKLE SURGERY      HERNIA REPAIR      TONSILLECTOMY         Vitals 11/1/2020 8/25/2020 8/17/2020 8/17/2020 8/15/2020 5/87/6434   SYSTOLIC 274 218 549 055 476 -   DIASTOLIC 72 73 68 68 79 -   Site - - - - - -   Position - - - - - -   Pulse 88 60 - 60 59 -   Temp 98.3 - - - - -   Resp 18 - - - - -   SpO2 94 - - - - -   Weight 206 lb - - 209 lb - -   Height - - - 6' 0\" - -   Body mass index - - - 28.34 kg/m2 - -   Pain Level - - - - - 3   Some recent data might be hidden       Family History   Problem Relation Age of Onset     Lipid screen  05/08/2021    Potassium monitoring  08/05/2021    Creatinine monitoring  08/05/2021    Annual Wellness Visit (AWV)  08/18/2021    Colon cancer screen colonoscopy  02/21/2025    DTaP/Tdap/Td vaccine (2 - Td) 08/15/2030    Pneumococcal 65+ years Vaccine  Completed    Hepatitis A vaccine  Aged Out    Hib vaccine  Aged Out    Meningococcal (ACWY) vaccine  Aged Out       Lab Results   Component Value Date    LABA1C 5.5 08/05/2020     Lab Results   Component Value Date    PSA 0.47 05/08/2020    PSA 0.41 05/02/2019    PSA 0.45 06/25/2018     TSH   Date Value Ref Range Status   08/05/2020 2.740 0.270 - 4.200 uIU/mL Final   ]  Lab Results   Component Value Date     08/05/2020    K 4.5 08/05/2020     08/05/2020    CO2 27 08/05/2020    BUN 28 (H) 08/05/2020    CREATININE 0.8 08/05/2020    GLUCOSE 135 (H) 08/05/2020    CALCIUM 9.6 08/05/2020    PROT 6.7 08/05/2020    LABALBU 4.4 08/05/2020    BILITOT 0.5 08/05/2020    ALKPHOS 68 08/05/2020    AST 27 08/05/2020    ALT 48 (H) 08/05/2020    LABGLOM >60 08/05/2020    GFRAA >59 08/05/2020     Lab Results   Component Value Date    CHOL 151 (L) 05/08/2020    CHOL 151 (L) 02/07/2020    CHOL 145 (L) 08/01/2019     Lab Results   Component Value Date    TRIG 103 05/08/2020    TRIG 81 02/07/2020    TRIG 69 08/01/2019     Lab Results   Component Value Date    HDL 44 (L) 05/08/2020    HDL 49 (L) 02/07/2020    HDL 44 (L) 08/01/2019     Lab Results   Component Value Date    LDLCALC 86 05/08/2020    LDLCALC 86 02/07/2020    LDLCALC 87 08/01/2019     Lab Results   Component Value Date     08/05/2020    K 4.5 08/05/2020     08/05/2020    CO2 27 08/05/2020    BUN 28 08/05/2020    CREATININE 0.8 08/05/2020    GLUCOSE 135 08/05/2020    CALCIUM 9.6 08/05/2020      Lab Results   Component Value Date    WBC 6.1 08/05/2020    HGB 14.3 08/05/2020    HCT 42.8 08/05/2020    MCV 89.2 08/05/2020     08/05/2020    LABLYMP 1.36 04/07/2015    LYMPHOPCT 29.8 05/08/2020    RBC 4.80 08/05/2020    MCH 29.8 08/05/2020    MCHC 33.4 08/05/2020    RDW 12.8 08/05/2020     Lab Results   Component Value Date    VITD25 50.3 08/05/2020       Subjective:      Review of Systems   Constitutional: Positive for fatigue. Negative for fever and unexpected weight change. HENT: Negative for ear discharge, ear pain, mouth sores, sore throat and trouble swallowing. Eyes: Negative for discharge, itching and visual disturbance. Respiratory: Negative for cough, choking, shortness of breath, wheezing and stridor. Cardiovascular: Negative for chest pain, palpitations and leg swelling. Gastrointestinal: Negative for abdominal distention, abdominal pain, blood in stool, constipation, diarrhea, nausea and vomiting. Endocrine: Negative for cold intolerance, polydipsia and polyuria. Genitourinary: Negative for difficulty urinating, dysuria, frequency and urgency. Musculoskeletal: Negative for arthralgias and gait problem. Skin: Negative for color change and rash. Allergic/Immunologic: Negative for food allergies and immunocompromised state. Neurological: Negative for dizziness, tremors, syncope, speech difficulty, weakness and headaches. Hematological: Negative for adenopathy. Does not bruise/bleed easily. Psychiatric/Behavioral: Negative for confusion and hallucinations. Objective:     Physical Exam   DGEVISITPE      GENERAL:   Afebrile alert no acute distress  Respiratory no use of accessory muscles no acute distress no audible wheezing  Mental status alert oriented adequate thought processes        Vital signs were not taken at this visit as no equipment was available; There were no vitals taken for this visit. Assessment:       Diagnosis Orders   1. COVID-19         Plan:        Patient given educational materials - see patient instructions. Discussed use, benefit, and side effects of prescribed medications. Allpatient questions answered.   Pt voiced understanding. Reviewed health maintenance. Instructed to continue current medications, diet and exercise. Patient agreed with treatment plan. Follow up as directed. MEDICATIONS:  Orders Placed This Encounter   Medications    ondansetron (ZOFRAN) 4 MG tablet     Sig: Take 1 tablet by mouth daily as needed for Nausea or Vomiting     Dispense:  30 tablet     Refill:  0    methylPREDNISolone (MEDROL DOSEPACK) 4 MG tablet     Sig: Take by mouth. Dispense:  1 kit     Refill:  0         ORDERS:  No orders of the defined types were placed in this encounter. Follow-up:  Return for keep fu appt. Following the remote visit today we will call you when we are available to reschedule your follow up appt      PATIENT INSTRUCTIONS:  Patient Instructions   1. Covid; Wife wanted steroid roger;    zofran for nausea; Electronically signed by MARISELA Pike on 11/10/2020 at 8:54 AM   televisit 15 minutes  We are communicating with telehealth for the 3 month fu for controlled substance      Pursuant to the emergency declaration under the [address] [address] waiver authority and the [employer] Act, this Virtual Visit was conducted, with patient's consent, to reduce the patient's risk of exposure to COVID-19 and provide continuity of care for an established patient.        EMRDragon/transcription disclaimer:  Much of this encounter note is electronic

## 2020-11-19 DIAGNOSIS — E78.2 MIXED HYPERLIPIDEMIA: ICD-10-CM

## 2020-11-19 DIAGNOSIS — E55.9 VITAMIN D DEFICIENCY: ICD-10-CM

## 2020-11-19 DIAGNOSIS — I10 ESSENTIAL HYPERTENSION: ICD-10-CM

## 2020-11-19 DIAGNOSIS — E11.9 TYPE 2 DIABETES MELLITUS WITHOUT COMPLICATION, WITHOUT LONG-TERM CURRENT USE OF INSULIN (HCC): ICD-10-CM

## 2020-11-19 LAB
ALBUMIN SERPL-MCNC: 4.1 G/DL (ref 3.5–5.2)
ALP BLD-CCNC: 76 U/L (ref 40–130)
ALT SERPL-CCNC: 40 U/L (ref 5–41)
ANION GAP SERPL CALCULATED.3IONS-SCNC: 11 MMOL/L (ref 7–19)
AST SERPL-CCNC: 20 U/L (ref 5–40)
BASOPHILS ABSOLUTE: 0 K/UL (ref 0–0.2)
BASOPHILS RELATIVE PERCENT: 0.3 % (ref 0–1)
BILIRUB SERPL-MCNC: 0.6 MG/DL (ref 0.2–1.2)
BILIRUBIN URINE: NEGATIVE
BLOOD, URINE: NEGATIVE
BUN BLDV-MCNC: 34 MG/DL (ref 8–23)
CALCIUM SERPL-MCNC: 9.1 MG/DL (ref 8.8–10.2)
CHLORIDE BLD-SCNC: 103 MMOL/L (ref 98–111)
CHOLESTEROL, TOTAL: 186 MG/DL (ref 160–199)
CLARITY: CLEAR
CO2: 26 MMOL/L (ref 22–29)
COLOR: YELLOW
CREAT SERPL-MCNC: 0.9 MG/DL (ref 0.5–1.2)
EOSINOPHILS ABSOLUTE: 0.2 K/UL (ref 0–0.6)
EOSINOPHILS RELATIVE PERCENT: 2.4 % (ref 0–5)
GFR AFRICAN AMERICAN: >59
GFR NON-AFRICAN AMERICAN: >60
GLUCOSE BLD-MCNC: 108 MG/DL (ref 74–109)
GLUCOSE URINE: NEGATIVE MG/DL
HBA1C MFR BLD: 5.7 % (ref 4–6)
HCT VFR BLD CALC: 41.4 % (ref 42–52)
HDLC SERPL-MCNC: 34 MG/DL (ref 55–121)
HEMOGLOBIN: 13.7 G/DL (ref 14–18)
IMMATURE GRANULOCYTES #: 0 K/UL
KETONES, URINE: ABNORMAL MG/DL
LDL CHOLESTEROL CALCULATED: 114 MG/DL
LEUKOCYTE ESTERASE, URINE: NEGATIVE
LYMPHOCYTES ABSOLUTE: 1.1 K/UL (ref 1.1–4.5)
LYMPHOCYTES RELATIVE PERCENT: 16.9 % (ref 20–40)
MCH RBC QN AUTO: 29.4 PG (ref 27–31)
MCHC RBC AUTO-ENTMCNC: 33.1 G/DL (ref 33–37)
MCV RBC AUTO: 88.8 FL (ref 80–94)
MONOCYTES ABSOLUTE: 0.5 K/UL (ref 0–0.9)
MONOCYTES RELATIVE PERCENT: 7.9 % (ref 0–10)
NEUTROPHILS ABSOLUTE: 4.5 K/UL (ref 1.5–7.5)
NEUTROPHILS RELATIVE PERCENT: 71.9 % (ref 50–65)
NITRITE, URINE: NEGATIVE
PDW BLD-RTO: 12.3 % (ref 11.5–14.5)
PH UA: 5 (ref 5–8)
PLATELET # BLD: 184 K/UL (ref 130–400)
PMV BLD AUTO: 9.4 FL (ref 9.4–12.4)
POTASSIUM SERPL-SCNC: 4.3 MMOL/L (ref 3.5–5)
PROTEIN UA: ABNORMAL MG/DL
RBC # BLD: 4.66 M/UL (ref 4.7–6.1)
SODIUM BLD-SCNC: 140 MMOL/L (ref 136–145)
SPECIFIC GRAVITY UA: 1.03 (ref 1–1.03)
TOTAL PROTEIN: 6.9 G/DL (ref 6.6–8.7)
TRIGL SERPL-MCNC: 188 MG/DL (ref 0–149)
TSH SERPL DL<=0.05 MIU/L-ACNC: 2 UIU/ML (ref 0.27–4.2)
UROBILINOGEN, URINE: 0.2 E.U./DL
VITAMIN D 25-HYDROXY: 41.3 NG/ML
WBC # BLD: 6.3 K/UL (ref 4.8–10.8)

## 2020-11-23 ENCOUNTER — HOSPITAL ENCOUNTER (OUTPATIENT)
Dept: GENERAL RADIOLOGY | Age: 80
Discharge: HOME | End: 2020-11-23
Payer: MEDICARE

## 2020-11-23 ENCOUNTER — OFFICE VISIT (OUTPATIENT)
Dept: INTERNAL MEDICINE | Age: 80
End: 2020-11-23
Payer: MEDICARE

## 2020-11-23 VITALS
SYSTOLIC BLOOD PRESSURE: 138 MMHG | HEART RATE: 58 BPM | HEIGHT: 72 IN | DIASTOLIC BLOOD PRESSURE: 76 MMHG | WEIGHT: 197 LBS | BODY MASS INDEX: 26.68 KG/M2

## 2020-11-23 PROCEDURE — G0008 ADMIN INFLUENZA VIRUS VAC: HCPCS | Performed by: NURSE PRACTITIONER

## 2020-11-23 PROCEDURE — 1123F ACP DISCUSS/DSCN MKR DOCD: CPT | Performed by: NURSE PRACTITIONER

## 2020-11-23 PROCEDURE — G8417 CALC BMI ABV UP PARAM F/U: HCPCS | Performed by: NURSE PRACTITIONER

## 2020-11-23 PROCEDURE — G8427 DOCREV CUR MEDS BY ELIG CLIN: HCPCS | Performed by: NURSE PRACTITIONER

## 2020-11-23 PROCEDURE — 90694 VACC AIIV4 NO PRSRV 0.5ML IM: CPT | Performed by: NURSE PRACTITIONER

## 2020-11-23 PROCEDURE — 71046 X-RAY EXAM CHEST 2 VIEWS: CPT

## 2020-11-23 PROCEDURE — 99214 OFFICE O/P EST MOD 30 MIN: CPT | Performed by: NURSE PRACTITIONER

## 2020-11-23 PROCEDURE — 4040F PNEUMOC VAC/ADMIN/RCVD: CPT | Performed by: NURSE PRACTITIONER

## 2020-11-23 PROCEDURE — G8484 FLU IMMUNIZE NO ADMIN: HCPCS | Performed by: NURSE PRACTITIONER

## 2020-11-23 PROCEDURE — 1036F TOBACCO NON-USER: CPT | Performed by: NURSE PRACTITIONER

## 2020-11-23 RX ORDER — TEMAZEPAM 15 MG/1
30 CAPSULE ORAL NIGHTLY PRN
Qty: 60 CAPSULE | Refills: 0 | Status: SHIPPED | OUTPATIENT
Start: 2020-11-23 | End: 2020-12-29

## 2020-11-23 ASSESSMENT — ENCOUNTER SYMPTOMS
COLOR CHANGE: 0
DIARRHEA: 0
TROUBLE SWALLOWING: 0
EYE ITCHING: 0
COUGH: 1
NAUSEA: 0
ABDOMINAL PAIN: 0
CHOKING: 0
CONSTIPATION: 0
WHEEZING: 0
VOMITING: 0
STRIDOR: 0
SHORTNESS OF BREATH: 0
BLOOD IN STOOL: 0
EYE DISCHARGE: 0
ABDOMINAL DISTENTION: 0
SORE THROAT: 0

## 2020-11-23 ASSESSMENT — PATIENT HEALTH QUESTIONNAIRE - PHQ9
SUM OF ALL RESPONSES TO PHQ QUESTIONS 1-9: 0
SUM OF ALL RESPONSES TO PHQ QUESTIONS 1-9: 0
SUM OF ALL RESPONSES TO PHQ9 QUESTIONS 1 & 2: 0
SUM OF ALL RESPONSES TO PHQ QUESTIONS 1-9: 0
1. LITTLE INTEREST OR PLEASURE IN DOING THINGS: 0
2. FEELING DOWN, DEPRESSED OR HOPELESS: 0

## 2020-11-23 NOTE — PATIENT INSTRUCTIONS
1.  Hypertension stable on current meds no changes are necessary  2. Recent COVID-19 we will get a chest x-ray today to ensure resolution  3. GERD stable no changes are necessary next   #4 type 2 diabetes mellitus very good control with A1c of 5.7  5. Elevated triglycerides. Try to add more carbs and eat a few less proteins in your diet and see if this will help your triglycerides. Today they are 188 usually around 100 so as long as well 150 will be good  6. Insomnia stable on current dose of Restoril no changes are necessary  7.   Vit d def;  Stable on current meds;

## 2020-11-23 NOTE — PROGRESS NOTES
[address] [facility]  [address] [place] [address]  Dept: [phone]  Dept Fax: [phone]  Loc: [phone]    Denis Gonzales is a 70 y.o. male who presents today for his medical conditions/complaints as noted below. Denis Gonzales is c/loly Hypertension (Patient is here for routine follow up visit and review of labs done 11/19/2020.)        HPI:     HPI   1. Hypertension;  Good control  With verapamil and cozaar;   2. COVID 19; He has some hypoxia; He had some fatigue; But he got over all the symptoms quickly and feels gine; He really had no coughing;    3. GERD:  Stable on protonix    4. TYpe II DM;  Very compliant and good with a1c is 5.7   5. Elevated trigs;  Usually around 100 and now 188   Eats tons of proteins for DM;    6. Insomnia; He is stable on restoril ; takes as directed; Controlled Substance Monitoring:    Acute and Chronic Pain Monitoring:   RX Monitoring 11/23/2020   Attestation The Prescription Monitoring Report for this patient was reviewed today. Periodic Controlled Substance Monitoring Possible medication side effects, risk of tolerance/dependence & alternative treatments discussed. ;No signs of potential drug abuse or diversion identified. ;Random urine drug screen sent today. Chief Complaint   Patient presents with    Hypertension     Patient is here for routine follow up visit and review of labs done 11/19/2020.        Past Medical History:   Diagnosis Date    BPH with obstruction/lower urinary tract symptoms     Gastroesophageal reflux disease without esophagitis 5/6/2019    Hyperglycemia     Hyperlipidemia     Hypertension     Testicular hypofunction     Type 2 diabetes mellitus without complication [facility])       Past Surgical History:   Procedure Laterality Date    ANKLE SURGERY      HERNIA REPAIR      TONSILLECTOMY         Vitals 11/23/2020 11/23/2020 11/1/2020 8/25/2020 8/17/2020 8/17/2020 SYSTOLIC [phone]   DIASTOLIC 76 74 72 73 68 68   Site - - - - - -   Position - - - - - -   Pulse - 58 88 60 - 60   Temp - - 98.3 - - -   Resp - - 18 - - -   SpO2 - - 94 - - -   Weight - 197 lb 206 lb - - 209 lb   Height - 6' 0\" - - - 6' 0\"   Body mass index - 26.72 kg/m2 - - - 28.34 kg/m2   Pain Level - - - - - -   Some recent data might be hidden       Family History   Problem Relation Age of Onset    No Known Problems Mother     Heart Attack Father        Social History     Tobacco Use    Smoking status: Never Smoker    Smokeless tobacco: Never Used   Substance Use Topics    Alcohol use: No      Current Outpatient Medications   Medication Sig Dispense Refill    temazepam (RESTORIL) 15 MG capsule Take 2 capsules by mouth nightly as needed for Sleep for up to 30 days.  60 capsule 0    ondansetron (ZOFRAN) 4 MG tablet Take 1 tablet by mouth daily as needed for Nausea or Vomiting 30 tablet 0    nystatin (MYCOSTATIN) 346479 UNIT/GM cream APPLY  CREAM TOPICALLY TWICE DAILY 30 g 2    atorvastatin (LIPITOR) 20 MG tablet TAKE 1 TABLET AT BEDTIME 90 tablet 3    losartan (COZAAR) 100 MG tablet TAKE 1 TABLET EVERY DAY 90 tablet 3    verapamil (CALAN SR) 240 MG extended release tablet TAKE 1 TABLET TWICE DAILY 180 tablet 3    potassium chloride (KLOR-CON M) 10 MEQ extended release tablet TAKE 2 TABLETS  2 TIMES DAILY 360 tablet 3    spironolactone (ALDACTONE) 25 MG tablet TAKE 1 TABLET EVERY DAY 90 tablet 3    hydroCHLOROthiazide (HYDRODIURIL) 25 MG tablet TAKE 1 TABLET EVERY DAY 90 tablet 3    glipiZIDE (GLUCOTROL XL) 10 MG extended release tablet TAKE 1 TABLET EVERY MORNING 90 tablet 3    fluticasone (FLONASE) 50 MCG/ACT nasal spray USE 1 SPRAY IN EACH NOSTRIL TWICE DAILY 48 g 3    pantoprazole (PROTONIX) 40 MG tablet TAKE 1 TABLET EVERY DAY 90 tablet 3    aspirin 81 MG tablet Take 81 mg by mouth daily      Omega-3 Fatty Acids (FISH OIL) 1000 MG CAPS Take 1,200 mg by mouth 2 times daily No current facility-administered medications for this visit.       No Known Allergies    Health Maintenance   Topic Date Due    Flu vaccine (1) 09/01/2020    Shingles Vaccine (1 of 2) 05/11/2021 (Originally 12/16/1990)    Annual Wellness Visit (AWV)  08/18/2021    Lipid screen  11/19/2021    Potassium monitoring  11/19/2021    Creatinine monitoring  11/19/2021    Colon cancer screen colonoscopy  02/21/2025    DTaP/Tdap/Td vaccine (2 - Td) 08/15/2030    Pneumococcal 65+ years Vaccine  Completed    Hepatitis A vaccine  Aged Out    Hib vaccine  Aged Out    Meningococcal (ACWY) vaccine  Aged Out       Lab Results   Component Value Date    LABA1C 5.7 11/19/2020     Lab Results   Component Value Date    PSA 0.47 05/08/2020    PSA 0.41 05/02/2019    PSA 0.45 06/25/2018     TSH   Date Value Ref Range Status   11/19/2020 2.000 0.270 - 4.200 uIU/mL Final   ]  Lab Results   Component Value Date     11/19/2020    K 4.3 11/19/2020     11/19/2020    CO2 26 11/19/2020    BUN 34 (H) 11/19/2020    CREATININE 0.9 11/19/2020    GLUCOSE 108 11/19/2020    CALCIUM 9.1 11/19/2020    PROT 6.9 11/19/2020    LABALBU 4.1 11/19/2020    BILITOT 0.6 11/19/2020    ALKPHOS 76 11/19/2020    AST 20 11/19/2020    ALT 40 11/19/2020    LABGLOM >60 11/19/2020    GFRAA >59 11/19/2020     Lab Results   Component Value Date    CHOL 186 11/19/2020    CHOL 151 (L) 05/08/2020    CHOL 151 (L) 02/07/2020     Lab Results   Component Value Date    TRIG 188 (H) 11/19/2020    TRIG 103 05/08/2020    TRIG 81 02/07/2020     Lab Results   Component Value Date    HDL 34 (L) 11/19/2020    HDL 44 (L) 05/08/2020    HDL 49 (L) 02/07/2020     Lab Results   Component Value Date    LDLCALC 114 11/19/2020    LDLCALC 86 05/08/2020    LDLCALC 86 02/07/2020     Lab Results   Component Value Date     11/19/2020    K 4.3 11/19/2020     11/19/2020    CO2 26 11/19/2020    BUN 34 11/19/2020    CREATININE 0.9 11/19/2020    GLUCOSE 108 11/19/2020 Musculoskeletal: Normal range of motion and neck supple. Thyroid: No thyromegaly. Vascular: No JVD. Cardiovascular:      Rate and Rhythm: Normal rate and regular rhythm. Heart sounds: Normal heart sounds. No murmur. Pulmonary:      Effort: Pulmonary effort is normal. No respiratory distress. Breath sounds: Normal breath sounds. No wheezing or rales. Abdominal:      General: Bowel sounds are normal. There is no distension. Palpations: Abdomen is soft. There is no mass. Tenderness: There is no abdominal tenderness. There is no guarding or rebound. Musculoskeletal: Normal range of motion. General: No tenderness. Skin:     General: Skin is warm and dry. Findings: No erythema or rash. Neurological:      Mental Status: He is alert and oriented to person, place, and time. Cranial Nerves: No cranial nerve deficit. Coordination: Coordination normal.      Deep Tendon Reflexes: Reflexes are normal and symmetric. Reflexes normal.   Psychiatric:         Mood and Affect: Mood is not depressed. Behavior: Behavior normal.         Thought Content: Thought content normal.         Judgment: Judgment normal.       /76   Pulse 58   Ht 6' (1.829 m)   Wt 197 lb (89.4 kg)   BMI 26.72 kg/m²     Assessment:       Diagnosis Orders   1. Need for influenza vaccination  INFLUENZA, QUADV, ADJUVANTED, 65 YRS =, IM, PF, PREFILL SYR, 0.5ML (FLUAD)   2. Primary insomnia  temazepam (RESTORIL) 15 MG capsule   3. Cough with exposure to COVID-19 virus  XR CHEST STANDARD (2 VW)   4. Essential hypertension  CBC Auto Differential    Comprehensive Metabolic Panel    Urinalysis Reflex to Culture    TSH without Reflex   5. Gastroesophageal reflux disease without esophagitis     6. Type 2 diabetes mellitus without complication, without long-term current use of insulin ([facility])  Hemoglobin A1C   7. Mixed hyperlipidemia  Lipid Panel   8.  Vitamin D deficiency  Vitamin D 25 Hydroxy Labs reviewedfrom 11/19/2020  Diagnostics reviewed from today  CXR    Plan:        Patient given educational materials - see patient instructions. Discussed use, benefit, and side effects of prescribed medications. Allpatient questions answered. Pt voiced understanding. Reviewed health maintenance. Instructed to continue current medications, diet and exercise. Patient agreed with treatment plan. Follow up as directed. MEDICATIONS:  Orders Placed This Encounter   Medications    temazepam (RESTORIL) 15 MG capsule     Sig: Take 2 capsules by mouth nightly as needed for Sleep for up to 30 days. Dispense:  60 capsule     Refill:  0         ORDERS:  Orders Placed This Encounter   Procedures    XR CHEST STANDARD (2 VW)    INFLUENZA, QUADV, ADJUVANTED, 65 YRS =, IM, PF, PREFILL SYR, 0.5ML (FLUAD)    CBC Auto Differential    Comprehensive Metabolic Panel    Hemoglobin A1C    Lipid Panel    Vitamin D 25 Hydroxy    Urinalysis Reflex to Culture    TSH without Reflex       Follow-up:  Return in about 3 months (around 2/23/2021) for have labs done prior to appt. PATIENT INSTRUCTIONS:  Patient Instructions   1. Hypertension stable on current meds no changes are necessary  2. Recent COVID-19 we will get a chest x-ray today to ensure resolution  3. GERD stable no changes are necessary next   #4 type 2 diabetes mellitus very good control with A1c of 5.7  5. Elevated triglycerides. Try to add more carbs and eat a few less proteins in your diet and see if this will help your triglycerides. Today they are 188 usually around 100 so as long as well 150 will be good  6. Insomnia stable on current dose of Restoril no changes are necessary  7. Vit d def;  Stable on current meds; Electronically signed by MARISELA Bullock on 11/23/2020 at 9:25 AM    EMRDragon/transcription disclaimer:  Much of this encounter note is electronic transcription/translation of spoken language to printed texts.   The electronic translation of spoken language may be erroneous, or at times,nonsensical words or phrases may be inadvertently transcribed.   Although I have reviewed the note for such errors, some may still exist.

## 2020-11-24 ENCOUNTER — TELEPHONE (OUTPATIENT)
Dept: INTERNAL MEDICINE | Age: 80
End: 2020-11-24

## 2020-11-24 NOTE — TELEPHONE ENCOUNTER
Patient is wanting to know if sugar free gatorrade would raise his triglycerides? He stated he has been drinking more of it since he had covid, 6 weeks ago.

## 2020-12-29 RX ORDER — TEMAZEPAM 15 MG/1
CAPSULE ORAL
Qty: 60 CAPSULE | Refills: 0 | Status: SHIPPED | OUTPATIENT
Start: 2020-12-29 | End: 2021-02-11

## 2021-02-01 ENCOUNTER — TELEPHONE (OUTPATIENT)
Dept: GASTROENTEROLOGY | Facility: CLINIC | Age: 81
End: 2021-02-01

## 2021-02-01 NOTE — TELEPHONE ENCOUNTER
Patient called in to schedule his 1 year colon for piecemeal polypectomy. He has had no major changes since last seen. Is it ok to schedule?

## 2021-02-09 ENCOUNTER — PREP FOR SURGERY (OUTPATIENT)
Dept: GASTROENTEROLOGY | Facility: CLINIC | Age: 81
End: 2021-02-09

## 2021-02-09 DIAGNOSIS — Z86.010 HX OF ADENOMATOUS COLONIC POLYPS: Primary | ICD-10-CM

## 2021-02-10 DIAGNOSIS — F51.01 PRIMARY INSOMNIA: ICD-10-CM

## 2021-02-11 RX ORDER — SODIUM, POTASSIUM,MAG SULFATES 17.5-3.13G
SOLUTION, RECONSTITUTED, ORAL ORAL
Qty: 177 ML | Refills: 0 | Status: ON HOLD | OUTPATIENT
Start: 2021-02-11 | End: 2021-03-04

## 2021-02-11 RX ORDER — TEMAZEPAM 15 MG/1
CAPSULE ORAL
Qty: 60 CAPSULE | Refills: 0 | Status: SHIPPED | OUTPATIENT
Start: 2021-02-11 | End: 2021-03-22

## 2021-02-16 ENCOUNTER — APPOINTMENT (OUTPATIENT)
Dept: VACCINE CLINIC | Facility: HOSPITAL | Age: 81
End: 2021-02-16

## 2021-02-18 DIAGNOSIS — E11.9 TYPE 2 DIABETES MELLITUS WITHOUT COMPLICATION, WITHOUT LONG-TERM CURRENT USE OF INSULIN (HCC): ICD-10-CM

## 2021-02-18 DIAGNOSIS — E78.2 MIXED HYPERLIPIDEMIA: ICD-10-CM

## 2021-02-18 DIAGNOSIS — I10 ESSENTIAL HYPERTENSION: ICD-10-CM

## 2021-02-18 DIAGNOSIS — E55.9 VITAMIN D DEFICIENCY: ICD-10-CM

## 2021-02-18 LAB
ALBUMIN SERPL-MCNC: 4.6 G/DL (ref 3.5–5.2)
ALP BLD-CCNC: 72 U/L (ref 40–130)
ALT SERPL-CCNC: 31 U/L (ref 5–41)
ANION GAP SERPL CALCULATED.3IONS-SCNC: 12 MMOL/L (ref 7–19)
AST SERPL-CCNC: 23 U/L (ref 5–40)
BASOPHILS ABSOLUTE: 0.1 K/UL (ref 0–0.2)
BASOPHILS RELATIVE PERCENT: 0.9 % (ref 0–1)
BILIRUB SERPL-MCNC: 0.7 MG/DL (ref 0.2–1.2)
BILIRUBIN URINE: NEGATIVE
BLOOD, URINE: NEGATIVE
BUN BLDV-MCNC: 28 MG/DL (ref 8–23)
CALCIUM SERPL-MCNC: 9.4 MG/DL (ref 8.8–10.2)
CHLORIDE BLD-SCNC: 103 MMOL/L (ref 98–111)
CHOLESTEROL, TOTAL: 149 MG/DL (ref 160–199)
CLARITY: CLEAR
CO2: 27 MMOL/L (ref 22–29)
COLOR: YELLOW
CREAT SERPL-MCNC: 0.8 MG/DL (ref 0.5–1.2)
EOSINOPHILS ABSOLUTE: 0.2 K/UL (ref 0–0.6)
EOSINOPHILS RELATIVE PERCENT: 3.7 % (ref 0–5)
GFR AFRICAN AMERICAN: >59
GFR NON-AFRICAN AMERICAN: >60
GLUCOSE BLD-MCNC: 138 MG/DL (ref 74–109)
GLUCOSE URINE: NEGATIVE MG/DL
HBA1C MFR BLD: 5.3 % (ref 4–6)
HCT VFR BLD CALC: 46.2 % (ref 42–52)
HDLC SERPL-MCNC: 40 MG/DL (ref 55–121)
HEMOGLOBIN: 15.2 G/DL (ref 14–18)
IMMATURE GRANULOCYTES #: 0 K/UL
KETONES, URINE: NEGATIVE MG/DL
LDL CHOLESTEROL CALCULATED: 88 MG/DL
LEUKOCYTE ESTERASE, URINE: NEGATIVE
LYMPHOCYTES ABSOLUTE: 1.5 K/UL (ref 1.1–4.5)
LYMPHOCYTES RELATIVE PERCENT: 27.3 % (ref 20–40)
MCH RBC QN AUTO: 29.1 PG (ref 27–31)
MCHC RBC AUTO-ENTMCNC: 32.9 G/DL (ref 33–37)
MCV RBC AUTO: 88.5 FL (ref 80–94)
MONOCYTES ABSOLUTE: 0.4 K/UL (ref 0–0.9)
MONOCYTES RELATIVE PERCENT: 7.1 % (ref 0–10)
NEUTROPHILS ABSOLUTE: 3.2 K/UL (ref 1.5–7.5)
NEUTROPHILS RELATIVE PERCENT: 60.8 % (ref 50–65)
NITRITE, URINE: NEGATIVE
PDW BLD-RTO: 13.1 % (ref 11.5–14.5)
PH UA: 5 (ref 5–8)
PLATELET # BLD: 139 K/UL (ref 130–400)
PMV BLD AUTO: 10.5 FL (ref 9.4–12.4)
POTASSIUM SERPL-SCNC: 4.1 MMOL/L (ref 3.5–5)
PROTEIN UA: NEGATIVE MG/DL
RBC # BLD: 5.22 M/UL (ref 4.7–6.1)
SODIUM BLD-SCNC: 142 MMOL/L (ref 136–145)
SPECIFIC GRAVITY UA: 1.02 (ref 1–1.03)
TOTAL PROTEIN: 7 G/DL (ref 6.6–8.7)
TRIGL SERPL-MCNC: 106 MG/DL (ref 0–149)
TSH SERPL DL<=0.05 MIU/L-ACNC: 2.88 UIU/ML (ref 0.27–4.2)
UROBILINOGEN, URINE: 0.2 E.U./DL
VITAMIN D 25-HYDROXY: 50 NG/ML
WBC # BLD: 5.3 K/UL (ref 4.8–10.8)

## 2021-02-23 ENCOUNTER — IMMUNIZATION (OUTPATIENT)
Dept: VACCINE CLINIC | Facility: HOSPITAL | Age: 81
End: 2021-02-23

## 2021-02-23 ENCOUNTER — OFFICE VISIT (OUTPATIENT)
Dept: INTERNAL MEDICINE | Age: 81
End: 2021-02-23
Payer: MEDICARE

## 2021-02-23 VITALS
HEART RATE: 67 BPM | SYSTOLIC BLOOD PRESSURE: 131 MMHG | WEIGHT: 209 LBS | HEIGHT: 72 IN | BODY MASS INDEX: 28.31 KG/M2 | DIASTOLIC BLOOD PRESSURE: 77 MMHG

## 2021-02-23 DIAGNOSIS — Z12.5 ENCOUNTER FOR PROSTATE CANCER SCREENING: ICD-10-CM

## 2021-02-23 DIAGNOSIS — G47.19 EXCESSIVE DAYTIME SLEEPINESS: ICD-10-CM

## 2021-02-23 DIAGNOSIS — I10 ESSENTIAL HYPERTENSION: Primary | ICD-10-CM

## 2021-02-23 DIAGNOSIS — F51.01 PRIMARY INSOMNIA: ICD-10-CM

## 2021-02-23 DIAGNOSIS — K21.9 GASTROESOPHAGEAL REFLUX DISEASE WITHOUT ESOPHAGITIS: ICD-10-CM

## 2021-02-23 DIAGNOSIS — E87.6 CHRONIC HYPOKALEMIA: ICD-10-CM

## 2021-02-23 DIAGNOSIS — Z00.00 WELL ADULT EXAM: ICD-10-CM

## 2021-02-23 DIAGNOSIS — E55.9 VITAMIN D DEFICIENCY: ICD-10-CM

## 2021-02-23 DIAGNOSIS — E11.9 TYPE 2 DIABETES MELLITUS WITHOUT COMPLICATION, WITHOUT LONG-TERM CURRENT USE OF INSULIN (HCC): ICD-10-CM

## 2021-02-23 DIAGNOSIS — E78.2 MIXED HYPERLIPIDEMIA: ICD-10-CM

## 2021-02-23 PROCEDURE — 1123F ACP DISCUSS/DSCN MKR DOCD: CPT | Performed by: NURSE PRACTITIONER

## 2021-02-23 PROCEDURE — 99214 OFFICE O/P EST MOD 30 MIN: CPT | Performed by: NURSE PRACTITIONER

## 2021-02-23 PROCEDURE — G8484 FLU IMMUNIZE NO ADMIN: HCPCS | Performed by: NURSE PRACTITIONER

## 2021-02-23 PROCEDURE — 91301 HC SARSCO02 VAC 100MCG/0.5ML IM: CPT | Performed by: OBSTETRICS & GYNECOLOGY

## 2021-02-23 PROCEDURE — 4040F PNEUMOC VAC/ADMIN/RCVD: CPT | Performed by: NURSE PRACTITIONER

## 2021-02-23 PROCEDURE — 1036F TOBACCO NON-USER: CPT | Performed by: NURSE PRACTITIONER

## 2021-02-23 PROCEDURE — G8427 DOCREV CUR MEDS BY ELIG CLIN: HCPCS | Performed by: NURSE PRACTITIONER

## 2021-02-23 PROCEDURE — 0011A: CPT | Performed by: OBSTETRICS & GYNECOLOGY

## 2021-02-23 PROCEDURE — G8417 CALC BMI ABV UP PARAM F/U: HCPCS | Performed by: NURSE PRACTITIONER

## 2021-02-23 ASSESSMENT — ENCOUNTER SYMPTOMS
SORE THROAT: 0
STRIDOR: 0
WHEEZING: 0
DIARRHEA: 0
SHORTNESS OF BREATH: 0
ABDOMINAL DISTENTION: 0
NAUSEA: 0
CONSTIPATION: 0
COUGH: 0
EYE ITCHING: 0
TROUBLE SWALLOWING: 0
EYE DISCHARGE: 0
ABDOMINAL PAIN: 0
VOMITING: 0
CHOKING: 0
COLOR CHANGE: 0
BLOOD IN STOOL: 0

## 2021-02-23 ASSESSMENT — PATIENT HEALTH QUESTIONNAIRE - PHQ9: 1. LITTLE INTEREST OR PLEASURE IN DOING THINGS: 0

## 2021-02-23 NOTE — PATIENT INSTRUCTIONS
1.  Hypertension; The current medical regimen is effective;  continue present plan and medications. 2.  Type II DM good today  a1c is 5.3   3. Hyperlipidemia good today   4. Excessive daytime sleepiness;   We will get your sleep study   We will try sleep meds if that does not work

## 2021-02-23 NOTE — PROGRESS NOTES
[facility])       Past Surgical History:   Procedure Laterality Date    ANKLE SURGERY      HERNIA REPAIR      TONSILLECTOMY         Vitals 2/23/2021 11/23/2020 11/23/2020 11/1/2020 8/25/2020 6/45/8610   SYSTOLIC 544 442 005 587 210 207   DIASTOLIC 77 76 74 72 73 68   Pulse 67 - 58 88 60 -   Temp - - - 98.3 - -   Resp - - - 18 - -   SpO2 - - - 94 - -   Weight 209 lb - 197 lb 206 lb - -   Height 6' 0\" - 6' 0\" - - -   Body mass index 28.34 kg/m2 - 26.72 kg/m2 - - -   Pain Level - - - - - -   Some recent data might be hidden       Family History   Problem Relation Age of Onset    No Known Problems Mother     Heart Attack Father        Social History     Tobacco Use    Smoking status: Never Smoker    Smokeless tobacco: Never Used   Substance Use Topics    Alcohol use: No      Current Outpatient Medications   Medication Sig Dispense Refill    temazepam (RESTORIL) 15 MG capsule TAKE 2 CAPSULES BY MOUTH NIGHTLY AS NEEDED FOR SLEEP 60 capsule 0    ondansetron (ZOFRAN) 4 MG tablet Take 1 tablet by mouth daily as needed for Nausea or Vomiting 30 tablet 0    nystatin (MYCOSTATIN) 432952 UNIT/GM cream APPLY  CREAM TOPICALLY TWICE DAILY 30 g 2    atorvastatin (LIPITOR) 20 MG tablet TAKE 1 TABLET AT BEDTIME 90 tablet 3    losartan (COZAAR) 100 MG tablet TAKE 1 TABLET EVERY DAY 90 tablet 3    verapamil (CALAN SR) 240 MG extended release tablet TAKE 1 TABLET TWICE DAILY 180 tablet 3    potassium chloride (KLOR-CON M) 10 MEQ extended release tablet TAKE 2 TABLETS  2 TIMES DAILY 360 tablet 3    spironolactone (ALDACTONE) 25 MG tablet TAKE 1 TABLET EVERY DAY 90 tablet 3    hydroCHLOROthiazide (HYDRODIURIL) 25 MG tablet TAKE 1 TABLET EVERY DAY 90 tablet 3    glipiZIDE (GLUCOTROL XL) 10 MG extended release tablet TAKE 1 TABLET EVERY MORNING 90 tablet 3    fluticasone (FLONASE) 50 MCG/ACT nasal spray USE 1 SPRAY IN EACH NOSTRIL TWICE DAILY 48 g 3    pantoprazole (PROTONIX) 40 MG tablet TAKE 1 TABLET EVERY DAY 90 tablet 3    aspirin 81 MG tablet Take 81 mg by mouth daily      Omega-3 Fatty Acids (FISH OIL) 1000 MG CAPS Take 1,200 mg by mouth 2 times daily       No current facility-administered medications for this visit.       No Known Allergies    Health Maintenance   Topic Date Due    Hepatitis C screen  1940    COVID-19 Vaccine (1 of 2) 12/16/1958    Shingles Vaccine (1 of 2) 05/11/2021 (Originally 12/16/1992)    Annual Wellness Visit (AWV)  08/18/2021    Lipid screen  02/18/2022    Potassium monitoring  02/18/2022    Creatinine monitoring  02/18/2022    Colon cancer screen colonoscopy  02/21/2025    DTaP/Tdap/Td vaccine (2 - Td) 08/15/2030    Flu vaccine  Completed    Pneumococcal 65+ years Vaccine  Completed    Hepatitis A vaccine  Aged Out    Hib vaccine  Aged Out    Meningococcal (ACWY) vaccine  Aged Out       Lab Results   Component Value Date    LABA1C 5.3 02/18/2021     Lab Results   Component Value Date    PSA 0.47 05/08/2020    PSA 0.41 05/02/2019    PSA 0.45 06/25/2018     TSH   Date Value Ref Range Status   02/18/2021 2.880 0.270 - 4.200 uIU/mL Final   ]  Lab Results   Component Value Date     02/18/2021    K 4.1 02/18/2021     02/18/2021    CO2 27 02/18/2021    BUN 28 (H) 02/18/2021    CREATININE 0.8 02/18/2021    GLUCOSE 138 (H) 02/18/2021    CALCIUM 9.4 02/18/2021    PROT 7.0 02/18/2021    LABALBU 4.6 02/18/2021    BILITOT 0.7 02/18/2021    ALKPHOS 72 02/18/2021    AST 23 02/18/2021    ALT 31 02/18/2021    LABGLOM >60 02/18/2021    GFRAA >59 02/18/2021     Lab Results   Component Value Date    CHOL 149 (L) 02/18/2021    CHOL 186 11/19/2020    CHOL 151 (L) 05/08/2020     Lab Results   Component Value Date    TRIG 106 02/18/2021    TRIG 188 (H) 11/19/2020    TRIG 103 05/08/2020     Lab Results   Component Value Date    HDL 40 (L) 02/18/2021    HDL 34 (L) 11/19/2020    HDL 44 (L) 05/08/2020     Lab Results   Component Value Date    LDLCALC 88 02/18/2021    LDLCALC 114 11/19/2020    [address] 86 05/08/2020     Lab Results   Component Value Date     02/18/2021    K 4.1 02/18/2021     02/18/2021    CO2 27 02/18/2021    BUN 28 02/18/2021    CREATININE 0.8 02/18/2021    GLUCOSE 138 02/18/2021    CALCIUM 9.4 02/18/2021      Lab Results   Component Value Date    WBC 5.3 02/18/2021    HGB 15.2 02/18/2021    HCT 46.2 02/18/2021    MCV 88.5 02/18/2021     02/18/2021    LABLYMP 1.36 04/07/2015    LYMPHOPCT 27.3 02/18/2021    RBC 5.22 02/18/2021    MCH 29.1 02/18/2021    MCHC 32.9 (L) 02/18/2021    RDW 13.1 02/18/2021     Lab Results   Component Value Date    VITD25 50.0 02/18/2021       Subjective:      Review of Systems   Constitutional: Negative for fatigue, fever and unexpected weight change. HENT: Negative for ear discharge, ear pain, mouth sores, sore throat and trouble swallowing. Eyes: Negative for discharge, itching and visual disturbance. Respiratory: Negative for cough, choking, shortness of breath, wheezing and stridor. Cardiovascular: Negative for chest pain, palpitations and leg swelling. Gastrointestinal: Negative for abdominal distention, abdominal pain, blood in stool, constipation, diarrhea, nausea and vomiting. GERD   Endocrine: Negative for cold intolerance, polydipsia and polyuria. Genitourinary: Negative for difficulty urinating, dysuria, frequency and urgency. Musculoskeletal: Negative for arthralgias and gait problem. Skin: Negative for color change and rash. Allergic/Immunologic: Negative for food allergies and immunocompromised state. Neurological: Negative for dizziness, tremors, syncope, speech difficulty, weakness and headaches. Daytime sleepiness   Hematological: Negative for adenopathy. Does not bruise/bleed easily. Psychiatric/Behavioral: Negative for confusion and hallucinations. Objective:     Physical Exam  Constitutional:       General: He is not in acute distress. Appearance: He is well-developed.    HENT: Head: Normocephalic and atraumatic. Eyes:      General: No scleral icterus. Right eye: No discharge. Left eye: No discharge. Pupils: Pupils are equal, round, and reactive to light. Neck:      Musculoskeletal: Normal range of motion and neck supple. Thyroid: No thyromegaly. Vascular: No JVD. Cardiovascular:      Rate and Rhythm: Normal rate and regular rhythm. Heart sounds: Normal heart sounds. No murmur. Pulmonary:      Effort: Pulmonary effort is normal. No respiratory distress. Breath sounds: Normal breath sounds. No wheezing or rales. Abdominal:      General: Bowel sounds are normal. There is no distension. Palpations: Abdomen is soft. There is no mass. Tenderness: There is no abdominal tenderness. There is no guarding or rebound. Musculoskeletal: Normal range of motion. General: No tenderness. Skin:     General: Skin is warm and dry. Findings: No erythema or rash. Neurological:      Mental Status: He is alert and oriented to person, place, and time. Cranial Nerves: No cranial nerve deficit. Coordination: Coordination normal.      Deep Tendon Reflexes: Reflexes are normal and symmetric. Reflexes normal.   Psychiatric:         Mood and Affect: Mood is not depressed. Behavior: Behavior normal.         Thought Content: Thought content normal.         Judgment: Judgment normal.       /77   Pulse 67   Ht 6' (1.829 m)   Wt 209 lb (94.8 kg)   BMI 28.35 kg/m²     Assessment:       Diagnosis Orders   1. Essential hypertension  CBC Auto Differential    Comprehensive Metabolic Panel    TSH without Reflex    Urinalysis Reflex to Culture   2. Type 2 diabetes mellitus without complication, without long-term current use of insulin ([facility])  Hemoglobin A1C   3. Chronic hypokalemia     4. Primary insomnia     5. Excessive daytime sleepiness  Home Sleep Study   6. Gastroesophageal reflux disease without esophagitis     7.  Mixed hyperlipidemia  Triglyceride   8. Vitamin D deficiency  Vitamin D 25 Hydroxy   9. Well adult exam  Hepatitis C Antibody   10. Encounter for prostate cancer screening  PSA screening     Labs reviewed from 2/18/2021  Diagnostics reviewed [id number]  cxr and echo  Plan:        Patient given educational materials - see patient instructions. Discussed use, benefit, and side effects of prescribed medications. Allpatient questions answered. Pt voiced understanding. Reviewed health maintenance. Instructed to continue current medications, diet and exercise. Patient agreed with treatment plan. Follow up as directed. MEDICATIONS:  No orders of the defined types were placed in this encounter. ORDERS:  Orders Placed This Encounter   Procedures    CBC Auto Differential    Comprehensive Metabolic Panel    Hepatitis C Antibody    PSA screening    Triglyceride    TSH without Reflex    Vitamin D 25 Hydroxy    Urinalysis Reflex to Culture    Hemoglobin A1C    Home Sleep Study       Follow-up:  Return in about 3 months (around 5/23/2021) for have labs done prior to appt. PATIENT INSTRUCTIONS:  Patient Instructions   1. Hypertension; The current medical regimen is effective;  continue present plan and medications. 2.  Type II DM good today  a1c is 5.3   3. Hyperlipidemia good today   4. Excessive daytime sleepiness; We will get your sleep study   We will try sleep meds if that does not work     Electronically signed by MARISELA Overton on 2/23/2021 at 10:32 AM         EMRDragon/transcription disclaimer:  Much of this encounter note is electronic transcription/translation of spoken language to printed texts. The electronic translation of spoken language may be erroneous, or at times,nonsensical words or phrases may be inadvertently transcribed.   Although I have reviewed the note for such errors, some may still exist.

## 2021-02-26 ENCOUNTER — TRANSCRIBE ORDERS (OUTPATIENT)
Dept: LAB | Facility: HOSPITAL | Age: 81
End: 2021-02-26

## 2021-02-26 DIAGNOSIS — Z01.818 PREOP TESTING: Primary | ICD-10-CM

## 2021-03-01 ENCOUNTER — LAB (OUTPATIENT)
Dept: LAB | Facility: HOSPITAL | Age: 81
End: 2021-03-01

## 2021-03-01 LAB — SARS-COV-2 ORF1AB RESP QL NAA+PROBE: NOT DETECTED

## 2021-03-01 PROCEDURE — U0004 COV-19 TEST NON-CDC HGH THRU: HCPCS | Performed by: INTERNAL MEDICINE

## 2021-03-01 PROCEDURE — U0005 INFEC AGEN DETEC AMPLI PROBE: HCPCS | Performed by: INTERNAL MEDICINE

## 2021-03-01 PROCEDURE — C9803 HOPD COVID-19 SPEC COLLECT: HCPCS | Performed by: INTERNAL MEDICINE

## 2021-03-04 ENCOUNTER — ANESTHESIA (OUTPATIENT)
Dept: GASTROENTEROLOGY | Facility: HOSPITAL | Age: 81
End: 2021-03-04

## 2021-03-04 ENCOUNTER — HOSPITAL ENCOUNTER (OUTPATIENT)
Facility: HOSPITAL | Age: 81
Setting detail: HOSPITAL OUTPATIENT SURGERY
Discharge: HOME | End: 2021-03-04
Attending: INTERNAL MEDICINE | Admitting: INTERNAL MEDICINE

## 2021-03-04 ENCOUNTER — ANESTHESIA EVENT (OUTPATIENT)
Dept: GASTROENTEROLOGY | Facility: HOSPITAL | Age: 81
End: 2021-03-04

## 2021-03-04 VITALS
SYSTOLIC BLOOD PRESSURE: 124 MMHG | BODY MASS INDEX: 26.68 KG/M2 | WEIGHT: 197 LBS | HEIGHT: 72 IN | RESPIRATION RATE: 18 BRPM | TEMPERATURE: 97.2 F | OXYGEN SATURATION: 100 % | DIASTOLIC BLOOD PRESSURE: 70 MMHG | HEART RATE: 66 BPM

## 2021-03-04 DIAGNOSIS — Z86.010 HX OF ADENOMATOUS COLONIC POLYPS: ICD-10-CM

## 2021-03-04 PROCEDURE — 45385 COLONOSCOPY W/LESION REMOVAL: CPT | Performed by: INTERNAL MEDICINE

## 2021-03-04 PROCEDURE — 25010000002 PROPOFOL 10 MG/ML EMULSION: Performed by: NURSE ANESTHETIST, CERTIFIED REGISTERED

## 2021-03-04 PROCEDURE — 88305 TISSUE EXAM BY PATHOLOGIST: CPT | Performed by: INTERNAL MEDICINE

## 2021-03-04 DEVICE — DEV CLIP ENDO RESOLUTION360 CONTRL ROT 235CM: Type: IMPLANTABLE DEVICE | Site: DESCENDING COLON | Status: FUNCTIONAL

## 2021-03-04 RX ORDER — LIDOCAINE HYDROCHLORIDE 10 MG/ML
0.5 INJECTION, SOLUTION EPIDURAL; INFILTRATION; INTRACAUDAL; PERINEURAL ONCE AS NEEDED
Status: DISCONTINUED | OUTPATIENT
Start: 2021-03-04 | End: 2021-03-04 | Stop reason: HOSPADM

## 2021-03-04 RX ORDER — SODIUM CHLORIDE 0.9 % (FLUSH) 0.9 %
10 SYRINGE (ML) INJECTION AS NEEDED
Status: DISCONTINUED | OUTPATIENT
Start: 2021-03-04 | End: 2021-03-04 | Stop reason: HOSPADM

## 2021-03-04 RX ORDER — PROPOFOL 10 MG/ML
VIAL (ML) INTRAVENOUS AS NEEDED
Status: DISCONTINUED | OUTPATIENT
Start: 2021-03-04 | End: 2021-03-04 | Stop reason: SURG

## 2021-03-04 RX ORDER — SODIUM CHLORIDE 9 MG/ML
500 INJECTION, SOLUTION INTRAVENOUS CONTINUOUS PRN
Status: DISCONTINUED | OUTPATIENT
Start: 2021-03-04 | End: 2021-03-04 | Stop reason: HOSPADM

## 2021-03-04 RX ADMIN — PROPOFOL 100 MG: 10 INJECTION, EMULSION INTRAVENOUS at 10:53

## 2021-03-04 RX ADMIN — SODIUM CHLORIDE 500 ML: 9 INJECTION, SOLUTION INTRAVENOUS at 09:03

## 2021-03-04 RX ADMIN — PROPOFOL 100 MG: 10 INJECTION, EMULSION INTRAVENOUS at 10:48

## 2021-03-04 NOTE — ANESTHESIA POSTPROCEDURE EVALUATION
Patient: Mike Kay    Procedure Summary     Date: 03/04/21 Room / Location: [facility] ENDOSCOPY 6 /  [facility]    Anesthesia Start: 1047 Anesthesia Stop: 1109    Procedure: COLONOSCOPY WITH ANESTHESIA (N/A ) Diagnosis:       Hx of adenomatous colonic polyps      (Hx of adenomatous colonic polyps [Z86.010])    Surgeon: Mohan Quinn MD Provider: Antonio Shi CRNA    Anesthesia Type: MAC ASA Status: 2          Anesthesia Type: MAC    Vitals  No vitals data found for the desired time range.          Post Anesthesia Care and Evaluation    Patient location during evaluation: PHASE II  Patient participation: complete - patient participated  Level of consciousness: awake and alert  Pain management: adequate  Airway patency: patent  Anesthetic complications: No anesthetic complications  PONV Status: none  Cardiovascular status: acceptable and stable  Respiratory status: acceptable and unassisted  Hydration status: acceptable

## 2021-03-04 NOTE — ANESTHESIA PREPROCEDURE EVALUATION
Anesthesia Evaluation     NPO Solid Status: > 8 hours  NPO Liquid Status: > 8 hours           Airway   Mallampati: I  TM distance: >3 FB  Neck ROM: full  No difficulty expected  Dental      Pulmonary - normal exam   (+) sleep apnea,   (-) not a smoker  Cardiovascular - normal exam  Exercise tolerance: excellent (>7 METS)    (+) hypertension, valvular problems/murmurs MVP, hyperlipidemia,       Neuro/Psych  GI/Hepatic/Renal/Endo    (+)  GERD,  diabetes mellitus,     Musculoskeletal (-) negative ROS    Abdominal    Substance History      OB/GYN          Other                        Anesthesia Plan    ASA 2     MAC     intravenous induction     Anesthetic plan, all risks, benefits, and alternatives have been provided, discussed and informed consent has been obtained with: patient.

## 2021-03-04 NOTE — H&P
[facility] Gastroenterology  Pre Procedure History & Physical    Chief Complaint:   History of polyps    Subjective     HPI:   Here for colonoscopy.  History of polyps    Past Medical History:   Past Medical History:   Diagnosis Date   • Acid reflux    • Diabetes mellitus type 2, diet-controlled (CMS/HCC)    • Hay fever    • High cholesterol     BORDERLINE   • Hx of colonic polyp    • Hypertension    • Mitral valve prolapse    • Umbilical hernia        Past Surgical History:  Past Surgical History:   Procedure Laterality Date   • CARDIAC CATHETERIZATION     • COLONOSCOPY N/A 2/20/2020    Procedure: COLONOSCOPY WITH ANESTHESIA;  Surgeon: Mohan Quinn MD;  Location: [facility] ENDOSCOPY;  Service: Gastroenterology;  Laterality: N/A;  pre; hx adenomatous colon polyps  post: polyp; Lili Ford, VILMA   • COLONOSCOPY W/ POLYPECTOMY  05/12/2014    Adenomatous polyp at 30 cm, Hyperplastic polyp cecum, Diverticulosis repeat exam in 5 years   • FOOT SURGERY Left 2015    R/T FRACTURE, PLATE AND SCREWS    • INGUINAL HERNIA REPAIR Left 2010   • OTHER SURGICAL HISTORY      VOCAL CORD    • TONSILLECTOMY  1991   • UMBILICAL HERNIA REPAIR N/A 1/12/2017    Procedure: UMBILICAL HERNIA REPAIR;  Surgeon: Kavitha Navarro MD;  Location: [facility] OR;  Service:        Family History:  Family History   Problem Relation Age of Onset   • Heart disease Father    • Colon cancer Neg Hx    • Colon polyps Neg Hx        Social History:   reports that he has never smoked. He has never used smokeless tobacco. He reports that he does not drink alcohol or use drugs.    Medications:   Prior to Admission medications    Medication Sig Start Date End Date Taking? Authorizing Provider   fluticasone (FLONASE) 50 MCG/ACT nasal spray 1 spray by Each Nare route Daily As Needed for rhinitis or allergies. 5/4/16  Yes Yasmin Oliveira MD   losartan (COZAAR) 100 MG tablet Take 100 mg by mouth Daily. 5/4/16  Yes Yasmin Oliveira MD  "  potassium chloride (K-DUR,KLOR-CON) 10 MEQ CR tablet Take 10 mEq by mouth 2 (Two) Times a Day. 5/4/16  Yes Yasmin Oliveira MD   spironolactone (ALDACTONE) 25 MG tablet Take 25 mg by mouth Daily.   Yes Yasmin Oliveira MD   verapamil SR (CALAN-SR) 240 MG CR tablet Take 240 mg by mouth 2 (Two) Times a Day. 5/4/16  Yes Yasmin Oliveira MD   aspirin 81 MG tablet Take 81 mg by mouth Daily.    Yasmin Oliveira MD   atorvastatin (LIPITOR) 20 MG tablet Take 10 mg by mouth Daily. PT TAKES 1/2 20 MG TABLET TO EQUAL 10 MG DAILY 5/4/16   Yasmin Oliveira MD   glipiZIDE (GLUCOTROL XL) 10 MG 24 hr tablet Take 10 mg by mouth Daily.    Emergency, Nurse Eugene RN   hydrochlorothiazide (HYDRODIURIL) 25 MG tablet Take 25 mg by mouth Daily. 5/4/16   Yasmin Oliveira MD   HYDROcodone-acetaminophen (NORCO) 5-325 MG per tablet Take 1-2 tablets by mouth Every 4 (Four) Hours As Needed (Pain). 1/12/17   Kavitha Navarro MD   pantoprazole (PROTONIX) 40 MG EC tablet Take 40 mg by mouth Daily As Needed.    ProviderYasmin MD   temazepam (RESTORIL) 15 MG capsule TAKE 2 CAPSULES BY MOUTH NIGHTLY AS NEEDED FOR SLEEP FOR UP TO 30 DAYS 9/30/20   Emergency, Nurse Epic, RN   sodium-potassium-magnesium sulfates (Suprep Bowel Prep Kit) 17.5-3.13-1.6 GM/177ML solution oral solution As Directed by Office 2/11/21 3/4/21  Kassandra Pollard APRN   sucralfate (CARAFATE) 1 GM/10ML suspension Take 10 mL by mouth 4 (Four) Times a Day With Meals & at Bedtime. 12/27/17 3/4/21  Naveen Zuniga MD       Allergies:  Patient has no known allergies.    Objective     Blood pressure 170/71, pulse 70, temperature 97.2 °F (36.2 °C), temperature source Temporal, resp. rate 18, height 182.9 cm (72\"), weight 89.4 kg (197 lb), SpO2 97 %.    Physical Exam   Constitutional: Pt is oriented to person, place, and in no distress.   HENT: Mouth/Throat: Oropharynx is clear.   Cardiovascular: Normal rate, regular rhythm.  "   Pulmonary/Chest: Effort normal. No respiratory distress. No  wheezes.   Abdominal: Soft. Non-distended.  Skin: Skin is warm and dry.   Psychiatric: Mood, memory, affect and judgment appear normal.     Assessment/Plan     Diagnosis:  History of polyps    Anticipated Surgical Procedure:    Proceed with colonoscopy as scheduled    The following major R/B/A were discussed with the patient, however the list is not all inclusive . Risk:  Bleeding (immediate and delayed), perforation (rupture or tear), reaction to medication, missed lesion/cancer, pain during the procedure, infection, need for surgery, need for ostomy, need for mechanical ventilation (breathing machine), death.  Benefits: removal of polyp/tissue, burn/clip/or inject to stop bleeding, removal of foreign body, dilate any stricture.  Alternatives: Xray or CT, surgery, do nothing with associated risk   The patient was given time to ask question and received explanation, and agrees to proceed as per History and Physical.   No guarantee given or expressed.    EMR Dragon/transcription disclaimer: Much of this encounter note is an electronic transcription/translation of spoken language to printed text.  The electronic translation of spoken language may permit erroneous, or at times, nonsensical words or phrases to be inadvertently transcribed.  Although I have reviewed the note for such errors, some may still exist.    Mohan Quinn MD  10:48 CST  3/4/2021

## 2021-03-05 LAB
CYTO UR: NORMAL
LAB AP CASE REPORT: NORMAL
PATH REPORT.FINAL DX SPEC: NORMAL
PATH REPORT.GROSS SPEC: NORMAL

## 2021-03-19 DIAGNOSIS — F51.01 PRIMARY INSOMNIA: ICD-10-CM

## 2021-03-19 NOTE — TELEPHONE ENCOUNTER
Curtis Manjarrez called to request a refill on his medication. Last office visit : 2/23/2021   Next office visit : 5/24/2021     Last UDS: No results found for: Graciela Morena, LABBENZ, BUPRENUR, COCAIMETSCRU, GABAPENTIN, MDMA, METAMPU, OPIATESCREENURINE, OXTCOSU, PHENCYCLIDINESCREENURINE, PROPOXYPHENE, THCSCREENUR, TRICYUR    Last Regan: 03/11/2021  Medication Contract: 08/17/2020    Requested Prescriptions     Pending Prescriptions Disp Refills    temazepam (RESTORIL) 15 MG capsule [Pharmacy Med Name: Temazepam 15 MG Oral Capsule] 60 capsule 0     Sig: TAKE 2 CAPSULES BY MOUTH NIGHTLY AS NEEDED FOR SLEEP         Please approve or refuse this medication.    Carlos Lopez

## 2021-03-22 RX ORDER — TEMAZEPAM 15 MG/1
CAPSULE ORAL
Qty: 60 CAPSULE | Refills: 0 | Status: SHIPPED | OUTPATIENT
Start: 2021-03-22 | End: 2021-04-28

## 2021-03-23 ENCOUNTER — IMMUNIZATION (OUTPATIENT)
Dept: VACCINE CLINIC | Facility: HOSPITAL | Age: 81
End: 2021-03-23

## 2021-03-23 PROCEDURE — 0012A: CPT | Performed by: OBSTETRICS & GYNECOLOGY

## 2021-03-23 PROCEDURE — 91301 HC SARSCO02 VAC 100MCG/0.5ML IM: CPT | Performed by: OBSTETRICS & GYNECOLOGY

## 2021-04-28 DIAGNOSIS — F51.01 PRIMARY INSOMNIA: ICD-10-CM

## 2021-04-28 RX ORDER — SPIRONOLACTONE 25 MG/1
TABLET ORAL
Qty: 90 TABLET | Refills: 3 | Status: SHIPPED | OUTPATIENT
Start: 2021-04-28 | End: 2022-05-23

## 2021-04-28 RX ORDER — TEMAZEPAM 15 MG/1
CAPSULE ORAL
Qty: 60 CAPSULE | Refills: 0 | Status: SHIPPED | OUTPATIENT
Start: 2021-04-28 | End: 2021-05-27 | Stop reason: SDUPTHER

## 2021-04-28 RX ORDER — GLIPIZIDE 10 MG/1
TABLET, FILM COATED, EXTENDED RELEASE ORAL
Qty: 90 TABLET | Refills: 3 | Status: SHIPPED | OUTPATIENT
Start: 2021-04-28 | End: 2022-05-23

## 2021-04-28 RX ORDER — HYDROCHLOROTHIAZIDE 25 MG/1
TABLET ORAL
Qty: 90 TABLET | Refills: 3 | Status: SHIPPED | OUTPATIENT
Start: 2021-04-28 | End: 2022-05-04

## 2021-04-28 NOTE — TELEPHONE ENCOUNTER
[address] called requesting a refill of the below medication which has been pended for you:     Requested Prescriptions     Pending Prescriptions Disp Refills    hydroCHLOROthiazide (HYDRODIURIL) 25 MG tablet [Pharmacy Med Name: HYDROCHLOROTHIAZIDE 25 MG Tablet] 90 tablet 3     Sig: TAKE 1 TABLET EVERY DAY    glipiZIDE (GLUCOTROL XL) 10 MG extended release tablet [Pharmacy Med Name: GLIPIZIDE ER 10 MG Tablet Extended Release 24 Hour] 90 tablet 3     Sig: TAKE 1 TABLET EVERY MORNING    spironolactone (ALDACTONE) 25 MG tablet [Pharmacy Med Name: SPIRONOLACTONE 25 MG Tablet] 90 tablet 3     Sig: TAKE 1 TABLET EVERY DAY       Last Appointment Date: 2/23/2021  Next Appointment Date: 5/24/2021    No Known Allergies

## 2021-04-28 NOTE — TELEPHONE ENCOUNTER
Paxton Whitmore called requesting a refill of the below medication which has been pended for you:     Requested Prescriptions     Pending Prescriptions Disp Refills    temazepam (RESTORIL) 15 MG capsule [Pharmacy Med Name: Temazepam 15 MG Oral Capsule] 60 capsule 0     Sig: TAKE 2 CAPSULES BY MOUTH NIGHTLY AS NEEDED FOR SLEEP       Last Appointment Date: 2/23/2021  Next Appointment Date: 4/28/2021    No Known Allergies

## 2021-05-24 DIAGNOSIS — Z12.5 ENCOUNTER FOR PROSTATE CANCER SCREENING: ICD-10-CM

## 2021-05-24 DIAGNOSIS — E55.9 VITAMIN D DEFICIENCY: ICD-10-CM

## 2021-05-24 DIAGNOSIS — E11.9 TYPE 2 DIABETES MELLITUS WITHOUT COMPLICATION, WITHOUT LONG-TERM CURRENT USE OF INSULIN (HCC): ICD-10-CM

## 2021-05-24 DIAGNOSIS — Z00.00 WELL ADULT EXAM: ICD-10-CM

## 2021-05-24 DIAGNOSIS — I10 ESSENTIAL HYPERTENSION: ICD-10-CM

## 2021-05-24 DIAGNOSIS — E78.2 MIXED HYPERLIPIDEMIA: ICD-10-CM

## 2021-05-24 LAB
ALBUMIN SERPL-MCNC: 4.4 G/DL (ref 3.5–5.2)
ALP BLD-CCNC: 64 U/L (ref 40–130)
ALT SERPL-CCNC: 32 U/L (ref 5–41)
ANION GAP SERPL CALCULATED.3IONS-SCNC: 8 MMOL/L (ref 7–19)
AST SERPL-CCNC: 24 U/L (ref 5–40)
BASOPHILS ABSOLUTE: 0.1 K/UL (ref 0–0.2)
BASOPHILS RELATIVE PERCENT: 1.1 % (ref 0–1)
BILIRUB SERPL-MCNC: 0.7 MG/DL (ref 0.2–1.2)
BILIRUBIN URINE: NEGATIVE
BLOOD, URINE: NEGATIVE
BUN BLDV-MCNC: 21 MG/DL (ref 8–23)
CALCIUM SERPL-MCNC: 9.6 MG/DL (ref 8.8–10.2)
CHLORIDE BLD-SCNC: 105 MMOL/L (ref 98–111)
CLARITY: CLEAR
CO2: 31 MMOL/L (ref 22–29)
COLOR: YELLOW
CREAT SERPL-MCNC: 0.9 MG/DL (ref 0.5–1.2)
EOSINOPHILS ABSOLUTE: 0.4 K/UL (ref 0–0.6)
EOSINOPHILS RELATIVE PERCENT: 6.5 % (ref 0–5)
GFR AFRICAN AMERICAN: >59
GFR NON-AFRICAN AMERICAN: >60
GLUCOSE BLD-MCNC: 131 MG/DL (ref 74–109)
GLUCOSE URINE: NEGATIVE MG/DL
HBA1C MFR BLD: 4.9 % (ref 4–6)
HCT VFR BLD CALC: 44.8 % (ref 42–52)
HEMOGLOBIN: 14.9 G/DL (ref 14–18)
HEPATITIS C ANTIBODY INTERPRETATION: NORMAL
IMMATURE GRANULOCYTES #: 0 K/UL
KETONES, URINE: ABNORMAL MG/DL
LEUKOCYTE ESTERASE, URINE: NEGATIVE
LYMPHOCYTES ABSOLUTE: 1.5 K/UL (ref 1.1–4.5)
LYMPHOCYTES RELATIVE PERCENT: 26.1 % (ref 20–40)
MCH RBC QN AUTO: 29.4 PG (ref 27–31)
MCHC RBC AUTO-ENTMCNC: 33.3 G/DL (ref 33–37)
MCV RBC AUTO: 88.4 FL (ref 80–94)
MONOCYTES ABSOLUTE: 0.4 K/UL (ref 0–0.9)
MONOCYTES RELATIVE PERCENT: 7.1 % (ref 0–10)
NEUTROPHILS ABSOLUTE: 3.3 K/UL (ref 1.5–7.5)
NEUTROPHILS RELATIVE PERCENT: 59 % (ref 50–65)
NITRITE, URINE: NEGATIVE
PDW BLD-RTO: 13.1 % (ref 11.5–14.5)
PH UA: 6.5 (ref 5–8)
PLATELET # BLD: 134 K/UL (ref 130–400)
PMV BLD AUTO: 9.7 FL (ref 9.4–12.4)
POTASSIUM SERPL-SCNC: 4 MMOL/L (ref 3.5–5)
PROSTATE SPECIFIC ANTIGEN: 0.42 NG/ML (ref 0–4)
PROTEIN UA: ABNORMAL MG/DL
RBC # BLD: 5.07 M/UL (ref 4.7–6.1)
SODIUM BLD-SCNC: 144 MMOL/L (ref 136–145)
SPECIFIC GRAVITY UA: 1.03 (ref 1–1.03)
TOTAL PROTEIN: 7 G/DL (ref 6.6–8.7)
TRIGL SERPL-MCNC: 121 MG/DL (ref 0–149)
TSH SERPL DL<=0.05 MIU/L-ACNC: 1.66 UIU/ML (ref 0.27–4.2)
UROBILINOGEN, URINE: 1 E.U./DL
VITAMIN D 25-HYDROXY: 36.1 NG/ML
WBC # BLD: 5.7 K/UL (ref 4.8–10.8)

## 2021-05-27 ENCOUNTER — OFFICE VISIT (OUTPATIENT)
Dept: INTERNAL MEDICINE | Age: 81
End: 2021-05-27
Payer: MEDICARE

## 2021-05-27 VITALS
HEART RATE: 57 BPM | WEIGHT: 209 LBS | DIASTOLIC BLOOD PRESSURE: 83 MMHG | SYSTOLIC BLOOD PRESSURE: 134 MMHG | BODY MASS INDEX: 28.31 KG/M2 | HEIGHT: 72 IN

## 2021-05-27 DIAGNOSIS — E78.2 MIXED HYPERLIPIDEMIA: ICD-10-CM

## 2021-05-27 DIAGNOSIS — E87.6 CHRONIC HYPOKALEMIA: ICD-10-CM

## 2021-05-27 DIAGNOSIS — R00.0 TACHYCARDIA: ICD-10-CM

## 2021-05-27 DIAGNOSIS — I10 ESSENTIAL HYPERTENSION: Primary | ICD-10-CM

## 2021-05-27 DIAGNOSIS — F51.01 PRIMARY INSOMNIA: ICD-10-CM

## 2021-05-27 DIAGNOSIS — M85.80 OSTEOPENIA, UNSPECIFIED LOCATION: ICD-10-CM

## 2021-05-27 DIAGNOSIS — K21.9 GASTROESOPHAGEAL REFLUX DISEASE WITHOUT ESOPHAGITIS: ICD-10-CM

## 2021-05-27 DIAGNOSIS — E11.9 TYPE 2 DIABETES MELLITUS WITHOUT COMPLICATION, WITHOUT LONG-TERM CURRENT USE OF INSULIN (HCC): ICD-10-CM

## 2021-05-27 DIAGNOSIS — R93.7 ABNORMAL FINDINGS ON DIAGNOSTIC IMAGING OF OTHER PARTS OF MUSCULOSKELETAL SYSTEM: ICD-10-CM

## 2021-05-27 PROCEDURE — 1123F ACP DISCUSS/DSCN MKR DOCD: CPT | Performed by: NURSE PRACTITIONER

## 2021-05-27 PROCEDURE — 4040F PNEUMOC VAC/ADMIN/RCVD: CPT | Performed by: NURSE PRACTITIONER

## 2021-05-27 PROCEDURE — G8427 DOCREV CUR MEDS BY ELIG CLIN: HCPCS | Performed by: NURSE PRACTITIONER

## 2021-05-27 PROCEDURE — G8417 CALC BMI ABV UP PARAM F/U: HCPCS | Performed by: NURSE PRACTITIONER

## 2021-05-27 PROCEDURE — 1036F TOBACCO NON-USER: CPT | Performed by: NURSE PRACTITIONER

## 2021-05-27 PROCEDURE — 99214 OFFICE O/P EST MOD 30 MIN: CPT | Performed by: NURSE PRACTITIONER

## 2021-05-27 RX ORDER — TEMAZEPAM 15 MG/1
CAPSULE ORAL
Qty: 60 CAPSULE | Refills: 1 | Status: SHIPPED | OUTPATIENT
Start: 2021-05-27 | End: 2021-08-31 | Stop reason: SDUPTHER

## 2021-05-27 SDOH — ECONOMIC STABILITY: FOOD INSECURITY: WITHIN THE PAST 12 MONTHS, THE FOOD YOU BOUGHT JUST DIDN'T LAST AND YOU DIDN'T HAVE MONEY TO GET MORE.: NEVER TRUE

## 2021-05-27 SDOH — ECONOMIC STABILITY: FOOD INSECURITY: WITHIN THE PAST 12 MONTHS, YOU WORRIED THAT YOUR FOOD WOULD RUN OUT BEFORE YOU GOT MONEY TO BUY MORE.: NEVER TRUE

## 2021-05-27 ASSESSMENT — ENCOUNTER SYMPTOMS
VOMITING: 0
EYE ITCHING: 0
CHOKING: 0
EYE DISCHARGE: 0
STRIDOR: 0
COLOR CHANGE: 0
CONSTIPATION: 0
DIARRHEA: 0
COUGH: 0
ABDOMINAL PAIN: 0
BLOOD IN STOOL: 0
TROUBLE SWALLOWING: 0
ABDOMINAL DISTENTION: 0
WHEEZING: 0
SORE THROAT: 0
SHORTNESS OF BREATH: 0
NAUSEA: 0

## 2021-05-27 ASSESSMENT — PATIENT HEALTH QUESTIONNAIRE - PHQ9
2. FEELING DOWN, DEPRESSED OR HOPELESS: 0
SUM OF ALL RESPONSES TO PHQ QUESTIONS 1-9: 0
SUM OF ALL RESPONSES TO PHQ QUESTIONS 1-9: 0

## 2021-05-27 ASSESSMENT — SOCIAL DETERMINANTS OF HEALTH (SDOH): HOW HARD IS IT FOR YOU TO PAY FOR THE VERY BASICS LIKE FOOD, HOUSING, MEDICAL CARE, AND HEATING?: NOT HARD AT ALL

## 2021-05-27 NOTE — PATIENT INSTRUCTIONS
1.  Hypertension stable with losartan   2. Tachycardia stable verapamil 240 mg daily   3. GERD stable with protonix   4. TYPE II DM stable with glipizide 10 daily no changes  5. Hyperlipidemia The current medical regimen is effective;  continue present plan and medications. 6. Primary insomnia The current medical regimen is effective;  continue present plan and medications. 7.  chronic hypokalemia; The current medical regimen is effective;  continue present plan and medications.   #8 osteopenia we will set up a DEXA scan

## 2021-05-27 NOTE — PROGRESS NOTES
disease without esophagitis 5/6/2019    Hyperglycemia     Hyperlipidemia     Hypertension     Testicular hypofunction     Type 2 diabetes mellitus without complication [facility])       Past Surgical History:   Procedure Laterality Date    ANKLE SURGERY      HERNIA REPAIR      TONSILLECTOMY         Vitals 5/27/2021 2/23/2021 11/23/2020 11/23/2020 11/1/2020 1/36/1577   SYSTOLIC [phone] 617 116 199   DIASTOLIC 83 77 76 74 72 73   Pulse 57 67 - 58 88 60   Temp - - - - 98.3 -   Resp - - - - 18 -   SpO2 - - - - 94 -   Weight 209 lb 209 lb - 197 lb 206 lb -   Height 6' 0\" 6' 0\" - 6' 0\" - -   Body mass index 28.34 kg/m2 28.34 kg/m2 - 26.72 kg/m2 - -   Pain Level - - - - - -   Some recent data might be hidden       Family History   Problem Relation Age of Onset    No Known Problems Mother     Heart Attack Father        Social History     Tobacco Use    Smoking status: Never Smoker    Smokeless tobacco: Never Used   Substance Use Topics    Alcohol use: No      Current Outpatient Medications   Medication Sig Dispense Refill    temazepam (RESTORIL) 15 MG capsule TAKE 2 CAPSULES BY MOUTH NIGHTLY AS NEEDED FOR SLEEP 60 capsule 1    hydroCHLOROthiazide (HYDRODIURIL) 25 MG tablet TAKE 1 TABLET EVERY DAY 90 tablet 3    glipiZIDE (GLUCOTROL XL) 10 MG extended release tablet TAKE 1 TABLET EVERY MORNING 90 tablet 3    spironolactone (ALDACTONE) 25 MG tablet TAKE 1 TABLET EVERY DAY 90 tablet 3    ondansetron (ZOFRAN) 4 MG tablet Take 1 tablet by mouth daily as needed for Nausea or Vomiting 30 tablet 0    nystatin (MYCOSTATIN) 871131 UNIT/GM cream APPLY  CREAM TOPICALLY TWICE DAILY 30 g 2    atorvastatin (LIPITOR) 20 MG tablet TAKE 1 TABLET AT BEDTIME 90 tablet 3    losartan (COZAAR) 100 MG tablet TAKE 1 TABLET EVERY DAY 90 tablet 3    verapamil (CALAN SR) 240 MG extended release tablet TAKE 1 TABLET TWICE DAILY 180 tablet 3    potassium chloride (KLOR-CON M) 10 MEQ extended release tablet TAKE 2 TABLETS  2 TIMES DAILY 360 tablet 3    fluticasone (FLONASE) 50 MCG/ACT nasal spray USE 1 SPRAY IN EACH NOSTRIL TWICE DAILY 48 g 3    pantoprazole (PROTONIX) 40 MG tablet TAKE 1 TABLET EVERY DAY 90 tablet 3    aspirin 81 MG tablet Take 81 mg by mouth daily      Omega-3 Fatty Acids (FISH OIL) 1000 MG CAPS Take 1,200 mg by mouth 2 times daily       No current facility-administered medications for this visit.      No Known Allergies    Health Maintenance   Topic Date Due    Shingles Vaccine (1 of 2) 05/27/2022 (Originally 12/16/1990)    Annual Wellness Visit (AWV)  08/18/2021    Lipid screen  02/18/2022    Potassium monitoring  05/24/2022    Creatinine monitoring  05/24/2022    Colon cancer screen colonoscopy  03/09/2026    DTaP/Tdap/Td vaccine (2 - Td) 08/15/2030    Flu vaccine  Completed    Pneumococcal 65+ years Vaccine  Completed    COVID-19 Vaccine  Completed    Hepatitis C screen  Completed    Hepatitis A vaccine  Aged Out    Hib vaccine  Aged Out    Meningococcal (ACWY) vaccine  Aged Out       Lab Results   Component Value Date    LABA1C 4.9 05/24/2021     Lab Results   Component Value Date    PSA 0.42 05/24/2021    PSA 0.47 05/08/2020    PSA 0.41 05/02/2019     TSH   Date Value Ref Range Status   05/24/2021 1.660 0.270 - 4.200 uIU/mL Final   ]  Lab Results   Component Value Date     05/24/2021    K 4.0 05/24/2021     05/24/2021    CO2 31 (H) 05/24/2021    BUN 21 05/24/2021    CREATININE 0.9 05/24/2021    GLUCOSE 131 (H) 05/24/2021    CALCIUM 9.6 05/24/2021    PROT 7.0 05/24/2021    LABALBU 4.4 05/24/2021    BILITOT 0.7 05/24/2021    ALKPHOS 64 05/24/2021    AST 24 05/24/2021    ALT 32 05/24/2021    LABGLOM >60 05/24/2021    GFRAA >59 05/24/2021     Lab Results   Component Value Date    CHOL 149 (L) 02/18/2021    CHOL 186 11/19/2020    CHOL 151 (L) 05/08/2020     Lab Results   Component Value Date    TRIG 121 05/24/2021    TRIG 106 02/18/2021    TRIG 188 (H) 11/19/2020     Lab Results   Component Value Date    HDL 40 (L) 02/18/2021    HDL 34 (L) 11/19/2020    HDL 44 (L) 05/08/2020     Lab Results   Component Value Date    LDLCALC 88 02/18/2021    LDLCALC 114 11/19/2020    LDLCALC 86 05/08/2020     Lab Results   Component Value Date     05/24/2021    K 4.0 05/24/2021     05/24/2021    CO2 31 05/24/2021    BUN 21 05/24/2021    CREATININE 0.9 05/24/2021    GLUCOSE 131 05/24/2021    CALCIUM 9.6 05/24/2021      Lab Results   Component Value Date    WBC 5.7 05/24/2021    HGB 14.9 05/24/2021    HCT 44.8 05/24/2021    MCV 88.4 05/24/2021     05/24/2021    LABLYMP 1.36 04/07/2015    LYMPHOPCT 26.1 05/24/2021    RBC 5.07 05/24/2021    MCH 29.4 05/24/2021    MCHC 33.3 05/24/2021    RDW 13.1 05/24/2021     Lab Results   Component Value Date    VITD25 36.1 05/24/2021     Labs reviewed from 5/24/2021  LIfe line screening reviewed   Subjective:      Review of Systems   Constitutional: Negative for fatigue, fever and unexpected weight change. HENT: Negative for ear discharge, ear pain, mouth sores, sore throat and trouble swallowing. Eyes: Negative for discharge, itching and visual disturbance. Respiratory: Negative for cough, choking, shortness of breath, wheezing and stridor. Cardiovascular: Negative for chest pain, palpitations and leg swelling. Gastrointestinal: Negative for abdominal distention, abdominal pain, blood in stool, constipation, diarrhea, nausea and vomiting. Gerd   Endocrine: Negative for cold intolerance, polydipsia and polyuria. Genitourinary: Negative for difficulty urinating, dysuria, frequency and urgency. Musculoskeletal: Negative for arthralgias and gait problem. Skin: Negative for color change and rash. Allergic/Immunologic: Negative for food allergies and immunocompromised state. Neurological: Negative for dizziness, tremors, syncope, speech difficulty, weakness and headaches.         Insomni \  Chronic vertigo   Hematological: Negative for adenopathy. Does not bruise/bleed easily. Psychiatric/Behavioral: Negative for confusion and hallucinations. Objective:     Physical Exam  Constitutional:       General: He is not in acute distress. Appearance: He is well-developed. HENT:      Head: Normocephalic and atraumatic. Eyes:      General: No scleral icterus. Right eye: No discharge. Left eye: No discharge. Pupils: Pupils are equal, round, and reactive to light. Neck:      Thyroid: No thyromegaly. Vascular: No JVD. Cardiovascular:      Rate and Rhythm: Normal rate and regular rhythm. Heart sounds: Normal heart sounds. No murmur heard. Pulmonary:      Effort: Pulmonary effort is normal. No respiratory distress. Breath sounds: Normal breath sounds. No wheezing or rales. Abdominal:      General: Bowel sounds are normal. There is no distension. Palpations: Abdomen is soft. There is no mass. Tenderness: There is no abdominal tenderness. There is no guarding or rebound. Musculoskeletal:         General: No tenderness. Normal range of motion. Cervical back: Normal range of motion and neck supple. Skin:     General: Skin is warm and dry. Findings: No erythema or rash. Neurological:      Mental Status: He is alert and oriented to person, place, and time. Cranial Nerves: No cranial nerve deficit. Coordination: Coordination normal.      Deep Tendon Reflexes: Reflexes are normal and symmetric. Reflexes normal.   Psychiatric:         Mood and Affect: Mood is not depressed. Behavior: Behavior normal.         Thought Content:  Thought content normal.         Judgment: Judgment normal.       /83   Pulse 57   Ht 6' (1.829 m)   Wt 209 lb (94.8 kg)   BMI 28.35 kg/m²           Assessment:      Problem List     Chronic hypokalemia     On K+ supplement           Essential hypertension - Primary     losartin 100 daiy  With HCTZ 25 mg           Gastroesophageal reflux disease without esophagitis     Stable with protonix            Relevant Medications    pantoprazole (PROTONIX) 40 MG tablet    ondansetron (ZOFRAN) 4 MG tablet    Mixed hyperlipidemia     lipitor 20 mg at bedtime    And fish oil 1200 BID          Relevant Medications    aspirin 81 MG tablet    atorvastatin (LIPITOR) 20 MG tablet    losartan (COZAAR) 100 MG tablet    verapamil (CALAN SR) 240 MG extended release tablet    hydroCHLOROthiazide (HYDRODIURIL) 25 MG tablet    spironolactone (ALDACTONE) 25 MG tablet    Primary insomnia     restoril 15 mg taking 2 at bedtime           Relevant Medications    temazepam (RESTORIL) 15 MG capsule    Tachycardia     Calan 240           Type 2 diabetes mellitus without complication, without long-term current use of insulin (HCC)     Glipizide 10 mg daiy           Relevant Medications    glipiZIDE (GLUCOTROL XL) 10 MG extended release tablet          Plan:        Patient given educational materials - see patient instructions. Discussed use, benefit, and side effects of prescribed medications. Allpatient questions answered. Pt voiced understanding. Reviewed health maintenance. Instructed to continue current medications, diet and exercise. Patient agreed with treatment plan. Follow up as directed. MEDICATIONS:  Orders Placed This Encounter   Medications    temazepam (RESTORIL) 15 MG capsule     Sig: TAKE 2 CAPSULES BY MOUTH NIGHTLY AS NEEDED FOR SLEEP     Dispense:  60 capsule     Refill:  1         ORDERS:  Orders Placed This Encounter   Procedures    DEXA BONE DENSITY 2 SITES       Follow-up:  Return in about 3 months (around 8/27/2021) for no labs. PATIENT INSTRUCTIONS:  Patient Instructions   1. Hypertension stable with losartan   2. Tachycardia stable verapamil 240 mg daily   3. GERD stable with protonix   4. TYPE II DM stable with glipizide 10 daily no changes  5.   Hyperlipidemia The current medical regimen is effective;  continue present plan and medications. 6. Primary insomnia The current medical regimen is effective;  continue present plan and medications. 7.  chronic hypokalemia; The current medical regimen is effective;  continue present plan and medications. #8 osteopenia we will set up a DEXA scan    Electronically signed by MARISELA Schwab on 5/27/2021 at 12:22 PM    @    Noemy/transcription disclaimer:  Much of this encounter note is electronic transcription/translation of spoken language to printed texts. The electronic translation of spoken language may be erroneous, or at times,nonsensical words or phrases may be inadvertently transcribed.   Although I have reviewed the note for such errors, some may still exist.

## 2021-06-02 DIAGNOSIS — E29.1 HYPOGONADISM IN MALE: Primary | ICD-10-CM

## 2021-06-14 DIAGNOSIS — E29.1 HYPOGONADISM IN MALE: ICD-10-CM

## 2021-06-14 LAB
HCT VFR BLD CALC: 43.1 % (ref 42–52)
HEMOGLOBIN: 14.6 G/DL (ref 14–18)
MCH RBC QN AUTO: 30 PG (ref 27–31)
MCHC RBC AUTO-ENTMCNC: 33.9 G/DL (ref 33–37)
MCV RBC AUTO: 88.7 FL (ref 80–94)
PDW BLD-RTO: 12.5 % (ref 11.5–14.5)
PLATELET # BLD: 123 K/UL (ref 130–400)
PMV BLD AUTO: 10.1 FL (ref 9.4–12.4)
RBC # BLD: 4.86 M/UL (ref 4.7–6.1)
TESTOSTERONE TOTAL: 142.2 NG/DL (ref 193–740)
WBC # BLD: 5.4 K/UL (ref 4.8–10.8)

## 2021-06-21 ENCOUNTER — OFFICE VISIT (OUTPATIENT)
Dept: UROLOGY | Age: 81
End: 2021-06-21
Payer: MEDICARE

## 2021-06-21 VITALS — BODY MASS INDEX: 28.74 KG/M2 | HEIGHT: 72 IN | WEIGHT: 212.2 LBS | TEMPERATURE: 98 F

## 2021-06-21 DIAGNOSIS — E29.1 HYPOGONADISM IN MALE: Primary | ICD-10-CM

## 2021-06-21 LAB
BILIRUBIN, POC: NORMAL
BLOOD URINE, POC: NORMAL
CLARITY, POC: CLEAR
COLOR, POC: YELLOW
GLUCOSE URINE, POC: NORMAL
KETONES, POC: NORMAL
LEUKOCYTE EST, POC: NORMAL
NITRITE, POC: NORMAL
PH, POC: 6.5
PROTEIN, POC: NORMAL
SPECIFIC GRAVITY, POC: 1.02
UROBILINOGEN, POC: 0.2

## 2021-06-21 PROCEDURE — 96372 THER/PROPH/DIAG INJ SC/IM: CPT | Performed by: NURSE PRACTITIONER

## 2021-06-21 PROCEDURE — 99204 OFFICE O/P NEW MOD 45 MIN: CPT | Performed by: NURSE PRACTITIONER

## 2021-06-21 PROCEDURE — 81003 URINALYSIS AUTO W/O SCOPE: CPT | Performed by: NURSE PRACTITIONER

## 2021-06-21 RX ORDER — TESTOSTERONE CYPIONATE 200 MG/ML
100 INJECTION INTRAMUSCULAR ONCE
Status: COMPLETED | OUTPATIENT
Start: 2021-06-21 | End: 2021-06-21

## 2021-06-21 RX ORDER — TESTOSTERONE CYPIONATE 200 MG/ML
1 VIAL (ML) INTRAMUSCULAR
Qty: 2 ML | Refills: 0 | Status: SHIPPED | OUTPATIENT
Start: 2021-06-21 | End: 2021-07-14 | Stop reason: SDUPTHER

## 2021-06-21 RX ADMIN — TESTOSTERONE CYPIONATE 100 MG: 200 INJECTION INTRAMUSCULAR at 09:18

## 2021-06-21 ASSESSMENT — ENCOUNTER SYMPTOMS
BACK PAIN: 0
TROUBLE SWALLOWING: 0
CONSTIPATION: 0
DIARRHEA: 0
EYE REDNESS: 0
EYE DISCHARGE: 0
VOMITING: 0
SHORTNESS OF BREATH: 0
ABDOMINAL DISTENTION: 0
NAUSEA: 0
COUGH: 0
ABDOMINAL PAIN: 0

## 2021-06-21 NOTE — PROGRESS NOTES
Dutch Wiseman is a 68 y.o. male who presents today   Chief Complaint   Patient presents with    New Patient     I am here today for low testosterone     Patient is a 57-year-old male who presents the clinic today with complaints of low testosterone, fatigue, low libido, decreased muscle tone over the past several years. Patient states he was on testosterone injections for over 10 years but have been off them for approximately 6 years. He denies any lower urinary tract symptoms, dysuria, flank pain. He is on no urologic medications at this time. AUA score is 0/35. PSA obtained on 5/24/2021 is 0.42. CBC obtained on 6/14/2021 reveals hemoglobin 14.6, hematocrit 43.1. Testosterone obtained on 6/14/2021 reveals level at 142.2.     Past Medical History:   Diagnosis Date    BPH with obstruction/lower urinary tract symptoms     Gastroesophageal reflux disease without esophagitis 5/6/2019    Hyperglycemia     Hyperlipidemia     Hypertension     Testicular hypofunction     Type 2 diabetes mellitus without complication (HCC)        Past Surgical History:   Procedure Laterality Date    ANKLE SURGERY      HERNIA REPAIR      TONSILLECTOMY         Current Outpatient Medications   Medication Sig Dispense Refill    Testosterone Cypionate 200 MG/ML SOLN Inject 1 mL as directed every 14 days 2 mL 0    Needles & Syringes MISC 1 each by Does not apply route daily 100 each 3    temazepam (RESTORIL) 15 MG capsule TAKE 2 CAPSULES BY MOUTH NIGHTLY AS NEEDED FOR SLEEP 60 capsule 1    hydroCHLOROthiazide (HYDRODIURIL) 25 MG tablet TAKE 1 TABLET EVERY DAY 90 tablet 3    glipiZIDE (GLUCOTROL XL) 10 MG extended release tablet TAKE 1 TABLET EVERY MORNING 90 tablet 3    spironolactone (ALDACTONE) 25 MG tablet TAKE 1 TABLET EVERY DAY 90 tablet 3    atorvastatin (LIPITOR) 20 MG tablet TAKE 1 TABLET AT BEDTIME 90 tablet 3    losartan (COZAAR) 100 MG tablet TAKE 1 TABLET EVERY DAY 90 tablet 3    verapamil (CALAN SR) 240 MG extended release tablet TAKE 1 TABLET TWICE DAILY 180 tablet 3    potassium chloride (KLOR-CON M) 10 MEQ extended release tablet TAKE 2 TABLETS  2 TIMES DAILY 360 tablet 3    fluticasone (FLONASE) 50 MCG/ACT nasal spray USE 1 SPRAY IN EACH NOSTRIL TWICE DAILY 48 g 3    pantoprazole (PROTONIX) 40 MG tablet TAKE 1 TABLET EVERY DAY 90 tablet 3    aspirin 81 MG tablet Take 81 mg by mouth daily      Omega-3 Fatty Acids (FISH OIL) 1000 MG CAPS Take 1,200 mg by mouth 2 times daily      ondansetron (ZOFRAN) 4 MG tablet Take 1 tablet by mouth daily as needed for Nausea or Vomiting (Patient not taking: Reported on 6/21/2021) 30 tablet 0    nystatin (MYCOSTATIN) 128903 UNIT/GM cream APPLY  CREAM TOPICALLY TWICE DAILY (Patient not taking: Reported on 6/21/2021) 30 g 2     No current facility-administered medications for this visit. No Known Allergies    Social History     Socioeconomic History    Marital status:      Spouse name: None    Number of children: None    Years of education: None    Highest education level: None   Occupational History    None   Tobacco Use    Smoking status: Never Smoker    Smokeless tobacco: Never Used   Vaping Use    Vaping Use: Never used   Substance and Sexual Activity    Alcohol use: No    Drug use: No    Sexual activity: None   Other Topics Concern    None   Social History Narrative    None     Social Determinants of Health     Financial Resource Strain: Low Risk     Difficulty of Paying Living Expenses: Not hard at all   Food Insecurity: No Food Insecurity    Worried About Running Out of Food in the Last Year: Never true    Lucie of Food in the Last Year: Never true   Transportation Needs:     Lack of Transportation (Medical):      Lack of Transportation (Non-Medical):    Physical Activity:     Days of Exercise per Week:     Minutes of Exercise per Session:    Stress:     Feeling of Stress :    Social Connections:     Frequency of Communication with Friends and Family:     Frequency of Social Gatherings with Friends and Family:     Attends Ca[employer] Services:     Active Member of Clubs or Organizations:     Attends Club or Organization Meetings:     Marital Status:    Intimate Partner Violence:     Fear of Current or Ex-Partner:     Emotionally Abused:     Physically Abused:     Sexually Abused:        Family History   Problem Relation Age of Onset    No Known Problems Mother     Heart Attack Father        REVIEW OF SYSTEMS:  Review of Systems   Constitutional: Positive for fatigue. Negative for chills and fever. HENT: Negative for congestion, ear pain and trouble swallowing. Eyes: Negative for discharge and redness. Respiratory: Negative for cough and shortness of breath. Cardiovascular: Negative for chest pain. Gastrointestinal: Negative for abdominal distention, abdominal pain, constipation, diarrhea, nausea and vomiting. Endocrine: Negative for cold intolerance and heat intolerance. Genitourinary: Negative for difficulty urinating, dysuria, flank pain, frequency, hematuria and urgency. Musculoskeletal: Negative for back pain and gait problem. Skin: Negative for pallor and wound. Allergic/Immunologic: Negative for environmental allergies and immunocompromised state. Neurological: Negative for dizziness and weakness. Hematological: Negative for adenopathy. Does not bruise/bleed easily. Psychiatric/Behavioral: Negative for agitation and confusion. PHYSICAL EXAM:  Temp 98 °F (36.7 °C) (Temporal)   Ht 6' (1.829 m)   Wt 212 lb 3.2 oz (96.3 kg)   BMI 28.78 kg/m²   Physical Exam  Vitals and nursing note reviewed. Constitutional:       General: He is not in acute distress. Appearance: Normal appearance. He is not ill-appearing. Pulmonary:      Effort: Pulmonary effort is normal. No respiratory distress. Abdominal:      General: There is no distension. Tenderness: There is no abdominal tenderness.  There is no right CVA tenderness or left CVA tenderness. Genitourinary:     Prostate: Enlarged (25g). Not tender and no nodules present. Neurological:      Mental Status: He is alert and oriented to person, place, and time. Mental status is at baseline. Psychiatric:         Mood and Affect: Mood normal.         Behavior: Behavior normal.       DATA:  CBC with Differential:    Lab Results   Component Value Date    WBC 5.4 06/14/2021    RBC 4.86 06/14/2021    HGB 14.6 06/14/2021    HCT 43.1 06/14/2021     06/14/2021    MCV 88.7 06/14/2021    MCH 30.0 06/14/2021    MCHC 33.9 06/14/2021    RDW 12.5 06/14/2021    LYMPHOPCT 26.1 05/24/2021    MONOPCT 7.1 05/24/2021    BASOPCT 1.1 05/24/2021    MONOSABS 0.40 05/24/2021    LYMPHSABS 1.5 05/24/2021    EOSABS 0.40 05/24/2021    BASOSABS 0.10 05/24/2021     Lab Results   Component Value Date    TESTOSTERONE 142.2 (L) 06/14/2021       Results for orders placed or performed in visit on 06/21/21   POCT Urinalysis No Micro (Auto)   Result Value Ref Range    Color, UA yellow     Clarity, UA clear     Glucose, UA POC neg     Bilirubin, UA neg     Ketones, UA neg     Spec Grav, UA 1.025     Blood, UA POC neg     pH, UA 6.5     Protein, UA POC 30mg/dl     Urobilinogen, UA 0.2     Leukocytes, UA neg     Nitrite, UA neg      Lab Results   Component Value Date    PSA 0.42 05/24/2021    PSA 0.47 05/08/2020    PSA 0.41 05/02/2019         1. Hypogonadism in male  Patient like to be restarted on testosterone injections although his last injections were done at the office. We discussed risk and benefits associated with TRT. We will go ahead and start testosterone cypionate 1 mL every 14 days. We also discussed possible phlebotomy if needed, also we need to obtain lab work during TRT. Educated on how to inject and where to inject. We will go ahead and get first dose today in office and patient will start his next dose in 2 weeks.   Will have patient follow-up in 3 months with labs prior.    - POCT Urinalysis No Micro (Auto)  - Testosterone Cypionate 200 MG/ML SOLN; Inject 1 mL as directed every 14 days  Dispense: 2 mL; Refill: 0  - Needles & Syringes MISC; 1 each by Does not apply route daily  Dispense: 100 each; Refill: 3  - Testosterone; Future  - Hemoglobin and Hematocrit, Blood; Future      Orders Placed This Encounter   Procedures    Testosterone     Standing Status:   Future     Standing Expiration Date:   6/21/2022    Hemoglobin and Hematocrit, Blood     Standing Status:   Future     Standing Expiration Date:   6/21/2022    POCT Urinalysis No Micro (Auto)        Return in about 3 months (around 9/21/2021) for with labs prior. All information inputted into the note by the MA to include chief complaint, past medical history, past surgical history, medications, allergies, social and family history and review of systems has been reviewed and updated as needed by me. EMR Dragon/transcription disclaimer: Much of this documentt is electronic  transcription/translation of spoken language to printed text. The  electronic translation of spoken language may be erroneous, or at times,  nonsensical words or phrases may be inadvertently transcribed.  Although I  have reviewed the document for such errors, some may still exist.

## 2021-07-14 ENCOUNTER — NURSE ONLY (OUTPATIENT)
Dept: UROLOGY | Age: 81
End: 2021-07-14
Payer: MEDICARE

## 2021-07-14 DIAGNOSIS — E29.1 HYPOGONADISM IN MALE: Primary | ICD-10-CM

## 2021-07-14 PROCEDURE — 96372 THER/PROPH/DIAG INJ SC/IM: CPT | Performed by: NURSE PRACTITIONER

## 2021-07-14 NOTE — PROGRESS NOTES
After obtaining consent from patient and receiving verbal/written orders from JEZ Elias, I gave patient 1cc of testosterone cyp. 200mg/ml injection in Left upper quad. gluteus, patient tolerated well. Medication was supplied by the patient. Patient had tried to do at home injections but could not figure out how to draw up and could not tolerate giving them to himself. Patient did lose 1 vial trying to inject and draw up at home so refill will be needed earlier. Will pend script to MARISELA Proctor today for refill to [address] [place] [address],[address] per patient request. Alraymundoy Cancel. Return in 2 weeks for next injection, Patient to bring med to appt.  Will fax in refill today

## 2021-07-15 RX ORDER — TESTOSTERONE CYPIONATE 200 MG/ML
1 VIAL (ML) INTRAMUSCULAR
Qty: 2 ML | Refills: 0 | Status: SHIPPED | OUTPATIENT
Start: 2021-07-15 | End: 2021-08-31

## 2021-07-28 ENCOUNTER — NURSE ONLY (OUTPATIENT)
Dept: UROLOGY | Age: 81
End: 2021-07-28
Payer: MEDICARE

## 2021-07-28 DIAGNOSIS — E29.1 HYPOGONADISM IN MALE: Primary | ICD-10-CM

## 2021-07-28 PROCEDURE — 96372 THER/PROPH/DIAG INJ SC/IM: CPT | Performed by: NURSE PRACTITIONER

## 2021-07-28 NOTE — PROGRESS NOTES
After obtaining consent from patient and receiving verbal/written orders from MARISELA Alejandra, I gave patient 1cc of Testosterone Cypionate injection in Right upper quad. gluteus, patient tolerated well. Medication was supplied by the patient.

## 2021-08-18 ENCOUNTER — NURSE ONLY (OUTPATIENT)
Dept: UROLOGY | Age: 81
End: 2021-08-18
Payer: MEDICARE

## 2021-08-18 DIAGNOSIS — E29.1 HYPOGONADISM IN MALE: Primary | ICD-10-CM

## 2021-08-18 PROCEDURE — 96372 THER/PROPH/DIAG INJ SC/IM: CPT | Performed by: NURSE PRACTITIONER

## 2021-08-18 NOTE — PROGRESS NOTES
After obtaining consent from patient and receiving verbal/written orders from MARISELA Holloway, I gave patient 1cc of Testosterone Cyp 200mg/ml injection in Left upper quad. gluteus, patient tolerated well. Medication was supplied by the patient.

## 2021-08-30 DIAGNOSIS — E29.1 HYPOGONADISM IN MALE: ICD-10-CM

## 2021-08-31 ENCOUNTER — OFFICE VISIT (OUTPATIENT)
Dept: INTERNAL MEDICINE | Age: 81
End: 2021-08-31
Payer: MEDICARE

## 2021-08-31 VITALS
BODY MASS INDEX: 27.77 KG/M2 | OXYGEN SATURATION: 93 % | WEIGHT: 205 LBS | HEART RATE: 56 BPM | SYSTOLIC BLOOD PRESSURE: 128 MMHG | DIASTOLIC BLOOD PRESSURE: 76 MMHG | HEIGHT: 72 IN

## 2021-08-31 DIAGNOSIS — E78.2 MIXED HYPERLIPIDEMIA: ICD-10-CM

## 2021-08-31 DIAGNOSIS — E11.9 TYPE 2 DIABETES MELLITUS WITHOUT COMPLICATION, WITHOUT LONG-TERM CURRENT USE OF INSULIN (HCC): ICD-10-CM

## 2021-08-31 DIAGNOSIS — I10 ESSENTIAL HYPERTENSION: ICD-10-CM

## 2021-08-31 DIAGNOSIS — Z00.00 ROUTINE GENERAL MEDICAL EXAMINATION AT A HEALTH CARE FACILITY: Primary | ICD-10-CM

## 2021-08-31 DIAGNOSIS — R79.89 LOW TESTOSTERONE IN MALE: ICD-10-CM

## 2021-08-31 DIAGNOSIS — F51.01 PRIMARY INSOMNIA: ICD-10-CM

## 2021-08-31 PROCEDURE — G8417 CALC BMI ABV UP PARAM F/U: HCPCS | Performed by: NURSE PRACTITIONER

## 2021-08-31 PROCEDURE — G8427 DOCREV CUR MEDS BY ELIG CLIN: HCPCS | Performed by: NURSE PRACTITIONER

## 2021-08-31 PROCEDURE — 1036F TOBACCO NON-USER: CPT | Performed by: NURSE PRACTITIONER

## 2021-08-31 PROCEDURE — G0439 PPPS, SUBSEQ VISIT: HCPCS | Performed by: NURSE PRACTITIONER

## 2021-08-31 PROCEDURE — 80305 DRUG TEST PRSMV DIR OPT OBS: CPT | Performed by: NURSE PRACTITIONER

## 2021-08-31 PROCEDURE — 4040F PNEUMOC VAC/ADMIN/RCVD: CPT | Performed by: NURSE PRACTITIONER

## 2021-08-31 PROCEDURE — 1123F ACP DISCUSS/DSCN MKR DOCD: CPT | Performed by: NURSE PRACTITIONER

## 2021-08-31 PROCEDURE — 99214 OFFICE O/P EST MOD 30 MIN: CPT | Performed by: NURSE PRACTITIONER

## 2021-08-31 RX ORDER — TESTOSTERONE CYPIONATE 200 MG/ML
INJECTION INTRAMUSCULAR
Qty: 2 ML | Refills: 0 | Status: SHIPPED | OUTPATIENT
Start: 2021-08-31 | End: 2021-09-21 | Stop reason: SDUPTHER

## 2021-08-31 RX ORDER — TEMAZEPAM 15 MG/1
CAPSULE ORAL
Qty: 60 CAPSULE | Refills: 1 | Status: SHIPPED | OUTPATIENT
Start: 2021-08-31 | End: 2021-11-30 | Stop reason: SDUPTHER

## 2021-08-31 ASSESSMENT — ENCOUNTER SYMPTOMS
BLOOD IN STOOL: 0
COLOR CHANGE: 0
SORE THROAT: 0
WHEEZING: 0
CHOKING: 0
DIARRHEA: 0
ABDOMINAL PAIN: 0
VOMITING: 0
ABDOMINAL DISTENTION: 0
COUGH: 0
NAUSEA: 0
TROUBLE SWALLOWING: 0
EYE ITCHING: 0
SHORTNESS OF BREATH: 0
STRIDOR: 0
CONSTIPATION: 0
EYE DISCHARGE: 0

## 2021-08-31 ASSESSMENT — PATIENT HEALTH QUESTIONNAIRE - PHQ9
2. FEELING DOWN, DEPRESSED OR HOPELESS: 0
SUM OF ALL RESPONSES TO PHQ QUESTIONS 1-9: 0
SUM OF ALL RESPONSES TO PHQ9 QUESTIONS 1 & 2: 0
1. LITTLE INTEREST OR PLEASURE IN DOING THINGS: 0

## 2021-08-31 ASSESSMENT — LIFESTYLE VARIABLES: HOW OFTEN DO YOU HAVE A DRINK CONTAINING ALCOHOL: 0

## 2021-08-31 NOTE — PATIENT INSTRUCTIONS
1.  Hypertension continue with the current meds no changes are necessary next   #2 hyperlipidemia stable with Lipitor 40 no changes  3. Insomnia stable with Restoril  4. Low testosterone stable on current dose no changes  #5 hyperglycemia and hypoglycemia he monitors blood sugar 4 times a day once a jose monitor. Personalized Preventive Plan for Colleen Florez - 8/31/2021  Medicare offers a range of preventive health benefits. Some of the tests and screenings are paid in full while other may be subject to a deductible, co-insurance, and/or copay. Some of these benefits include a comprehensive review of your medical history including lifestyle, illnesses that may run in your family, and various assessments and screenings as appropriate. After reviewing your medical record and screening and assessments performed today your provider may have ordered immunizations, labs, imaging, and/or referrals for you. A list of these orders (if applicable) as well as your Preventive Care list are included within your After Visit Summary for your review. Other Preventive Recommendations:    · A preventive eye exam performed by an eye specialist is recommended every 1-2 years to screen for glaucoma; cataracts, macular degeneration, and other eye disorders. · A preventive dental visit is recommended every 6 months. · Try to get at least 150 minutes of exercise per week or 10,000 steps per day on a pedometer . · Order or download the FREE \"Exercise & Physical Activity: Your Everyday Guide\" from [employer]. Call 1-432.197.6003 or search [employer] online. · You need 6073-9221 mg of calcium and 6814-4556 IU of vitamin D per day. It is possible to meet your calcium requirement with diet alone, but a vitamin D supplement is usually necessary to meet this goal.  · When exposed to the sun, use a sunscreen that protects against both UVA and UVB radiation with an SPF of 30 or greater. Reapply every 2 to 3 hours or after sweating, drying off with a towel, or swimming. · Always wear a seat belt when traveling in a car. Always wear a helmet when riding a bicycle or motorcycle.

## 2021-08-31 NOTE — PROGRESS NOTES
[facility] PHYSICIAN SERVICES  [facility] INTERNAL MEDICINE  [address] [facility] [address]  [address] [address]  Dept: [phone]  Dept Fax: [phone]: [phone]    Prasanna Hill (:  1940) is a 68 y.o. male,Established patient, here for evaluation of the following chief complaint(s): Medicare Elzie Cheese is a 68 y.o. male who presents today for his medical conditions/complaints as noted below. Prasanna Hill is c/loly Medicare AWV        HPI:     Chief Complaint   Patient presents with    Medicare AWV     HPI   1. Hypertension stable on verpapmil and HCTZ with spironalactone   2. Hyperlipidemia  He is stable with lipitor 40 rl   3. Insomnia; He is stable with restoril at bedtime   4. Low testosterone he is taking testosterone every 2 weeks level is 530  Which is fine   5. Hyperglycemia;  a1 c  He checks his blood sugar 4 times a day ; He is concerned about hypoglycemia ; he wants the JUAN ;      CONTROLLED SUBSTANCE  Drug  restoril   Diagnosis insomnia  Last Ayde Han    Last UDS 2021  Pain contract last date 2021  Effective dose   yes        Changes this visit    none   Controlled Substance Monitoring:    Acute and Chronic Pain Monitoring:   RX Monitoring 2020   Attestation The Prescription Monitoring Report for this patient was reviewed today. Periodic Controlled Substance Monitoring Possible medication side effects, risk of tolerance/dependence & alternative treatments discussed. ;No signs of potential drug abuse or diversion identified. ;Random urine drug screen sent today.              Past Medical History:   Diagnosis Date    BPH with obstruction/lower urinary tract symptoms     Gastroesophageal reflux disease without esophagitis 2019    Hyperglycemia     Hyperlipidemia     Hypertension     Testicular hypofunction     Type 2 diabetes mellitus without complication [facility])       Past Surgical History:   Procedure Laterality Date    ANKLE SURGERY      HERNIA REPAIR      TONSILLECTOMY         Vitals 8/31/2021 6/21/2021 5/27/2021 2/23/2021 11/23/2020 57/15/2859   SYSTOLIC 601 - 470 855 789 260   DIASTOLIC 76 - 83 77 76 74   Pulse 56 - 57 67 - 58   Temp - 98 - - - -   Resp - - - - - -   SpO2 93 - - - - -   Weight 205 lb 212 lb 3.2 oz 209 lb 209 lb - 197 lb   Height 6' 0\" 6' 0\" 6' 0\" 6' 0\" - 6' 0\"   Body mass index 27.8 kg/m2 28.78 kg/m2 28.34 kg/m2 28.34 kg/m2 - 26.72 kg/m2   Pain Level - - - - - -   Some recent data might be hidden       Family History   Problem Relation Age of Onset    No Known Problems Mother     Heart Attack Father        Social History     Tobacco Use    Smoking status: Never Smoker    Smokeless tobacco: Never Used   Substance Use Topics    Alcohol use: No      Current Outpatient Medications   Medication Sig Dispense Refill    testosterone cypionate (DEPOTESTOTERONE CYPIONATE) 200 MG/ML injection INJECT 1 ML (CC) INTRAMUSCULARLY EVERY OTHER WEEK (EVERY  14  DAYS) 2 mL 0    Needles & Syringes MISC 1 each by Does not apply route daily 100 each 3    temazepam (RESTORIL) 15 MG capsule TAKE 2 CAPSULES BY MOUTH NIGHTLY AS NEEDED FOR SLEEP 60 capsule 1    hydroCHLOROthiazide (HYDRODIURIL) 25 MG tablet TAKE 1 TABLET EVERY DAY 90 tablet 3    glipiZIDE (GLUCOTROL XL) 10 MG extended release tablet TAKE 1 TABLET EVERY MORNING 90 tablet 3    spironolactone (ALDACTONE) 25 MG tablet TAKE 1 TABLET EVERY DAY 90 tablet 3    ondansetron (ZOFRAN) 4 MG tablet Take 1 tablet by mouth daily as needed for Nausea or Vomiting (Patient not taking: Reported on 6/21/2021) 30 tablet 0    nystatin (MYCOSTATIN) 318466 UNIT/GM cream APPLY  CREAM TOPICALLY TWICE DAILY (Patient not taking: Reported on 6/21/2021) 30 g 2    atorvastatin (LIPITOR) 20 MG tablet TAKE 1 TABLET AT BEDTIME 90 tablet 3    losartan (COZAAR) 100 MG tablet TAKE 1 TABLET EVERY DAY 90 tablet 3    verapamil (CALAN SR) 240 MG extended release tablet TAKE 1 TABLET TWICE DAILY 180 tablet 3    potassium chloride (KLOR-CON M) 10 MEQ extended release tablet TAKE 2 TABLETS  2 TIMES DAILY 360 tablet 3    fluticasone (FLONASE) 50 MCG/ACT nasal spray USE 1 SPRAY IN EACH NOSTRIL TWICE DAILY 48 g 3    pantoprazole (PROTONIX) 40 MG tablet TAKE 1 TABLET EVERY DAY 90 tablet 3    aspirin 81 MG tablet Take 81 mg by mouth daily      Omega-3 Fatty Acids (FISH OIL) 1000 MG CAPS Take 1,200 mg by mouth 2 times daily       No current facility-administered medications for this visit.      No Known Allergies    Health Maintenance   Topic Date Due    Annual Wellness Visit (AWV)  Never done    Shingles Vaccine (1 of 2) 05/27/2022 (Originally 12/16/1992)    Flu vaccine (1) 09/01/2021    Lipid screen  02/18/2022    Potassium monitoring  05/24/2022    Creatinine monitoring  05/24/2022    Colon cancer screen colonoscopy  03/09/2026    DTaP/Tdap/Td vaccine (2 - Td or Tdap) 08/15/2030    Pneumococcal 65+ years Vaccine  Completed    COVID-19 Vaccine  Completed    Hepatitis C screen  Completed    Hepatitis A vaccine  Aged Out    Hib vaccine  Aged Out    Meningococcal (ACWY) vaccine  Aged Out       Lab Results   Component Value Date    LABA1C 4.9 05/24/2021     Lab Results   Component Value Date    PSA 0.42 05/24/2021    PSA 0.47 05/08/2020    PSA 0.41 05/02/2019     TSH   Date Value Ref Range Status   05/24/2021 1.660 0.270 - 4.200 uIU/mL Final   ]  Lab Results   Component Value Date     05/24/2021    K 4.0 05/24/2021     05/24/2021    CO2 31 (H) 05/24/2021    BUN 21 05/24/2021    CREATININE 0.9 05/24/2021    GLUCOSE 131 (H) 05/24/2021    CALCIUM 9.6 05/24/2021    PROT 7.0 05/24/2021    LABALBU 4.4 05/24/2021    BILITOT 0.7 05/24/2021    ALKPHOS 64 05/24/2021    AST 24 05/24/2021    ALT 32 05/24/2021    LABGLOM >60 05/24/2021    GFRAA >59 05/24/2021     Lab Results   Component Value Date    CHOL 149 (L) 02/18/2021    CHOL 186 11/19/2020    CHOL 151 (L) 05/08/2020     Lab Results   Component Hematological: Negative for adenopathy. Does not bruise/bleed easily. Psychiatric/Behavioral: Negative for confusion and hallucinations. Objective:     Physical Exam  Constitutional:       General: He is not in acute distress. Appearance: He is well-developed. HENT:      Head: Normocephalic and atraumatic. Eyes:      General: No scleral icterus. Right eye: No discharge. Left eye: No discharge. Pupils: Pupils are equal, round, and reactive to light. Neck:      Thyroid: No thyromegaly. Vascular: No JVD. Cardiovascular:      Rate and Rhythm: Normal rate and regular rhythm. Heart sounds: Normal heart sounds. No murmur heard. Pulmonary:      Effort: Pulmonary effort is normal. No respiratory distress. Breath sounds: Normal breath sounds. No wheezing or rales. Abdominal:      General: Bowel sounds are normal. There is no distension. Palpations: Abdomen is soft. There is no mass. Tenderness: There is no abdominal tenderness. There is no guarding or rebound. Musculoskeletal:         General: No tenderness. Normal range of motion. Cervical back: Normal range of motion and neck supple. Skin:     General: Skin is warm and dry. Findings: No erythema or rash. Neurological:      Mental Status: He is alert and oriented to person, place, and time. Cranial Nerves: No cranial nerve deficit. Coordination: Coordination normal.      Deep Tendon Reflexes: Reflexes are normal and symmetric. Reflexes normal.   Psychiatric:         Mood and Affect: Mood is not depressed. Behavior: Behavior normal.         Thought Content:  Thought content normal.         Judgment: Judgment normal.       /76   Pulse 56   Ht 6' (1.829 m)   Wt 205 lb (93 kg)   SpO2 93%   BMI 27.80 kg/m²           Assessment:      Problem List     Essential hypertension     Stable with verapamil HCTZ and spironolactone          Low testosterone in male     Labs are good today he takes an injection every 2 weeks          Mixed hyperlipidemia     Labs are good today with Lipitor 40 daily          Relevant Medications    aspirin 81 MG tablet    atorvastatin (LIPITOR) 20 MG tablet    losartan (COZAAR) 100 MG tablet    verapamil (CALAN SR) 240 MG extended release tablet    hydroCHLOROthiazide (HYDRODIURIL) 25 MG tablet    spironolactone (ALDACTONE) 25 MG tablet    Primary insomnia     Stable with Restoril at bedtime          Relevant Orders    POCT Rapid Drug Screen (Completed)    Type 2 diabetes mellitus without complication, without long-term current use of insulin ([address]     He is very good blood sugar control he is more concerned with hypoglycemia than high readings          Relevant Medications    glipiZIDE (GLUCOTROL XL) 10 MG extended release tablet          Plan:        Patient given educational materials - see patient instructions. Discussed use, benefit, and side effects of prescribed medications. Allpatient questions answered. Pt voiced understanding. Reviewed health maintenance. Instructed to continue current medications, diet and exercise. Patient agreed with treatment plan. Follow up as directed. MEDICATIONS:  No orders of the defined types were placed in this encounter. ORDERS:  Orders Placed This Encounter   Procedures    POCT Rapid Drug Screen       Follow-up:  Return in about 3 months (around 11/30/2021). PATIENT INSTRUCTIONS:  Patient Instructions   1. Hypertension continue with the current meds no changes are necessary next   #2 hyperlipidemia stable with Lipitor 40 no changes  3. Insomnia stable with Restoril  4. Low testosterone stable on current dose no changes  #5 hyperglycemia and hypoglycemia he monitors blood sugar 4 times a day once a jose monitor.     Electronically signed by MARISELA Kebede on 8/31/2021 at 11:35 AM    @On this date 8/31/2021 I have spent 30 minutes reviewing previous notes, test results and face to face with the patient discussing the diagnosis and importance of compliance with the treatment plan as well as documenting on the day of the visit. EMRDragon/transcription disclaimer:  Much of this encounter note is electronic transcription/translation of spoken language to printed texts. The electronic translation of spoken language may be erroneous, or at times,nonsensical words or phrases may be inadvertently transcribed. Although I have reviewed the note for such errors, some may still exist.  Medicare Annual Wellness Visit  Name: Gloria Yan Date: 2021   MRN: [id number] Sex: Male   Age: 68 y.o. Ethnicity: Non- / Non    : 1940 Race: White (non-)      Kwame Gonzales is here for Medicare AWV    Screenings for behavioral, psychosocial and functional/safety risks, and cognitive dysfunction are all negative except as indicated below. These results, as well as other patient data from the [address] [facility] [place], operated by [facility] form, are documented in Flowsheets linked to this Encounter. No Known Allergies      Prior to Visit Medications    Medication Sig Taking?  Authorizing Provider   testosterone cypionate (DEPOTESTOTERONE CYPIONATE) 200 MG/ML injection INJECT 1 ML (CC) INTRAMUSCULARLY EVERY OTHER WEEK (EVERY  14  DAYS)  MARISELA Daily CNP   Needles & Syringes MISC 1 each by Does not apply route daily  MARISELA Daily CNP   temazepam (RESTORIL) 15 MG capsule TAKE 2 CAPSULES BY MOUTH NIGHTLY AS NEEDED FOR SLEEP  MARISELA Ybarra   hydroCHLOROthiazide (HYDRODIURIL) 25 MG tablet TAKE 1 TABLET EVERY DAY  MARISELA Ybarra   glipiZIDE (GLUCOTROL XL) 10 MG extended release tablet TAKE 1 TABLET EVERY MORNING  MARISELA Ybarra   spironolactone (ALDACTONE) 25 MG tablet TAKE 1 TABLET EVERY DAY  MARISELA Ybarra   ondansetron (ZOFRAN) 4 MG tablet Take 1 tablet by mouth daily as needed for Nausea or Vomiting  Patient not taking: Reported on 2021  Urszula Alvarado index is 27.8 kg/m². Based upon direct observation of the patient, evaluation of cognition reveals recent and remote memory intact. Patient's complete Health Risk Assessment and screening values have been reviewed and are found in Flowsheets. The following problems were reviewed today and where indicated follow up appointments were made and/or referrals ordered. Positive Risk Factor Screenings with Interventions:            General Health and ACP:  General  In general, how would you say your health is?: Good  In the past 7 days, have you experienced any of the following?  New or Increased Pain, New or Increased Fatigue, Loneliness, Social Isolation, Stress or Anger?: None of These  Do you get the social and emotional support that you need?: Yes  Do you have a Living Will?: (!) No  Advance Directives     Power of [address] [place] [address] Will ACP-Advance Directive ACP-Power of     Not on File Not on File Not on File Not on File      General Health Risk Interventions:  · No Living Will: Patient declines ACP discussion/assistance        Personalized Preventive Plan   Current Health Maintenance Status  Immunization History   Administered Date(s) Administered    COVID-19, Moderna, PF, 100mcg/0.5mL 02/23/2021, 03/23/2021    Influenza, High Dose (Fluzone 65 yrs and older) 12/20/2017    Influenza, Quadv, adjuvanted, 65 yrs +, IM, PF (Fluad) 11/23/2020    Influenza, Triv, inactivated, subunit, adjuvanted, IM (Fluad 65 yrs and older) 10/01/2019    Pneumococcal Conjugate 13-valent ([id number]) 12/15/2015    Pneumococcal Polysaccharide ([id number]) 04/28/2010    Tdap (Boostrix, Adacel) 08/15/2020        Health Maintenance   Topic Date Due    Annual Wellness Visit (AWV)  Never done    Shingles Vaccine (1 of 2) 05/27/2022 (Originally 12/16/1992)    Flu vaccine (1) 09/01/2021    Lipid screen  02/18/2022    Potassium monitoring  05/24/2022    Creatinine monitoring  05/24/2022    Colon cancer screen colonoscopy 03/09/2026    DTaP/Tdap/Td vaccine (2 - Td or Tdap) 08/15/2030    Pneumococcal 65+ years Vaccine  Completed    COVID-19 Vaccine  Completed    Hepatitis C screen  Completed    Hepatitis A vaccine  Aged Out    Hib vaccine  Aged Out    Meningococcal (ACWY) vaccine  Aged Out     Recommendations for [employer] Due: see orders and patient instructions/AVS.  . Recommended screening schedule for the next 5-10 years is provided to the patient in written form: see Patient Instructions/AVS.    Laila Reyes was seen today for medicare aw.     Diagnoses and all orders for this visit:    Primary insomnia  -     POCT Rapid Drug Screen    Essential hypertension    Mixed hyperlipidemia    Low testosterone in male    Type 2 diabetes mellitus without complication, without long-term current use of insulin ([facility] [address]

## 2021-09-01 ENCOUNTER — NURSE ONLY (OUTPATIENT)
Dept: UROLOGY | Age: 81
End: 2021-09-01
Payer: MEDICARE

## 2021-09-01 DIAGNOSIS — E29.1 HYPOGONADISM IN MALE: Primary | ICD-10-CM

## 2021-09-01 PROCEDURE — 96372 THER/PROPH/DIAG INJ SC/IM: CPT | Performed by: NURSE PRACTITIONER

## 2021-09-01 RX ORDER — TESTOSTERONE CYPIONATE 200 MG/ML
200 INJECTION INTRAMUSCULAR ONCE
Status: DISCONTINUED | OUTPATIENT
Start: 2021-09-01 | End: 2021-09-21

## 2021-09-14 ENCOUNTER — NURSE ONLY (OUTPATIENT)
Dept: UROLOGY | Age: 81
End: 2021-09-14
Payer: MEDICARE

## 2021-09-14 DIAGNOSIS — R79.89 LOW TESTOSTERONE IN MALE: ICD-10-CM

## 2021-09-14 PROCEDURE — 96372 THER/PROPH/DIAG INJ SC/IM: CPT | Performed by: NURSE PRACTITIONER

## 2021-09-14 NOTE — PROGRESS NOTES
After obtaining consent from patient and receiving verbal/written orders from MARISELA Holloway, I gave patient 1cc of Testosterone Cyp 200mg/ml injection in right upper quad. gluteus, patient tolerated well. Medication was supplied by the patient.

## 2021-09-21 ENCOUNTER — OFFICE VISIT (OUTPATIENT)
Dept: UROLOGY | Age: 81
End: 2021-09-21
Payer: MEDICARE

## 2021-09-21 VITALS — WEIGHT: 213 LBS | TEMPERATURE: 97.2 F | HEIGHT: 72 IN | BODY MASS INDEX: 28.85 KG/M2

## 2021-09-21 DIAGNOSIS — E29.1 HYPOGONADISM MALE: Primary | ICD-10-CM

## 2021-09-21 LAB
APPEARANCE FLUID: CLEAR
BILIRUBIN, POC: NORMAL
BLOOD URINE, POC: NORMAL
CLARITY, POC: CLEAR
COLOR, POC: YELLOW
GLUCOSE URINE, POC: NORMAL
KETONES, POC: NORMAL
LEUKOCYTE EST, POC: NORMAL
NITRITE, POC: NORMAL
PH, POC: 5
PROTEIN, POC: NORMAL
SPECIFIC GRAVITY, POC: 1.02
UROBILINOGEN, POC: 0.2

## 2021-09-21 PROCEDURE — 81002 URINALYSIS NONAUTO W/O SCOPE: CPT | Performed by: NURSE PRACTITIONER

## 2021-09-21 PROCEDURE — 99213 OFFICE O/P EST LOW 20 MIN: CPT | Performed by: NURSE PRACTITIONER

## 2021-09-21 RX ORDER — TESTOSTERONE CYPIONATE 200 MG/ML
INJECTION INTRAMUSCULAR
Qty: 2 ML | Refills: 0 | Status: SHIPPED | OUTPATIENT
Start: 2021-09-21 | End: 2021-10-12 | Stop reason: SDUPTHER

## 2021-09-21 NOTE — PROGRESS NOTES
Harry Ordoñez is a 68 y.o. male who presents today   Chief Complaint   Patient presents with    Follow-up     I am here today for my 3 Mo FU for low testerone. Patient is a 61-year-old male presents clinic today with complaints of low testosterone. He was originally on testosterone injections for over 10 years and has been off of them for 6 years. I placed him on testosterone cypionate 200 mg IM every 14 days with a testosterone level of 142.2 on 6/21/2021. He comes in today for follow-up. He denies any fatigue, low libido, overall improvement. Testosterone obtained on 8/23/2021 midcycle was 530.5, hemoglobin 15.9, hematocrit 49.4. Last PSA obtained on 5/24/2021 was 0.42.     Past Medical History:   Diagnosis Date    BPH with obstruction/lower urinary tract symptoms     Gastroesophageal reflux disease without esophagitis 5/6/2019    Hyperglycemia     Hyperlipidemia     Hypertension     Testicular hypofunction     Type 2 diabetes mellitus without complication (HCC)        Past Surgical History:   Procedure Laterality Date    ANKLE SURGERY      HERNIA REPAIR      TONSILLECTOMY         Current Outpatient Medications   Medication Sig Dispense Refill    testosterone cypionate (DEPOTESTOTERONE CYPIONATE) 200 MG/ML injection INJECT 1 ML (CC) INTRAMUSCULARLY EVERY OTHER WEEK (EVERY  14  DAYS) 2 mL 0    temazepam (RESTORIL) 15 MG capsule TAKE 2 CAPSULES BY MOUTH NIGHTLY AS NEEDED FOR SLEEP 60 capsule 1    Needles & Syringes MISC 1 each by Does not apply route daily 100 each 3    hydroCHLOROthiazide (HYDRODIURIL) 25 MG tablet TAKE 1 TABLET EVERY DAY 90 tablet 3    glipiZIDE (GLUCOTROL XL) 10 MG extended release tablet TAKE 1 TABLET EVERY MORNING 90 tablet 3    spironolactone (ALDACTONE) 25 MG tablet TAKE 1 TABLET EVERY DAY 90 tablet 3    ondansetron (ZOFRAN) 4 MG tablet Take 1 tablet by mouth daily as needed for Nausea or Vomiting 30 tablet 0    nystatin (MYCOSTATIN) 971248 UNIT/GM cream APPLY Family:     Attends Shinto Services:     Active Member of Clubs or Organizations:     Attends Club or Organization Meetings:     Marital Status:    Intimate Partner Violence:     Fear of Current or Ex-Partner:     Emotionally Abused:     Physically Abused:     Sexually Abused:        Family History   Problem Relation Age of Onset    No Known Problems Mother     Heart Attack Father        REVIEW OF SYSTEMS:  Review of Systems   Constitutional: Negative for chills and fever. Gastrointestinal: Negative for abdominal distention, abdominal pain, nausea and vomiting. Genitourinary: Negative. Negative for difficulty urinating, dysuria, flank pain, frequency, hematuria and urgency. Musculoskeletal: Negative for back pain and gait problem. Psychiatric/Behavioral: Negative for agitation and confusion. PHYSICAL EXAM:  Temp 97.2 °F (36.2 °C) (Temporal)   Ht 6' (1.829 m)   Wt 213 lb (96.6 kg)   BMI 28.89 kg/m²   Physical Exam  Vitals and nursing note reviewed. Constitutional:       General: He is not in acute distress. Appearance: Normal appearance. He is not ill-appearing. Pulmonary:      Effort: Pulmonary effort is normal. No respiratory distress. Abdominal:      General: There is no distension. Tenderness: There is no abdominal tenderness. There is no right CVA tenderness or left CVA tenderness. Neurological:      Mental Status: He is alert and oriented to person, place, and time. Mental status is at baseline.    Psychiatric:         Mood and Affect: Mood normal.         Behavior: Behavior normal.       DATA:  Hemoglobin/Hematocrit:    Lab Results   Component Value Date    HGB 15.9 08/23/2021    HCT 49.4 08/23/2021     Results for orders placed or performed in visit on 09/21/21   POCT Urinalysis no Micro   Result Value Ref Range    Color, UA YELLOW     Clarity, UA CLEAR     Glucose, UA POC NEG     Bilirubin, UA NEG     Ketones, UA NEG     Spec Grav, UA 1.025     Blood, UA POC NEG pH, UA 5.0     Protein, UA POC NEG     Urobilinogen, UA 0.2     Leukocytes, UA NEG     Nitrite, UA NEG     Appearance, Fluid Clear Clear, Slightly Cloudy     Lab Results   Component Value Date    PSA 0.42 05/24/2021    PSA 0.47 05/08/2020    PSA 0.41 05/02/2019     Lab Results   Component Value Date    TESTOSTERONE 530.5 08/23/2021       1. Hypogonadism male  Patient doing well on testosterone cypionate injections every 2 weeks. He is coming to the office for nurse visits. Decrease in fatigue and increase in libido since starting back on testosterone. Total testosterone within therapeutic range at 530. We will have him follow-up in 6 months with labs prior. Will need EMILY at next visit. - POCT Urinalysis no Micro  - Testosterone Free and Total Male; Future  - Hemoglobin and Hematocrit, Blood; Future  - testosterone cypionate (DEPOTESTOTERONE CYPIONATE) 200 MG/ML injection; INJECT 1 ML (CC) INTRAMUSCULARLY EVERY OTHER WEEK (EVERY  14  DAYS)  Dispense: 2 mL; Refill: 0      Orders Placed This Encounter   Procedures    Testosterone Free and Total Male     Standing Status:   Future     Standing Expiration Date:   9/21/2022    Hemoglobin and Hematocrit, Blood     Standing Status:   Future     Standing Expiration Date:   9/21/2022    POCT Urinalysis no Micro        Return in about 6 months (around 3/21/2022). All information inputted into the note by the MA to include chief complaint, past medical history, past surgical history, medications, allergies, social and family history and review of systems has been reviewed and updated as needed by me. EMR Dragon/transcription disclaimer: Much of this documentt is electronic  transcription/translation of spoken language to printed text. The  electronic translation of spoken language may be erroneous, or at times,  nonsensical words or phrases may be inadvertently transcribed.  Although I  have reviewed the document for such errors, some may still exist.

## 2021-09-24 ASSESSMENT — ENCOUNTER SYMPTOMS
ABDOMINAL PAIN: 0
ABDOMINAL DISTENTION: 0
VOMITING: 0
BACK PAIN: 0
NAUSEA: 0

## 2021-09-29 ENCOUNTER — NURSE ONLY (OUTPATIENT)
Dept: UROLOGY | Age: 81
End: 2021-09-29
Payer: MEDICARE

## 2021-09-29 DIAGNOSIS — R79.89 LOW TESTOSTERONE IN MALE: ICD-10-CM

## 2021-09-29 PROCEDURE — 96372 THER/PROPH/DIAG INJ SC/IM: CPT | Performed by: NURSE PRACTITIONER

## 2021-09-29 RX ORDER — TESTOSTERONE CYPIONATE 200 MG/ML
200 INJECTION INTRAMUSCULAR ONCE
Status: COMPLETED | OUTPATIENT
Start: 2021-09-29 | End: 2021-09-29

## 2021-09-29 RX ADMIN — TESTOSTERONE CYPIONATE 200 MG: 200 INJECTION INTRAMUSCULAR at 08:45

## 2021-10-12 ENCOUNTER — NURSE ONLY (OUTPATIENT)
Dept: UROLOGY | Age: 81
End: 2021-10-12
Payer: MEDICARE

## 2021-10-12 DIAGNOSIS — E29.1 HYPOGONADISM MALE: ICD-10-CM

## 2021-10-12 DIAGNOSIS — R79.89 LOW TESTOSTERONE IN MALE: ICD-10-CM

## 2021-10-12 PROCEDURE — 96372 THER/PROPH/DIAG INJ SC/IM: CPT | Performed by: NURSE PRACTITIONER

## 2021-10-12 RX ORDER — TESTOSTERONE CYPIONATE 200 MG/ML
INJECTION INTRAMUSCULAR
Qty: 2 ML | Refills: 0 | Status: SHIPPED | OUTPATIENT
Start: 2021-10-12 | End: 2021-10-22

## 2021-10-12 NOTE — PROGRESS NOTES
After obtaining consent from patient and receiving verbal/written [facility] from [facility], I gave patient 1cc of Testosterone Cyp 200mg/ml injection in right upper quad. gluteus, patient tolerated well. Medication was supplied by the patient. Patient due for next shot in 2 weeks.     [facility] [id number]  LOT [id number]  EXP 01/2023

## 2021-10-22 DIAGNOSIS — E29.1 HYPOGONADISM MALE: ICD-10-CM

## 2021-10-22 RX ORDER — TESTOSTERONE CYPIONATE 200 MG/ML
INJECTION INTRAMUSCULAR
Qty: 2 ML | Refills: 0 | Status: SHIPPED | OUTPATIENT
Start: 2021-10-22 | End: 2021-11-22

## 2021-10-26 ENCOUNTER — NURSE ONLY (OUTPATIENT)
Dept: UROLOGY | Age: 81
End: 2021-10-26
Payer: MEDICARE

## 2021-10-26 DIAGNOSIS — R79.89 LOW TESTOSTERONE IN MALE: ICD-10-CM

## 2021-10-26 PROCEDURE — 96372 THER/PROPH/DIAG INJ SC/IM: CPT | Performed by: NURSE PRACTITIONER

## 2021-10-26 NOTE — PROGRESS NOTES
After obtaining consent from patient and receiving verbal/written orders from [facility], I gave patient 1cc of Testosterone Cyp 200mg/ml injection in left upper quad. gluteus, patient tolerated well. Medication was supplied by the patient. Patient due for next shot in 2 weeks.     Agapito Escobedo 47 [id number]  LOT [id number]  EXP 10/2023

## 2021-11-07 DIAGNOSIS — I10 ESSENTIAL HYPERTENSION: ICD-10-CM

## 2021-11-07 DIAGNOSIS — E78.2 MIXED HYPERLIPIDEMIA: Primary | ICD-10-CM

## 2021-11-08 RX ORDER — VERAPAMIL HYDROCHLORIDE 240 MG/1
TABLET, FILM COATED, EXTENDED RELEASE ORAL
Qty: 180 TABLET | Refills: 3 | Status: SHIPPED | OUTPATIENT
Start: 2021-11-08 | End: 2022-08-25 | Stop reason: SDUPTHER

## 2021-11-08 RX ORDER — POTASSIUM CHLORIDE 750 MG/1
TABLET, EXTENDED RELEASE ORAL
Qty: 360 TABLET | Refills: 3 | Status: SHIPPED | OUTPATIENT
Start: 2021-11-08 | End: 2022-08-25 | Stop reason: SDUPTHER

## 2021-11-08 RX ORDER — ATORVASTATIN CALCIUM 20 MG/1
TABLET, FILM COATED ORAL
Qty: 90 TABLET | Refills: 3 | Status: SHIPPED | OUTPATIENT
Start: 2021-11-08 | End: 2022-08-25 | Stop reason: SDUPTHER

## 2021-11-08 RX ORDER — LOSARTAN POTASSIUM 100 MG/1
TABLET ORAL
Qty: 90 TABLET | Refills: 3 | Status: SHIPPED | OUTPATIENT
Start: 2021-11-08 | End: 2022-08-25 | Stop reason: SDUPTHER

## 2021-11-08 NOTE — TELEPHONE ENCOUNTER
Demarco Correia called to request a refill on his medication.       Last office visit : 8/31/2021   Next office visit : 11/30/2021     Requested Prescriptions     Pending Prescriptions Disp Refills    losartan (COZAAR) 100 MG tablet [Pharmacy Med Name: LOSARTAN POTASSIUM 100 MG Tablet] 90 tablet 3     Sig: TAKE 1 TABLET EVERY DAY    atorvastatin (LIPITOR) 20 MG tablet [Pharmacy Med Name: ATORVASTATIN CALCIUM 20 MG Tablet] 90 tablet 3     Sig: TAKE 1 TABLET AT BEDTIME    verapamil (CALAN SR) 240 MG extended release tablet [Pharmacy Med Name: VERAPAMIL HYDROCHLORIDE  MG Tablet Extended Release] 180 tablet 3     Sig: TAKE 1 TABLET TWICE DAILY    potassium chloride (KLOR-CON M) 10 MEQ extended release tablet [Pharmacy Med Name: POTASSIUM CHLORIDE ER 10 MEQ Tablet Extended Release] 360 tablet 3     Sig: TAKE 2 TABLETS TWICE DAILY            Andrez Mack MA

## 2021-11-09 ENCOUNTER — NURSE ONLY (OUTPATIENT)
Dept: UROLOGY | Age: 81
End: 2021-11-09
Payer: MEDICARE

## 2021-11-09 ENCOUNTER — OFFICE VISIT (OUTPATIENT)
Dept: INTERNAL MEDICINE | Age: 81
End: 2021-11-09
Payer: MEDICARE

## 2021-11-09 VITALS
OXYGEN SATURATION: 96 % | TEMPERATURE: 97.7 F | WEIGHT: 214 LBS | HEART RATE: 60 BPM | SYSTOLIC BLOOD PRESSURE: 144 MMHG | BODY MASS INDEX: 28.99 KG/M2 | HEIGHT: 72 IN | DIASTOLIC BLOOD PRESSURE: 68 MMHG

## 2021-11-09 DIAGNOSIS — E78.2 MIXED HYPERLIPIDEMIA: ICD-10-CM

## 2021-11-09 DIAGNOSIS — E55.9 VITAMIN D DEFICIENCY: ICD-10-CM

## 2021-11-09 DIAGNOSIS — J32.9 SINUSITIS, UNSPECIFIED CHRONICITY, UNSPECIFIED LOCATION: Primary | ICD-10-CM

## 2021-11-09 DIAGNOSIS — R79.89 LOW TESTOSTERONE IN MALE: ICD-10-CM

## 2021-11-09 DIAGNOSIS — I10 ESSENTIAL HYPERTENSION: ICD-10-CM

## 2021-11-09 PROCEDURE — G8427 DOCREV CUR MEDS BY ELIG CLIN: HCPCS | Performed by: NURSE PRACTITIONER

## 2021-11-09 PROCEDURE — 99213 OFFICE O/P EST LOW 20 MIN: CPT | Performed by: NURSE PRACTITIONER

## 2021-11-09 PROCEDURE — 96372 THER/PROPH/DIAG INJ SC/IM: CPT | Performed by: NURSE PRACTITIONER

## 2021-11-09 PROCEDURE — 1123F ACP DISCUSS/DSCN MKR DOCD: CPT | Performed by: NURSE PRACTITIONER

## 2021-11-09 PROCEDURE — 1036F TOBACCO NON-USER: CPT | Performed by: NURSE PRACTITIONER

## 2021-11-09 PROCEDURE — G8484 FLU IMMUNIZE NO ADMIN: HCPCS | Performed by: NURSE PRACTITIONER

## 2021-11-09 PROCEDURE — G8417 CALC BMI ABV UP PARAM F/U: HCPCS | Performed by: NURSE PRACTITIONER

## 2021-11-09 PROCEDURE — 4040F PNEUMOC VAC/ADMIN/RCVD: CPT | Performed by: NURSE PRACTITIONER

## 2021-11-09 RX ORDER — METHYLPREDNISOLONE ACETATE 80 MG/ML
80 INJECTION, SUSPENSION INTRA-ARTICULAR; INTRALESIONAL; INTRAMUSCULAR; SOFT TISSUE ONCE
Status: COMPLETED | OUTPATIENT
Start: 2021-11-09 | End: 2021-11-09

## 2021-11-09 RX ORDER — CEFDINIR 300 MG/1
300 CAPSULE ORAL 2 TIMES DAILY
Qty: 14 CAPSULE | Refills: 0 | Status: SHIPPED | OUTPATIENT
Start: 2021-11-09 | End: 2021-11-16

## 2021-11-09 RX ADMIN — METHYLPREDNISOLONE ACETATE 80 MG: 80 INJECTION, SUSPENSION INTRA-ARTICULAR; INTRALESIONAL; INTRAMUSCULAR; SOFT TISSUE at 14:00

## 2021-11-09 ASSESSMENT — ENCOUNTER SYMPTOMS
WHEEZING: 0
COLOR CHANGE: 0
CHOKING: 0
DIARRHEA: 0
SINUS PRESSURE: 1
COUGH: 0
STRIDOR: 0
BLOOD IN STOOL: 0
ABDOMINAL DISTENTION: 0
EYE DISCHARGE: 0
NAUSEA: 0
CONSTIPATION: 0
VOMITING: 0
ABDOMINAL PAIN: 0
SINUS PAIN: 1
TROUBLE SWALLOWING: 0
SHORTNESS OF BREATH: 0
SORE THROAT: 0
EYE ITCHING: 0

## 2021-11-09 NOTE — PROGRESS NOTES
After obtaining consent from patient and receiving verbal/written orders from [facility], I gave patient 1cc of Testosterone Cyp 200mg/ml injection in right upper quad. gluteus, patient tolerated well. Medication was supplied by the patient. Patient due for next shot in 2 weeks.     Agapito Escobedo 47 [id number]  LOT [id number]  EXP 10/2023

## 2021-11-09 NOTE — PATIENT INSTRUCTIONS
1.  Acute sinusitis    Acute sinusitis  Medrol 80 im  Cefdinir 300 twice  a day for 7 days  get 2 doses in today    Saline nasal spray 4 times a day both nares for 7 days  Steroid spray, rhinocort, nasocort, nasonex, flonase twice daily about 5 minutes after the saline   mucinex 1200 mg twice daily for 7 days with plenty of water  Delsym cough syrup up to 3 times daily as needed for cough   Ricola cough drops, honey lemon in pink bag;  has echanesia to help build your immunity       #2 loss of taste and altered smell

## 2021-11-09 NOTE — PROGRESS NOTES
[facility] INTERNAL MEDICINE  [address] [address] [place] [address]  Dept: [phone]  Dept Fax: [phone]: [phone]    Artemio De La Rosa (:  1940) is a 68 y.o. male,Established patient, here for evaluation of the following chief complaint(s): Sinusitis (states has a sinus infection)      Artemio De La Rosa is a 68 y.o. male who presents today for his medical conditions/complaints as noted below. Artemio De La Rosa is c/loly Sinusitis (states has a sinus infection)        HPI:     Chief Complaint   Patient presents with    Sinusitis     states has a sinus infection     HPI   1. Sinus congestion; He has been sick for a few days     2. Loss of taste and smell has changed;   He has a terrible smelll all the time   Past Medical History:   Diagnosis Date    BPH with obstruction/lower urinary tract symptoms     Gastroesophageal reflux disease without esophagitis 2019    Hyperglycemia     Hyperlipidemia     Hypertension     Testicular hypofunction     Type 2 diabetes mellitus without complication [facility])       Past Surgical History:   Procedure Laterality Date    ANKLE SURGERY      HERNIA REPAIR      TONSILLECTOMY         Vitals 2021/1076   SYSTOLIC 423 - 669 - 721 790   DIASTOLIC 68 - 76 - 83 77   Pulse 60 - 56 - 57 67   Temp 97.7 97.2 - 98 - -   Resp - - - - - -   SpO2 96 - 93 - - -   Weight 214 lb 213 lb 205 lb 212 lb 3.2 oz 209 lb 209 lb   Height 6' 0\" 6' 0\" 6' 0\" 6' 0\" 6' 0\" 6' 0\"   Body mass index 29.02 kg/m2 28.88 kg/m2 27.8 kg/m2 28.78 kg/m2 28.34 kg/m2 28.34 kg/m2   Pain Level - - - - - -   Some recent data might be hidden       Family History   Problem Relation Age of Onset    No Known Problems Mother     Heart Attack Father        Social History     Tobacco Use    Smoking status: Never Smoker    Smokeless tobacco: Never Used   Substance Use Topics    Alcohol use: No      Current Outpatient Medications   Medication Sig Dispense Refill    cefdinir (OMNICEF) 300 MG capsule Take 1 capsule by mouth 2 times daily for 7 days 14 capsule 0    losartan (COZAAR) 100 MG tablet TAKE 1 TABLET EVERY DAY 90 tablet 3    atorvastatin (LIPITOR) 20 MG tablet TAKE 1 TABLET AT BEDTIME 90 tablet 3    verapamil (CALAN SR) 240 MG extended release tablet TAKE 1 TABLET TWICE DAILY 180 tablet 3    potassium chloride (KLOR-CON M) 10 MEQ extended release tablet TAKE 2 TABLETS TWICE DAILY 360 tablet 3    testosterone cypionate (DEPOTESTOTERONE CYPIONATE) 200 MG/ML injection INJECT 1 ML (CC) INTRAMUSCULARLY EVERY OTHER WEEK (EVERY  14  DAYS) 2 mL 0    Needles & Syringes MISC 1 each by Does not apply route daily 100 each 3    hydroCHLOROthiazide (HYDRODIURIL) 25 MG tablet TAKE 1 TABLET EVERY DAY 90 tablet 3    glipiZIDE (GLUCOTROL XL) 10 MG extended release tablet TAKE 1 TABLET EVERY MORNING 90 tablet 3    spironolactone (ALDACTONE) 25 MG tablet TAKE 1 TABLET EVERY DAY 90 tablet 3    ondansetron (ZOFRAN) 4 MG tablet Take 1 tablet by mouth daily as needed for Nausea or Vomiting 30 tablet 0    nystatin (MYCOSTATIN) 367762 UNIT/GM cream APPLY  CREAM TOPICALLY TWICE DAILY 30 g 2    fluticasone (FLONASE) 50 MCG/ACT nasal spray USE 1 SPRAY IN EACH NOSTRIL TWICE DAILY 48 g 3    pantoprazole (PROTONIX) 40 MG tablet TAKE 1 TABLET EVERY DAY 90 tablet 3    aspirin 81 MG tablet Take 81 mg by mouth daily      Omega-3 Fatty Acids (FISH OIL) 1000 MG CAPS Take 1,200 mg by mouth 2 times daily      temazepam (RESTORIL) 15 MG capsule TAKE 2 CAPSULES BY MOUTH NIGHTLY AS NEEDED FOR SLEEP 60 capsule 1     Current Facility-Administered Medications   Medication Dose Route Frequency Provider Last Rate Last Admin    methylPREDNISolone acetate (DEPO-MEDROL) injection 80 mg  80 mg IntraMUSCular Once Hoy Carrier, APRN         No Known Allergies    Health Maintenance   Topic Date Due    COVID-19 Vaccine (1) Never done    Flu vaccine (1) 09/01/2021    Shingles Vaccine (1 of 2) 05/27/2022 (Originally 12/16/1992)    Lipid screen  02/18/2022    Potassium monitoring  05/24/2022    Creatinine monitoring  05/24/2022    Annual Wellness Visit (AWV)  09/01/2022    Colon cancer screen colonoscopy  03/09/2026    DTaP/Tdap/Td vaccine (2 - Td or Tdap) 08/15/2030    Pneumococcal 65+ years Vaccine  Completed    Hepatitis C screen  Completed    Hepatitis A vaccine  Aged Out    Hib vaccine  Aged Out    Meningococcal (ACWY) vaccine  Aged Out       Lab Results   Component Value Date    LABA1C 4.9 05/24/2021     Lab Results   Component Value Date    PSA 0.42 05/24/2021    PSA 0.47 05/08/2020    PSA 0.41 05/02/2019     TSH   Date Value Ref Range Status   05/24/2021 1.660 0.270 - 4.200 uIU/mL Final   ]  Lab Results   Component Value Date     05/24/2021    K 4.0 05/24/2021     05/24/2021    CO2 31 (H) 05/24/2021    BUN 21 05/24/2021    CREATININE 0.9 05/24/2021    GLUCOSE 131 (H) 05/24/2021    CALCIUM 9.6 05/24/2021    PROT 7.0 05/24/2021    LABALBU 4.4 05/24/2021    BILITOT 0.7 05/24/2021    ALKPHOS 64 05/24/2021    AST 24 05/24/2021    ALT 32 05/24/2021    LABGLOM >60 05/24/2021    GFRAA >59 05/24/2021     Lab Results   Component Value Date    CHOL 149 (L) 02/18/2021    CHOL 186 11/19/2020    CHOL 151 (L) 05/08/2020     Lab Results   Component Value Date    TRIG 121 05/24/2021    TRIG 106 02/18/2021    TRIG 188 (H) 11/19/2020     Lab Results   Component Value Date    HDL 40 (L) 02/18/2021    HDL 34 (L) 11/19/2020    HDL 44 (L) 05/08/2020     Lab Results   Component Value Date    LDLCALC 88 02/18/2021    LDLCALC 114 11/19/2020    LDLCALC 86 05/08/2020     Lab Results   Component Value Date     05/24/2021    K 4.0 05/24/2021     05/24/2021    CO2 31 05/24/2021    BUN 21 05/24/2021    CREATININE 0.9 05/24/2021    GLUCOSE 131 05/24/2021    CALCIUM 9.6 05/24/2021      Lab Results   Component Value Date    WBC 5.4 06/14/2021 HGB 15.9 08/23/2021    HCT 49.4 08/23/2021    MCV 88.7 06/14/2021     (L) 06/14/2021    LABLYMP 1.36 04/07/2015    LYMPHOPCT 26.1 05/24/2021    RBC 4.86 06/14/2021    MCH 30.0 06/14/2021    MCHC 33.9 06/14/2021    RDW 12.5 06/14/2021     Lab Results   Component Value Date    VITD25 36.1 05/24/2021       Subjective:      Review of Systems   Constitutional: Negative for fatigue, fever and unexpected weight change. HENT: Positive for congestion, sinus pressure and sinus pain. Negative for ear discharge, ear pain, mouth sores, sore throat and trouble swallowing. Eyes: Negative for discharge, itching and visual disturbance. Respiratory: Negative for cough, choking, shortness of breath, wheezing and stridor. Cardiovascular: Negative for chest pain, palpitations and leg swelling. Gastrointestinal: Negative for abdominal distention, abdominal pain, blood in stool, constipation, diarrhea, nausea and vomiting. Endocrine: Negative for cold intolerance, polydipsia and polyuria. Genitourinary: Negative for difficulty urinating, dysuria, frequency and urgency. Musculoskeletal: Negative for arthralgias and gait problem. Skin: Negative for color change and rash. Allergic/Immunologic: Negative for food allergies and immunocompromised state. Neurological: Negative for dizziness, tremors, syncope, speech difficulty, weakness and headaches. Hematological: Negative for adenopathy. Does not bruise/bleed easily. Psychiatric/Behavioral: Negative for confusion and hallucinations. Objective:     Physical Exam  Constitutional:       General: He is not in acute distress. Appearance: He is well-developed. HENT:      Head: Normocephalic and atraumatic. Eyes:      General: No scleral icterus. Right eye: No discharge. Left eye: No discharge. Pupils: Pupils are equal, round, and reactive to light. Neck:      Thyroid: No thyromegaly. Vascular: No JVD.    Cardiovascular: Rate and Rhythm: Normal rate and regular rhythm. Heart sounds: Normal heart sounds. No murmur heard. Pulmonary:      Effort: Pulmonary effort is normal. No respiratory distress. Breath sounds: Normal breath sounds. No wheezing or rales. Abdominal:      General: Bowel sounds are normal. There is no distension. Palpations: Abdomen is soft. There is no mass. Tenderness: There is no abdominal tenderness. There is no guarding or rebound. Musculoskeletal:         General: No tenderness. Normal range of motion. Cervical back: Normal range of motion and neck supple. Skin:     General: Skin is warm and dry. Findings: No erythema or rash. Neurological:      Mental Status: He is alert and oriented to person, place, and time. Cranial Nerves: No cranial nerve deficit. Coordination: Coordination normal.      Deep Tendon Reflexes: Reflexes are normal and symmetric. Reflexes normal.   Psychiatric:         Mood and Affect: Mood is not depressed. Behavior: Behavior normal.         Thought Content:  Thought content normal.         Judgment: Judgment normal.       BP (!) 144/68   Pulse 60   Temp 97.7 °F (36.5 °C)   Ht 6' (1.829 m)   Wt 214 lb (97.1 kg)   SpO2 96%   BMI 29.02 kg/m²           Assessment:      Problem List     Essential hypertension    Relevant Medications    losartan (COZAAR) 100 MG tablet    verapamil (CALAN SR) 240 MG extended release tablet    potassium chloride (KLOR-CON M) 10 MEQ extended release tablet    Other Relevant Orders    CBC Auto Differential    Comprehensive Metabolic Panel    Urinalysis Reflex to Culture    TSH without Reflex    Mixed hyperlipidemia    Relevant Medications    aspirin 81 MG tablet    hydroCHLOROthiazide (HYDRODIURIL) 25 MG tablet    spironolactone (ALDACTONE) 25 MG tablet    losartan (COZAAR) 100 MG tablet    atorvastatin (LIPITOR) 20 MG tablet    verapamil (CALAN SR) 240 MG extended release tablet    Other Relevant Orders Lipid Panel    Vitamin D deficiency    Relevant Orders    Vitamin D 25 Hydroxy          Plan:        Patient given educational materials - see patient instructions. Discussed use, benefit, and side effects of prescribed medications. Allpatient questions answered. Pt voiced understanding. Reviewed health maintenance. Instructed to continue current medications, diet and exercise. Patient agreed with treatment plan. Follow up as directed. MEDICATIONS:  Orders Placed This Encounter   Medications    methylPREDNISolone acetate (DEPO-MEDROL) injection 80 mg    cefdinir (OMNICEF) 300 MG capsule     Sig: Take 1 capsule by mouth 2 times daily for 7 days     Dispense:  14 capsule     Refill:  0         ORDERS:  Orders Placed This Encounter   Procedures    XR SINUSES (<3 VIEWS)    CBC Auto Differential    Comprehensive Metabolic Panel    Vitamin D 25 Hydroxy    Urinalysis Reflex to Culture    TSH without Reflex    Lipid Panel       Follow-up:  No follow-ups on file. PATIENT INSTRUCTIONS:  Patient Instructions   1. Acute sinusitis    Acute sinusitis  Medrol 80 im  Cefdinir 300 twice  a day for 7 days  get 2 doses in today    Saline nasal spray 4 times a day both nares for 7 days  Steroid spray, rhinocort, nasocort, nasonex, flonase twice daily about 5 minutes after the saline   mucinex 1200 mg twice daily for 7 days with plenty of water  Delsym cough syrup up to 3 times daily as needed for cough   Ricola cough drops, honey lemon in pink bag;  has echanesia to help build your immunity       #2 loss of taste and altered smell    Electronically signed by MARISELA Lam on 11/9/2021 at 1:33 PM    @On this date 11/9/2021 I have spent 22 minutes reviewing previous notes, test results and face to face with the patient discussing the diagnosis and importance of compliance with the treatment plan as well as documenting on the day of the visit.     EMRDragon/transcription disclaimer:  Much of this encounter note is electronic transcription/translation of spoken language to printed texts. The electronic translation of spoken language may be erroneous, or at times,nonsensical words or phrases may be inadvertently transcribed.   Although I have reviewed the note for such errors, some may still exist.

## 2021-11-20 DIAGNOSIS — E29.1 HYPOGONADISM MALE: ICD-10-CM

## 2021-11-22 ENCOUNTER — HOSPITAL ENCOUNTER (OUTPATIENT)
Dept: GENERAL RADIOLOGY | Age: 81
Discharge: HOME | End: 2021-11-22
Payer: MEDICARE

## 2021-11-22 DIAGNOSIS — E78.2 MIXED HYPERLIPIDEMIA: ICD-10-CM

## 2021-11-22 DIAGNOSIS — J32.9 SINUSITIS, UNSPECIFIED CHRONICITY, UNSPECIFIED LOCATION: ICD-10-CM

## 2021-11-22 DIAGNOSIS — I10 ESSENTIAL HYPERTENSION: ICD-10-CM

## 2021-11-22 DIAGNOSIS — E55.9 VITAMIN D DEFICIENCY: ICD-10-CM

## 2021-11-22 LAB
ALBUMIN SERPL-MCNC: 4.5 G/DL (ref 3.5–5.2)
ALP BLD-CCNC: 63 U/L (ref 40–130)
ALT SERPL-CCNC: 32 U/L (ref 5–41)
ANION GAP SERPL CALCULATED.3IONS-SCNC: 13 MMOL/L (ref 7–19)
AST SERPL-CCNC: 25 U/L (ref 5–40)
BACTERIA: NEGATIVE /HPF
BASOPHILS ABSOLUTE: 0.1 K/UL (ref 0–0.2)
BASOPHILS RELATIVE PERCENT: 1 % (ref 0–1)
BILIRUB SERPL-MCNC: 0.7 MG/DL (ref 0.2–1.2)
BILIRUBIN URINE: NEGATIVE
BLOOD, URINE: NEGATIVE
BUN BLDV-MCNC: 24 MG/DL (ref 8–23)
CALCIUM SERPL-MCNC: 9.4 MG/DL (ref 8.8–10.2)
CHLORIDE BLD-SCNC: 104 MMOL/L (ref 98–111)
CHOLESTEROL, TOTAL: 127 MG/DL (ref 160–199)
CLARITY: CLEAR
CO2: 26 MMOL/L (ref 22–29)
COLOR: YELLOW
CREAT SERPL-MCNC: 1 MG/DL (ref 0.5–1.2)
CRYSTALS, UA: ABNORMAL /HPF
EOSINOPHILS ABSOLUTE: 0.2 K/UL (ref 0–0.6)
EOSINOPHILS RELATIVE PERCENT: 3 % (ref 0–5)
EPITHELIAL CELLS, UA: 0 /HPF (ref 0–5)
GFR AFRICAN AMERICAN: >59
GFR NON-AFRICAN AMERICAN: >60
GLUCOSE BLD-MCNC: 120 MG/DL (ref 74–109)
GLUCOSE URINE: NEGATIVE MG/DL
HCT VFR BLD CALC: 56.1 % (ref 42–52)
HDLC SERPL-MCNC: 37 MG/DL (ref 55–121)
HEMOGLOBIN: 17.8 G/DL (ref 14–18)
HYALINE CASTS: 1 /HPF (ref 0–8)
IMMATURE GRANULOCYTES #: 0 K/UL
KETONES, URINE: NEGATIVE MG/DL
LDL CHOLESTEROL CALCULATED: 78 MG/DL
LEUKOCYTE ESTERASE, URINE: NEGATIVE
LYMPHOCYTES ABSOLUTE: 1.7 K/UL (ref 1.1–4.5)
LYMPHOCYTES RELATIVE PERCENT: 23.6 % (ref 20–40)
MCH RBC QN AUTO: 28.2 PG (ref 27–31)
MCHC RBC AUTO-ENTMCNC: 31.7 G/DL (ref 33–37)
MCV RBC AUTO: 88.9 FL (ref 80–94)
MONOCYTES ABSOLUTE: 0.6 K/UL (ref 0–0.9)
MONOCYTES RELATIVE PERCENT: 7.8 % (ref 0–10)
NEUTROPHILS ABSOLUTE: 4.7 K/UL (ref 1.5–7.5)
NEUTROPHILS RELATIVE PERCENT: 64.3 % (ref 50–65)
NITRITE, URINE: NEGATIVE
PDW BLD-RTO: 13.2 % (ref 11.5–14.5)
PH UA: 7 (ref 5–8)
PLATELET # BLD: 146 K/UL (ref 130–400)
PMV BLD AUTO: 9.8 FL (ref 9.4–12.4)
POTASSIUM SERPL-SCNC: 4.7 MMOL/L (ref 3.5–5)
PROTEIN UA: 30 MG/DL
RBC # BLD: 6.31 M/UL (ref 4.7–6.1)
RBC UA: 1 /HPF (ref 0–4)
SODIUM BLD-SCNC: 143 MMOL/L (ref 136–145)
SPECIFIC GRAVITY UA: 1.02 (ref 1–1.03)
TOTAL PROTEIN: 6.9 G/DL (ref 6.6–8.7)
TRIGL SERPL-MCNC: 60 MG/DL (ref 0–149)
TSH SERPL DL<=0.05 MIU/L-ACNC: 3.67 UIU/ML (ref 0.27–4.2)
UROBILINOGEN, URINE: 0.2 E.U./DL
VITAMIN D 25-HYDROXY: 41.1 NG/ML
WBC # BLD: 7.3 K/UL (ref 4.8–10.8)
WBC UA: 0 /HPF (ref 0–5)

## 2021-11-22 PROCEDURE — 70210 X-RAY EXAM OF SINUSES: CPT

## 2021-11-22 RX ORDER — TESTOSTERONE CYPIONATE 200 MG/ML
INJECTION INTRAMUSCULAR
Qty: 2 ML | Refills: 0 | Status: SHIPPED | OUTPATIENT
Start: 2021-11-22 | End: 2022-01-03

## 2021-11-23 ENCOUNTER — NURSE ONLY (OUTPATIENT)
Dept: UROLOGY | Age: 81
End: 2021-11-23
Payer: MEDICARE

## 2021-11-23 DIAGNOSIS — R79.89 LOW TESTOSTERONE IN MALE: ICD-10-CM

## 2021-11-23 PROCEDURE — 96372 THER/PROPH/DIAG INJ SC/IM: CPT | Performed by: NURSE PRACTITIONER

## 2021-11-23 RX ORDER — TESTOSTERONE CYPIONATE 100 MG/ML
200 INJECTION, SOLUTION INTRAMUSCULAR ONCE
Status: COMPLETED | OUTPATIENT
Start: 2021-11-23 | End: 2021-11-23

## 2021-11-23 RX ADMIN — TESTOSTERONE CYPIONATE 200 MG: 100 INJECTION, SOLUTION INTRAMUSCULAR at 14:58

## 2021-11-23 NOTE — PROGRESS NOTES
After obtaining consent from patient and receiving [facility], I gave patient 2cc of Testosterone Cyp 100mg/ml injection (to equal 200mg as we only have 100mg/ml in stock) in left upper quad. gluteus, patient tolerated well. Medication was NOT supplied by the patient. Patient due for next shot in 2 weeks.

## 2021-11-30 ENCOUNTER — OFFICE VISIT (OUTPATIENT)
Dept: INTERNAL MEDICINE | Age: 81
End: 2021-11-30
Payer: MEDICARE

## 2021-11-30 VITALS
WEIGHT: 206 LBS | HEART RATE: 68 BPM | OXYGEN SATURATION: 95 % | SYSTOLIC BLOOD PRESSURE: 138 MMHG | HEIGHT: 72 IN | BODY MASS INDEX: 27.9 KG/M2 | DIASTOLIC BLOOD PRESSURE: 80 MMHG

## 2021-11-30 DIAGNOSIS — I10 ESSENTIAL HYPERTENSION: ICD-10-CM

## 2021-11-30 DIAGNOSIS — E55.9 VITAMIN D DEFICIENCY: ICD-10-CM

## 2021-11-30 DIAGNOSIS — E11.9 TYPE 2 DIABETES MELLITUS WITHOUT COMPLICATION, WITHOUT LONG-TERM CURRENT USE OF INSULIN (HCC): Primary | ICD-10-CM

## 2021-11-30 DIAGNOSIS — E87.6 CHRONIC HYPOKALEMIA: ICD-10-CM

## 2021-11-30 DIAGNOSIS — N40.0 BENIGN PROSTATIC HYPERPLASIA WITHOUT LOWER URINARY TRACT SYMPTOMS: ICD-10-CM

## 2021-11-30 DIAGNOSIS — R79.89 LOW TESTOSTERONE IN MALE: ICD-10-CM

## 2021-11-30 DIAGNOSIS — E78.2 MIXED HYPERLIPIDEMIA: ICD-10-CM

## 2021-11-30 DIAGNOSIS — F51.01 PRIMARY INSOMNIA: ICD-10-CM

## 2021-11-30 PROCEDURE — 80305 DRUG TEST PRSMV DIR OPT OBS: CPT | Performed by: NURSE PRACTITIONER

## 2021-11-30 PROCEDURE — 4040F PNEUMOC VAC/ADMIN/RCVD: CPT | Performed by: NURSE PRACTITIONER

## 2021-11-30 PROCEDURE — 90694 VACC AIIV4 NO PRSRV 0.5ML IM: CPT | Performed by: NURSE PRACTITIONER

## 2021-11-30 PROCEDURE — 1036F TOBACCO NON-USER: CPT | Performed by: NURSE PRACTITIONER

## 2021-11-30 PROCEDURE — 1123F ACP DISCUSS/DSCN MKR DOCD: CPT | Performed by: NURSE PRACTITIONER

## 2021-11-30 PROCEDURE — G0008 ADMIN INFLUENZA VIRUS VAC: HCPCS | Performed by: NURSE PRACTITIONER

## 2021-11-30 PROCEDURE — G8427 DOCREV CUR MEDS BY ELIG CLIN: HCPCS | Performed by: NURSE PRACTITIONER

## 2021-11-30 PROCEDURE — G8484 FLU IMMUNIZE NO ADMIN: HCPCS | Performed by: NURSE PRACTITIONER

## 2021-11-30 PROCEDURE — 99214 OFFICE O/P EST MOD 30 MIN: CPT | Performed by: NURSE PRACTITIONER

## 2021-11-30 PROCEDURE — G8417 CALC BMI ABV UP PARAM F/U: HCPCS | Performed by: NURSE PRACTITIONER

## 2021-11-30 RX ORDER — TEMAZEPAM 15 MG/1
CAPSULE ORAL
Qty: 60 CAPSULE | Refills: 1 | Status: SHIPPED | OUTPATIENT
Start: 2021-11-30 | End: 2022-04-25

## 2021-11-30 ASSESSMENT — ENCOUNTER SYMPTOMS
STRIDOR: 0
EYE DISCHARGE: 0
WHEEZING: 0
SHORTNESS OF BREATH: 0
COLOR CHANGE: 0
EYE ITCHING: 0
ABDOMINAL PAIN: 0
CHOKING: 0
VOMITING: 0
NAUSEA: 0
DIARRHEA: 0
TROUBLE SWALLOWING: 0
ABDOMINAL DISTENTION: 0
BLOOD IN STOOL: 0
CONSTIPATION: 0
COUGH: 0
SORE THROAT: 0

## 2021-11-30 NOTE — PATIENT INSTRUCTIONS
. TYPE II DM  Stable no changes  2. BPH;  Stable on meds    3. Chronic hypokalemia  K+ is good continue as you are  4. Vit d def;  Labs are good your stable no changes  5. Hyperlipidemia; very good control with the statin you are on  6. Low testosterone; Stable need to check that at next visit  7.   Hypertension; stable for now;  Just monitor if more tingling start taking 2 81 mg ASA

## 2021-11-30 NOTE — PROGRESS NOTES
Vitals 11/30/2021 11/9/2021 9/21/2021 8/31/2021 6/21/2021 6/52/0014   SYSTOLIC 488 272 - 535 - 840   DIASTOLIC 80 68 - 76 - 83   Pulse 68 60 - 56 - 57   Temp - 97.7 97.2 - 98 -   Resp - - - - - -   SpO2 95 96 - 93 - -   Weight 206 lb 214 lb 213 lb 205 lb 212 lb 3.2 oz 209 lb   Height 6' 0\" 6' 0\" 6' 0\" 6' 0\" 6' 0\" 6' 0\"   Body mass index 27.94 kg/m2 29.02 kg/m2 28.88 kg/m2 27.8 kg/m2 28.78 kg/m2 28.34 kg/m2   Pain Level - - - - - -   Some recent data might be hidden       Family History   Problem Relation Age of Onset    No Known Problems Mother     Heart Attack Father        Social History     Tobacco Use    Smoking status: Never Smoker    Smokeless tobacco: Never Used   Substance Use Topics    Alcohol use: No      Current Outpatient Medications   Medication Sig Dispense Refill    testosterone cypionate (DEPOTESTOTERONE CYPIONATE) 200 MG/ML injection INJECT 1 ML INTRAMUSCULARLY EVERY OTHER WEEK.  2 mL 0    losartan (COZAAR) 100 MG tablet TAKE 1 TABLET EVERY DAY 90 tablet 3    atorvastatin (LIPITOR) 20 MG tablet TAKE 1 TABLET AT BEDTIME 90 tablet 3    verapamil (CALAN SR) 240 MG extended release tablet TAKE 1 TABLET TWICE DAILY 180 tablet 3    potassium chloride (KLOR-CON M) 10 MEQ extended release tablet TAKE 2 TABLETS TWICE DAILY 360 tablet 3    temazepam (RESTORIL) 15 MG capsule TAKE 2 CAPSULES BY MOUTH NIGHTLY AS NEEDED FOR SLEEP 60 capsule 1    Needles & Syringes MISC 1 each by Does not apply route daily 100 each 3    hydroCHLOROthiazide (HYDRODIURIL) 25 MG tablet TAKE 1 TABLET EVERY DAY 90 tablet 3    glipiZIDE (GLUCOTROL XL) 10 MG extended release tablet TAKE 1 TABLET EVERY MORNING 90 tablet 3    spironolactone (ALDACTONE) 25 MG tablet TAKE 1 TABLET EVERY DAY 90 tablet 3    ondansetron (ZOFRAN) 4 MG tablet Take 1 tablet by mouth daily as needed for Nausea or Vomiting 30 tablet 0    nystatin (MYCOSTATIN) 455622 UNIT/GM cream APPLY  CREAM TOPICALLY TWICE DAILY 30 g 2    fluticasone (FLONASE) 50 MCG/ACT nasal spray USE 1 SPRAY IN EACH NOSTRIL TWICE DAILY 48 g 3    pantoprazole (PROTONIX) 40 MG tablet TAKE 1 TABLET EVERY DAY 90 tablet 3    aspirin 81 MG tablet Take 81 mg by mouth daily      Omega-3 Fatty Acids (FISH OIL) 1000 MG CAPS Take 1,200 mg by mouth 2 times daily       No current facility-administered medications for this visit.      No Known Allergies    Health Maintenance   Topic Date Due    COVID-19 Vaccine (1) Never done    Shingles Vaccine (1 of 2) 05/27/2022 (Originally 12/16/1992)    Annual Wellness Visit (AWV)  09/01/2022    Lipid screen  11/22/2022    Potassium monitoring  11/22/2022    Creatinine monitoring  11/22/2022    Colon cancer screen colonoscopy  03/09/2026    DTaP/Tdap/Td vaccine (2 - Td or Tdap) 08/15/2030    Flu vaccine  Completed    Pneumococcal 65+ years Vaccine  Completed    Hepatitis C screen  Completed    Hepatitis A vaccine  Aged Out    Hib vaccine  Aged Out    Meningococcal (ACWY) vaccine  Aged Out       Lab Results   Component Value Date    LABA1C 4.9 05/24/2021     Lab Results   Component Value Date    PSA 0.42 05/24/2021    PSA 0.47 05/08/2020    PSA 0.41 05/02/2019     TSH   Date Value Ref Range Status   11/22/2021 3.670 0.270 - 4.200 uIU/mL Final   ]  Lab Results   Component Value Date     11/22/2021    K 4.7 11/22/2021     11/22/2021    CO2 26 11/22/2021    BUN 24 (H) 11/22/2021    CREATININE 1.0 11/22/2021    GLUCOSE 120 (H) 11/22/2021    CALCIUM 9.4 11/22/2021    PROT 6.9 11/22/2021    LABALBU 4.5 11/22/2021    BILITOT 0.7 11/22/2021    ALKPHOS 63 11/22/2021    AST 25 11/22/2021    ALT 32 11/22/2021    LABGLOM >60 11/22/2021    GFRAA >59 11/22/2021     Lab Results   Component Value Date    CHOL 127 (L) 11/22/2021    CHOL 149 (L) 02/18/2021    CHOL 186 11/19/2020     Lab Results   Component Value Date    TRIG 60 11/22/2021    TRIG 121 05/24/2021    TRIG 106 02/18/2021     Lab Results   Component Value Date    HDL 37 (L) 11/22/2021    HDL 40 (L) 02/18/2021    HDL 34 (L) 11/19/2020     Lab Results   Component Value Date    LDLCALC 78 11/22/2021    LDLCALC 88 02/18/2021    LDLCALC 114 11/19/2020     Lab Results   Component Value Date     11/22/2021    K 4.7 11/22/2021     11/22/2021    CO2 26 11/22/2021    BUN 24 11/22/2021    CREATININE 1.0 11/22/2021    GLUCOSE 120 11/22/2021    CALCIUM 9.4 11/22/2021      Lab Results   Component Value Date    WBC 7.3 11/22/2021    HGB 17.8 11/22/2021    HCT 56.1 (H) 11/22/2021    MCV 88.9 11/22/2021     11/22/2021    LABLYMP 1.36 04/07/2015    LYMPHOPCT 23.6 11/22/2021    RBC 6.31 (H) 11/22/2021    MCH 28.2 11/22/2021    MCHC 31.7 (L) 11/22/2021    RDW 13.2 11/22/2021     Lab Results   Component Value Date    VITD25 41.1 11/22/2021     Labs reviewed from 11/22/2021    Subjective:      Review of Systems   Constitutional: Negative for fatigue, fever and unexpected weight change. HENT: Negative for ear discharge, ear pain, mouth sores, sore throat and trouble swallowing. Eyes: Negative for discharge, itching and visual disturbance. Respiratory: Negative for cough, choking, shortness of breath, wheezing and stridor. Cardiovascular: Negative for chest pain, palpitations and leg swelling. Gastrointestinal: Negative for abdominal distention, abdominal pain, blood in stool, constipation, diarrhea, nausea and vomiting. Endocrine: Negative for cold intolerance, polydipsia and polyuria. Genitourinary: Negative for difficulty urinating, dysuria, frequency and urgency. Musculoskeletal: Positive for arthralgias. Negative for gait problem. Skin: Negative for color change and rash. Allergic/Immunologic: Negative for food allergies and immunocompromised state. Neurological: Negative for dizziness, tremors, syncope, speech difficulty, weakness and headaches. Insomnia     Hematological: Negative for adenopathy. Does not bruise/bleed easily.    Psychiatric/Behavioral: (COZAAR) 100 MG tablet    verapamil (CALAN SR) 240 MG extended release tablet    potassium chloride (KLOR-CON M) 10 MEQ extended release tablet    Low testosterone in male     He takes 200 mg twice a month we will get testosterone level at next office visit hemoglobin is fine          Relevant Medications    testosterone cypionate (DEPOTESTOTERONE CYPIONATE) 200 MG/ML injection    Mixed hyperlipidemia     Very good control with cholesterol check with glycerides he is on Lipitor 20          Relevant Medications    aspirin 81 MG tablet    hydroCHLOROthiazide (HYDRODIURIL) 25 MG tablet    spironolactone (ALDACTONE) 25 MG tablet    losartan (COZAAR) 100 MG tablet    atorvastatin (LIPITOR) 20 MG tablet    verapamil (CALAN SR) 240 MG extended release tablet    Primary insomnia     Stable on Restoril 30          Relevant Orders    POCT Rapid Drug Screen (Completed)    Type 2 diabetes mellitus without complication, without long-term current use of insulin (HCC) - Primary     Stable with glipizide 10 no hypoglycemia          Relevant Medications    glipiZIDE (GLUCOTROL XL) 10 MG extended release tablet    Vitamin D deficiency     Stable with 50,000 weekly level is good today                Plan:        Patient given educational materials - see patient instructions. Discussed use, benefit, and side effects of prescribed medications. Allpatient questions answered. Pt voiced understanding. Reviewed health maintenance. Instructed to continue current medications, diet and exercise. Patient agreed with treatment plan. Follow up as directed. MEDICATIONS:  No orders of the defined types were placed in this encounter. ORDERS:  Orders Placed This Encounter   Procedures    INFLUENZA, QUADV, ADJUVANTED, 65 YRS =, IM, PF, PREFILL SYR, 0.5ML (FLUAD)    POCT Rapid Drug Screen       Follow-up:  Return in about 3 months (around 2/28/2022). PATIENT INSTRUCTIONS:  Patient Instructions   . TYPE II DM  Stable no changes  2. BPH;  Stable on meds    3. Chronic hypokalemia  K+ is good continue as you are  4. Vit d def;  Labs are good your stable no changes  5. Hyperlipidemia; very good control with the statin you are on  6. Low testosterone; Stable need to check that at next visit  7. Hypertension; stable for now;  Just monitor if more tingling start taking 2 81 mg ASA      Electronically signed by MARISELA Martinez on 11/30/2021 at 4:25 PM    @On this date 11/30/2021 I have spent 28  minutes reviewing previous notes, test results and face to face with the patient discussing the diagnosis and importance of compliance with the treatment plan as well as documenting on the day of the visit. EMRDragon/transcription disclaimer:  Much of this encounter note is electronic transcription/translation of spoken language to printed texts. The electronic translation of spoken language may be erroneous, or at times,nonsensical words or phrases may be inadvertently transcribed.   Although I have reviewed the note for such errors, some may still exist.

## 2021-11-30 NOTE — ASSESSMENT & PLAN NOTE
He takes 200 mg twice a month we will get testosterone level at next office visit hemoglobin is fine

## 2021-12-08 ENCOUNTER — NURSE ONLY (OUTPATIENT)
Dept: UROLOGY | Age: 81
End: 2021-12-08
Payer: MEDICARE

## 2021-12-08 DIAGNOSIS — E29.1 HYPOGONADISM MALE: ICD-10-CM

## 2021-12-08 DIAGNOSIS — R79.89 LOW TESTOSTERONE IN MALE: Primary | ICD-10-CM

## 2021-12-08 PROCEDURE — 96372 THER/PROPH/DIAG INJ SC/IM: CPT | Performed by: NURSE PRACTITIONER

## 2021-12-08 RX ORDER — TESTOSTERONE CYPIONATE 200 MG/ML
200 INJECTION INTRAMUSCULAR ONCE
Status: COMPLETED | OUTPATIENT
Start: 2021-12-08 | End: 2021-12-08

## 2021-12-08 RX ADMIN — TESTOSTERONE CYPIONATE 200 MG: 200 INJECTION INTRAMUSCULAR at 08:36

## 2021-12-08 NOTE — PROGRESS NOTES
After obtaining consent from patient and receiving verbal/written orders from [facility], I gave patient 1cc of Testosterone Cyp 200mg/ml injection in right upper quad. gluteus, patient tolerated well. Medication was supplied by the patient. Patient due for next shot in 2 weeks. Agapito Escobedo [id number]  LOT [id number]. [id number]  EXP 03/2024

## 2021-12-22 ENCOUNTER — NURSE ONLY (OUTPATIENT)
Dept: UROLOGY | Age: 81
End: 2021-12-22
Payer: MEDICARE

## 2021-12-22 DIAGNOSIS — R79.89 LOW TESTOSTERONE IN MALE: Primary | ICD-10-CM

## 2021-12-22 PROCEDURE — 96372 THER/PROPH/DIAG INJ SC/IM: CPT | Performed by: NURSE PRACTITIONER

## 2021-12-22 RX ORDER — TESTOSTERONE CYPIONATE 200 MG/ML
200 INJECTION INTRAMUSCULAR ONCE
Status: COMPLETED | OUTPATIENT
Start: 2021-12-22 | End: 2021-12-22

## 2021-12-22 RX ADMIN — TESTOSTERONE CYPIONATE 200 MG: 200 INJECTION INTRAMUSCULAR at 08:26

## 2021-12-22 NOTE — PROGRESS NOTES
After obtaining consent from patient and receiving verbal/written orders from MARISELA Rush, I gave patient 1cc of Testosterone Cyp 200mg/ml injection in left upper quad. gluteus, patient tolerated well. Medication was supplied by the patient. Patient due for next shot in 2 weeks.     [facility] [id number]  LOT [id number]  EXP 10/2023

## 2022-01-03 DIAGNOSIS — E29.1 HYPOGONADISM MALE: ICD-10-CM

## 2022-01-03 RX ORDER — TESTOSTERONE CYPIONATE 200 MG/ML
INJECTION INTRAMUSCULAR
Qty: 2 ML | Refills: 0 | Status: SHIPPED | OUTPATIENT
Start: 2022-01-03 | End: 2022-01-25

## 2022-01-05 ENCOUNTER — NURSE ONLY (OUTPATIENT)
Dept: UROLOGY | Age: 82
End: 2022-01-05
Payer: MEDICARE

## 2022-01-05 DIAGNOSIS — R79.89 LOW TESTOSTERONE IN MALE: Primary | ICD-10-CM

## 2022-01-05 PROCEDURE — 96372 THER/PROPH/DIAG INJ SC/IM: CPT | Performed by: NURSE PRACTITIONER

## 2022-01-05 RX ORDER — TESTOSTERONE CYPIONATE 200 MG/ML
200 INJECTION INTRAMUSCULAR ONCE
Status: COMPLETED | OUTPATIENT
Start: 2022-01-05 | End: 2022-01-05

## 2022-01-05 RX ADMIN — TESTOSTERONE CYPIONATE 200 MG: 200 INJECTION INTRAMUSCULAR at 08:52

## 2022-01-05 NOTE — PROGRESS NOTES
After obtaining consent from patient and receiving verbal/written orders from [facility] [place] [place] [facility], I gave patient 1cc of Testosterone Cyp 200mg/ml injection in right upper quad. gluteus, patient tolerated well. Medication was supplied by the patient. Patient due for next shot in 2 weeks. Agapito Escobedo 49 [id number]  LOT [id number]. [id number]  EXP 03/24

## 2022-01-19 ENCOUNTER — NURSE ONLY (OUTPATIENT)
Dept: UROLOGY | Age: 82
End: 2022-01-19
Payer: MEDICARE

## 2022-01-19 DIAGNOSIS — R79.89 LOW TESTOSTERONE IN MALE: Primary | ICD-10-CM

## 2022-01-19 PROCEDURE — 96372 THER/PROPH/DIAG INJ SC/IM: CPT | Performed by: NURSE PRACTITIONER

## 2022-01-19 RX ORDER — TESTOSTERONE CYPIONATE 200 MG/ML
200 INJECTION INTRAMUSCULAR ONCE
Status: COMPLETED | OUTPATIENT
Start: 2022-01-19 | End: 2022-01-19

## 2022-01-19 RX ADMIN — TESTOSTERONE CYPIONATE 200 MG: 200 INJECTION INTRAMUSCULAR at 08:31

## 2022-01-19 NOTE — PROGRESS NOTES
After obtaining consent from patient and receiving verbal/written orders from [facility] [place] [facility], I gave patient 1cc of Testosterone Cyp 200mg/ml injection in left upper quad. gluteus, patient tolerated well. Medication was supplied by the patient. Patient due for next shot in 2 weeks. Agaptio Escobedo 47 [phone]  LOT [id number]. [id number]  EXP 03/24

## 2022-01-25 DIAGNOSIS — E29.1 HYPOGONADISM MALE: ICD-10-CM

## 2022-01-25 RX ORDER — TESTOSTERONE CYPIONATE 200 MG/ML
INJECTION INTRAMUSCULAR
Qty: 2 ML | Refills: 0 | Status: SHIPPED | OUTPATIENT
Start: 2022-01-25 | End: 2022-02-25

## 2022-02-02 ENCOUNTER — NURSE ONLY (OUTPATIENT)
Dept: UROLOGY | Age: 82
End: 2022-02-02
Payer: MEDICARE

## 2022-02-02 DIAGNOSIS — R79.89 LOW TESTOSTERONE IN MALE: ICD-10-CM

## 2022-02-02 PROCEDURE — 96372 THER/PROPH/DIAG INJ SC/IM: CPT | Performed by: NURSE PRACTITIONER

## 2022-02-02 RX ORDER — TESTOSTERONE CYPIONATE 200 MG/ML
200 INJECTION INTRAMUSCULAR ONCE
Status: COMPLETED | OUTPATIENT
Start: 2022-02-02 | End: 2022-02-02

## 2022-02-02 RX ADMIN — TESTOSTERONE CYPIONATE 200 MG: 200 INJECTION INTRAMUSCULAR at 08:17

## 2022-02-02 NOTE — PROGRESS NOTES
After obtaining consent from patient and receiving verbal/written orders [facility], I gave patient 1cc of Testosterone Cyp 200mg/ml injection in right upper quad. gluteus, patient tolerated well. Medication was supplied by the patient. Patient due for next shot in 2 weeks.     Agapito Escobedo 47 [id number]  LOT [id number]  EXP 08/24

## 2022-02-15 ENCOUNTER — NURSE ONLY (OUTPATIENT)
Dept: UROLOGY | Age: 82
End: 2022-02-15
Payer: MEDICARE

## 2022-02-15 DIAGNOSIS — R79.89 LOW TESTOSTERONE IN MALE: ICD-10-CM

## 2022-02-15 PROCEDURE — 96372 THER/PROPH/DIAG INJ SC/IM: CPT | Performed by: NURSE PRACTITIONER

## 2022-02-15 NOTE — PROGRESS NOTES
After obtaining consent from patient and receiving verbal/written orders from MARISELA Vela, I gave patient 1cc of Testosterone Cyp 200mg/ml injection in left upper quad. gluteus, patient tolerated well. Medication was supplied by the patient. Patient due for next shot in 2 weeks.

## 2022-02-22 RX ORDER — CLONIDINE HYDROCHLORIDE 0.1 MG/1
TABLET ORAL
COMMUNITY
Start: 2021-12-28

## 2022-02-23 DIAGNOSIS — E11.9 TYPE 2 DIABETES MELLITUS WITHOUT COMPLICATION, WITHOUT LONG-TERM CURRENT USE OF INSULIN (HCC): ICD-10-CM

## 2022-02-23 DIAGNOSIS — I10 ESSENTIAL HYPERTENSION: ICD-10-CM

## 2022-02-23 DIAGNOSIS — E55.9 VITAMIN D DEFICIENCY: ICD-10-CM

## 2022-02-23 DIAGNOSIS — E29.1 HYPOGONADISM MALE: ICD-10-CM

## 2022-02-23 DIAGNOSIS — R79.89 LOW TESTOSTERONE IN MALE: ICD-10-CM

## 2022-02-23 LAB
ALBUMIN SERPL-MCNC: 4.1 G/DL (ref 3.5–5.2)
ALP BLD-CCNC: 54 U/L (ref 40–130)
ALT SERPL-CCNC: 33 U/L (ref 5–41)
ANION GAP SERPL CALCULATED.3IONS-SCNC: 11 MMOL/L (ref 7–19)
AST SERPL-CCNC: 27 U/L (ref 5–40)
BACTERIA: NEGATIVE /HPF
BASOPHILS ABSOLUTE: 0.1 K/UL (ref 0–0.2)
BASOPHILS RELATIVE PERCENT: 0.6 % (ref 0–1)
BILIRUB SERPL-MCNC: 0.6 MG/DL (ref 0.2–1.2)
BILIRUBIN URINE: NEGATIVE
BLOOD, URINE: NEGATIVE
BUN BLDV-MCNC: 20 MG/DL (ref 8–23)
CALCIUM SERPL-MCNC: 9 MG/DL (ref 8.8–10.2)
CHLORIDE BLD-SCNC: 102 MMOL/L (ref 98–111)
CLARITY: CLEAR
CO2: 26 MMOL/L (ref 22–29)
COLOR: YELLOW
CREAT SERPL-MCNC: 0.7 MG/DL (ref 0.5–1.2)
CRYSTALS, UA: ABNORMAL /HPF
EOSINOPHILS ABSOLUTE: 0.3 K/UL (ref 0–0.6)
EOSINOPHILS RELATIVE PERCENT: 4 % (ref 0–5)
EPITHELIAL CELLS, UA: 0 /HPF (ref 0–5)
GFR AFRICAN AMERICAN: >59
GFR NON-AFRICAN AMERICAN: >60
GLUCOSE BLD-MCNC: 97 MG/DL (ref 74–109)
GLUCOSE URINE: NEGATIVE MG/DL
HBA1C MFR BLD: 5.4 % (ref 4–6)
HCT VFR BLD CALC: 52.5 % (ref 42–52)
HEMOGLOBIN: 17.1 G/DL (ref 14–18)
HYALINE CASTS: 0 /HPF (ref 0–8)
IMMATURE GRANULOCYTES #: 0 K/UL
KETONES, URINE: ABNORMAL MG/DL
LEUKOCYTE ESTERASE, URINE: NEGATIVE
LYMPHOCYTES ABSOLUTE: 1.2 K/UL (ref 1.1–4.5)
LYMPHOCYTES RELATIVE PERCENT: 15 % (ref 20–40)
MCH RBC QN AUTO: 28.9 PG (ref 27–31)
MCHC RBC AUTO-ENTMCNC: 32.6 G/DL (ref 33–37)
MCV RBC AUTO: 88.7 FL (ref 80–94)
MONOCYTES ABSOLUTE: 0.6 K/UL (ref 0–0.9)
MONOCYTES RELATIVE PERCENT: 8 % (ref 0–10)
NEUTROPHILS ABSOLUTE: 5.6 K/UL (ref 1.5–7.5)
NEUTROPHILS RELATIVE PERCENT: 72.1 % (ref 50–65)
NITRITE, URINE: NEGATIVE
PDW BLD-RTO: 13.8 % (ref 11.5–14.5)
PH UA: 6 (ref 5–8)
PLATELET # BLD: 136 K/UL (ref 130–400)
PMV BLD AUTO: 10 FL (ref 9.4–12.4)
POTASSIUM SERPL-SCNC: 3.9 MMOL/L (ref 3.5–5)
PROTEIN UA: 30 MG/DL
RBC # BLD: 5.92 M/UL (ref 4.7–6.1)
RBC UA: 1 /HPF (ref 0–4)
SODIUM BLD-SCNC: 139 MMOL/L (ref 136–145)
SPECIFIC GRAVITY UA: 1.02 (ref 1–1.03)
TESTOSTERONE TOTAL: 856.9 NG/DL (ref 193–740)
TOTAL PROTEIN: 6.4 G/DL (ref 6.6–8.7)
TSH SERPL DL<=0.05 MIU/L-ACNC: 3.03 UIU/ML (ref 0.27–4.2)
UROBILINOGEN, URINE: 0.2 E.U./DL
VITAMIN D 25-HYDROXY: 39.1 NG/ML
WBC # BLD: 7.7 K/UL (ref 4.8–10.8)
WBC UA: 1 /HPF (ref 0–5)

## 2022-02-23 ASSESSMENT — ENCOUNTER SYMPTOMS
CONSTIPATION: 0
EYE DISCHARGE: 0
SHORTNESS OF BREATH: 0
TROUBLE SWALLOWING: 0
ABDOMINAL DISTENTION: 0
WHEEZING: 0
COUGH: 0
DIARRHEA: 0
NAUSEA: 0
CHOKING: 0
SORE THROAT: 0
COLOR CHANGE: 0
VOMITING: 0
BLOOD IN STOOL: 0
STRIDOR: 0
ABDOMINAL PAIN: 0
EYE ITCHING: 0

## 2022-02-23 NOTE — PATIENT INSTRUCTIONS
1.  Hypertension The current medical regimen is effective;  continue present plan and medications. 2.   type 2 diabetes mellitus stable no changes  3. Tachycardia stable with Carmen no changes  4. Primary insomnia stable no changes with Restoril  5. Mixed hyperlipidemia stable on current meds no changes are necessary  6.   Low testosterone level is good today no changes are necessary

## 2022-02-23 NOTE — PROGRESS NOTES
[facility]  [facility]  [address] [facility] [place] [address]  [address] [address]  Dept: [phone]  Dept Fax: [phone]: [phone]    Gutierrez Montalvo (:  1940) is a 80 y.o. male,Established patient  with green, here for evaluation of the following chief complaint(s): 3 Month Follow-Up      Gutierrez Montalvo is a 80 y.o. male who presents today for his medical conditions/complaints as noted below. Gutierrez Montalvo is c/loly 3 Month Follow-Up        HPI:     Chief Complaint   Patient presents with    3 Month Follow-Up     HPI   #1 hypertension he is on HCTZ 25 losartan 100 mg daily spironolactone 25 mg daily  2.   tachycardia he is on Jennifer 240 mg twice daily  #3 type 2 diabetes mellitus he is well controlled with diet A1c is 5.4 fasting glucose is 120  #4 insomnia he is taking to 15 mg Restoril 2 at bedtime at night for sleep  #5 hyperlipidemia stable on current dose of Lipitor 20 mg at bedtime  #5 hypotestosterone level is good today should have no issues    Controlled Substance Monitoring:    Acute and Chronic Pain Monitoring:   RX Monitoring 2020   Attestation The Prescription Monitoring Report for this patient was reviewed today. Periodic Controlled Substance Monitoring Possible medication side effects, risk of tolerance/dependence & alternative treatments discussed. ;No signs of potential drug abuse or diversion identified. ;Random urine drug screen sent today. Controlled Substance Monitoring:    Acute and Chronic Pain Monitoring:   RX Monitoring 2020   Attestation The Prescription Monitoring Report for this patient was reviewed today. Periodic Controlled Substance Monitoring Possible medication side effects, risk of tolerance/dependence & alternative treatments discussed. ;No signs of potential drug abuse or diversion identified. ;Random urine drug screen sent today.      CONTROLLED SUBSTANCE  Drug restoril   Diagnosis  insomnia  Last Naomy Netiffani Last UDS 12/1/2021  Pain contract last date  12/1/2021  Effective dose   yes      Changes this visit   none       Past Medical History:   Diagnosis Date    BPH with obstruction/lower urinary tract symptoms     Gastroesophageal reflux disease without esophagitis 5/6/2019    Hyperglycemia     Hyperlipidemia     Hypertension     Testicular hypofunction     Type 2 diabetes mellitus without complication [facility])       Past Surgical History:   Procedure Laterality Date    ANKLE SURGERY      HERNIA REPAIR      TONSILLECTOMY         Vitals 2/28/2022 11/30/2021 11/9/2021 9/21/2021 8/31/2021 9/97/7532   SYSTOLIC 087 647 473 - 198 -   DIASTOLIC 70 80 68 - 76 -   Pulse 68 68 60 - 56 -   Temp - - 97.7 97.2 - 98   Resp - - - - - -   SpO2 98 95 96 - 93 -   Weight 214 lb 9.6 oz 206 lb 214 lb 213 lb 205 lb 212 lb 3.2 oz   Height 6' 0\" 6' 0\" 6' 0\" 6' 0\" 6' 0\" 6' 0\"   Body mass index 29.1 kg/m2 27.94 kg/m2 29.02 kg/m2 28.88 kg/m2 27.8 kg/m2 28.78 kg/m2   Pain Level - - - - - -   Some recent data might be hidden       Family History   Problem Relation Age of Onset    No Known Problems Mother     Heart Attack Father        Social History     Tobacco Use    Smoking status: Never Smoker    Smokeless tobacco: Never Used   Substance Use Topics    Alcohol use: No      Current Outpatient Medications   Medication Sig Dispense Refill    ciprofloxacin (CIPRO) 500 MG tablet Take 1 tablet by mouth 2 times daily for 7 days 14 tablet 0    methylPREDNISolone (MEDROL DOSEPACK) 4 MG tablet Take by mouth.  1 kit 0    testosterone cypionate (DEPOTESTOTERONE CYPIONATE) 200 MG/ML injection INJECT 1 ML INTRAMUSCULARLY  EVERY OTHER WEEK 2 mL 0    temazepam (RESTORIL) 15 MG capsule TAKE 2 CAPSULES BY MOUTH NIGHTLY AS NEEDED FOR SLEEP 60 capsule 1    losartan (COZAAR) 100 MG tablet TAKE 1 TABLET EVERY DAY 90 tablet 3    atorvastatin (LIPITOR) 20 MG tablet TAKE 1 TABLET AT BEDTIME 90 tablet 3    verapamil (CALAN SR) 240 MG extended release tablet TAKE 1 TABLET TWICE DAILY 180 tablet 3    potassium chloride (KLOR-CON M) 10 MEQ extended release tablet TAKE 2 TABLETS TWICE DAILY 360 tablet 3    Needles & Syringes MISC 1 each by Does not apply route daily 100 each 3    hydroCHLOROthiazide (HYDRODIURIL) 25 MG tablet TAKE 1 TABLET EVERY DAY 90 tablet 3    glipiZIDE (GLUCOTROL XL) 10 MG extended release tablet TAKE 1 TABLET EVERY MORNING 90 tablet 3    spironolactone (ALDACTONE) 25 MG tablet TAKE 1 TABLET EVERY DAY 90 tablet 3    nystatin (MYCOSTATIN) 928451 UNIT/GM cream APPLY  CREAM TOPICALLY TWICE DAILY 30 g 2    fluticasone (FLONASE) 50 MCG/ACT nasal spray USE 1 SPRAY IN EACH NOSTRIL TWICE DAILY 48 g 3    pantoprazole (PROTONIX) 40 MG tablet TAKE 1 TABLET EVERY DAY 90 tablet 3    aspirin 81 MG tablet Take 81 mg by mouth daily      Omega-3 Fatty Acids (FISH OIL) 1000 MG CAPS Take 1,200 mg by mouth 2 times daily      cloNIDine (CATAPRES) 0.1 MG tablet TAKE 1 TABLET BY MOUTH ONCE DAILY (Patient not taking: Reported on 2/28/2022)      ondansetron (ZOFRAN) 4 MG tablet Take 1 tablet by mouth daily as needed for Nausea or Vomiting (Patient not taking: Reported on 2/28/2022) 30 tablet 0     No current facility-administered medications for this visit.      No Known Allergies    Health Maintenance   Topic Date Due    COVID-19 Vaccine (3 - Booster for Moderna series) 08/23/2021    Shingles Vaccine (1 of 2) 05/27/2022 (Originally 12/16/1990)    Depression Screen  08/31/2022    Annual Wellness Visit (AWV)  09/01/2022    Lipid screen  11/22/2022    Potassium monitoring  02/23/2023    Creatinine monitoring  02/23/2023    Colorectal Cancer Screen  03/09/2026    DTaP/Tdap/Td vaccine (2 - Td or Tdap) 08/15/2030    Flu vaccine  Completed    Pneumococcal 65+ years Vaccine  Completed    Hepatitis C screen  Completed    Hepatitis A vaccine  Aged Out    Hib vaccine  Aged Out    Meningococcal (ACWY) vaccine  Aged Out       Lab Results   Component Value Date    LABA1C 5.4 02/23/2022     Lab Results   Component Value Date    PSA 0.42 05/24/2021    PSA 0.47 05/08/2020    PSA 0.41 05/02/2019     TSH   Date Value Ref Range Status   02/23/2022 3.030 0.270 - 4.200 uIU/mL Final   ]  Lab Results   Component Value Date     02/23/2022    K 3.9 02/23/2022     02/23/2022    CO2 26 02/23/2022    BUN 20 02/23/2022    CREATININE 0.7 02/23/2022    GLUCOSE 97 02/23/2022    CALCIUM 9.0 02/23/2022    PROT 6.4 (L) 02/23/2022    LABALBU 4.1 02/23/2022    BILITOT 0.6 02/23/2022    ALKPHOS 54 02/23/2022    AST 27 02/23/2022    ALT 33 02/23/2022    LABGLOM >60 02/23/2022    GFRAA >59 02/23/2022     Lab Results   Component Value Date    CHOL 127 (L) 11/22/2021    CHOL 149 (L) 02/18/2021    CHOL 186 11/19/2020     Lab Results   Component Value Date    TRIG 60 11/22/2021    TRIG 121 05/24/2021    TRIG 106 02/18/2021     Lab Results   Component Value Date    HDL 37 (L) 11/22/2021    HDL 40 (L) 02/18/2021    HDL 34 (L) 11/19/2020     Lab Results   Component Value Date    LDLCALC 78 11/22/2021    LDLCALC 88 02/18/2021    LDLCALC 114 11/19/2020     Lab Results   Component Value Date     02/23/2022    K 3.9 02/23/2022     02/23/2022    CO2 26 02/23/2022    BUN 20 02/23/2022    CREATININE 0.7 02/23/2022    GLUCOSE 97 02/23/2022    CALCIUM 9.0 02/23/2022      Lab Results   Component Value Date    WBC 7.7 02/23/2022    HGB 17.1 02/23/2022    HCT 52.5 (H) 02/23/2022    MCV 88.7 02/23/2022     02/23/2022    LABLYMP 1.36 04/07/2015    LYMPHOPCT 15.0 (L) 02/23/2022    RBC 5.92 02/23/2022    MCH 28.9 02/23/2022    MCHC 32.6 (L) 02/23/2022    RDW 13.8 02/23/2022     Lab Results   Component Value Date    VITD25 39.1 02/23/2022     Labs reviewed from 2/23/2022    Subjective:      Review of Systems   Constitutional: Negative for fatigue, fever and unexpected weight change. HENT: Negative for ear discharge, ear pain, mouth sores, sore throat and trouble swallowing.     Eyes: Negative for discharge, itching and visual disturbance. Respiratory: Negative for cough, choking, shortness of breath, wheezing and stridor. Cardiovascular: Negative for chest pain, palpitations and leg swelling. Gastrointestinal: Negative for abdominal distention, abdominal pain, blood in stool, constipation, diarrhea, nausea and vomiting. Endocrine: Negative for cold intolerance, polydipsia and polyuria. Genitourinary: Negative for difficulty urinating, dysuria, frequency and urgency. Musculoskeletal: Negative for arthralgias and gait problem. Skin: Negative for color change and rash. Allergic/Immunologic: Negative for food allergies and immunocompromised state. Neurological: Negative for dizziness, tremors, syncope, speech difficulty, weakness and headaches. Hematological: Negative for adenopathy. Does not bruise/bleed easily. Psychiatric/Behavioral: Negative for confusion and hallucinations. Objective:     Physical Exam  Constitutional:       General: He is not in acute distress. Appearance: He is well-developed. HENT:      Head: Normocephalic and atraumatic. Eyes:      General: No scleral icterus. Right eye: No discharge. Left eye: No discharge. Pupils: Pupils are equal, round, and reactive to light. Neck:      Thyroid: No thyromegaly. Vascular: No JVD. Cardiovascular:      Rate and Rhythm: Normal rate and regular rhythm. Heart sounds: Normal heart sounds. No murmur heard. Pulmonary:      Effort: Pulmonary effort is normal. No respiratory distress. Breath sounds: Normal breath sounds. No wheezing or rales. Abdominal:      General: Bowel sounds are normal. There is no distension. Palpations: Abdomen is soft. There is no mass. Tenderness: There is no abdominal tenderness. There is no guarding or rebound. Musculoskeletal:         General: No tenderness. Normal range of motion.       Cervical back: Normal range of motion and neck supple. Skin:     General: Skin is warm and dry. Findings: No erythema or rash. Neurological:      Mental Status: He is alert and oriented to person, place, and time. Cranial Nerves: No cranial nerve deficit. Coordination: Coordination normal.      Deep Tendon Reflexes: Reflexes are normal and symmetric. Reflexes normal.   Psychiatric:         Mood and Affect: Mood is not depressed. Behavior: Behavior normal.         Thought Content:  Thought content normal.         Judgment: Judgment normal.       /70   Pulse 68   Ht 6' (1.829 m)   Wt 214 lb 9.6 oz (97.3 kg)   SpO2 98%   BMI 29.10 kg/m²           Assessment:      Problem List     Essential hypertension - Primary     Stable valsartan 100 HCTZ 25          Relevant Medications    losartan (COZAAR) 100 MG tablet    verapamil (CALAN SR) 240 MG extended release tablet    potassium chloride (KLOR-CON M) 10 MEQ extended release tablet    Other Relevant Orders    CBC with Auto Differential    Comprehensive Metabolic Panel    Urinalysis with Reflex to Culture    TSH    Low testosterone in male    Relevant Medications    testosterone cypionate (DEPOTESTOTERONE CYPIONATE) 200 MG/ML injection    Other Relevant Orders    Testosterone    Mixed hyperlipidemia     Stable on 20 mg of Lipitor daily          Relevant Medications    aspirin 81 MG tablet    hydroCHLOROthiazide (HYDRODIURIL) 25 MG tablet    spironolactone (ALDACTONE) 25 MG tablet    losartan (COZAAR) 100 MG tablet    atorvastatin (LIPITOR) 20 MG tablet    verapamil (CALAN SR) 240 MG extended release tablet    cloNIDine (CATAPRES) 0.1 MG tablet    Other Relevant Orders    Lipid Panel    Primary insomnia     He is on Restoril 15 mg 2 tabs for sleep          Relevant Medications    temazepam (RESTORIL) 15 MG capsule    Tachycardia     Stable on current echocardiogram          Type 2 diabetes mellitus without complication, without long-term current use of insulin ([facility]) Relevant Medications    glipiZIDE (GLUCOTROL XL) 10 MG extended release tablet    Other Relevant Orders    Hemoglobin A1C    Vitamin D deficiency    Relevant Orders    Vitamin D 25 Hydroxy          Plan:        Patient given educational materials - see patient instructions. Discussed use, benefit, and side effects of prescribed medications. Allpatient questions answered. Pt voiced understanding. Reviewed health maintenance. Instructed to continue current medications, diet and exercise. Patient agreed with treatment plan. Follow up as directed. MEDICATIONS:  Orders Placed This Encounter   Medications    ciprofloxacin (CIPRO) 500 MG tablet     Sig: Take 1 tablet by mouth 2 times daily for 7 days     Dispense:  14 tablet     Refill:  0    methylPREDNISolone (MEDROL DOSEPACK) 4 MG tablet     Sig: Take by mouth. Dispense:  1 kit     Refill:  0         ORDERS:  Orders Placed This Encounter   Procedures    XR FOOT LEFT (MIN 3 VIEWS)    XR FOOT RIGHT (MIN 3 VIEWS)    CBC with Auto Differential    Comprehensive Metabolic Panel    Hemoglobin A1C    Vitamin D 25 Hydroxy    Urinalysis with Reflex to Culture    TSH    Lipid Panel    Testosterone       Follow-up:  Return in about 3 months (around 5/28/2022). PATIENT INSTRUCTIONS:  Patient Instructions   1. Hypertension The current medical regimen is effective;  continue present plan and medications. 2.   type 2 diabetes mellitus stable no changes  3. Tachycardia stable with Carmen no changes  4. Primary insomnia stable no changes with Restoril  5. Mixed hyperlipidemia stable on current meds no changes are necessary  6.   Low testosterone level is good today no changes are necessary    Electronically signed by MARISELA Watt on 2/28/2022 at 12:41 PM    @On this date 2/28/2022 I have spent 28 minutes reviewing previous notes, test results and face to face with the patient discussing the diagnosis and importance of compliance with the treatment plan as well as documenting on the day of the visit. EMRDragon/transcription disclaimer:  Much of this encounter note is electronic transcription/translation of spoken language to printed texts. The electronic translation of spoken language may be erroneous, or at times,nonsensical words or phrases may be inadvertently transcribed.   Although I have reviewed the note for such errors, some may still exist.

## 2022-02-24 DIAGNOSIS — E29.1 HYPOGONADISM MALE: ICD-10-CM

## 2022-02-25 LAB
SEX HORMONE BINDING GLOBULIN: 15 NMOL/L (ref 11–80)
TESTOSTERONE FREE-NONMALE: 288 PG/ML (ref 47–244)
TESTOSTERONE TOTAL: 891 NG/DL (ref 220–1000)

## 2022-02-25 RX ORDER — TESTOSTERONE CYPIONATE 200 MG/ML
INJECTION INTRAMUSCULAR
Qty: 2 ML | Refills: 0 | Status: SHIPPED | OUTPATIENT
Start: 2022-02-25 | End: 2022-03-22 | Stop reason: SDUPTHER

## 2022-02-28 ENCOUNTER — OFFICE VISIT (OUTPATIENT)
Dept: INTERNAL MEDICINE | Age: 82
End: 2022-02-28
Payer: MEDICARE

## 2022-02-28 VITALS
DIASTOLIC BLOOD PRESSURE: 70 MMHG | HEART RATE: 68 BPM | WEIGHT: 214.6 LBS | SYSTOLIC BLOOD PRESSURE: 130 MMHG | BODY MASS INDEX: 29.07 KG/M2 | OXYGEN SATURATION: 98 % | HEIGHT: 72 IN

## 2022-02-28 DIAGNOSIS — M79.671 PAIN IN BOTH FEET: ICD-10-CM

## 2022-02-28 DIAGNOSIS — R00.0 TACHYCARDIA: ICD-10-CM

## 2022-02-28 DIAGNOSIS — E11.9 TYPE 2 DIABETES MELLITUS WITHOUT COMPLICATION, WITHOUT LONG-TERM CURRENT USE OF INSULIN (HCC): ICD-10-CM

## 2022-02-28 DIAGNOSIS — E55.9 VITAMIN D DEFICIENCY: ICD-10-CM

## 2022-02-28 DIAGNOSIS — I10 ESSENTIAL HYPERTENSION: Primary | ICD-10-CM

## 2022-02-28 DIAGNOSIS — E78.2 MIXED HYPERLIPIDEMIA: ICD-10-CM

## 2022-02-28 DIAGNOSIS — F51.01 PRIMARY INSOMNIA: ICD-10-CM

## 2022-02-28 DIAGNOSIS — R79.89 LOW TESTOSTERONE IN MALE: ICD-10-CM

## 2022-02-28 DIAGNOSIS — M79.672 PAIN IN BOTH FEET: ICD-10-CM

## 2022-02-28 PROCEDURE — 4040F PNEUMOC VAC/ADMIN/RCVD: CPT | Performed by: NURSE PRACTITIONER

## 2022-02-28 PROCEDURE — 1123F ACP DISCUSS/DSCN MKR DOCD: CPT | Performed by: NURSE PRACTITIONER

## 2022-02-28 PROCEDURE — G8417 CALC BMI ABV UP PARAM F/U: HCPCS | Performed by: NURSE PRACTITIONER

## 2022-02-28 PROCEDURE — 99214 OFFICE O/P EST MOD 30 MIN: CPT | Performed by: NURSE PRACTITIONER

## 2022-02-28 PROCEDURE — 1036F TOBACCO NON-USER: CPT | Performed by: NURSE PRACTITIONER

## 2022-02-28 PROCEDURE — G8484 FLU IMMUNIZE NO ADMIN: HCPCS | Performed by: NURSE PRACTITIONER

## 2022-02-28 PROCEDURE — G8427 DOCREV CUR MEDS BY ELIG CLIN: HCPCS | Performed by: NURSE PRACTITIONER

## 2022-02-28 RX ORDER — METHYLPREDNISOLONE 4 MG/1
TABLET ORAL
Qty: 1 KIT | Refills: 0 | Status: SHIPPED | OUTPATIENT
Start: 2022-02-28 | End: 2022-03-06

## 2022-02-28 RX ORDER — CIPROFLOXACIN 500 MG/1
500 TABLET, FILM COATED ORAL 2 TIMES DAILY
Qty: 14 TABLET | Refills: 0 | Status: SHIPPED | OUTPATIENT
Start: 2022-02-28 | End: 2022-03-07

## 2022-03-02 ENCOUNTER — NURSE ONLY (OUTPATIENT)
Dept: UROLOGY | Age: 82
End: 2022-03-02
Payer: MEDICARE

## 2022-03-02 DIAGNOSIS — R79.89 LOW TESTOSTERONE IN MALE: ICD-10-CM

## 2022-03-02 PROCEDURE — 96372 THER/PROPH/DIAG INJ SC/IM: CPT | Performed by: NURSE PRACTITIONER

## 2022-03-02 NOTE — PROGRESS NOTES
After obtaining consent from patient and receiving verbal/written orders from [facility], MARISELA, I gave patient 1cc of Testosterone Cyp 200mg/ml injection in right upper quad. gluteus, patient tolerated well. Medication was supplied by the patient. Patient due for next shot in 2 weeks. Lot: [id number]  Exp: 09/24    Patient will see APRN for 6 month follow up as scheduled.

## 2022-03-16 ENCOUNTER — NURSE ONLY (OUTPATIENT)
Dept: UROLOGY | Age: 82
End: 2022-03-16
Payer: MEDICARE

## 2022-03-16 DIAGNOSIS — R79.89 LOW TESTOSTERONE IN MALE: ICD-10-CM

## 2022-03-16 PROCEDURE — 96372 THER/PROPH/DIAG INJ SC/IM: CPT | Performed by: NURSE PRACTITIONER

## 2022-03-22 ENCOUNTER — OFFICE VISIT (OUTPATIENT)
Dept: UROLOGY | Age: 82
End: 2022-03-22
Payer: MEDICARE

## 2022-03-22 VITALS
TEMPERATURE: 98.1 F | WEIGHT: 208.4 LBS | HEIGHT: 72 IN | DIASTOLIC BLOOD PRESSURE: 88 MMHG | SYSTOLIC BLOOD PRESSURE: 134 MMHG | BODY MASS INDEX: 28.23 KG/M2

## 2022-03-22 DIAGNOSIS — N52.9 ERECTILE DYSFUNCTION, UNSPECIFIED ERECTILE DYSFUNCTION TYPE: ICD-10-CM

## 2022-03-22 DIAGNOSIS — E29.1 HYPOGONADISM MALE: Primary | ICD-10-CM

## 2022-03-22 DIAGNOSIS — D75.1 SECONDARY POLYCYTHEMIA: ICD-10-CM

## 2022-03-22 LAB
APPEARANCE FLUID: CLEAR
BILIRUBIN, POC: NORMAL
BLOOD URINE, POC: NORMAL
CLARITY, POC: CLEAR
COLOR, POC: YELLOW
GLUCOSE URINE, POC: NORMAL
KETONES, POC: NORMAL
LEUKOCYTE EST, POC: NORMAL
NITRITE, POC: NORMAL
PH, POC: 5.5
PROTEIN, POC: NORMAL
SPECIFIC GRAVITY, POC: 1.02
UROBILINOGEN, POC: 0.2

## 2022-03-22 PROCEDURE — 1036F TOBACCO NON-USER: CPT | Performed by: NURSE PRACTITIONER

## 2022-03-22 PROCEDURE — G8417 CALC BMI ABV UP PARAM F/U: HCPCS | Performed by: NURSE PRACTITIONER

## 2022-03-22 PROCEDURE — 99214 OFFICE O/P EST MOD 30 MIN: CPT | Performed by: NURSE PRACTITIONER

## 2022-03-22 PROCEDURE — G8484 FLU IMMUNIZE NO ADMIN: HCPCS | Performed by: NURSE PRACTITIONER

## 2022-03-22 PROCEDURE — 4040F PNEUMOC VAC/ADMIN/RCVD: CPT | Performed by: NURSE PRACTITIONER

## 2022-03-22 PROCEDURE — 81002 URINALYSIS NONAUTO W/O SCOPE: CPT | Performed by: NURSE PRACTITIONER

## 2022-03-22 PROCEDURE — G8427 DOCREV CUR MEDS BY ELIG CLIN: HCPCS | Performed by: NURSE PRACTITIONER

## 2022-03-22 PROCEDURE — 1123F ACP DISCUSS/DSCN MKR DOCD: CPT | Performed by: NURSE PRACTITIONER

## 2022-03-22 RX ORDER — TESTOSTERONE CYPIONATE 200 MG/ML
INJECTION INTRAMUSCULAR
Qty: 2 ML | Refills: 0 | Status: SHIPPED | OUTPATIENT
Start: 2022-03-22 | End: 2022-04-22

## 2022-03-22 RX ORDER — DOXYCYCLINE 100 MG/1
CAPSULE ORAL
COMMUNITY
Start: 2022-03-18

## 2022-03-22 RX ORDER — SILDENAFIL 25 MG/1
25 TABLET, FILM COATED ORAL PRN
Qty: 30 TABLET | Refills: 1 | Status: SHIPPED | OUTPATIENT
Start: 2022-03-22 | End: 2022-09-27

## 2022-03-22 ASSESSMENT — ENCOUNTER SYMPTOMS
ABDOMINAL PAIN: 0
VOMITING: 0
NAUSEA: 0
BACK PAIN: 0
ABDOMINAL DISTENTION: 0

## 2022-03-22 NOTE — PROGRESS NOTES
Aftab Singh is a 80 y.o. male who presents today   Chief Complaint   Patient presents with    Follow-up     I am here today for my follow up for low testosterone       Patient is 51-year-old male presents to clinic today for follow-up hypogonadism. Currently maintained on testosterone cypionate 200 mg IM every 14 days. Did have his testosterone level drawn at midcycle with 856.9. Hemoglobin hematocrit slightly elevated 17.1/52. 5. He denies any fatigue, low libido, does have overall improvement. He does have erectile dysfunction although this has been present for the past 20 years. He is able to obtain partial erections although no full erections. States Dr. Micah Contreras did place him on Viagra several years ago and this did seem to help. Is requesting a refill. Last PSA obtained on 5/24/2021 is 0.42.     Lab Results   Component Value Date    TESTOSTERONE 856.9 (H) 02/23/2022    TESTOSTERONE 891 02/23/2022     Lab Results   Component Value Date    WBC 7.7 02/23/2022    HGB 17.1 02/23/2022    HCT 52.5 (H) 02/23/2022    MCV 88.7 02/23/2022     02/23/2022       Past Medical History:   Diagnosis Date    BPH with obstruction/lower urinary tract symptoms     Gastroesophageal reflux disease without esophagitis 5/6/2019    Hyperglycemia     Hyperlipidemia     Hypertension     Testicular hypofunction     Type 2 diabetes mellitus without complication (HCC)        Past Surgical History:   Procedure Laterality Date    ANKLE SURGERY      HERNIA REPAIR      TONSILLECTOMY         Current Outpatient Medications   Medication Sig Dispense Refill    doxycycline monohydrate (MONODOX) 100 MG capsule TAKE 1 CAPSULE BY MOUTH TWICE DAILY FOR 7 DAYS      sildenafil (VIAGRA) 25 MG tablet Take 1 tablet by mouth as needed for Erectile Dysfunction 30 tablet 1    testosterone cypionate (DEPOTESTOTERONE CYPIONATE) 200 MG/ML injection Inject 1 mL every 14 days 2 mL 0    cloNIDine (CATAPRES) 0.1 MG tablet TAKE 1 TABLET BY MOUTH ONCE DAILY      temazepam (RESTORIL) 15 MG capsule TAKE 2 CAPSULES BY MOUTH NIGHTLY AS NEEDED FOR SLEEP 60 capsule 1    losartan (COZAAR) 100 MG tablet TAKE 1 TABLET EVERY DAY 90 tablet 3    atorvastatin (LIPITOR) 20 MG tablet TAKE 1 TABLET AT BEDTIME 90 tablet 3    verapamil (CALAN SR) 240 MG extended release tablet TAKE 1 TABLET TWICE DAILY 180 tablet 3    potassium chloride (KLOR-CON M) 10 MEQ extended release tablet TAKE 2 TABLETS TWICE DAILY 360 tablet 3    Needles & Syringes MISC 1 each by Does not apply route daily 100 each 3    hydroCHLOROthiazide (HYDRODIURIL) 25 MG tablet TAKE 1 TABLET EVERY DAY 90 tablet 3    glipiZIDE (GLUCOTROL XL) 10 MG extended release tablet TAKE 1 TABLET EVERY MORNING 90 tablet 3    spironolactone (ALDACTONE) 25 MG tablet TAKE 1 TABLET EVERY DAY 90 tablet 3    nystatin (MYCOSTATIN) 030154 UNIT/GM cream APPLY  CREAM TOPICALLY TWICE DAILY 30 g 2    fluticasone (FLONASE) 50 MCG/ACT nasal spray USE 1 SPRAY IN EACH NOSTRIL TWICE DAILY 48 g 3    pantoprazole (PROTONIX) 40 MG tablet TAKE 1 TABLET EVERY DAY 90 tablet 3    aspirin 81 MG tablet Take 81 mg by mouth daily      Omega-3 Fatty Acids (FISH OIL) 1000 MG CAPS Take 1,200 mg by mouth 2 times daily       No current facility-administered medications for this visit.        No Known Allergies    Social History     Socioeconomic History    Marital status:      Spouse name: None    Number of children: None    Years of education: None    Highest education level: None   Occupational History    None   Tobacco Use    Smoking status: Never Smoker    Smokeless tobacco: Never Used   Vaping Use    Vaping Use: Never used   Substance and Sexual Activity    Alcohol use: No    Drug use: No    Sexual activity: None   Other Topics Concern    None   Social History Narrative    None     Social Determinants of Health     Financial Resource Strain: Low Risk     Difficulty of Paying Living Expenses: Not hard at all Food Insecurity: No Food Insecurity    Worried About Running Out of Food in the Last Year: Never true    Lucie of Food in the Last Year: Never true   Transportation Needs:     Lack of Transportation (Medical): Not on file    Lack of Transportation (Non-Medical): Not on file   Physical Activity:     Days of Exercise per Week: Not on file    Minutes of Exercise per Session: Not on file   Stress:     Feeling of Stress : Not on file   Social Connections:     Frequency of Communication with Friends and Family: Not on file    Frequency of Social Gatherings with Friends and Family: Not on file    Attends Mandaen Services: Not on file    Active Member of [address] [place] [address] [employer] or Organizations: Not on file    Attends Club or Organization Meetings: Not on file    Marital Status: Not on file   Intimate Partner Violence:     Fear of Current or Ex-Partner: Not on file    Emotionally Abused: Not on file    Physically Abused: Not on file    Sexually Abused: Not on file   Housing Stability:     Unable to Pay for Housing in the Last Year: Not on file    Number of [place] in the Last Year: Not on file    Unstable Housing in the Last Year: Not on file       Family History   Problem Relation Age of Onset    No Known Problems Mother     Heart Attack Father        REVIEW OF SYSTEMS:  Review of Systems   Constitutional: Negative for chills and fever. Gastrointestinal: Negative for abdominal distention, abdominal pain, nausea and vomiting. Genitourinary: Negative for difficulty urinating, dysuria, flank pain, frequency, hematuria and urgency. Musculoskeletal: Negative for back pain and gait problem. Psychiatric/Behavioral: Negative for agitation and confusion. PHYSICAL EXAM:  /88   Temp 98.1 °F (36.7 °C) (Temporal)   Ht 6' (1.829 m)   Wt 208 lb 6.4 oz (94.5 kg)   BMI 28.26 kg/m²   Physical Exam  Vitals and nursing note reviewed. Constitutional:       General: He is not in acute distress. Appearance: Normal appearance. He is not ill-appearing. Pulmonary:      Effort: Pulmonary effort is normal. No respiratory distress. Abdominal:      General: There is no distension. Tenderness: There is no abdominal tenderness. There is no right CVA tenderness or left CVA tenderness. Neurological:      Mental Status: He is alert and oriented to person, place, and time. Mental status is at baseline. Psychiatric:         Mood and Affect: Mood normal.         Behavior: Behavior normal.       DATA:    Lab Results   Component Value Date    TESTOSTERONE 856.9 (H) 02/23/2022    TESTOSTERONE 891 02/23/2022     Lab Results   Component Value Date    WBC 7.7 02/23/2022    HGB 17.1 02/23/2022    HCT 52.5 (H) 02/23/2022    MCV 88.7 02/23/2022     02/23/2022       Results for orders placed or performed in visit on 03/22/22   POCT Urinalysis no Micro   Result Value Ref Range    Color, UA YELLOW     Clarity, UA CLEAR     Glucose, UA POC NEG     Bilirubin, UA NEG     Ketones, UA NEG     Spec Grav, UA 1.025     Blood, UA POC NEG     pH, UA 5.5     Protein, UA POC NEG     Urobilinogen, UA 0.2     Leukocytes, UA NEG     Nitrite, UA NEG     Appearance, Fluid Clear Clear, Slightly Cloudy     Lab Results   Component Value Date    PSA 0.42 05/24/2021    PSA 0.47 05/08/2020    PSA 0.41 05/02/2019    PSA 0.45 06/25/2018       1. Hypogonadism male  Patient doing well on testosterone cypionate injections every 2 weeks. We will continue to 200 mg / 1 mL every 2 weeks in office. He has come into the office for nurse visits. He significant decrease in fatigue and increase in libido since back on testosterone. Total testosterone is now within the 800 range which is therapeutic for the patient. We will have him follow-up with testosterone, CBC, PSA. Will need EMILY.     - PSA, Diagnostic; Future  - Testosterone; Future  - CBC;  Future  - POCT Urinalysis no Micro  - testosterone cypionate (DEPOTESTOTERONE CYPIONATE) 200 MG/ML Dragon/transcription disclaimer: Much of this documentt is electronic  transcription/translation of spoken language to printed text. The  electronic translation of spoken language may be erroneous, or at times,  nonsensical words or phrases may be inadvertently transcribed.  Although I  have reviewed the document for such errors, some may still exist.

## 2022-03-29 ENCOUNTER — NURSE ONLY (OUTPATIENT)
Dept: UROLOGY | Age: 82
End: 2022-03-29
Payer: MEDICARE

## 2022-03-29 DIAGNOSIS — E29.1 HYPOGONADISM MALE: Primary | ICD-10-CM

## 2022-03-29 PROCEDURE — 96372 THER/PROPH/DIAG INJ SC/IM: CPT | Performed by: NURSE PRACTITIONER

## 2022-03-29 NOTE — PROGRESS NOTES
After obtaining consent from patient and receiving verbal/written orders from Jed Riedel, APRN, I gave patient 1cc of Testosterone Cyp 200mg/ml injection in LEFT upper quad. gluteus, patient tolerated well. Medication was supplied by the patient. Patient due for next shot in 2 weeks.     Lot: [id number]   Exp: 08/23

## 2022-04-13 ENCOUNTER — NURSE ONLY (OUTPATIENT)
Dept: UROLOGY | Age: 82
End: 2022-04-13
Payer: MEDICARE

## 2022-04-13 DIAGNOSIS — E29.1 HYPOGONADISM MALE: Primary | ICD-10-CM

## 2022-04-13 PROCEDURE — 96372 THER/PROPH/DIAG INJ SC/IM: CPT | Performed by: NURSE PRACTITIONER

## 2022-04-13 RX ORDER — TESTOSTERONE CYPIONATE 200 MG/ML
200 INJECTION INTRAMUSCULAR ONCE
Status: COMPLETED | OUTPATIENT
Start: 2022-04-13 | End: 2022-04-13

## 2022-04-13 RX ADMIN — TESTOSTERONE CYPIONATE 200 MG: 200 INJECTION INTRAMUSCULAR at 08:16

## 2022-04-13 NOTE — PROGRESS NOTES
After obtaining consent from patient and receiving verbal/written orders from [facility], I gave patient 1cc of Testosterone Cyp 200mg/ml injection in RIGHT upper quad. gluteus, patient tolerated well. Medication was supplied by the patient. Patient due for next shot in 2 weeks.   JLU:[id number]  Lot: [id number]   Exp: 08/23

## 2022-04-15 NOTE — PROGRESS NOTES
Progress Note      Pt Name: Nazia Martinez  YOB: 1940  MRN: [id number]    Date of evaluation: 4/19/2022  History Obtained From:  patient, electronic medical record    CHIEF COMPLAINT:    Chief Complaint   Patient presents with    New Patient     Erythrocytosis    Discuss Labs     Thrombocytopenia     HISTORY OF PRESENT ILLNESS:    Nazia Martinez is a 80 y.o.  male who is here today initial hematology consultation for concern for erythrocytosis and upon review of medical records persistent mild thrombocytopenia was noted, referred by BASSAM Ramirez for further evaluation and recommendations. Keshav Cazares has a past medical history to include hypogonadism (initiated testosterone replacement with 1 mg injection every 2 weeks in January 2022), hypertension, gastroesophageal reflux, iron deficiency anemia (treated with OTC iron replacement) and diabetes mellitus type 2. He also reported having skin cancer on right forearm, middle lower back, right upper back (shoulder blade), and right upper chest managed by Dr. Philip Bernard, will request medical records. He denied history of tobacco or alcohol abuse. He denied a history of thrombotic events to include deep vein thrombosis, pulmonary emboli or cerebrovascular accident. He denied any significant/chronic arthralgias. He has no known history of renal or liver disease. Keshav Cazares denied any history of excessive bruising or bleeding to include melena, epistaxis, hemoptysis, hematuria or hematochezia. He denied any personal or known family history of blood disorders. Reported last donating blood to the [employer] in 2014, prior to this he frequently donated blood on a routine basis (reported retired in 2014). Reviewed the following serology studies at initial consultation on 4/19/2022.     CBC results:  11/19/2020:  WBC 6.3, RBC 4.66, Hgb 13.7, Hct 41.4, MCV 88.8, platelets 176,571  01/77/7044:  WBC 5.3, RBC 5.22, Hgb 15.2, Hct 46.2, MCV 88.5, platelets 178,457  :  WBC 5.7, RBC 5.07, Hgb 14.9, Hct 44.8, MCV 88.4, platelets 299,359  :  WBC 5.4, RBC 4.86, Hgb 14.6, Hct 43.1, MCV 88.7, platelets 695,593  :                   Hgb 15.9, Hct 49.4  2021:  WBC 7.3, RBC 6.31, Hgb 17.8, Hct 56.1, MCV 88.9, platelets 242,780  :  WBC 7.7, RBC 5.92, Hgb 17.1, Hct 52.5, MCV 88.7, platelets 829,549    No available radiographic imaging to evaluate spleen and liver, Cristian denied having any imaging completed. CBC at initial consultation on 2022: WBC 7.9, ANC 5.2, RBC 6.25, hemoglobin 17.9, hematocrit 56.0 and a platelet count of 651,598.     HEMATOLOGIC HISTORY:   Diagnosis:  · Mild thrombocytopenia  · Erythrocytosis  · History of iron deficiency anemia    Treatment summary:  · History of OTC iron replacement, not taking at initial consultation    Age-appropriate health screenin2021 Colonoscopy [facility])- adenomatous polyp, tubular type  2021- PSA 0.42    Past Medical History:    Past Medical History:   Diagnosis Date    BPH with obstruction/lower urinary tract symptoms     Gastroesophageal reflux disease without esophagitis 2019    Hyperglycemia     Hyperlipidemia     Hypertension     Testicular hypofunction     Type 2 diabetes mellitus without complication (HCC)        Past Surgical History:    Past Surgical History:   Procedure Laterality Date    ANKLE SURGERY      HERNIA REPAIR      TONSILLECTOMY         Current Medications:    Current Outpatient Medications   Medication Sig Dispense Refill    doxycycline monohydrate (MONODOX) 100 MG capsule TAKE 1 CAPSULE BY MOUTH TWICE DAILY FOR 7 DAYS      sildenafil (VIAGRA) 25 MG tablet Take 1 tablet by mouth as needed for Erectile Dysfunction 30 tablet 1    testosterone cypionate (DEPOTESTOTERONE CYPIONATE) 200 MG/ML injection Inject 1 mL every 14 days 2 mL 0    cloNIDine (CATAPRES) 0.1 MG tablet TAKE 1 TABLET BY MOUTH ONCE DAILY      losartan (COZAAR) 100 MG tablet TAKE 1 TABLET EVERY DAY 90 tablet 3    atorvastatin (LIPITOR) 20 MG tablet TAKE 1 TABLET AT BEDTIME 90 tablet 3    verapamil (CALAN SR) 240 MG extended release tablet TAKE 1 TABLET TWICE DAILY 180 tablet 3    potassium chloride (KLOR-CON M) 10 MEQ extended release tablet TAKE 2 TABLETS TWICE DAILY 360 tablet 3    Needles & Syringes MISC 1 each by Does not apply route daily 100 each 3    hydroCHLOROthiazide (HYDRODIURIL) 25 MG tablet TAKE 1 TABLET EVERY DAY 90 tablet 3    glipiZIDE (GLUCOTROL XL) 10 MG extended release tablet TAKE 1 TABLET EVERY MORNING 90 tablet 3    spironolactone (ALDACTONE) 25 MG tablet TAKE 1 TABLET EVERY DAY 90 tablet 3    nystatin (MYCOSTATIN) 541948 UNIT/GM cream APPLY  CREAM TOPICALLY TWICE DAILY 30 g 2    fluticasone (FLONASE) 50 MCG/ACT nasal spray USE 1 SPRAY IN EACH NOSTRIL TWICE DAILY 48 g 3    pantoprazole (PROTONIX) 40 MG tablet TAKE 1 TABLET EVERY DAY 90 tablet 3    aspirin 81 MG tablet Take 81 mg by mouth daily      Omega-3 Fatty Acids (FISH OIL) 1000 MG CAPS Take 1,200 mg by mouth 2 times daily      temazepam (RESTORIL) 15 MG capsule TAKE 2 CAPSULES BY MOUTH NIGHTLY AS NEEDED FOR SLEEP 60 capsule 1     No current facility-administered medications for this visit. Allergies: No Known Allergies    Social History:    Social History     Tobacco Use    Smoking status: Never Smoker    Smokeless tobacco: Never Used   Vaping Use    Vaping Use: Never used   Substance Use Topics    Alcohol use: No    Drug use: No       Family History:   Family History   Problem Relation Age of Onset    No Known Problems Mother     Heart Attack Father        Vitals:  Vitals:    04/19/22 0853   BP: (!) 158/80   Pulse: 66   SpO2: 96%   Weight: 213 lb 1.6 oz (96.7 kg)   Height: 6' (1.829 m)        Subjective   REVIEW OF SYSTEMS:   Review of Systems   Constitutional: Positive for fatigue. Negative for chills, diaphoresis and fever. HENT: Negative.   Negative for congestion, ear pain, hearing loss, nosebleeds, sore throat and tinnitus. Eyes: Negative. Negative for pain, discharge and redness. Respiratory: Negative. Negative for cough, shortness of breath and wheezing. Cardiovascular: Negative. Negative for chest pain, palpitations and leg swelling. Gastrointestinal: Negative. Negative for abdominal pain, blood in stool, constipation, diarrhea, nausea and vomiting. Endocrine: Negative for polydipsia. Genitourinary: Negative for dysuria, flank pain, frequency, hematuria and urgency. Musculoskeletal: Negative. Negative for back pain, myalgias and neck pain. Skin: Negative. Negative for rash. Allergic/Immunologic: Positive for environmental allergies. Neurological: Negative. Negative for dizziness, tremors, seizures, weakness and headaches. Hematological: Does not bruise/bleed easily. Psychiatric/Behavioral: Negative. The patient is not nervous/anxious. Objective   PHYSICAL EXAM:  Physical Exam  Vitals reviewed. Constitutional:       General: He is not in acute distress. Appearance: He is well-developed. HENT:      Head: Normocephalic and atraumatic. Mouth/Throat:      Pharynx: Uvula midline. Tonsils: No tonsillar exudate. Eyes:      General: Lids are normal.      Conjunctiva/sclera: Conjunctivae normal.      Pupils: Pupils are equal, round, and reactive to light. Neck:      Thyroid: No thyroid mass or thyromegaly. Vascular: No JVD. Trachea: Trachea normal. No tracheal deviation. Cardiovascular:      Rate and Rhythm: Normal rate and regular rhythm. Pulses: Normal pulses. Heart sounds: Normal heart sounds. Pulmonary:      Effort: Pulmonary effort is normal. No respiratory distress. Breath sounds: Normal breath sounds. No wheezing or rales. Chest:      Chest wall: No tenderness. Abdominal:      General: Bowel sounds are normal. There is no distension. Palpations: Abdomen is soft. There is no mass. Tenderness: There is no abdominal tenderness. There is no guarding. Musculoskeletal:         General: No tenderness or deformity. Cervical back: Normal range of motion and neck supple. Comments: Range of motion within normal limits x4 extremities   Skin:     General: Skin is warm. Findings: No bruising, erythema or rash. Neurological:      Mental Status: He is alert and oriented to person, place, and time. Cranial Nerves: No cranial nerve deficit. Coordination: Coordination normal.   Psychiatric:         Behavior: Behavior normal.         Thought Content: Thought content normal.         Labs reviewed today:  Lab Results   Component Value Date    WBC 7.90 04/19/2022    HGB 17.9 (H) 04/19/2022    HCT 56.0 (HH) 04/19/2022    MCV 89.6 04/19/2022     (L) 04/19/2022     Lab Results   Component Value Date    NEUTROABS 5.6 02/23/2022       ASSESSMENT/PLAN:      1. Erythrocytosis: Suspect secondary to testosterone replacement  Asymptomatic denies any headaches, vision changes, chest pain or other complaints. Hemoglobin 17.9, hematocrit 56.0, MCV 89.6 and RBC 6.25 today    History of hypogonadism and currently receiving testosterone replacement with 1 mg injection every 2 weeks    He denied history of tobacco or alcohol abuse. He denied a history of thrombotic events to include deep vein thrombosis, pulmonary emboli or cerebrovascular accident. Reported last donating blood to the [employer] in 2014, prior to this he frequently donated blood on a routine basis (reported retired in 2014). - Erythropoietin; Future  - JAK2 V617F Mutation Analysis, Qual rflx CALR/Exon 12-15/MPL; Future    Will evaluate for myeloproliferative neoplasm with JAK2. No need for phlebotomy with hematocrit being <60 and asymptomatic    2. Thrombocytopenia ([facility] [address] unknown etiology dating back to June 2018.   Platelet count of 793,478 today  Denied any history of excessive bruising or bleeding to include melena, epistaxis, hemoptysis, hematuria or hematochezia. He denied any significant/chronic arthralgias. He has no known history of renal or liver disease. Serology to be obtained to evaluate for acute/chronic inflammatory process, liver disease and will also obtain ultrasound for evaluation of spleen    - Rheumatoid Factor; Future  - Sedimentation Rate; Future  - C-Reactive Protein; Future  - Folate; Future  - Vitamin B12; Future  - Path Review, Smear; Future  - US ABDOMEN COMPLETE; Future  - Comprehensive Metabolic Panel; Future      3. History of iron deficiency  - Iron and TIBC; Future  - Ferritin; Future      4. Skin cancer, recently underwent multiple biopsies. He reported having skin cancer: right forearm, middle lower back, right upper back (shoulder blade), and right upper chest managed by Dr. Ryanne Patino    Will request medical records. I discussed all of the above findings included in the assessment and plan with the patient and the patient is in agreement to move forward with current recommendations/treatment. I have addressed all of their questions and concerns that were verbalized. FOLLOW UP:  1. Follow up given for 3 weeks or sooner, if needed  2. Continue to follow with other medical providers as recommended  3. Labs at next visit:     EMR Dragon/Transcription disclaimer:   Much of this encounter note is an electronic transcription/translation of spoken language to printed text. The electronic translation of spoken language may permit erroneous, or at times, nonsensical words or phrases to be inadvertently transcribed; although attempts have made to review the note for such errors, some may still exist.  Please excuse any unrecognized transcription errors and contact us if the air is unintelligible or needs documented correction.   Also, portions of this note have been copied forward, however, changed to reflect the most current clinical status of this patient. Electronically signed by MARISELA Manning on 4/19/2022 at 4:32 PM  Amina OLVERA am pre-charting as a registered nurse for MARISELA Alejandre.

## 2022-04-18 DIAGNOSIS — D75.1 SECONDARY ERYTHROCYTOSIS: Primary | ICD-10-CM

## 2022-04-19 ENCOUNTER — OFFICE VISIT (OUTPATIENT)
Dept: HEMATOLOGY | Age: 82
End: 2022-04-19
Payer: MEDICARE

## 2022-04-19 ENCOUNTER — HOSPITAL ENCOUNTER (OUTPATIENT)
Dept: INFUSION THERAPY | Age: 82
Discharge: HOME | End: 2022-04-19
Payer: MEDICARE

## 2022-04-19 VITALS
HEIGHT: 72 IN | WEIGHT: 213.1 LBS | SYSTOLIC BLOOD PRESSURE: 158 MMHG | BODY MASS INDEX: 28.86 KG/M2 | HEART RATE: 66 BPM | DIASTOLIC BLOOD PRESSURE: 80 MMHG | OXYGEN SATURATION: 96 %

## 2022-04-19 DIAGNOSIS — C44.90 SKIN CANCER: ICD-10-CM

## 2022-04-19 DIAGNOSIS — Z86.39 HISTORY OF IRON DEFICIENCY: ICD-10-CM

## 2022-04-19 DIAGNOSIS — D69.6 THROMBOCYTOPENIA (HCC): ICD-10-CM

## 2022-04-19 DIAGNOSIS — R89.9 ABNORMAL LABORATORY TEST RESULT: Primary | ICD-10-CM

## 2022-04-19 DIAGNOSIS — D75.1 SECONDARY ERYTHROCYTOSIS: ICD-10-CM

## 2022-04-19 DIAGNOSIS — D75.1 ERYTHROCYTOSIS: Primary | ICD-10-CM

## 2022-04-19 LAB
ALBUMIN SERPL-MCNC: 4.4 G/DL (ref 3.5–5.2)
ALP BLD-CCNC: 62 U/L (ref 40–130)
ALT SERPL-CCNC: 43 U/L (ref 21–72)
ANION GAP SERPL CALCULATED.3IONS-SCNC: 7 MMOL/L (ref 7–19)
AST SERPL-CCNC: 35 U/L (ref 17–59)
BILIRUB SERPL-MCNC: 0.8 MG/DL (ref 0.2–1.3)
BUN BLDV-MCNC: 26 MG/DL (ref 9–20)
C-REACTIVE PROTEIN: <0 MG/DL (ref 0–1)
CALCIUM SERPL-MCNC: 9.9 MG/DL (ref 8.4–10.2)
CHLORIDE BLD-SCNC: 104 MMOL/L (ref 98–111)
CO2: 29 MMOL/L (ref 22–29)
CREAT SERPL-MCNC: 1.3 MG/DL (ref 0.6–1.2)
FERRITIN: 57.9 NG/ML (ref 30–400)
FOLATE: 12.8 NG/ML (ref 4.5–32.2)
GFR NON-AFRICAN AMERICAN: 53
GLOBULIN: 2.7 G/DL
GLUCOSE BLD-MCNC: 115 MG/DL (ref 74–106)
HCT VFR BLD CALC: 56 % (ref 40.1–51)
HEMOGLOBIN: 17.9 G/DL (ref 13.7–17.5)
IRON SATURATION: 21 % (ref 14–50)
IRON: 78 UG/DL (ref 59–158)
MCH RBC QN AUTO: 28.6 PG (ref 25.7–32.2)
MCHC RBC AUTO-ENTMCNC: 32 G/DL (ref 32.3–36.5)
MCV RBC AUTO: 89.6 FL (ref 79–92.2)
PDW BLD-RTO: 13.3 % (ref 11.6–14.4)
PLATELET # BLD: 114 K/UL (ref 163–337)
PMV BLD AUTO: 10.6 FL (ref 7.4–10.4)
POTASSIUM SERPL-SCNC: 4.9 MMOL/L (ref 3.5–5.1)
RBC # BLD: 6.25 M/UL (ref 4.63–6.08)
RHEUMATOID FACTOR: <10 IU/ML
SEDIMENTATION RATE, ERYTHROCYTE: 1 MM/HR (ref 0–15)
SODIUM BLD-SCNC: 140 MMOL/L (ref 137–145)
TOTAL IRON BINDING CAPACITY: 371 UG/DL (ref 250–400)
TOTAL PROTEIN: 7.2 G/DL (ref 6.3–8.2)
VITAMIN B-12: 782 PG/ML (ref 211–946)
WBC # BLD: 7.9 K/UL (ref 4.23–9.07)

## 2022-04-19 PROCEDURE — 36415 COLL VENOUS BLD VENIPUNCTURE: CPT

## 2022-04-19 PROCEDURE — 80053 COMPREHEN METABOLIC PANEL: CPT

## 2022-04-19 PROCEDURE — 1123F ACP DISCUSS/DSCN MKR DOCD: CPT | Performed by: NURSE PRACTITIONER

## 2022-04-19 PROCEDURE — 99212 OFFICE O/P EST SF 10 MIN: CPT

## 2022-04-19 PROCEDURE — 85027 COMPLETE CBC AUTOMATED: CPT

## 2022-04-19 PROCEDURE — G8417 CALC BMI ABV UP PARAM F/U: HCPCS | Performed by: NURSE PRACTITIONER

## 2022-04-19 PROCEDURE — 4040F PNEUMOC VAC/ADMIN/RCVD: CPT | Performed by: NURSE PRACTITIONER

## 2022-04-19 PROCEDURE — 99214 OFFICE O/P EST MOD 30 MIN: CPT | Performed by: NURSE PRACTITIONER

## 2022-04-19 PROCEDURE — G8428 CUR MEDS NOT DOCUMENT: HCPCS | Performed by: NURSE PRACTITIONER

## 2022-04-19 PROCEDURE — 1036F TOBACCO NON-USER: CPT | Performed by: NURSE PRACTITIONER

## 2022-04-19 ASSESSMENT — ENCOUNTER SYMPTOMS
NAUSEA: 0
EYE REDNESS: 0
BACK PAIN: 0
COUGH: 0
WHEEZING: 0
GASTROINTESTINAL NEGATIVE: 1
EYE DISCHARGE: 0
RESPIRATORY NEGATIVE: 1
CONSTIPATION: 0
EYES NEGATIVE: 1
SHORTNESS OF BREATH: 0
DIARRHEA: 0
BLOOD IN STOOL: 0
SORE THROAT: 0
EYE PAIN: 0
VOMITING: 0
ABDOMINAL PAIN: 0

## 2022-04-19 NOTE — Clinical Note
Please request medical records from Dr. Philip Bernard, dermatologist at [facility]

## 2022-04-20 ENCOUNTER — TELEPHONE (OUTPATIENT)
Dept: INFUSION THERAPY | Age: 82
End: 2022-04-20

## 2022-04-20 NOTE — TELEPHONE ENCOUNTER
----- Message from MARISELA Jones sent at 4/19/2022  7:53 PM CDT -----  Please call Rick Merlos and ask him to increase his hydration, renal function has declined over the past month.  Will repeat lab on 5/12/2022

## 2022-04-21 ENCOUNTER — OFFICE VISIT (OUTPATIENT)
Dept: INTERNAL MEDICINE | Age: 82
End: 2022-04-21
Payer: MEDICARE

## 2022-04-21 VITALS — HEART RATE: 76 BPM | TEMPERATURE: 98.7 F | DIASTOLIC BLOOD PRESSURE: 60 MMHG | SYSTOLIC BLOOD PRESSURE: 130 MMHG

## 2022-04-21 DIAGNOSIS — T81.31XD POSTOPERATIVE WOUND DEHISCENCE, SUBSEQUENT ENCOUNTER: ICD-10-CM

## 2022-04-21 PROBLEM — T81.31XA POSTOPERATIVE WOUND DEHISCENCE: Status: ACTIVE | Noted: 2022-04-21

## 2022-04-21 LAB
BLOOD SMEAR REVIEW: NORMAL
ERYTHROPOIETIN: 6 MU/ML (ref 4–27)

## 2022-04-21 PROCEDURE — G8427 DOCREV CUR MEDS BY ELIG CLIN: HCPCS | Performed by: NURSE PRACTITIONER

## 2022-04-21 PROCEDURE — G8417 CALC BMI ABV UP PARAM F/U: HCPCS | Performed by: NURSE PRACTITIONER

## 2022-04-21 PROCEDURE — 1036F TOBACCO NON-USER: CPT | Performed by: NURSE PRACTITIONER

## 2022-04-21 PROCEDURE — 99213 OFFICE O/P EST LOW 20 MIN: CPT | Performed by: NURSE PRACTITIONER

## 2022-04-21 PROCEDURE — 1123F ACP DISCUSS/DSCN MKR DOCD: CPT | Performed by: NURSE PRACTITIONER

## 2022-04-21 PROCEDURE — 4040F PNEUMOC VAC/ADMIN/RCVD: CPT | Performed by: NURSE PRACTITIONER

## 2022-04-21 NOTE — ASSESSMENT & PLAN NOTE
We will need to use wet-to-dry dressings.   He is going to text me a picture next week to see what it looks like if it is okay I will see him when I get back otherwise on getting into see someone while I am gone

## 2022-04-21 NOTE — PROGRESS NOTES
[address] [facility]  [address] [address]  [address] [address]  Dept: [phone]  Dept Fax: [phone]: [address] [facility] Gilberto (:  1940) is a 80 y.o. male,Established patient  with green, here for evaluation of the following chief complaint(s): Other (open wound on back from dr Hillary Leone surgery)      Jovanny Walton is a 80 y.o. male who presents today for his medical conditions/complaints as noted below. Jovanny Walton is c/loly Other (open wound on back from dr Hillary johnson)        HPI:     Chief Complaint   Patient presents with    Other     open wound on back from dr Hillary Leone surgery     HPI    1. Malignant melanoma post op incision opened in lumbar spine; He had skin graft from right flank  That opened a little bit in the middle he went back to their office they told him was fine they would not be suture it just to keep using the Vaseline on it. Now it is open for so he came to the office this morning the entire incision is opened the sutures are popped out. There is no sign of infection the skin graft area is irritated but he does not need any additional treatment. He is having some local irritation from the just the adhesive of the bandage also.       Past Medical History:   Diagnosis Date    BPH with obstruction/lower urinary tract symptoms     Gastroesophageal reflux disease without esophagitis 2019    Hyperglycemia     Hyperlipidemia     Hypertension     Testicular hypofunction     Type 2 diabetes mellitus without complication [facility])       Past Surgical History:   Procedure Laterality Date    ANKLE SURGERY      HERNIA REPAIR      TONSILLECTOMY         Vitals 2022 2022 3/22/2022 2022 2021    SYSTOLIC 028 287 129 [phone]   DIASTOLIC 60 80 88 70 80 68   Pulse 76 66 - 68 68 60   Temp 98.7 - 98.1 - - 97.7   Resp - - - - - -   SpO2 - 96 - 98 95 96   Weight - 213 lb 1.6 oz 208 lb 6.4 oz 214 lb 9.6 oz 206 lb 214 lb   Height - 6' 0\" 6' 0\" 6' 0\" 6' 0\" 6' 0\"   Body mass index - 28.9 kg/m2 28.26 kg/m2 29.1 kg/m2 27.94 kg/m2 29.02 kg/m2   Pain Level - - - - - -   Some recent data might be hidden       Family History   Problem Relation Age of Onset    No Known Problems Mother     Heart Attack Father        Social History     Tobacco Use    Smoking status: Never Smoker    Smokeless tobacco: Never Used   Substance Use Topics    Alcohol use: No      Current Outpatient Medications   Medication Sig Dispense Refill    doxycycline monohydrate (MONODOX) 100 MG capsule TAKE 1 CAPSULE BY MOUTH TWICE DAILY FOR 7 DAYS      sildenafil (VIAGRA) 25 MG tablet Take 1 tablet by mouth as needed for Erectile Dysfunction 30 tablet 1    testosterone cypionate (DEPOTESTOTERONE CYPIONATE) 200 MG/ML injection Inject 1 mL every 14 days 2 mL 0    cloNIDine (CATAPRES) 0.1 MG tablet TAKE 1 TABLET BY MOUTH ONCE DAILY      temazepam (RESTORIL) 15 MG capsule TAKE 2 CAPSULES BY MOUTH NIGHTLY AS NEEDED FOR SLEEP 60 capsule 1    losartan (COZAAR) 100 MG tablet TAKE 1 TABLET EVERY DAY 90 tablet 3    atorvastatin (LIPITOR) 20 MG tablet TAKE 1 TABLET AT BEDTIME 90 tablet 3    verapamil (CALAN SR) 240 MG extended release tablet TAKE 1 TABLET TWICE DAILY 180 tablet 3    potassium chloride (KLOR-CON M) 10 MEQ extended release tablet TAKE 2 TABLETS TWICE DAILY 360 tablet 3    Needles & Syringes MISC 1 each by Does not apply route daily 100 each 3    hydroCHLOROthiazide (HYDRODIURIL) 25 MG tablet TAKE 1 TABLET EVERY DAY 90 tablet 3    glipiZIDE (GLUCOTROL XL) 10 MG extended release tablet TAKE 1 TABLET EVERY MORNING 90 tablet 3    spironolactone (ALDACTONE) 25 MG tablet TAKE 1 TABLET EVERY DAY 90 tablet 3    nystatin (MYCOSTATIN) 077319 UNIT/GM cream APPLY  CREAM TOPICALLY TWICE DAILY 30 g 2    fluticasone (FLONASE) 50 MCG/ACT nasal spray USE 1 SPRAY IN EACH NOSTRIL TWICE DAILY 48 g 3    pantoprazole (PROTONIX) 40 MG tablet TAKE 1 TABLET EVERY DAY 90 tablet 3    aspirin 81 MG tablet Take 81 mg by mouth daily      Omega-3 Fatty Acids (FISH OIL) 1000 MG CAPS Take 1,200 mg by mouth 2 times daily       No current facility-administered medications for this visit.      No Known Allergies    Health Maintenance   Topic Date Due    COVID-19 Vaccine (3 - Booster for Moderna series) 08/23/2021    Shingles Vaccine (1 of 2) 05/27/2022 (Originally 12/16/1992)    Depression Screen  08/31/2022    Annual Wellness Visit (AWV)  09/01/2022    Lipids  11/22/2022    Potassium  04/19/2023    Creatinine  04/19/2023    Colorectal Cancer Screen  03/09/2026    DTaP/Tdap/Td vaccine (2 - Td or Tdap) 08/15/2030    Flu vaccine  Completed    Pneumococcal 65+ years Vaccine  Completed    Hepatitis C screen  Completed    Hepatitis A vaccine  Aged Out    Hib vaccine  Aged Out    Meningococcal (ACWY) vaccine  Aged Out       Lab Results   Component Value Date    LABA1C 5.4 02/23/2022     Lab Results   Component Value Date    PSA 0.42 05/24/2021    PSA 0.47 05/08/2020    PSA 0.41 05/02/2019     TSH   Date Value Ref Range Status   02/23/2022 3.030 0.270 - 4.200 uIU/mL Final   ]  Lab Results   Component Value Date     04/19/2022    K 4.9 04/19/2022     04/19/2022    CO2 29 04/19/2022    BUN 26 (H) 04/19/2022    CREATININE 1.3 (H) 04/19/2022    GLUCOSE 115 (H) 04/19/2022    CALCIUM 9.9 04/19/2022    PROT 7.2 04/19/2022    LABALBU 4.4 04/19/2022    BILITOT 0.8 04/19/2022    ALKPHOS 62 04/19/2022    AST 35 04/19/2022    ALT 43 04/19/2022    LABGLOM 53 (A) 04/19/2022    GFRAA >59 02/23/2022    GLOB 2.7 04/19/2022     Lab Results   Component Value Date    CHOL 127 (L) 11/22/2021    CHOL 149 (L) 02/18/2021    CHOL 186 11/19/2020     Lab Results   Component Value Date    TRIG 60 11/22/2021    TRIG 121 05/24/2021    TRIG 106 02/18/2021     Lab Results   Component Value Date    HDL 37 (L) 11/22/2021    HDL 40 (L) 02/18/2021    HDL 34 (L) 11/19/2020     Lab Results Component Value Date    LDLCALC 78 11/22/2021    LDLCALC 88 02/18/2021    LDLCALC 114 11/19/2020     Lab Results   Component Value Date     04/19/2022    K 4.9 04/19/2022     04/19/2022    CO2 29 04/19/2022    BUN 26 04/19/2022    CREATININE 1.3 04/19/2022    GLUCOSE 115 04/19/2022    CALCIUM 9.9 04/19/2022      Lab Results   Component Value Date    WBC 7.90 04/19/2022    HGB 17.9 (H) 04/19/2022    HCT 56.0 (HH) 04/19/2022    MCV 89.6 04/19/2022     (L) 04/19/2022    LABLYMP 1.36 04/07/2015    LYMPHOPCT 15.0 (L) 02/23/2022    RBC 6.25 (H) 04/19/2022    MCH 28.6 04/19/2022    MCHC 32.0 (L) 04/19/2022    RDW 13.3 04/19/2022     Lab Results   Component Value Date    VITD25 39.1 02/23/2022       Subjective:      Review of Systems   Skin: Positive for wound. Objective:     Physical Exam  Skin:            Comments: Incision in the lumbar area that is opened about 4 cm. The graft site is irritated but graft site looks ok  No sign of infection                       Incisional area of the back cleansed with saline applied wet-to-dry dressings instructions given for wet-to-dry dressings on daily basis. He is going to text me a picture of his back next week if he is doing okay we will I will see him on the following week if he is not I will get him into see someone while I am gone  /60   Pulse 76   Temp 98.7 °F (37.1 °C)           Assessment:      Problem List     Postoperative wound dehiscence     We will need to use wet-to-dry dressings. He is going to text me a picture next week to see what it looks like if it is okay I will see him when I get back otherwise on getting into see someone while I am gone                Plan:        Patient given educational materials - see patient instructions. Discussed use, benefit, and side effects of prescribed medications. Allpatient questions answered. Pt voiced understanding. Reviewed health maintenance.   Instructed to continue current medications, diet and exercise. Patient agreed with treatment plan. Follow up as directed. MEDICATIONS:  No orders of the defined types were placed in this encounter. ORDERS:  No orders of the defined types were placed in this encounter. Follow-up:  No follow-ups on file. PATIENT INSTRUCTIONS:  Patient Instructions   Incisional area of the back cleansed with saline applied wet-to-dry dressings instructions given for wet-to-dry dressings on daily basis. He is going to text me a picture of his back next week if he is doing okay we will I will see him on the following week if he is not I will get him into see someone while I am gone      Electronically signed by MARISELA Marinelli on 4/21/2022 at 10:31 AM    @    Noemy/transcription disclaimer:  Much of this encounter note is electronic transcription/translation of spoken language to printed texts. The electronic translation of spoken language may be erroneous, or at times,nonsensical words or phrases may be inadvertently transcribed.   Although I have reviewed the note for such errors, some may still exist.

## 2022-04-21 NOTE — PATIENT INSTRUCTIONS
Incisional area of the back cleansed with saline applied wet-to-dry dressings instructions given for wet-to-dry dressings on daily basis.   He is going to text me a picture of his back next week if he is doing okay we will I will see him on the following week if he is not I will get him into see someone while I am gone

## 2022-04-22 DIAGNOSIS — D75.1 ERYTHROCYTOSIS: Primary | ICD-10-CM

## 2022-04-22 DIAGNOSIS — E29.1 HYPOGONADISM MALE: ICD-10-CM

## 2022-04-22 DIAGNOSIS — F51.01 PRIMARY INSOMNIA: ICD-10-CM

## 2022-04-22 RX ORDER — TESTOSTERONE CYPIONATE 200 MG/ML
INJECTION INTRAMUSCULAR
Qty: 2 ML | Refills: 0 | Status: SHIPPED | OUTPATIENT
Start: 2022-04-22 | End: 2022-05-20

## 2022-04-25 RX ORDER — TEMAZEPAM 15 MG/1
CAPSULE ORAL
Qty: 60 CAPSULE | Refills: 0 | Status: SHIPPED | OUTPATIENT
Start: 2022-04-25 | End: 2022-07-12

## 2022-04-25 NOTE — TELEPHONE ENCOUNTER
Wilfred Britt called to request a refill on his medication. Last office visit : 4/21/2022   Next office visit : 6/2/2022     Last UDS:   Amphetamine Screen, Urine   Date Value Ref Range Status   11/30/2021 NEG  Final     Barbiturate Screen, Urine   Date Value Ref Range Status   11/30/2021 NEG  Final     Benzodiazepine Screen, Urine   Date Value Ref Range Status   11/30/2021 POS  Final     Buprenorphine Urine   Date Value Ref Range Status   11/30/2021 NEG  Final     Cocaine Metabolite Screen, Urine   Date Value Ref Range Status   11/30/2021 NEG  Final     Gabapentin Screen, Urine   Date Value Ref Range Status   08/31/2021 neg  Final     MDMA, Urine   Date Value Ref Range Status   11/30/2021 NEG  Final     Methamphetamine, Urine   Date Value Ref Range Status   11/30/2021 NEG  Final     Opiate Scrn, Ur   Date Value Ref Range Status   11/30/2021 NEG  Final     Oxycodone Screen, Ur   Date Value Ref Range Status   11/30/2021 NEG  Final     PCP Screen, Urine   Date Value Ref Range Status   11/30/2021 NEG  Final     Propoxyphene Screen, Urine   Date Value Ref Range Status   11/30/2021 NEG  Final     THC Screen, Urine   Date Value Ref Range Status   11/30/2021 NEG  Final     Tricyclic Antidepressants, Urine   Date Value Ref Range Status   11/30/2021 NEG  Final       Last Wayne Hoda: 2/28/22  Medication Contract: 12/1/21    Requested Prescriptions     Pending Prescriptions Disp Refills    temazepam (RESTORIL) 15 MG capsule [Pharmacy Med Name: Temazepam 15 MG Oral Capsule] 60 capsule 0     Sig: TAKE 2 CAPSULES BY MOUTH NIGHTLY AS NEEDED FOR SLEEP         Please approve or refuse this medication.    Pauline Oneal MA

## 2022-04-26 ENCOUNTER — OFFICE VISIT (OUTPATIENT)
Dept: WOUND CARE | Facility: HOSPITAL | Age: 82
End: 2022-04-26

## 2022-04-26 DIAGNOSIS — T81.31XA DISRUPTION OF EXTERNAL OPERATION (SURGICAL) WOUND, NOT ELSEWHERE CLASSIFIED, INITIAL ENCOUNTER: ICD-10-CM

## 2022-04-26 DIAGNOSIS — I10 ESSENTIAL (PRIMARY) HYPERTENSION: ICD-10-CM

## 2022-04-26 DIAGNOSIS — L98.422 NON-PRESSURE CHRONIC ULCER OF BACK WITH FAT LAYER EXPOSED: ICD-10-CM

## 2022-04-26 DIAGNOSIS — E11.628 TYPE 2 DIABETES MELLITUS WITH OTHER SKIN COMPLICATIONS: ICD-10-CM

## 2022-04-26 PROCEDURE — G0463 HOSPITAL OUTPT CLINIC VISIT: HCPCS

## 2022-04-26 PROCEDURE — 11042 DBRDMT SUBQ TIS 1ST 20SQCM/<: CPT | Performed by: SURGERY

## 2022-04-26 PROCEDURE — 99204 OFFICE O/P NEW MOD 45 MIN: CPT | Performed by: SURGERY

## 2022-04-27 ENCOUNTER — HOSPITAL ENCOUNTER (OUTPATIENT)
Dept: ULTRASOUND IMAGING | Age: 82
Discharge: HOME | End: 2022-04-27
Payer: MEDICARE

## 2022-04-27 ENCOUNTER — NURSE ONLY (OUTPATIENT)
Dept: UROLOGY | Age: 82
End: 2022-04-27
Payer: MEDICARE

## 2022-04-27 DIAGNOSIS — R79.89 LOW TESTOSTERONE IN MALE: ICD-10-CM

## 2022-04-27 DIAGNOSIS — D69.6 THROMBOCYTOPENIA (HCC): ICD-10-CM

## 2022-04-27 PROCEDURE — 76700 US EXAM ABDOM COMPLETE: CPT

## 2022-04-27 PROCEDURE — 96372 THER/PROPH/DIAG INJ SC/IM: CPT | Performed by: NURSE PRACTITIONER

## 2022-04-27 NOTE — PROGRESS NOTES
After obtaining consent from patient and receiving verbal/written orders from [facility], I gave patient 1cc of Testosterone Cyp 200mg/ml injection in LEFT upper quad. gluteus, patient tolerated well. Medication was supplied by the patient. Patient due for next shot in 2 weeks.   VGQ:[id number]  Lot: [id number]  Exp: 11/23
Toileting/Standing/Walking

## 2022-05-02 LAB
DIRECTOR REVIEW: NORMAL
EXTRACTION: NORMAL
JAK2 V617F MUTATION DETECTION 489201: NORMAL
Lab: NORMAL
REFLEX: NORMAL

## 2022-05-03 ENCOUNTER — OFFICE VISIT (OUTPATIENT)
Dept: WOUND CARE | Facility: HOSPITAL | Age: 82
End: 2022-05-03

## 2022-05-03 DIAGNOSIS — E11.628 TYPE 2 DIABETES MELLITUS WITH OTHER SKIN COMPLICATIONS: ICD-10-CM

## 2022-05-03 DIAGNOSIS — I10 ESSENTIAL (PRIMARY) HYPERTENSION: ICD-10-CM

## 2022-05-03 DIAGNOSIS — D75.1 ERYTHROCYTOSIS: Primary | ICD-10-CM

## 2022-05-03 DIAGNOSIS — T81.31XA DISRUPTION OF EXTERNAL OPERATION (SURGICAL) WOUND, NOT ELSEWHERE CLASSIFIED, INITIAL ENCOUNTER: ICD-10-CM

## 2022-05-03 DIAGNOSIS — L98.422 NON-PRESSURE CHRONIC ULCER OF BACK WITH FAT LAYER EXPOSED: ICD-10-CM

## 2022-05-03 PROCEDURE — 11042 DBRDMT SUBQ TIS 1ST 20SQCM/<: CPT | Performed by: SURGERY

## 2022-05-04 ENCOUNTER — OFFICE VISIT (OUTPATIENT)
Dept: INTERNAL MEDICINE | Age: 82
End: 2022-05-04
Payer: MEDICARE

## 2022-05-04 VITALS
HEIGHT: 72 IN | OXYGEN SATURATION: 96 % | HEART RATE: 68 BPM | BODY MASS INDEX: 29.12 KG/M2 | DIASTOLIC BLOOD PRESSURE: 70 MMHG | WEIGHT: 215 LBS | SYSTOLIC BLOOD PRESSURE: 128 MMHG

## 2022-05-04 DIAGNOSIS — I10 ESSENTIAL HYPERTENSION: ICD-10-CM

## 2022-05-04 DIAGNOSIS — T81.31XS POSTOPERATIVE WOUND DEHISCENCE, SEQUELA: Primary | ICD-10-CM

## 2022-05-04 DIAGNOSIS — R79.89 LOW TESTOSTERONE IN MALE: ICD-10-CM

## 2022-05-04 DIAGNOSIS — E11.9 TYPE 2 DIABETES MELLITUS WITHOUT COMPLICATION, WITHOUT LONG-TERM CURRENT USE OF INSULIN (HCC): ICD-10-CM

## 2022-05-04 DIAGNOSIS — R60.9 PERIPHERAL EDEMA: ICD-10-CM

## 2022-05-04 PROBLEM — R60.0 PERIPHERAL EDEMA: Status: ACTIVE | Noted: 2022-05-04

## 2022-05-04 PROCEDURE — G8427 DOCREV CUR MEDS BY ELIG CLIN: HCPCS | Performed by: NURSE PRACTITIONER

## 2022-05-04 PROCEDURE — 1036F TOBACCO NON-USER: CPT | Performed by: NURSE PRACTITIONER

## 2022-05-04 PROCEDURE — 1123F ACP DISCUSS/DSCN MKR DOCD: CPT | Performed by: NURSE PRACTITIONER

## 2022-05-04 PROCEDURE — G8417 CALC BMI ABV UP PARAM F/U: HCPCS | Performed by: NURSE PRACTITIONER

## 2022-05-04 PROCEDURE — 3044F HG A1C LEVEL LT 7.0%: CPT | Performed by: NURSE PRACTITIONER

## 2022-05-04 PROCEDURE — 99213 OFFICE O/P EST LOW 20 MIN: CPT | Performed by: NURSE PRACTITIONER

## 2022-05-04 PROCEDURE — 4040F PNEUMOC VAC/ADMIN/RCVD: CPT | Performed by: NURSE PRACTITIONER

## 2022-05-04 RX ORDER — BUMETANIDE 0.5 MG/1
0.5 TABLET ORAL DAILY
Qty: 90 TABLET | Refills: 1 | Status: SHIPPED | OUTPATIENT
Start: 2022-05-04

## 2022-05-04 ASSESSMENT — ENCOUNTER SYMPTOMS
TROUBLE SWALLOWING: 0
EYE DISCHARGE: 0
SORE THROAT: 0
ABDOMINAL PAIN: 0
COUGH: 0
COLOR CHANGE: 0
ABDOMINAL DISTENTION: 0
EYE ITCHING: 0
SHORTNESS OF BREATH: 0
BLOOD IN STOOL: 0
CONSTIPATION: 0
WHEEZING: 0
DIARRHEA: 0
STRIDOR: 0
CHOKING: 0
NAUSEA: 0
VOMITING: 0

## 2022-05-04 NOTE — PROGRESS NOTES
[facility] PHYSICIAN SERVICES  [facility] INTERNAL MEDICINE  [id number] [address] [place] [address]  Dept: [phone]  Dept Fax: [phone]: [phone]    Chace Fay (:  1940) is a 80 y.o. male,Established patient  with green, here for evaluation of the following chief complaint(s): Follow-up      Chace Fay is a 80 y.o. male who presents today for his medical conditions/complaints as noted below. Chace Fay is c/loly Follow-up        HPI:     Chief Complaint   Patient presents with    Follow-up     HPI   1.  bllod smear with onc ? Hypoxemia;  ? She is going to do exercise pulse ox; Bu he has been on testosterone that may have contributed;    2. Fu on wound dehiscence from [facility] dermatology with removal of melanoma lover lumbar spine  #3 edema he has had increased swelling recently. He does have an old injury to the left leg with his left leg swelling more below. No swelling. He is up and going all day long still very active remodeling a house right now.     Past Medical History:   Diagnosis Date    BPH with obstruction/lower urinary tract symptoms     Gastroesophageal reflux disease without esophagitis 2019    Hyperglycemia     Hyperlipidemia     Hypertension     Testicular hypofunction     Type 2 diabetes mellitus without complication [facility])       Past Surgical History:   Procedure Laterality Date    ANKLE SURGERY      HERNIA REPAIR      TONSILLECTOMY         Vitals 2022 2022 2022 3/22/2022 2022    SYSTOLIC 132 296 250 064 216 411   DIASTOLIC 70 60 80 88 70 80   Pulse 68 76 66 - 68 68   Temp - 98.7 - 98.1 - -   Resp - - - - - -   SpO2 96 - 96 - 98 95   Weight 215 lb - 213 lb 1.6 oz 208 lb 6.4 oz 214 lb 9.6 oz 206 lb   Height 6' 0\" - 6' 0\" 6' 0\" 6' 0\" 6' 0\"   Body mass index 29.16 kg/m2 - 28.9 kg/m2 28.26 kg/m2 29.1 kg/m2 27.94 kg/m2   Pain Level - - - - - -   Some recent data might be hidden       Family History Problem Relation Age of Onset    No Known Problems Mother     Heart Attack Father        Social History     Tobacco Use    Smoking status: Never Smoker    Smokeless tobacco: Never Used   Substance Use Topics    Alcohol use: No      Current Outpatient Medications   Medication Sig Dispense Refill    bumetanide (BUMEX) 0.5 MG tablet Take 1 tablet by mouth daily 90 tablet 1    temazepam (RESTORIL) 15 MG capsule TAKE 2 CAPSULES BY MOUTH NIGHTLY AS NEEDED FOR SLEEP 60 capsule 0    testosterone cypionate (DEPOTESTOTERONE CYPIONATE) 200 MG/ML injection INJECT 1 ML INTRAMUSCULARLY EVERY 14 DAYS 2 mL 0    doxycycline monohydrate (MONODOX) 100 MG capsule TAKE 1 CAPSULE BY MOUTH TWICE DAILY FOR 7 DAYS      sildenafil (VIAGRA) 25 MG tablet Take 1 tablet by mouth as needed for Erectile Dysfunction 30 tablet 1    cloNIDine (CATAPRES) 0.1 MG tablet TAKE 1 TABLET BY MOUTH ONCE DAILY      losartan (COZAAR) 100 MG tablet TAKE 1 TABLET EVERY DAY 90 tablet 3    atorvastatin (LIPITOR) 20 MG tablet TAKE 1 TABLET AT BEDTIME 90 tablet 3    verapamil (CALAN SR) 240 MG extended release tablet TAKE 1 TABLET TWICE DAILY 180 tablet 3    potassium chloride (KLOR-CON M) 10 MEQ extended release tablet TAKE 2 TABLETS TWICE DAILY 360 tablet 3    Needles & Syringes MISC 1 each by Does not apply route daily 100 each 3    glipiZIDE (GLUCOTROL XL) 10 MG extended release tablet TAKE 1 TABLET EVERY MORNING 90 tablet 3    spironolactone (ALDACTONE) 25 MG tablet TAKE 1 TABLET EVERY DAY 90 tablet 3    nystatin (MYCOSTATIN) 717771 UNIT/GM cream APPLY  CREAM TOPICALLY TWICE DAILY 30 g 2    fluticasone (FLONASE) 50 MCG/ACT nasal spray USE 1 SPRAY IN EACH NOSTRIL TWICE DAILY 48 g 3    pantoprazole (PROTONIX) 40 MG tablet TAKE 1 TABLET EVERY DAY 90 tablet 3    aspirin 81 MG tablet Take 81 mg by mouth daily      Omega-3 Fatty Acids (FISH OIL) 1000 MG CAPS Take 1,200 mg by mouth 2 times daily       No current facility-administered medications for this visit.      No Known Allergies    Health Maintenance   Topic Date Due    COVID-19 Vaccine (3 - Booster for Moderna series) 08/23/2021    Shingles vaccine (1 of 2) 05/27/2022 (Originally 12/16/1992)    Depression Screen  08/31/2022    Annual Wellness Visit (AWV)  09/01/2022    Lipids  11/22/2022    Potassium  04/19/2023    Creatinine  04/19/2023    Colorectal Cancer Screen  03/09/2026    DTaP/Tdap/Td vaccine (2 - Td or Tdap) 08/15/2030    Flu vaccine  Completed    Pneumococcal 65+ years Vaccine  Completed    Hepatitis C screen  Completed    Hepatitis A vaccine  Aged Out    Hib vaccine  Aged Out    Meningococcal (ACWY) vaccine  Aged Out       Lab Results   Component Value Date    LABA1C 5.4 02/23/2022     Lab Results   Component Value Date    PSA 0.42 05/24/2021    PSA 0.47 05/08/2020    PSA 0.41 05/02/2019     TSH   Date Value Ref Range Status   02/23/2022 3.030 0.270 - 4.200 uIU/mL Final   ]  Lab Results   Component Value Date     04/19/2022    K 4.9 04/19/2022     04/19/2022    CO2 29 04/19/2022    BUN 26 (H) 04/19/2022    CREATININE 1.3 (H) 04/19/2022    GLUCOSE 115 (H) 04/19/2022    CALCIUM 9.9 04/19/2022    PROT 7.2 04/19/2022    LABALBU 4.4 04/19/2022    BILITOT 0.8 04/19/2022    ALKPHOS 62 04/19/2022    AST 35 04/19/2022    ALT 43 04/19/2022    LABGLOM 53 (A) 04/19/2022    GFRAA >59 02/23/2022    GLOB 2.7 04/19/2022     Lab Results   Component Value Date    CHOL 127 (L) 11/22/2021    CHOL 149 (L) 02/18/2021    CHOL 186 11/19/2020     Lab Results   Component Value Date    TRIG 60 11/22/2021    TRIG 121 05/24/2021    TRIG 106 02/18/2021     Lab Results   Component Value Date    HDL 37 (L) 11/22/2021    HDL 40 (L) 02/18/2021    HDL 34 (L) 11/19/2020     Lab Results   Component Value Date    LDLCALC 78 11/22/2021    LDLCALC 88 02/18/2021    LDLCALC 114 11/19/2020     Lab Results   Component Value Date     04/19/2022    K 4.9 04/19/2022     04/19/2022 CO2 29 04/19/2022    BUN 26 04/19/2022    CREATININE 1.3 04/19/2022    GLUCOSE 115 04/19/2022    CALCIUM 9.9 04/19/2022      Lab Results   Component Value Date    WBC 7.90 04/19/2022    HGB 17.9 (H) 04/19/2022    HCT 56.0 (HH) 04/19/2022    MCV 89.6 04/19/2022     (L) 04/19/2022    LABLYMP 1.36 04/07/2015    LYMPHOPCT 15.0 (L) 02/23/2022    RBC 6.25 (H) 04/19/2022    MCH 28.6 04/19/2022    MCHC 32.0 (L) 04/19/2022    RDW 13.3 04/19/2022     Lab Results   Component Value Date    VITD25 39.1 02/23/2022       Subjective:      Review of Systems   Constitutional: Negative for fatigue, fever and unexpected weight change. HENT: Negative for ear discharge, ear pain, mouth sores, sore throat and trouble swallowing. Eyes: Negative for discharge, itching and visual disturbance. Respiratory: Negative for cough, choking, shortness of breath, wheezing and stridor. Cardiovascular: Positive for leg swelling. Negative for chest pain and palpitations. Gastrointestinal: Negative for abdominal distention, abdominal pain, blood in stool, constipation, diarrhea, nausea and vomiting. Endocrine: Negative for cold intolerance, polydipsia and polyuria. Genitourinary: Negative for difficulty urinating, dysuria, frequency and urgency. Musculoskeletal: Negative for arthralgias and gait problem. Skin: Positive for wound. Negative for color change and rash. Allergic/Immunologic: Negative for food allergies and immunocompromised state. Neurological: Negative for dizziness, tremors, syncope, speech difficulty, weakness and headaches. Hematological: Negative for adenopathy. Does not bruise/bleed easily. Psychiatric/Behavioral: Negative for confusion and hallucinations. Objective:     Physical Exam  Constitutional:       General: He is not in acute distress. Appearance: He is well-developed. HENT:      Head: Normocephalic and atraumatic. Eyes:      General: No scleral icterus.         Right eye: No discharge. Left eye: No discharge. Pupils: Pupils are equal, round, and reactive to light. Neck:      Thyroid: No thyromegaly. Vascular: No JVD. Cardiovascular:      Rate and Rhythm: Normal rate and regular rhythm. Heart sounds: Normal heart sounds. No murmur heard. Pulmonary:      Effort: Pulmonary effort is normal. No respiratory distress. Breath sounds: Normal breath sounds. No wheezing or rales. Abdominal:      General: Bowel sounds are normal. There is no distension. Palpations: Abdomen is soft. There is no mass. Tenderness: There is no abdominal tenderness. There is no guarding or rebound. Musculoskeletal:         General: No tenderness. Normal range of motion. Cervical back: Normal range of motion and neck supple. Right lower leg: Edema present. Left lower leg: Edema present. Skin:     General: Skin is warm and dry. Findings: No erythema or rash. Neurological:      Mental Status: He is alert and oriented to person, place, and time. Cranial Nerves: No cranial nerve deficit. Coordination: Coordination normal.      Deep Tendon Reflexes: Reflexes are normal and symmetric. Reflexes normal.   Psychiatric:         Mood and Affect: Mood is not depressed. Behavior: Behavior normal.         Thought Content: Thought content normal.         Judgment: Judgment normal.       /70   Pulse 68   Ht 6' (1.829 m)   Wt 215 lb (97.5 kg)   SpO2 96%   BMI 29.16 kg/m²           Assessment:      Problem List     Essential hypertension    Relevant Medications    losartan (COZAAR) 100 MG tablet    verapamil (CALAN SR) 240 MG extended release tablet    potassium chloride (KLOR-CON M) 10 MEQ extended release tablet    Low testosterone in male    Relevant Medications    testosterone cypionate (DEPOTESTOTERONE CYPIONATE) 200 MG/ML injection    Peripheral edema     .   HCTZ changed to Bumex recheck his potassium at his next office visit in a month          Relevant Medications    aspirin 81 MG tablet    spironolactone (ALDACTONE) 25 MG tablet    losartan (COZAAR) 100 MG tablet    atorvastatin (LIPITOR) 20 MG tablet    verapamil (CALAN SR) 240 MG extended release tablet    cloNIDine (CATAPRES) 0.1 MG tablet    bumetanide (BUMEX) 0.5 MG tablet    Postoperative wound dehiscence - Primary    Type 2 diabetes mellitus without complication, without long-term current use of insulin (HCC)    Relevant Medications    glipiZIDE (GLUCOTROL XL) 10 MG extended release tablet          Plan:        Patient given educational materials - see patient instructions. Discussed use, benefit, and side effects of prescribed medications. Allpatient questions answered. Pt voiced understanding. Reviewed health maintenance. Instructed to continue current medications, diet and exercise. Patient agreed with treatment plan. Follow up as directed. MEDICATIONS:  Orders Placed This Encounter   Medications    bumetanide (BUMEX) 0.5 MG tablet     Sig: Take 1 tablet by mouth daily     Dispense:  90 tablet     Refill:  1         ORDERS:  No orders of the defined types were placed in this encounter. Follow-up:  Return for keep fu appt, have labs done prior to appt. PATIENT INSTRUCTIONS:  Patient Instructions   1. Wound dehiscence;   Continue wet to dry bandages   2. Edema; Stop HCTZ and start bumex  0.5 mg daily     Electronically signed by MARISELA Rahman on 5/4/2022 at 9:05 AM        EMRDragon/transcription disclaimer:  Much of this encounter note is electronic transcription/translation of spoken language to printed texts. The electronic translation of spoken language may be erroneous, or at times,nonsensical words or phrases may be inadvertently transcribed.   Although I have reviewed the note for such errors, some may still exist.

## 2022-05-04 NOTE — PATIENT INSTRUCTIONS
1.  Wound dehiscence;   Continue wet to dry bandages   2. Edema;   Stop HCTZ and start bumex  0.5 mg daily

## 2022-05-10 ENCOUNTER — OFFICE VISIT (OUTPATIENT)
Dept: WOUND CARE | Facility: HOSPITAL | Age: 82
End: 2022-05-10

## 2022-05-10 DIAGNOSIS — I10 ESSENTIAL (PRIMARY) HYPERTENSION: ICD-10-CM

## 2022-05-10 DIAGNOSIS — E11.628 TYPE 2 DIABETES MELLITUS WITH OTHER SKIN COMPLICATIONS: ICD-10-CM

## 2022-05-10 DIAGNOSIS — L98.422 NON-PRESSURE CHRONIC ULCER OF BACK WITH FAT LAYER EXPOSED: ICD-10-CM

## 2022-05-10 DIAGNOSIS — T81.31XA DISRUPTION OF EXTERNAL OPERATION (SURGICAL) WOUND, NOT ELSEWHERE CLASSIFIED, INITIAL ENCOUNTER: ICD-10-CM

## 2022-05-10 PROCEDURE — 11042 DBRDMT SUBQ TIS 1ST 20SQCM/<: CPT | Performed by: SURGERY

## 2022-05-10 NOTE — PROGRESS NOTES
Progress Note      Pt Name: Daisy Carpenter  YOB: 1940  MRN: [id number]    Date of evaluation: 05/12/2022  History Obtained From:  patient, electronic medical record    CHIEF COMPLAINT:    Chief Complaint   Patient presents with    Follow-up     Erythrocytosis    Discuss Labs     Mild thrombocytopenia    Other     Mild splenomegaly    Results     HISTORY OF PRESENT ILLNESS:    Daisy Carpenter is a 80 y.o.  male who returns today to review serology stool results from initial consultation on 4/19/2022 and ultrasound results from 4/27/2022, here for further evaluation of mild thrombocytopenia and erythrocytosis. Brooke Zheng presents today with no new complaints, reports that he is currently seeing wound care at [address] due to the incision site of his lower back is not healing well. He denied any febrile illness or  any excessive bruising or bleeding to include melena, epistaxis, hemoptysis, hematuria or hematochezia. I reviewed the following findings with Brooke Zheng that revealed mild splenomegaly, serology studies were negative for myeloproliferative disorder and path review was within acceptable limits. He has mild thrombocytopenia is most likely related to his mild splenomegaly and his erythrocytosis is associated with testosterone replacement. He reported feeling significantly better with the testosterone replacement, without he has severe fatigue and weakness. 04/19/2022 Serology results  · Erythropoietin 6  · Rheumatoid Factor <10  · Sed Rate 1  · C Reactive Protein <0  · FARIDA 2- V617F negative; CALR negative; Exon 12-15 negative  · Path Review- normocytic normochromic erythrocytosis  · Creat 1.3/ GFR 53  · Iron 78  · TIBC 371  · Saturation 21%  · Ferritin 57.9  · B12/Folate 782/12.8    04/27/2022 US abdomen- Mild splenomegaly, the spleen measures 13.9 x 5.4 x 13.6 cm with an estimated volume of 534 mL. 2 small cysts are seen of the liver and appear benign.  There is a small cyst at the inferior pole the left kidney, which appears benign. No gallstones are identified, suspended echoes within the gallbladder probably represents cholesterol crystals. The gallbladder appears partially contracted. No evidence of acute cholecystitis is identified. There is no significant biliary ductal dilatation. HEMATOLOGIC HISTORY:   Diagnosis:  · Mild thrombocytopenia  · Erythrocytosis  · History of iron deficiency anemia  · Hypergonadism  · Mild splenomegaly    Treatment summary:  · History of OTC iron replacement, not taking at initial consultation    Laila Reyes was seen and initial hematology consultation on 4/19/2022 for concern for erythrocytosis and upon review of medical records persistent mild thrombocytopenia was noted, referred by BASSAM Gutierrez for further evaluation and recommendations. Laila Reyes has a past medical history to include hypogonadism (initiated testosterone replacement with 1 mg injection every 2 weeks in January 2022), hypertension, gastroesophageal reflux, iron deficiency anemia (treated with OTC iron replacement) and diabetes mellitus type 2. He also reported having skin cancer managed by Dr. 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  He denied history of tobacco or alcohol abuse. He denied a history of thrombotic events to include deep vein thrombosis, pulmonary emboli or cerebrovascular accident. He denied any significant/chronic arthralgias. He has no known history of renal or liver disease. Laila Reyes denied any history of excessive bruising or bleeding to include melena, epistaxis, hemoptysis, hematuria or hematochezia. He denied any personal or known family history of blood disorders.   Reported last donating blood to the [employer] in 2014, prior to this he frequently donated blood on a routine basis (reported retired in ). Reviewed the following serology studies at initial consultation on 2022. CBC results:  2020:  WBC 6.3, RBC 4.66, Hgb 13.7, Hct 41.4, MCV 88.8, platelets [phone],437  :  WBC 5.3, RBC 5.22, Hgb 15.2, Hct 46.2, MCV 88.5, platelets 782,340  :  WBC 5.7, RBC 5.07, Hgb 14.9, Hct 44.8, MCV 88.4, platelets 508,481  :  WBC 5.4, RBC 4.86, Hgb 14.6, Hct 43.1, MCV 88.7, platelets 105,802  :                   Hgb 15.9, Hct 49.4  2021:  WBC 7.3, RBC 6.31, Hgb 17.8, Hct 56.1, MCV 88.9, platelets 022,491  :  WBC 7.7, RBC 5.92, Hgb 17.1, Hct 52.5, MCV 88.7, platelets [phone],615    No available radiographic imaging to evaluate spleen and liver, [address] [facility] denied having any imaging completed. CBC at initial consultation on 2022: WBC 7.9, ANC 5.2, RBC 6.25, hemoglobin 17.9, hematocrit 56.0 and a platelet count of 669,981.    2022 Serology results  · Erythropoietin 6  · Rheumatoid Factor <10  · Sed Rate 1  · C Reactive Protein <0  · FARIDA 2- V617F negative; CALR negative; Exon 12-15 negative  · Path Review- normocytic normochromic erythrocytosis  · Creat 1.3/ GFR 53  · Iron 78  · TIBC 371  · Saturation 21%  · Ferritin 57.9  · B12/Folate 782/12.8    2022 US abdomen- Mild splenomegaly, the spleen measures 13.9 x 5.4 x 13.6 cm with an estimated volume of 534 mL. 2 small cysts are seen of the liver and appear benign. There is a small cyst at the inferior pole the left kidney, which appears benign. No gallstones are identified, suspended echoes within the gallbladder probably represents cholesterol crystals. The gallbladder appears partially contracted. No evidence of acute cholecystitis is identified. There is no significant biliary ductal dilatation. 2022-O2 saturation walk: 96% on room air at rest, maintained 96% during ambulation and recovery was up to 98% on room air.     Age-appropriate health screenin2021 Colonoscopy [facility])- adenomatous polyp, tubular type  05/24/2021- PSA 0.42    Past Medical History:    Past Medical History:   Diagnosis Date    BPH with obstruction/lower urinary tract symptoms     Gastroesophageal reflux disease without esophagitis 5/6/2019    Hyperglycemia     Hyperlipidemia     Hypertension     Testicular hypofunction     Type 2 diabetes mellitus without complication (HCC)        Past Surgical History:    Past Surgical History:   Procedure Laterality Date    ANKLE SURGERY      HERNIA REPAIR      TONSILLECTOMY         Current Medications:    Current Outpatient Medications   Medication Sig Dispense Refill    bumetanide (BUMEX) 0.5 MG tablet Take 1 tablet by mouth daily 90 tablet 1    temazepam (RESTORIL) 15 MG capsule TAKE 2 CAPSULES BY MOUTH NIGHTLY AS NEEDED FOR SLEEP 60 capsule 0    testosterone cypionate (DEPOTESTOTERONE CYPIONATE) 200 MG/ML injection INJECT 1 ML INTRAMUSCULARLY EVERY 14 DAYS 2 mL 0    doxycycline monohydrate (MONODOX) 100 MG capsule TAKE 1 CAPSULE BY MOUTH TWICE DAILY FOR 7 DAYS      sildenafil (VIAGRA) 25 MG tablet Take 1 tablet by mouth as needed for Erectile Dysfunction 30 tablet 1    cloNIDine (CATAPRES) 0.1 MG tablet TAKE 1 TABLET BY MOUTH ONCE DAILY      losartan (COZAAR) 100 MG tablet TAKE 1 TABLET EVERY DAY 90 tablet 3    atorvastatin (LIPITOR) 20 MG tablet TAKE 1 TABLET AT BEDTIME 90 tablet 3    verapamil (CALAN SR) 240 MG extended release tablet TAKE 1 TABLET TWICE DAILY 180 tablet 3    potassium chloride (KLOR-CON M) 10 MEQ extended release tablet TAKE 2 TABLETS TWICE DAILY 360 tablet 3    Needles & Syringes MISC 1 each by Does not apply route daily 100 each 3    glipiZIDE (GLUCOTROL XL) 10 MG extended release tablet TAKE 1 TABLET EVERY MORNING 90 tablet 3    spironolactone (ALDACTONE) 25 MG tablet TAKE 1 TABLET EVERY DAY 90 tablet 3    nystatin (MYCOSTATIN) 788226 UNIT/GM cream APPLY  CREAM TOPICALLY TWICE DAILY 30 g 2    fluticasone (FLONASE) 50 MCG/ACT nasal Constitutional:       General: He is not in acute distress. Appearance: He is well-developed. HENT:      Head: Normocephalic and atraumatic. Mouth/Throat:      Pharynx: Uvula midline. Tonsils: No tonsillar exudate. Eyes:      General: Lids are normal.      Conjunctiva/sclera: Conjunctivae normal.      Pupils: Pupils are equal, round, and reactive to light. Neck:      Thyroid: No thyroid mass or thyromegaly. Vascular: No JVD. Trachea: Trachea normal. No tracheal deviation. Cardiovascular:      Rate and Rhythm: Normal rate and regular rhythm. Pulses: Normal pulses. Heart sounds: Normal heart sounds. Pulmonary:      Effort: Pulmonary effort is normal. No respiratory distress. Breath sounds: Normal breath sounds. No wheezing or rales. Chest:      Chest wall: No tenderness. Abdominal:      General: Bowel sounds are normal. There is no distension. Palpations: Abdomen is soft. There is no mass. Tenderness: There is no abdominal tenderness. There is no guarding. Musculoskeletal:         General: No tenderness or deformity. Cervical back: Normal range of motion and neck supple. Comments: Range of motion within normal limits x4 extremities   Skin:     General: Skin is warm. Findings: No bruising, erythema or rash. Neurological:      Mental Status: He is alert and oriented to person, place, and time. Cranial Nerves: No cranial nerve deficit. Coordination: Coordination normal.   Psychiatric:         Behavior: Behavior normal.         Thought Content: Thought content normal.         Labs reviewed today:  Lab Results   Component Value Date    WBC 7.76 05/12/2022    HGB 17.6 (H) 05/12/2022    HCT 55.1 (HH) 05/12/2022    MCV 90.2 05/12/2022     (L) 05/12/2022     Lab Results   Component Value Date    NEUTROABS 4.95 05/12/2022       ASSESSMENT/PLAN:      1.  Erythrocytosis: Suspect secondary to testosterone replacement, currently receiving testosterone replacement with 1 mg injection every 2 weeks. Serology studies were negative for myeloproliferative disorder and path review was within acceptable limits. He reported feeling significantly better with the testosterone replacement, without he has severe fatigue and weakness. Hemoglobin 17.6, hematocrit 55.1, MCV 90.2 and RBC 6.11 today    04/19/2022 Serology results  · Erythropoietin 6  · FARIDA 2 V617F negative; CALR negative; Exon 12-15 negative    - No need for phlebotomy with hematocrit being <60 and asymptomatic  -Okay to continue testosterone replacement. Under the direction of MARISELA Salamanca  - CBC in 3 months    2. Thrombocytopenia ([address] with mild splenomegaly. Platelet count of 851,782 today, stable. Denies any excessive bruising or bleeding to include melena, epistaxis, hemoptysis, hematuria or hematochezia. He has no known history of renal or liver disease and serology studies do not suggest chronic inflammatory or infectious process. .     Serology to be obtained to evaluate for acute/chronic inflammatory process, liver disease and will also obtain ultrasound for evaluation of spleen    04/19/2022 Serology results  · Rheumatoid Factor <10  · Sed Rate 1  · C Reactive Protein <0  · Path Review- normocytic normochromic erythrocytosis  · Creat 1.3/ GFR 53  · B12/Folate 782/12.8    04/27/2022 US abdomen- Mild splenomegaly, the spleen measures 13.9 x 5.4 x 13.6 cm with an estimated volume of 534 mL. 2 small cysts are seen of the liver and appear benign. There is a small cyst at the inferior pole the left kidney, which appears benign. No gallstones are identified, suspended echoes within the gallbladder probably represents cholesterol crystals. The gallbladder appears partially contracted. No evidence of acute cholecystitis is identified. There is no significant biliary ductal dilatation.      3. History of iron deficiency, iron substrates were within normal limits. 04/19/2022 Serology results  · Iron 78  · TIBC 371  · Saturation 21%  · Ferritin 57.9    4. Skin cancer, recently underwent multiple biopsies. He reported having skin cancer managed by by Dr. Arechiga Needle cell to right forearm, right lower back, left inferior medial back, and right superior lateral mid-back  Squamous cell to left forearm and right medial superior chest    Currently seeing wound care at [address] due to the incision site of his lower back is not healing well. I discussed all of the above findings included in the assessment and plan with the patient and the patient is in agreement to move forward with current recommendations/treatment. I have addressed all of their questions and concerns that were verbalized. FOLLOW UP:  1. Follow up given for 6 months or sooner, if needed  2. CBC in 3 months  3. Continue to follow with other medical providers as recommended  4. Labs at next visit: CBC, CMP, iron panel and ferritin    EMR Dragon/Transcription disclaimer:   Much of this encounter note is an electronic transcription/translation of spoken language to printed text. The electronic translation of spoken language may permit erroneous, or at times, nonsensical words or phrases to be inadvertently transcribed; although attempts have made to review the note for such errors, some may still exist.  Please excuse any unrecognized transcription errors and contact us if the air is unintelligible or needs documented correction. Also, portions of this note have been copied forward, however, changed to reflect the most current clinical status of this patient. Electronically signed by Merideth Frankel, APRN on 5/16/2022 at 1:03 PM  Mireya OLVERA am pre-charting as a registered nurse for MARISELA Aly.

## 2022-05-11 ENCOUNTER — NURSE ONLY (OUTPATIENT)
Dept: UROLOGY | Age: 82
End: 2022-05-11
Payer: MEDICARE

## 2022-05-11 DIAGNOSIS — R79.89 LOW TESTOSTERONE IN MALE: ICD-10-CM

## 2022-05-11 PROCEDURE — 96372 THER/PROPH/DIAG INJ SC/IM: CPT | Performed by: NURSE PRACTITIONER

## 2022-05-11 NOTE — PROGRESS NOTES
After obtaining consent from patient and receiving verbal/written orders from Dwana Snellen, APRN, I gave patient 1cc of Testosterone Cyp 200mg/ml injection in LEFT upper quad. gluteus, patient tolerated well. Medication was supplied by the patient. Patient due for next shot in 2 weeks.   PRK:[id number]  Lot: [id number]  Exp: 11/23

## 2022-05-12 ENCOUNTER — OFFICE VISIT (OUTPATIENT)
Dept: HEMATOLOGY | Age: 82
End: 2022-05-12
Payer: MEDICARE

## 2022-05-12 ENCOUNTER — HOSPITAL ENCOUNTER (OUTPATIENT)
Dept: INFUSION THERAPY | Age: 82
Discharge: HOME | End: 2022-05-12
Payer: MEDICARE

## 2022-05-12 VITALS
HEART RATE: 67 BPM | SYSTOLIC BLOOD PRESSURE: 118 MMHG | OXYGEN SATURATION: 99 % | WEIGHT: 209.8 LBS | DIASTOLIC BLOOD PRESSURE: 68 MMHG | BODY MASS INDEX: 28.45 KG/M2

## 2022-05-12 DIAGNOSIS — R16.1 SPLENOMEGALY: ICD-10-CM

## 2022-05-12 DIAGNOSIS — D69.6 THROMBOCYTOPENIA (HCC): Primary | ICD-10-CM

## 2022-05-12 DIAGNOSIS — D75.1 SECONDARY ERYTHROCYTOSIS: ICD-10-CM

## 2022-05-12 DIAGNOSIS — C44.90 SKIN CANCER: ICD-10-CM

## 2022-05-12 DIAGNOSIS — Z86.39 HISTORY OF IRON DEFICIENCY: ICD-10-CM

## 2022-05-12 DIAGNOSIS — R79.89 LOW TESTOSTERONE IN MALE: ICD-10-CM

## 2022-05-12 LAB
BASOPHILS ABSOLUTE: 0.06 K/UL (ref 0.01–0.08)
BASOPHILS RELATIVE PERCENT: 0.8 % (ref 0.1–1.2)
EOSINOPHILS ABSOLUTE: 0.26 K/UL (ref 0.04–0.54)
EOSINOPHILS RELATIVE PERCENT: 3.4 % (ref 0.7–7)
HCT VFR BLD CALC: 55.1 % (ref 40.1–51)
HEMOGLOBIN: 17.6 G/DL (ref 13.7–17.5)
LYMPHOCYTES ABSOLUTE: 1.77 K/UL (ref 1.18–3.74)
LYMPHOCYTES RELATIVE PERCENT: 22.8 % (ref 19.3–53.1)
MCH RBC QN AUTO: 28.8 PG (ref 25.7–32.2)
MCHC RBC AUTO-ENTMCNC: 31.9 G/DL (ref 32.3–36.5)
MCV RBC AUTO: 90.2 FL (ref 79–92.2)
MONOCYTES ABSOLUTE: 0.69 K/UL (ref 0.24–0.82)
MONOCYTES RELATIVE PERCENT: 8.9 % (ref 4.7–12.5)
NEUTROPHILS ABSOLUTE: 4.95 K/UL (ref 1.56–6.13)
NEUTROPHILS RELATIVE PERCENT: 63.7 % (ref 34–71.1)
PDW BLD-RTO: 13.2 % (ref 11.6–14.4)
PLATELET # BLD: 128 K/UL (ref 163–337)
PMV BLD AUTO: 10.4 FL (ref 7.4–10.4)
RBC # BLD: 6.11 M/UL (ref 4.63–6.08)
WBC # BLD: 7.76 K/UL (ref 4.23–9.07)

## 2022-05-12 PROCEDURE — 99214 OFFICE O/P EST MOD 30 MIN: CPT | Performed by: NURSE PRACTITIONER

## 2022-05-12 PROCEDURE — G8417 CALC BMI ABV UP PARAM F/U: HCPCS | Performed by: NURSE PRACTITIONER

## 2022-05-12 PROCEDURE — 1123F ACP DISCUSS/DSCN MKR DOCD: CPT | Performed by: NURSE PRACTITIONER

## 2022-05-12 PROCEDURE — G8428 CUR MEDS NOT DOCUMENT: HCPCS | Performed by: NURSE PRACTITIONER

## 2022-05-12 PROCEDURE — 4040F PNEUMOC VAC/ADMIN/RCVD: CPT | Performed by: NURSE PRACTITIONER

## 2022-05-12 PROCEDURE — 85025 COMPLETE CBC W/AUTO DIFF WBC: CPT

## 2022-05-12 PROCEDURE — 99211 OFF/OP EST MAY X REQ PHY/QHP: CPT

## 2022-05-12 PROCEDURE — 36415 COLL VENOUS BLD VENIPUNCTURE: CPT

## 2022-05-12 PROCEDURE — 1036F TOBACCO NON-USER: CPT | Performed by: NURSE PRACTITIONER

## 2022-05-16 ASSESSMENT — ENCOUNTER SYMPTOMS
EYE DISCHARGE: 0
SORE THROAT: 0
RESPIRATORY NEGATIVE: 1
VOMITING: 0
BACK PAIN: 0
EYE PAIN: 0
ABDOMINAL PAIN: 0
COUGH: 0
WHEEZING: 0
GASTROINTESTINAL NEGATIVE: 1
EYES NEGATIVE: 1
SHORTNESS OF BREATH: 0
CONSTIPATION: 0
DIARRHEA: 0
EYE REDNESS: 0
NAUSEA: 0
BLOOD IN STOOL: 0

## 2022-05-17 ENCOUNTER — OFFICE VISIT (OUTPATIENT)
Dept: WOUND CARE | Facility: HOSPITAL | Age: 82
End: 2022-05-17

## 2022-05-17 DIAGNOSIS — E11.628 TYPE 2 DIABETES MELLITUS WITH OTHER SKIN COMPLICATIONS: ICD-10-CM

## 2022-05-17 DIAGNOSIS — T81.31XA DISRUPTION OF EXTERNAL OPERATION (SURGICAL) WOUND, NOT ELSEWHERE CLASSIFIED, INITIAL ENCOUNTER: ICD-10-CM

## 2022-05-17 DIAGNOSIS — I10 ESSENTIAL (PRIMARY) HYPERTENSION: ICD-10-CM

## 2022-05-17 DIAGNOSIS — L98.422 NON-PRESSURE CHRONIC ULCER OF BACK WITH FAT LAYER EXPOSED: ICD-10-CM

## 2022-05-17 PROCEDURE — 97597 DBRDMT OPN WND 1ST 20 CM/<: CPT | Performed by: SURGERY

## 2022-05-20 DIAGNOSIS — E29.1 HYPOGONADISM MALE: ICD-10-CM

## 2022-05-20 RX ORDER — TESTOSTERONE CYPIONATE 200 MG/ML
INJECTION INTRAMUSCULAR
Qty: 2 ML | Refills: 0 | Status: SHIPPED | OUTPATIENT
Start: 2022-05-20 | End: 2022-06-08 | Stop reason: SDUPTHER

## 2022-05-23 RX ORDER — HYDROCHLOROTHIAZIDE 25 MG/1
TABLET ORAL
Qty: 90 TABLET | Refills: 3 | Status: SHIPPED | OUTPATIENT
Start: 2022-05-23

## 2022-05-23 RX ORDER — GLIPIZIDE 10 MG/1
TABLET, FILM COATED, EXTENDED RELEASE ORAL
Qty: 90 TABLET | Refills: 3 | Status: SHIPPED | OUTPATIENT
Start: 2022-05-23 | End: 2022-08-25 | Stop reason: SDUPTHER

## 2022-05-23 RX ORDER — SPIRONOLACTONE 25 MG/1
TABLET ORAL
Qty: 90 TABLET | Refills: 3 | Status: SHIPPED | OUTPATIENT
Start: 2022-05-23

## 2022-05-23 NOTE — TELEPHONE ENCOUNTER
Camila Pompa called requesting a refill of the below medication which has been pended for you:     Requested Prescriptions     Pending Prescriptions Disp Refills    spironolactone (ALDACTONE) 25 MG tablet [Pharmacy Med Name: SPIRONOLACTONE 25 MG Tablet] 90 tablet 3     Sig: TAKE 1 TABLET EVERY DAY    glipiZIDE (GLUCOTROL XL) 10 MG extended release tablet [Pharmacy Med Name: GLIPIZIDE ER 10 MG Tablet Extended Release 24 Hour] 90 tablet 3     Sig: TAKE 1 TABLET EVERY MORNING    hydroCHLOROthiazide (HYDRODIURIL) 25 MG tablet [Pharmacy Med Name: HYDROCHLOROTHIAZIDE 25 MG Tablet] 90 tablet 3     Sig: TAKE 1 TABLET EVERY DAY       Last Appointment Date: 5/4/2022  Next Appointment Date: 6/2/2022    No Known Allergies

## 2022-05-24 ENCOUNTER — OFFICE VISIT (OUTPATIENT)
Dept: WOUND CARE | Facility: HOSPITAL | Age: 82
End: 2022-05-24

## 2022-05-24 DIAGNOSIS — I10 ESSENTIAL (PRIMARY) HYPERTENSION: ICD-10-CM

## 2022-05-24 DIAGNOSIS — L98.422 NON-PRESSURE CHRONIC ULCER OF BACK WITH FAT LAYER EXPOSED: ICD-10-CM

## 2022-05-24 DIAGNOSIS — E11.628 TYPE 2 DIABETES MELLITUS WITH OTHER SKIN COMPLICATIONS: ICD-10-CM

## 2022-05-24 DIAGNOSIS — T81.31XA DISRUPTION OF EXTERNAL OPERATION (SURGICAL) WOUND, NOT ELSEWHERE CLASSIFIED, INITIAL ENCOUNTER: ICD-10-CM

## 2022-05-24 PROCEDURE — G0463 HOSPITAL OUTPT CLINIC VISIT: HCPCS

## 2022-05-24 PROCEDURE — 99213 OFFICE O/P EST LOW 20 MIN: CPT | Performed by: SURGERY

## 2022-05-25 ENCOUNTER — NURSE ONLY (OUTPATIENT)
Dept: UROLOGY | Age: 82
End: 2022-05-25
Payer: MEDICARE

## 2022-05-25 DIAGNOSIS — E55.9 VITAMIN D DEFICIENCY: ICD-10-CM

## 2022-05-25 DIAGNOSIS — R79.89 LOW TESTOSTERONE IN MALE: ICD-10-CM

## 2022-05-25 DIAGNOSIS — E11.9 TYPE 2 DIABETES MELLITUS WITHOUT COMPLICATION, WITHOUT LONG-TERM CURRENT USE OF INSULIN (HCC): ICD-10-CM

## 2022-05-25 DIAGNOSIS — E29.1 HYPOGONADISM MALE: ICD-10-CM

## 2022-05-25 DIAGNOSIS — E78.2 MIXED HYPERLIPIDEMIA: ICD-10-CM

## 2022-05-25 DIAGNOSIS — I10 ESSENTIAL HYPERTENSION: ICD-10-CM

## 2022-05-25 LAB
ALBUMIN SERPL-MCNC: 4.6 G/DL (ref 3.5–5.2)
ALP BLD-CCNC: 60 U/L (ref 40–130)
ALT SERPL-CCNC: 40 U/L (ref 5–41)
ANION GAP SERPL CALCULATED.3IONS-SCNC: 13 MMOL/L (ref 7–19)
AST SERPL-CCNC: 30 U/L (ref 5–40)
BASOPHILS ABSOLUTE: 0.1 K/UL (ref 0–0.2)
BASOPHILS RELATIVE PERCENT: 0.8 % (ref 0–1)
BILIRUB SERPL-MCNC: 0.7 MG/DL (ref 0.2–1.2)
BILIRUBIN URINE: NEGATIVE
BLOOD, URINE: NEGATIVE
BUN BLDV-MCNC: 23 MG/DL (ref 8–23)
CALCIUM SERPL-MCNC: 9.6 MG/DL (ref 8.8–10.2)
CHLORIDE BLD-SCNC: 101 MMOL/L (ref 98–111)
CHOLESTEROL, TOTAL: 194 MG/DL (ref 160–199)
CLARITY: CLEAR
CO2: 26 MMOL/L (ref 22–29)
COLOR: YELLOW
CREAT SERPL-MCNC: 1 MG/DL (ref 0.5–1.2)
EOSINOPHILS ABSOLUTE: 0.3 K/UL (ref 0–0.6)
EOSINOPHILS RELATIVE PERCENT: 4.7 % (ref 0–5)
GFR AFRICAN AMERICAN: >59
GFR NON-AFRICAN AMERICAN: >60
GLUCOSE BLD-MCNC: 130 MG/DL (ref 74–109)
GLUCOSE URINE: NEGATIVE MG/DL
HBA1C MFR BLD: 5.9 % (ref 4–6)
HCT VFR BLD CALC: 56.6 % (ref 42–52)
HDLC SERPL-MCNC: 36 MG/DL (ref 55–121)
HEMOGLOBIN: 18 G/DL (ref 14–18)
IMMATURE GRANULOCYTES #: 0 K/UL
KETONES, URINE: ABNORMAL MG/DL
LDL CHOLESTEROL CALCULATED: 131 MG/DL
LEUKOCYTE ESTERASE, URINE: NEGATIVE
LYMPHOCYTES ABSOLUTE: 1.6 K/UL (ref 1.1–4.5)
LYMPHOCYTES RELATIVE PERCENT: 24.5 % (ref 20–40)
MCH RBC QN AUTO: 28.3 PG (ref 27–31)
MCHC RBC AUTO-ENTMCNC: 31.8 G/DL (ref 33–37)
MCV RBC AUTO: 89.1 FL (ref 80–94)
MONOCYTES ABSOLUTE: 0.7 K/UL (ref 0–0.9)
MONOCYTES RELATIVE PERCENT: 10.2 % (ref 0–10)
NEUTROPHILS ABSOLUTE: 3.9 K/UL (ref 1.5–7.5)
NEUTROPHILS RELATIVE PERCENT: 59.6 % (ref 50–65)
NITRITE, URINE: NEGATIVE
PDW BLD-RTO: 13.1 % (ref 11.5–14.5)
PH UA: 5 (ref 5–8)
PLATELET # BLD: 144 K/UL (ref 130–400)
PMV BLD AUTO: 10.3 FL (ref 9.4–12.4)
POTASSIUM SERPL-SCNC: 5 MMOL/L (ref 3.5–5)
PROTEIN UA: NEGATIVE MG/DL
RBC # BLD: 6.35 M/UL (ref 4.7–6.1)
SODIUM BLD-SCNC: 140 MMOL/L (ref 136–145)
SPECIFIC GRAVITY UA: 1.02 (ref 1–1.03)
TOTAL PROTEIN: 6.7 G/DL (ref 6.6–8.7)
TRIGL SERPL-MCNC: 137 MG/DL (ref 0–149)
TSH SERPL DL<=0.05 MIU/L-ACNC: 5.36 UIU/ML (ref 0.27–4.2)
UROBILINOGEN, URINE: 0.2 E.U./DL
VITAMIN D 25-HYDROXY: 37.3 NG/ML
WBC # BLD: 6.6 K/UL (ref 4.8–10.8)

## 2022-05-25 PROCEDURE — 96372 THER/PROPH/DIAG INJ SC/IM: CPT | Performed by: NURSE PRACTITIONER

## 2022-05-25 NOTE — PROGRESS NOTES
After obtaining consent from patient and receiving verbal/written orders from [facility] APRBILLY, I gave patient 1cc of Testosterone Cyp 200mg/ml injection in RIGHT upper quad. gluteus, patient tolerated well. Medication was supplied by the patient. Patient due for next shot in 2 weeks.   MUR:[id number]  Lot: [id number]  Exp: 10/23

## 2022-06-02 ENCOUNTER — OFFICE VISIT (OUTPATIENT)
Dept: INTERNAL MEDICINE | Age: 82
End: 2022-06-02
Payer: MEDICARE

## 2022-06-02 VITALS
BODY MASS INDEX: 28.99 KG/M2 | OXYGEN SATURATION: 98 % | DIASTOLIC BLOOD PRESSURE: 70 MMHG | HEIGHT: 72 IN | WEIGHT: 214 LBS | SYSTOLIC BLOOD PRESSURE: 138 MMHG | HEART RATE: 60 BPM

## 2022-06-02 DIAGNOSIS — R73.03 PREDIABETES: ICD-10-CM

## 2022-06-02 DIAGNOSIS — R79.89 LOW TESTOSTERONE IN MALE: ICD-10-CM

## 2022-06-02 DIAGNOSIS — R79.89 ELEVATED TSH: ICD-10-CM

## 2022-06-02 DIAGNOSIS — D69.6 THROMBOCYTOPENIA (HCC): ICD-10-CM

## 2022-06-02 DIAGNOSIS — E11.9 TYPE 2 DIABETES MELLITUS WITHOUT COMPLICATION, WITHOUT LONG-TERM CURRENT USE OF INSULIN (HCC): ICD-10-CM

## 2022-06-02 DIAGNOSIS — I10 ESSENTIAL HYPERTENSION: ICD-10-CM

## 2022-06-02 DIAGNOSIS — E78.2 MIXED HYPERLIPIDEMIA: ICD-10-CM

## 2022-06-02 DIAGNOSIS — E03.9 HYPOTHYROIDISM, UNSPECIFIED TYPE: Primary | ICD-10-CM

## 2022-06-02 DIAGNOSIS — T81.31XS POSTOPERATIVE WOUND DEHISCENCE, SEQUELA: ICD-10-CM

## 2022-06-02 PROCEDURE — G8417 CALC BMI ABV UP PARAM F/U: HCPCS | Performed by: NURSE PRACTITIONER

## 2022-06-02 PROCEDURE — 1036F TOBACCO NON-USER: CPT | Performed by: NURSE PRACTITIONER

## 2022-06-02 PROCEDURE — 1123F ACP DISCUSS/DSCN MKR DOCD: CPT | Performed by: NURSE PRACTITIONER

## 2022-06-02 PROCEDURE — G8427 DOCREV CUR MEDS BY ELIG CLIN: HCPCS | Performed by: NURSE PRACTITIONER

## 2022-06-02 PROCEDURE — 3044F HG A1C LEVEL LT 7.0%: CPT | Performed by: NURSE PRACTITIONER

## 2022-06-02 PROCEDURE — 99214 OFFICE O/P EST MOD 30 MIN: CPT | Performed by: NURSE PRACTITIONER

## 2022-06-02 SDOH — ECONOMIC STABILITY: FOOD INSECURITY: WITHIN THE PAST 12 MONTHS, YOU WORRIED THAT YOUR FOOD WOULD RUN OUT BEFORE YOU GOT MONEY TO BUY MORE.: NEVER TRUE

## 2022-06-02 SDOH — ECONOMIC STABILITY: FOOD INSECURITY: WITHIN THE PAST 12 MONTHS, THE FOOD YOU BOUGHT JUST DIDN'T LAST AND YOU DIDN'T HAVE MONEY TO GET MORE.: NEVER TRUE

## 2022-06-02 ASSESSMENT — ENCOUNTER SYMPTOMS
DIARRHEA: 0
STRIDOR: 0
VOMITING: 0
CONSTIPATION: 0
EYE DISCHARGE: 0
BLOOD IN STOOL: 0
ABDOMINAL PAIN: 0
SHORTNESS OF BREATH: 0
EYE ITCHING: 0
WHEEZING: 0
COLOR CHANGE: 0
NAUSEA: 0
COUGH: 0
CHOKING: 0
SORE THROAT: 0
TROUBLE SWALLOWING: 0
ABDOMINAL DISTENTION: 0

## 2022-06-02 ASSESSMENT — SOCIAL DETERMINANTS OF HEALTH (SDOH): HOW HARD IS IT FOR YOU TO PAY FOR THE VERY BASICS LIKE FOOD, HOUSING, MEDICAL CARE, AND HEATING?: NOT HARD AT ALL

## 2022-06-02 ASSESSMENT — PATIENT HEALTH QUESTIONNAIRE - PHQ9
SUM OF ALL RESPONSES TO PHQ QUESTIONS 1-9: 0
SUM OF ALL RESPONSES TO PHQ9 QUESTIONS 1 & 2: 0
2. FEELING DOWN, DEPRESSED OR HOPELESS: 0
SUM OF ALL RESPONSES TO PHQ QUESTIONS 1-9: 0
1. LITTLE INTEREST OR PLEASURE IN DOING THINGS: 0

## 2022-06-02 NOTE — PATIENT INSTRUCTIONS
1 elevated TSH we will repeat this before starting labs  2. Hypertension stable on current meds no changes   #3 low testosterone he is receiving testosterone supplements from neurology  4. Mixed hyperlipidemia cholesterol triglycerides are both significantly abdomen and repeat was next week  5. Wound dehiscence followed by wound care at [facility] he has been released  6. Thrombocytopenia is followed by hematology  7.   Type 2 diabetes mellitus fasting and A1c have gone up a little we will just monitor that for now

## 2022-06-02 NOTE — PROGRESS NOTES
4/19/2022 6/18/7479   SYSTOLIC 200 658 510 356 358 976   DIASTOLIC 70 68 70 60 80 88   Pulse 60 67 68 76 66 -   Temp - - - 98.7 - 98.1   Resp - - - - - -   SpO2 98 99 96 - 96 -   Weight 214 lb 209 lb 12.8 oz 215 lb - 213 lb 1.6 oz 208 lb 6.4 oz   Height 6' 0\" - 6' 0\" - 6' 0\" 6' 0\"   Body mass index 29.02 kg/m2 - 29.16 kg/m2 - 28.9 kg/m2 28.26 kg/m2   Pain Level - - - - - -   Some recent data might be hidden       Family History   Problem Relation Age of Onset    No Known Problems Mother     Heart Attack Father        Social History     Tobacco Use    Smoking status: Never Smoker    Smokeless tobacco: Never Used   Substance Use Topics    Alcohol use: No      Current Outpatient Medications   Medication Sig Dispense Refill    spironolactone (ALDACTONE) 25 MG tablet TAKE 1 TABLET EVERY DAY 90 tablet 3    glipiZIDE (GLUCOTROL XL) 10 MG extended release tablet TAKE 1 TABLET EVERY MORNING 90 tablet 3    hydroCHLOROthiazide (HYDRODIURIL) 25 MG tablet TAKE 1 TABLET EVERY DAY 90 tablet 3    testosterone cypionate (DEPOTESTOTERONE CYPIONATE) 200 MG/ML injection INJECT 1 ML INTRAMUSCULARLY EVERY 14 DAYS 2 mL 0    bumetanide (BUMEX) 0.5 MG tablet Take 1 tablet by mouth daily 90 tablet 1    temazepam (RESTORIL) 15 MG capsule TAKE 2 CAPSULES BY MOUTH NIGHTLY AS NEEDED FOR SLEEP 60 capsule 0    doxycycline monohydrate (MONODOX) 100 MG capsule TAKE 1 CAPSULE BY MOUTH TWICE DAILY FOR 7 DAYS      sildenafil (VIAGRA) 25 MG tablet Take 1 tablet by mouth as needed for Erectile Dysfunction 30 tablet 1    cloNIDine (CATAPRES) 0.1 MG tablet TAKE 1 TABLET BY MOUTH ONCE DAILY      losartan (COZAAR) 100 MG tablet TAKE 1 TABLET EVERY DAY 90 tablet 3    atorvastatin (LIPITOR) 20 MG tablet TAKE 1 TABLET AT BEDTIME 90 tablet 3    verapamil (CALAN SR) 240 MG extended release tablet TAKE 1 TABLET TWICE DAILY 180 tablet 3    potassium chloride (KLOR-CON M) 10 MEQ extended release tablet TAKE 2 TABLETS TWICE DAILY 360 tablet 3    Needles & Syringes MISC 1 each by Does not apply route daily 100 each 3    nystatin (MYCOSTATIN) 928155 UNIT/GM cream APPLY  CREAM TOPICALLY TWICE DAILY 30 g 2    fluticasone (FLONASE) 50 MCG/ACT nasal spray USE 1 SPRAY IN EACH NOSTRIL TWICE DAILY 48 g 3    pantoprazole (PROTONIX) 40 MG tablet TAKE 1 TABLET EVERY DAY 90 tablet 3    aspirin 81 MG tablet Take 81 mg by mouth daily      Omega-3 Fatty Acids (FISH OIL) 1000 MG CAPS Take 1,200 mg by mouth 2 times daily       No current facility-administered medications for this visit.      No Known Allergies    Health Maintenance   Topic Date Due    Shingles vaccine (1 of 2) Never done    COVID-19 Vaccine (3 - Booster for Moderna series) 08/23/2021    Depression Screen  08/31/2022    Annual Wellness Visit (AWV)  09/01/2022    Lipids  05/25/2023    Colorectal Cancer Screen  03/09/2026    DTaP/Tdap/Td vaccine (2 - Td or Tdap) 08/15/2030    Flu vaccine  Completed    Pneumococcal 65+ years Vaccine  Completed    Hepatitis C screen  Completed    Hepatitis A vaccine  Aged Out    Hib vaccine  Aged Out    Meningococcal (ACWY) vaccine  Aged Out       Lab Results   Component Value Date    LABA1C 5.9 05/25/2022     Lab Results   Component Value Date    PSA 0.42 05/24/2021    PSA 0.47 05/08/2020    PSA 0.41 05/02/2019     TSH   Date Value Ref Range Status   05/25/2022 5.360 (H) 0.270 - 4.200 uIU/mL Final   ]  Lab Results   Component Value Date     05/25/2022    K 5.0 05/25/2022     05/25/2022    CO2 26 05/25/2022    BUN 23 05/25/2022    CREATININE 1.0 05/25/2022    GLUCOSE 130 (H) 05/25/2022    CALCIUM 9.6 05/25/2022    PROT 6.7 05/25/2022    LABALBU 4.6 05/25/2022    BILITOT 0.7 05/25/2022    ALKPHOS 60 05/25/2022    AST 30 05/25/2022    ALT 40 05/25/2022    LABGLOM >60 05/25/2022    GFRAA >59 05/25/2022    GLOB 2.7 04/19/2022     Lab Results   Component Value Date    CHOL 194 05/25/2022    CHOL 127 (L) 11/22/2021    CHOL 149 (L) 02/18/2021     Lab Results Component Value Date    TRIG 137 05/25/2022    TRIG 60 11/22/2021    TRIG 121 05/24/2021     Lab Results   Component Value Date    HDL 36 (L) 05/25/2022    HDL 37 (L) 11/22/2021    HDL 40 (L) 02/18/2021     Lab Results   Component Value Date    LDLCALC 131 05/25/2022    LDLCALC 78 11/22/2021    LDLCALC 88 02/18/2021     Lab Results   Component Value Date     05/25/2022    K 5.0 05/25/2022     05/25/2022    CO2 26 05/25/2022    BUN 23 05/25/2022    CREATININE 1.0 05/25/2022    GLUCOSE 130 05/25/2022    CALCIUM 9.6 05/25/2022      Lab Results   Component Value Date    WBC 6.6 05/25/2022    HGB 18.0 05/25/2022    HCT 56.6 (H) 05/25/2022    MCV 89.1 05/25/2022     05/25/2022    LABLYMP 1.36 04/07/2015    LYMPHOPCT 24.5 05/25/2022    RBC 6.35 (H) 05/25/2022    MCH 28.3 05/25/2022    MCHC 31.8 (L) 05/25/2022    RDW 13.1 05/25/2022     Lab Results   Component Value Date    VITD25 37.3 05/25/2022     Labs reviewed from 5/25/2022    Subjective:      Review of Systems   Constitutional: Negative for fatigue, fever and unexpected weight change. HENT: Negative for ear discharge, ear pain, mouth sores, sore throat and trouble swallowing. Eyes: Negative for discharge, itching and visual disturbance. Respiratory: Negative for cough, choking, shortness of breath, wheezing and stridor. Cardiovascular: Negative for chest pain, palpitations and leg swelling. Gastrointestinal: Negative for abdominal distention, abdominal pain, blood in stool, constipation, diarrhea, nausea and vomiting. Endocrine: Negative for cold intolerance, polydipsia and polyuria. Genitourinary: Negative for difficulty urinating, dysuria, frequency and urgency. Musculoskeletal: Negative for arthralgias and gait problem. Skin: Negative for color change and rash. Allergic/Immunologic: Negative for food allergies and immunocompromised state.    Neurological: Negative for dizziness, tremors, syncope, speech difficulty, weakness and headaches. Hematological: Negative for adenopathy. Does not bruise/bleed easily. Psychiatric/Behavioral: Negative for confusion and hallucinations. Objective:     Physical Exam  Constitutional:       General: He is not in acute distress. Appearance: He is well-developed. HENT:      Head: Normocephalic and atraumatic. Eyes:      General: No scleral icterus. Right eye: No discharge. Left eye: No discharge. Pupils: Pupils are equal, round, and reactive to light. Neck:      Thyroid: No thyromegaly. Vascular: No JVD. Cardiovascular:      Rate and Rhythm: Normal rate and regular rhythm. Heart sounds: Normal heart sounds. No murmur heard. Pulmonary:      Effort: Pulmonary effort is normal. No respiratory distress. Breath sounds: Normal breath sounds. No wheezing or rales. Abdominal:      General: Bowel sounds are normal. There is no distension. Palpations: Abdomen is soft. There is no mass. Tenderness: There is no abdominal tenderness. There is no guarding or rebound. Musculoskeletal:         General: No tenderness. Normal range of motion. Cervical back: Normal range of motion and neck supple. Skin:     General: Skin is warm and dry. Findings: No erythema or rash. Neurological:      Mental Status: He is alert and oriented to person, place, and time. Cranial Nerves: No cranial nerve deficit. Coordination: Coordination normal.      Deep Tendon Reflexes: Reflexes are normal and symmetric. Reflexes normal.   Psychiatric:         Mood and Affect: Mood is not depressed. Behavior: Behavior normal.         Thought Content:  Thought content normal.         Judgment: Judgment normal.       /70   Pulse 60   Ht 6' (1.829 m)   Wt 214 lb (97.1 kg)   SpO2 98%   BMI 29.02 kg/m²           Assessment:      Problem List     Elevated TSH     This is a new finding for him with all of his labs changing so much today we will repeat this before we talk about any medicine can          Relevant Orders    TSH    TSH    Urinalysis with Reflex to Culture    Essential hypertension     He is stable on losartan 100 Bumex 0.5 HCTZ 25 spironolactone 25 and verapamil 240          Relevant Medications    losartan (COZAAR) 100 MG tablet    verapamil (CALAN SR) 240 MG extended release tablet    potassium chloride (KLOR-CON M) 10 MEQ extended release tablet    Other Relevant Orders    CBC with Auto Differential    Comprehensive Metabolic Panel    Low testosterone in male     Followed by urology taking testosterone injections he does have elevated hemoglobin that has been evaluated by hematology          Relevant Medications    testosterone cypionate (DEPOTESTOTERONE CYPIONATE) 200 MG/ML injection    Mixed hyperlipidemia     Both cholesterol and triglycerides went up significantly for him he is on Terell 20          Relevant Medications    aspirin 81 MG tablet    losartan (COZAAR) 100 MG tablet    atorvastatin (LIPITOR) 20 MG tablet    verapamil (CALAN SR) 240 MG extended release tablet    cloNIDine (CATAPRES) 0.1 MG tablet    sildenafil (VIAGRA) 25 MG tablet    bumetanide (BUMEX) 0.5 MG tablet    spironolactone (ALDACTONE) 25 MG tablet    hydroCHLOROthiazide (HYDRODIURIL) 25 MG tablet    Other Relevant Orders    Lipid Panel    Triglyceride    Postoperative wound dehiscence     Followed by wound care at [facility]         Thrombocytopenia [facility])     Platelets are back up to normal he has been evaluated by oncology with no acute findings          Type 2 diabetes mellitus without complication, without long-term current use of insulin ([facility])     His A1c and fasting is actually gone up he is on glipizide          Relevant Medications    glipiZIDE (GLUCOTROL XL) 10 MG extended release tablet    Other Relevant Orders    Hemoglobin A1C          Plan:        Patient given educational materials - see patient instructions.   Discussed use, benefit, and side effects of prescribed medications. Allpatient questions answered. Pt voiced understanding. Reviewed health maintenance. Instructed to continue current medications, diet and exercise. Patient agreed with treatment plan. Follow up as directed. MEDICATIONS:  No orders of the defined types were placed in this encounter. ORDERS:  Orders Placed This Encounter   Procedures    Lipid Panel    TSH    CBC with Auto Differential    Hemoglobin A1C    Comprehensive Metabolic Panel    Triglyceride    TSH    Urinalysis with Reflex to Culture       Follow-up:  No follow-ups on file. PATIENT INSTRUCTIONS:  Patient Instructions   1 elevated TSH we will repeat this before starting labs  2. Hypertension stable on current meds no changes   #3 low testosterone he is receiving testosterone supplements from neurology  4. Mixed hyperlipidemia cholesterol triglycerides are both significantly abdomen and repeat was next week  5. Wound dehiscence followed by wound care at [facility] he has been released  6. Thrombocytopenia is followed by hematology  7. Type 2 diabetes mellitus fasting and A1c have gone up a little we will just monitor that for now    Electronically signed by MARISELA Henderson on 6/2/2022 at 9:40 AM        EMRDragon/transcription disclaimer:  Much of this encounter note is electronic transcription/translation of spoken language to printed texts. The electronic translation of spoken language may be erroneous, or at times,nonsensical words or phrases may be inadvertently transcribed.   Although I have reviewed the note for such errors, some may still exist.

## 2022-06-02 NOTE — ASSESSMENT & PLAN NOTE
This is a new finding for him with all of his labs changing so much today we will repeat this before we talk about any medicine can

## 2022-06-02 NOTE — ASSESSMENT & PLAN NOTE
Followed by urology taking testosterone injections he does have elevated hemoglobin that has been evaluated by hematology

## 2022-06-08 ENCOUNTER — NURSE ONLY (OUTPATIENT)
Dept: UROLOGY | Age: 82
End: 2022-06-08
Payer: MEDICARE

## 2022-06-08 DIAGNOSIS — E29.1 HYPOGONADISM MALE: ICD-10-CM

## 2022-06-08 DIAGNOSIS — R79.89 LOW TESTOSTERONE IN MALE: ICD-10-CM

## 2022-06-08 PROCEDURE — 96372 THER/PROPH/DIAG INJ SC/IM: CPT | Performed by: NURSE PRACTITIONER

## 2022-06-08 RX ORDER — TESTOSTERONE CYPIONATE 200 MG/ML
INJECTION INTRAMUSCULAR
Qty: 2 ML | Refills: 0 | Status: SHIPPED | OUTPATIENT
Start: 2022-06-08 | End: 2022-07-06 | Stop reason: SDUPTHER

## 2022-06-08 RX ORDER — TESTOSTERONE CYPIONATE 200 MG/ML
INJECTION INTRAMUSCULAR
Qty: 2 ML | Refills: 0 | Status: CANCELLED | OUTPATIENT
Start: 2022-06-08 | End: 2022-07-09

## 2022-06-08 NOTE — TELEPHONE ENCOUNTER
Patient needs a refill on testosterone shot  Next shot due 6/22/22  Patient wants it sent in to [facility] on [address]   He gets 1mL every 2 weeks  SASCHA deleon 6/8/22

## 2022-06-08 NOTE — PROGRESS NOTES
After obtaining consent from patient and receiving verbal/written orders from [facility] APRBILLY, I gave patient 1cc of Testosterone Cyp 200mg/ml injection in LEFT upper quad. gluteus, patient tolerated well. Medication was supplied by the patient. Patient due for next shot in 2 weeks.   [id number]  Lot: [id number]  Exp:

## 2022-06-13 DIAGNOSIS — R73.03 PREDIABETES: ICD-10-CM

## 2022-06-13 DIAGNOSIS — E78.2 MIXED HYPERLIPIDEMIA: ICD-10-CM

## 2022-06-13 DIAGNOSIS — R79.89 ELEVATED TSH: ICD-10-CM

## 2022-06-13 LAB
CHOLESTEROL, TOTAL: 120 MG/DL (ref 160–199)
HDLC SERPL-MCNC: 31 MG/DL (ref 55–121)
LDL CHOLESTEROL CALCULATED: 72 MG/DL
TRIGL SERPL-MCNC: 84 MG/DL (ref 0–149)
TSH SERPL DL<=0.05 MIU/L-ACNC: 3.24 UIU/ML (ref 0.27–4.2)

## 2022-06-22 ENCOUNTER — NURSE ONLY (OUTPATIENT)
Dept: UROLOGY | Age: 82
End: 2022-06-22
Payer: MEDICARE

## 2022-06-22 DIAGNOSIS — R79.89 LOW TESTOSTERONE IN MALE: ICD-10-CM

## 2022-06-22 PROCEDURE — 96372 THER/PROPH/DIAG INJ SC/IM: CPT | Performed by: NURSE PRACTITIONER

## 2022-06-22 NOTE — PROGRESS NOTES
After obtaining consent from patient and receiving verbal/written orders from [facility] [place], I gave patient 1cc of Testosterone Cyp 200mg/ml injection in RIGHT upper quad. gluteus, patient tolerated well. Medication was supplied by the patient. Patient due for next shot in 2 weeks.   KKP:[id number]  Lot: [id number]  Exp: 12/23

## 2022-07-06 ENCOUNTER — NURSE ONLY (OUTPATIENT)
Dept: UROLOGY | Age: 82
End: 2022-07-06
Payer: MEDICARE

## 2022-07-06 DIAGNOSIS — R79.89 LOW TESTOSTERONE IN MALE: ICD-10-CM

## 2022-07-06 DIAGNOSIS — E29.1 HYPOGONADISM MALE: ICD-10-CM

## 2022-07-06 PROCEDURE — 96372 THER/PROPH/DIAG INJ SC/IM: CPT | Performed by: NURSE PRACTITIONER

## 2022-07-06 RX ORDER — TESTOSTERONE CYPIONATE 200 MG/ML
INJECTION INTRAMUSCULAR
Qty: 2 ML | Refills: 0 | Status: SHIPPED | OUTPATIENT
Start: 2022-07-06 | End: 2022-07-19 | Stop reason: SDUPTHER

## 2022-07-06 RX ORDER — TESTOSTERONE CYPIONATE 200 MG/ML
INJECTION INTRAMUSCULAR
Qty: 2 ML | Refills: 0 | Status: CANCELLED | OUTPATIENT
Start: 2022-07-06 | End: 2022-08-06

## 2022-07-06 NOTE — PROGRESS NOTES
After obtaining consent from patient and receiving verbal/written orders from [facility], I gave patient 1cc of Testosterone Cyp 200mg/ml injection in RIGHT upper quad. gluteus, patient tolerated well. Medication was supplied by the patient. Patient due for next shot in 2 weeks.   [facility]:[id number]  Lot: [id number]  Exp: 12/23

## 2022-07-06 NOTE — PROGRESS NOTES
Patient wants it sent into [facility] on [address]    Dosage: 1mL  Last shot:  Today 7/6/22  Ernie Sesar done

## 2022-07-12 DIAGNOSIS — F51.01 PRIMARY INSOMNIA: ICD-10-CM

## 2022-07-12 RX ORDER — TEMAZEPAM 15 MG/1
CAPSULE ORAL
Qty: 60 CAPSULE | Refills: 0 | Status: SHIPPED | OUTPATIENT
Start: 2022-07-12 | End: 2022-09-06 | Stop reason: SDUPTHER

## 2022-07-12 NOTE — TELEPHONE ENCOUNTER
Rishi King called requesting a refill of the below medication which has been pended for you:     Requested Prescriptions     Pending Prescriptions Disp Refills    temazepam (RESTORIL) 15 MG capsule [Pharmacy Med Name: Temazepam 15 MG Oral Capsule] 60 capsule 0     Sig: TAKE 2 CAPSULES BY MOUTH NIGHTLY AS NEEDED FOR SLEEP       Last Appointment Date: 6/2/2022  Next Appointment Date: 9/6/2022    No Known Allergies

## 2022-07-16 DIAGNOSIS — E29.1 HYPOGONADISM MALE: ICD-10-CM

## 2022-07-18 RX ORDER — TESTOSTERONE CYPIONATE 200 MG/ML
INJECTION INTRAMUSCULAR
Qty: 2 ML | OUTPATIENT
Start: 2022-07-18

## 2022-07-19 ENCOUNTER — NURSE ONLY (OUTPATIENT)
Dept: UROLOGY | Age: 82
End: 2022-07-19
Payer: MEDICARE

## 2022-07-19 DIAGNOSIS — E29.1 HYPOGONADISM MALE: ICD-10-CM

## 2022-07-19 DIAGNOSIS — R79.89 LOW TESTOSTERONE IN MALE: Primary | ICD-10-CM

## 2022-07-19 PROCEDURE — 96372 THER/PROPH/DIAG INJ SC/IM: CPT | Performed by: NURSE PRACTITIONER

## 2022-07-19 RX ORDER — TESTOSTERONE CYPIONATE 200 MG/ML
200 INJECTION INTRAMUSCULAR ONCE
Status: COMPLETED | OUTPATIENT
Start: 2022-07-19 | End: 2022-07-19

## 2022-07-19 RX ORDER — TESTOSTERONE CYPIONATE 200 MG/ML
INJECTION INTRAMUSCULAR
Qty: 2 ML | Refills: 0 | Status: SHIPPED | OUTPATIENT
Start: 2022-07-19 | End: 2022-08-26 | Stop reason: SDUPTHER

## 2022-07-19 RX ADMIN — TESTOSTERONE CYPIONATE 200 MG: 200 INJECTION INTRAMUSCULAR at 08:11

## 2022-07-19 NOTE — PROGRESS NOTES
After obtaining consent from patient and [facility] I gave patient 1cc of Testosterone Cyp 200mg/ml injection in LEFT upper quad. gluteus, patient tolerated well. Medication was not supplied by the patient. Patient due for next shot in 2 weeks.

## 2022-08-03 ENCOUNTER — NURSE ONLY (OUTPATIENT)
Dept: UROLOGY | Age: 82
End: 2022-08-03
Payer: MEDICARE

## 2022-08-03 DIAGNOSIS — E29.1 HYPOGONADISM MALE: Primary | ICD-10-CM

## 2022-08-03 PROCEDURE — 96372 THER/PROPH/DIAG INJ SC/IM: CPT | Performed by: NURSE PRACTITIONER

## 2022-08-03 RX ORDER — TESTOSTERONE CYPIONATE 200 MG/ML
200 INJECTION INTRAMUSCULAR ONCE
Status: COMPLETED | OUTPATIENT
Start: 2022-08-03 | End: 2022-08-03

## 2022-08-03 RX ADMIN — TESTOSTERONE CYPIONATE 200 MG: 200 INJECTION INTRAMUSCULAR at 08:26

## 2022-08-03 NOTE — PROGRESS NOTES
After obtaining consent from patient and receiving verbal/written orders from [facility] APRBILLY, I gave patient 1cc of Testosterone Cyp 200mg/ml injection in RIGHT upper quad. gluteus, patient tolerated well. Medication was not supplied by the patient. Patient due for next shot in 2 weeks.

## 2022-08-17 ENCOUNTER — NURSE ONLY (OUTPATIENT)
Dept: UROLOGY | Age: 82
End: 2022-08-17
Payer: MEDICARE

## 2022-08-17 DIAGNOSIS — R79.89 LOW TESTOSTERONE IN MALE: Primary | ICD-10-CM

## 2022-08-17 PROCEDURE — 96372 THER/PROPH/DIAG INJ SC/IM: CPT | Performed by: NURSE PRACTITIONER

## 2022-08-24 ENCOUNTER — TELEPHONE (OUTPATIENT)
Dept: INTERNAL MEDICINE | Age: 82
End: 2022-08-24

## 2022-08-24 DIAGNOSIS — I10 ESSENTIAL HYPERTENSION: ICD-10-CM

## 2022-08-24 DIAGNOSIS — E78.2 MIXED HYPERLIPIDEMIA: ICD-10-CM

## 2022-08-24 NOTE — TELEPHONE ENCOUNTER
S/w [facility], pt is out of his medication and needs a two week supply sent to [address]:  1.) Atorvastatin  2.) Glipizide  3.) Losartan  4.) Potassium  5.) Verapamil

## 2022-08-25 RX ORDER — POTASSIUM CHLORIDE 750 MG/1
TABLET, EXTENDED RELEASE ORAL
Qty: 56 TABLET | Refills: 0 | Status: SHIPPED | OUTPATIENT
Start: 2022-08-25

## 2022-08-25 RX ORDER — ATORVASTATIN CALCIUM 20 MG/1
TABLET, FILM COATED ORAL
Qty: 14 TABLET | Refills: 0 | Status: SHIPPED | OUTPATIENT
Start: 2022-08-25

## 2022-08-25 RX ORDER — GLIPIZIDE 10 MG/1
TABLET, FILM COATED, EXTENDED RELEASE ORAL
Qty: 14 TABLET | Refills: 0 | Status: SHIPPED | OUTPATIENT
Start: 2022-08-25

## 2022-08-25 RX ORDER — LOSARTAN POTASSIUM 100 MG/1
TABLET ORAL
Qty: 14 TABLET | Refills: 0 | Status: SHIPPED | OUTPATIENT
Start: 2022-08-25

## 2022-08-25 RX ORDER — VERAPAMIL HYDROCHLORIDE 240 MG/1
TABLET, FILM COATED, EXTENDED RELEASE ORAL
Qty: 28 TABLET | Refills: 0 | Status: SHIPPED | OUTPATIENT
Start: 2022-08-25

## 2022-08-26 ENCOUNTER — TELEPHONE (OUTPATIENT)
Dept: UROLOGY | Age: 82
End: 2022-08-26

## 2022-08-26 DIAGNOSIS — E29.1 HYPOGONADISM MALE: ICD-10-CM

## 2022-08-26 RX ORDER — TESTOSTERONE CYPIONATE 200 MG/ML
INJECTION INTRAMUSCULAR
Qty: 2 ML | Refills: 0 | Status: SHIPPED | OUTPATIENT
Start: 2022-08-26 | End: 2022-09-29

## 2022-08-26 NOTE — TELEPHONE ENCOUNTER
[facility]ill [facility] to [facility] [address].    Zakiya Sunshine done   1mL every 2 weeks   Last fill was 7/19/22

## 2022-08-26 NOTE — TELEPHONE ENCOUNTER
Pt called stating hes out of testosterone and is needing a refill. he has an appt on 8/31 and needs more before then

## 2022-08-31 ENCOUNTER — NURSE ONLY (OUTPATIENT)
Dept: UROLOGY | Age: 82
End: 2022-08-31
Payer: MEDICARE

## 2022-08-31 DIAGNOSIS — R79.89 LOW TESTOSTERONE IN MALE: Primary | ICD-10-CM

## 2022-08-31 PROCEDURE — 96372 THER/PROPH/DIAG INJ SC/IM: CPT | Performed by: NURSE PRACTITIONER

## 2022-08-31 NOTE — PROGRESS NOTES
After obtaining consent from patient and receiving verbal/written orders from [facility] APRBILLY, I gave patient 1cc of Testosterone Cyp 200mg/ml injection in RIGHT upper quad. gluteus, patient tolerated well. Medication was supplied by the patient. Patient due for next shot in 2 weeks.

## 2022-09-02 DIAGNOSIS — E78.2 MIXED HYPERLIPIDEMIA: ICD-10-CM

## 2022-09-02 DIAGNOSIS — R79.89 ELEVATED TSH: ICD-10-CM

## 2022-09-02 DIAGNOSIS — E11.9 TYPE 2 DIABETES MELLITUS WITHOUT COMPLICATION, WITHOUT LONG-TERM CURRENT USE OF INSULIN (HCC): ICD-10-CM

## 2022-09-02 DIAGNOSIS — E03.9 HYPOTHYROIDISM, UNSPECIFIED TYPE: ICD-10-CM

## 2022-09-02 DIAGNOSIS — I10 ESSENTIAL HYPERTENSION: ICD-10-CM

## 2022-09-02 LAB
ALBUMIN SERPL-MCNC: 4.5 G/DL (ref 3.5–5.2)
ALP BLD-CCNC: 71 U/L (ref 40–130)
ALT SERPL-CCNC: 40 U/L (ref 5–41)
ANION GAP SERPL CALCULATED.3IONS-SCNC: 11 MMOL/L (ref 7–19)
AST SERPL-CCNC: 24 U/L (ref 5–40)
BACTERIA: NEGATIVE /HPF
BASOPHILS ABSOLUTE: 0.1 K/UL (ref 0–0.2)
BASOPHILS RELATIVE PERCENT: 0.7 % (ref 0–1)
BILIRUB SERPL-MCNC: 0.9 MG/DL (ref 0.2–1.2)
BILIRUBIN URINE: NEGATIVE
BLOOD, URINE: NEGATIVE
BUN BLDV-MCNC: 26 MG/DL (ref 8–23)
CALCIUM SERPL-MCNC: 9.5 MG/DL (ref 8.8–10.2)
CHLORIDE BLD-SCNC: 102 MMOL/L (ref 98–111)
CLARITY: CLEAR
CO2: 27 MMOL/L (ref 22–29)
COLOR: YELLOW
CREAT SERPL-MCNC: 1.1 MG/DL (ref 0.5–1.2)
CRYSTALS, UA: ABNORMAL /HPF
EOSINOPHILS ABSOLUTE: 0.3 K/UL (ref 0–0.6)
EOSINOPHILS RELATIVE PERCENT: 3.1 % (ref 0–5)
EPITHELIAL CELLS, UA: 0 /HPF (ref 0–5)
GFR AFRICAN AMERICAN: >59
GFR NON-AFRICAN AMERICAN: >60
GLUCOSE BLD-MCNC: 117 MG/DL (ref 74–109)
GLUCOSE URINE: NEGATIVE MG/DL
HBA1C MFR BLD: 5.8 % (ref 4–6)
HCT VFR BLD CALC: 55.6 % (ref 42–52)
HEMOGLOBIN: 17.6 G/DL (ref 14–18)
HYALINE CASTS: 1 /HPF (ref 0–8)
IMMATURE GRANULOCYTES #: 0 K/UL
KETONES, URINE: NEGATIVE MG/DL
LEUKOCYTE ESTERASE, URINE: NEGATIVE
LYMPHOCYTES ABSOLUTE: 1.8 K/UL (ref 1.1–4.5)
LYMPHOCYTES RELATIVE PERCENT: 20 % (ref 20–40)
MCH RBC QN AUTO: 27.5 PG (ref 27–31)
MCHC RBC AUTO-ENTMCNC: 31.7 G/DL (ref 33–37)
MCV RBC AUTO: 86.9 FL (ref 80–94)
MONOCYTES ABSOLUTE: 0.8 K/UL (ref 0–0.9)
MONOCYTES RELATIVE PERCENT: 8.6 % (ref 0–10)
NEUTROPHILS ABSOLUTE: 6 K/UL (ref 1.5–7.5)
NEUTROPHILS RELATIVE PERCENT: 67.3 % (ref 50–65)
NITRITE, URINE: NEGATIVE
PDW BLD-RTO: 13.6 % (ref 11.5–14.5)
PH UA: 6.5 (ref 5–8)
PLATELET # BLD: 143 K/UL (ref 130–400)
PMV BLD AUTO: 10 FL (ref 9.4–12.4)
POTASSIUM SERPL-SCNC: 4.6 MMOL/L (ref 3.5–5)
PROTEIN UA: 30 MG/DL
RBC # BLD: 6.4 M/UL (ref 4.7–6.1)
RBC UA: 1 /HPF (ref 0–4)
SODIUM BLD-SCNC: 140 MMOL/L (ref 136–145)
SPECIFIC GRAVITY UA: 1.02 (ref 1–1.03)
TOTAL PROTEIN: 7 G/DL (ref 6.6–8.7)
TRIGL SERPL-MCNC: 108 MG/DL (ref 0–149)
TSH SERPL DL<=0.05 MIU/L-ACNC: 2.97 UIU/ML (ref 0.27–4.2)
UROBILINOGEN, URINE: 0.2 E.U./DL
WBC # BLD: 9 K/UL (ref 4.8–10.8)
WBC UA: 1 /HPF (ref 0–5)

## 2022-09-06 ENCOUNTER — OFFICE VISIT (OUTPATIENT)
Dept: INTERNAL MEDICINE | Age: 82
End: 2022-09-06
Payer: MEDICARE

## 2022-09-06 VITALS
DIASTOLIC BLOOD PRESSURE: 70 MMHG | BODY MASS INDEX: 27.77 KG/M2 | OXYGEN SATURATION: 94 % | HEIGHT: 72 IN | WEIGHT: 205 LBS | SYSTOLIC BLOOD PRESSURE: 132 MMHG | HEART RATE: 68 BPM

## 2022-09-06 DIAGNOSIS — F51.01 PRIMARY INSOMNIA: ICD-10-CM

## 2022-09-06 DIAGNOSIS — Z00.00 WELCOME TO MEDICARE PREVENTIVE VISIT: ICD-10-CM

## 2022-09-06 DIAGNOSIS — Z00.00 MEDICARE ANNUAL WELLNESS VISIT, SUBSEQUENT: Primary | ICD-10-CM

## 2022-09-06 DIAGNOSIS — I10 ESSENTIAL HYPERTENSION: ICD-10-CM

## 2022-09-06 DIAGNOSIS — E11.9 TYPE 2 DIABETES MELLITUS WITHOUT COMPLICATION, WITHOUT LONG-TERM CURRENT USE OF INSULIN (HCC): ICD-10-CM

## 2022-09-06 PROCEDURE — 3044F HG A1C LEVEL LT 7.0%: CPT | Performed by: NURSE PRACTITIONER

## 2022-09-06 PROCEDURE — G0402 INITIAL PREVENTIVE EXAM: HCPCS | Performed by: NURSE PRACTITIONER

## 2022-09-06 PROCEDURE — 1123F ACP DISCUSS/DSCN MKR DOCD: CPT | Performed by: NURSE PRACTITIONER

## 2022-09-06 RX ORDER — TEMAZEPAM 15 MG/1
CAPSULE ORAL
Qty: 60 CAPSULE | Refills: 0 | Status: SHIPPED | OUTPATIENT
Start: 2022-09-06 | End: 2022-10-07

## 2022-09-06 ASSESSMENT — PATIENT HEALTH QUESTIONNAIRE - PHQ9
SUM OF ALL RESPONSES TO PHQ9 QUESTIONS 1 & 2: 0
2. FEELING DOWN, DEPRESSED OR HOPELESS: 0
SUM OF ALL RESPONSES TO PHQ QUESTIONS 1-9: 0
1. LITTLE INTEREST OR PLEASURE IN DOING THINGS: 0
SUM OF ALL RESPONSES TO PHQ QUESTIONS 1-9: 0

## 2022-09-06 ASSESSMENT — LIFESTYLE VARIABLES: HOW OFTEN DO YOU HAVE A DRINK CONTAINING ALCOHOL: NEVER

## 2022-09-06 NOTE — PATIENT INSTRUCTIONS
Personalized Preventive Plan for Sabra Calles - 9/6/2022  Medicare offers a range of preventive health benefits. Some of the tests and screenings are paid in full while other may be subject to a deductible, co-insurance, and/or copay. Some of these benefits include a comprehensive review of your medical history including lifestyle, illnesses that may run in your family, and various assessments and screenings as appropriate. After reviewing your medical record and screening and assessments performed today your provider may have ordered immunizations, labs, imaging, and/or referrals for you. A list of these orders (if applicable) as well as your Preventive Care list are included within your After Visit Summary for your review. Other Preventive Recommendations:    A preventive eye exam performed by an eye specialist is recommended every 1-2 years to screen for glaucoma; cataracts, macular degeneration, and other eye disorders. A preventive dental visit is recommended every 6 months. Try to get at least 150 minutes of exercise per week or 10,000 steps per day on a pedometer . Order or download the FREE \"Exercise & Physical Activity: Your Everyday Guide\" from [employer]. Call 6-889.417.4252 or search [employer] online. You need 7691-3535 mg of calcium and 2728-0450 IU of vitamin D per day. It is possible to meet your calcium requirement with diet alone, but a vitamin D supplement is usually necessary to meet this goal.  When exposed to the sun, use a sunscreen that protects against both UVA and UVB radiation with an SPF of 30 or greater. Reapply every 2 to 3 hours or after sweating, drying off with a towel, or swimming. Always wear a seat belt when traveling in a car. Always wear a helmet when riding a bicycle or motorcycle.

## 2022-09-06 NOTE — PROGRESS NOTES
Medicare Annual Wellness Visit    Bhavya Laguerre is here for Medicare AWV    Assessment & Plan   Primary insomnia  The following orders have not been finalized:  -     temazepam (RESTORIL) 15 MG capsule    Recommendations for Preventive Services Due: see orders and patient instructions/AVS.  Recommended screening schedule for the next 5-10 years is provided to the patient in written form: see Patient Instructions/AVS.     No follow-ups on file. Subjective       Patient's complete Health Risk Assessment and screening values have been reviewed and are found in Flowsheets. The following problems were reviewed today and where indicated follow up appointments were made and/or referrals ordered. Positive Risk Factor Screenings with Interventions:             General Health and ACP:  General  In general, how would you say your health is?: Good  In the past 7 days, have you experienced any of the following: New or Increased Pain, New or Increased Fatigue, Loneliness, Social Isolation, Stress or Anger?: No  Do you get the social and emotional support that you need?: Yes  Do you have a Living Will?: Yes    Advance Directives       Power of  Living Will ACP-Advance Directive ACP-Power of     Not on File Not on File Not on File Not on File        General Health Risk Interventions:  na              Objective   Vitals:    09/06/22 0904   BP: 132/70   Pulse: 68   SpO2: 94%   Weight: 205 lb (93 kg)   Height: 6' (1.829 m)      Body mass index is 27.8 kg/m².       General Appearance: alert and oriented to person, place and time, well developed and well- nourished, in no acute distress  Skin: warm and dry, no rash or erythema  Head: normocephalic and atraumatic  Eyes: pupils equal, round, and reactive to light, extraocular eye movements intact, conjunctivae normal  ENT: tympanic membrane, external ear and ear canal normal bilaterally, nose without deformity, nasal mucosa and turbinates normal without polyps  Neck: supple and non-tender without mass, no thyromegaly or thyroid nodules, no cervical lymphadenopathy  Pulmonary/Chest: clear to auscultation bilaterally- no wheezes, rales or rhonchi, normal air movement, no respiratory distress  Cardiovascular: normal rate, regular rhythm, normal S1 and S2, no murmurs, rubs, clicks, or gallops, distal pulses intact, no carotid bruits  Abdomen: soft, non-tender, non-distended, normal bowel sounds, no masses or organomegaly  Extremities: no cyanosis, clubbing or edema  Musculoskeletal: normal range of motion, no joint swelling, deformity or tenderness  Neurologic: reflexes normal and symmetric, no cranial nerve deficit, gait, coordination and speech normal       No Known Allergies  Prior to Visit Medications    Medication Sig Taking?  Authorizing Provider   testosterone cypionate (DEPOTESTOTERONE CYPIONATE) 200 MG/ML injection INJECT 1 ML INTRAMUSCULARLY EVERY 14 DAYS  MARISELA Cervantes - CNP   atorvastatin (LIPITOR) 20 MG tablet TAKE 1 TABLET AT BEDTIME  MARISELA Reyna   glipiZIDE (GLUCOTROL XL) 10 MG extended release tablet TAKE 1 TABLET EVERY MORNING  MARISELA Reyna   losartan (COZAAR) 100 MG tablet TAKE 1 TABLET EVERY DAY  MARISELA Reyna   potassium chloride (KLOR-CON M) 10 MEQ extended release tablet TAKE 2 TABLETS TWICE DAILY  MARISELA Reyna   verapamil (CALAN SR) 240 MG extended release tablet TAKE 1 TABLET TWICE DAILY  MARISELA Reyna   temazepam (RESTORIL) 15 MG capsule TAKE 2 CAPSULES BY MOUTH NIGHTLY AS NEEDED FOR SLEEP  MARISELA Reyna   spironolactone (ALDACTONE) 25 MG tablet TAKE 1 TABLET EVERY DAY  MARISELA Reyna   hydroCHLOROthiazide (HYDRODIURIL) 25 MG tablet TAKE 1 TABLET EVERY DAY  MARISELA Reyna   bumetanide (BUMEX) 0.5 MG tablet Take 1 tablet by mouth daily  MARISELA Reyna   doxycycline monohydrate (MONODOX) 100 MG capsule TAKE 1 CAPSULE BY MOUTH TWICE DAILY FOR 7 DAYS  Historical Provider, MD sildenafil (VIAGRA) 25 MG tablet Take 1 tablet by mouth as needed for Erectile Dysfunction  MARISELA Laureano CNP   cloNIDine (CATAPRES) 0.1 MG tablet TAKE 1 TABLET BY MOUTH ONCE DAILY  Historical Provider, MD   Needles & Syringes MISC 1 each by Does not apply route daily  MARISELA Laureano CNP   nystatin (MYCOSTATIN) 276290 UNIT/GM cream APPLY  CREAM TOPICALLY TWICE DAILY  MARISELA Noel   fluticasone (FLONASE) 50 MCG/ACT nasal spray USE 1 SPRAY IN EACH NOSTRIL TWICE DAILY  MARISELA Noel   pantoprazole (PROTONIX) 40 MG tablet TAKE 1 TABLET EVERY DAY  MARISELA Noel   aspirin 81 MG tablet Take 81 mg by mouth daily  Historical Provider, MD   Omega-3 Fatty Acids (FISH OIL) 1000 MG CAPS Take 1,200 mg by mouth 2 times daily  Historical Provider, MD Moreno (Including outside providers/suppliers regularly involved in providing care):   Patient Care Team:  MARISELA Noel as PCP - General (Nurse Practitioner Acute Care)  MARISELA Noel as PCP - [facility] Ang Baez Provider     Reviewed and updated this visit:  Tobacco  Allergies  Meds  Med Hx  Surg Hx  Soc Hx  Fam Hx

## 2022-09-14 ENCOUNTER — NURSE ONLY (OUTPATIENT)
Dept: UROLOGY | Age: 82
End: 2022-09-14

## 2022-09-14 DIAGNOSIS — E29.1 HYPOGONADISM MALE: Primary | ICD-10-CM

## 2022-09-14 NOTE — PROGRESS NOTES
After obtaining consent from patient and receiving verbal/written orders from [facility] APRBILLY, I gave patient 1cc of Testosterone Cyp 200mg/ml injection in RIGHT upper quad. gluteus, patient tolerated well. Medication was supplied by the patient. Patient due for next shot in 2 weeks.

## 2022-09-21 ENCOUNTER — OFFICE VISIT (OUTPATIENT)
Dept: UROLOGY | Age: 82
End: 2022-09-21
Payer: MEDICARE

## 2022-09-21 VITALS
DIASTOLIC BLOOD PRESSURE: 80 MMHG | BODY MASS INDEX: 28.04 KG/M2 | WEIGHT: 207 LBS | SYSTOLIC BLOOD PRESSURE: 160 MMHG | HEIGHT: 72 IN | TEMPERATURE: 97.7 F

## 2022-09-21 DIAGNOSIS — E29.1 HYPOGONADISM MALE: Primary | ICD-10-CM

## 2022-09-21 DIAGNOSIS — E29.1 HYPOGONADISM MALE: ICD-10-CM

## 2022-09-21 DIAGNOSIS — D75.1 SECONDARY POLYCYTHEMIA: ICD-10-CM

## 2022-09-21 DIAGNOSIS — N52.9 ERECTILE DYSFUNCTION, UNSPECIFIED ERECTILE DYSFUNCTION TYPE: ICD-10-CM

## 2022-09-21 LAB
APPEARANCE FLUID: CLEAR
BILIRUBIN, POC: NORMAL
BLOOD URINE, POC: NORMAL
CLARITY, POC: CLEAR
COLOR, POC: YELLOW
GLUCOSE URINE, POC: NORMAL
HCT VFR BLD CALC: 54 % (ref 42–52)
HEMOGLOBIN: 17.2 G/DL (ref 14–18)
KETONES, POC: NORMAL
LEUKOCYTE EST, POC: NORMAL
MCH RBC QN AUTO: 28.1 PG (ref 27–31)
MCHC RBC AUTO-ENTMCNC: 31.9 G/DL (ref 33–37)
MCV RBC AUTO: 88.1 FL (ref 80–94)
NITRITE, POC: NORMAL
PDW BLD-RTO: 13.9 % (ref 11.5–14.5)
PH, POC: 5
PLATELET # BLD: 141 K/UL (ref 130–400)
PMV BLD AUTO: 10.3 FL (ref 9.4–12.4)
PROSTATE SPECIFIC ANTIGEN: 0.82 NG/ML (ref 0–4)
PROTEIN, POC: NORMAL
RBC # BLD: 6.13 M/UL (ref 4.7–6.1)
SPECIFIC GRAVITY, POC: 1.02
TESTOSTERONE TOTAL: 947.8 NG/DL (ref 193–740)
UROBILINOGEN, POC: 0.2
WBC # BLD: 6.9 K/UL (ref 4.8–10.8)

## 2022-09-21 PROCEDURE — 1036F TOBACCO NON-USER: CPT | Performed by: NURSE PRACTITIONER

## 2022-09-21 PROCEDURE — 81002 URINALYSIS NONAUTO W/O SCOPE: CPT | Performed by: NURSE PRACTITIONER

## 2022-09-21 PROCEDURE — G8427 DOCREV CUR MEDS BY ELIG CLIN: HCPCS | Performed by: NURSE PRACTITIONER

## 2022-09-21 PROCEDURE — 99214 OFFICE O/P EST MOD 30 MIN: CPT | Performed by: NURSE PRACTITIONER

## 2022-09-21 PROCEDURE — 1123F ACP DISCUSS/DSCN MKR DOCD: CPT | Performed by: NURSE PRACTITIONER

## 2022-09-21 PROCEDURE — G8417 CALC BMI ABV UP PARAM F/U: HCPCS | Performed by: NURSE PRACTITIONER

## 2022-09-21 ASSESSMENT — ENCOUNTER SYMPTOMS
BACK PAIN: 0
VOMITING: 0
NAUSEA: 0
ABDOMINAL PAIN: 0
ABDOMINAL DISTENTION: 0

## 2022-09-21 NOTE — PROGRESS NOTES
Javi Rasmussen is a 80 y.o., male, Established patient who presents today   Chief Complaint   Patient presents with    Follow-up     I am here today for a 6 month fu. I had my psa done prior. HPI   Patient presents for follow-up of low testosterone. He is currently maintained on injections at 200 mg every 2 weeks. He is having his labs drawn today which is midcycle. He reports he has been doing well on his current dosage. Pretreatment symptoms include fatigue, low libido. Again, symptoms well maintained on testosterone replacement therapy. He also has history of erectile dysfunction and was recently prescribed Viagra, however, he has not utilized the medication yet. Lab Results   Component Value Date    TESTOSTERONE 947.8 (H) 09/21/2022     Lab Results   Component Value Date    WBC 6.9 09/21/2022    HGB 17.2 09/21/2022    HCT 54.0 (H) 09/21/2022    MCV 88.1 09/21/2022     09/21/2022     Lab Results   Component Value Date    PSA 0.82 09/21/2022    PSA 0.42 05/24/2021    PSA 0.47 05/08/2020         REVIEW OF SYSTEMS:  Review of Systems   Constitutional:  Negative for chills and fever. Gastrointestinal:  Negative for abdominal distention, abdominal pain, nausea and vomiting. Genitourinary:  Negative for difficulty urinating, dysuria, flank pain, frequency, hematuria and urgency. Musculoskeletal:  Negative for back pain and gait problem. Psychiatric/Behavioral:  Negative for agitation and confusion. PHYSICAL EXAM:  BP (!) 160/80   Temp 97.7 °F (36.5 °C)   Ht 6' (1.829 m)   Wt 207 lb (93.9 kg)   BMI 28.07 kg/m²   Physical Exam  Vitals and nursing note reviewed. Constitutional:       General: He is not in acute distress. Appearance: Normal appearance. He is not ill-appearing. Pulmonary:      Effort: Pulmonary effort is normal. No respiratory distress. Abdominal:      General: There is no distension. Tenderness: There is no abdominal tenderness.  There is no right CVA tenderness or left CVA tenderness. Genitourinary:     Prostate: Enlarged (45-55g). No nodules present. Neurological:      Mental Status: He is alert and oriented to person, place, and time. Mental status is at baseline. Psychiatric:         Mood and Affect: Mood normal.         Behavior: Behavior normal.       DATA:  Results for orders placed or performed in visit on 09/21/22   POCT Urinalysis no Micro   Result Value Ref Range    Color, UA yellow     Clarity, UA clear     Glucose, UA POC neg     Bilirubin, UA neg     Ketones, UA neg     Spec Grav, UA 1.025     Blood, UA POC neg     pH, UA 5.0     Protein, UA POC trace     Urobilinogen, UA 0.2     Leukocytes, UA neg     Nitrite, UA neg     Appearance, Fluid Clear Clear, Slightly Cloudy       ASSESSMENT/PLAN  1. 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 Midcycle levels slightly elevated, but will maintain on current dosage.  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 Previous notes from hematology report no intervention needed if patient remains asymptomatic with hematocrit less than 60. He will continue follow-up with their office. 3. Erectile dysfunction, unspecified erectile dysfunction type  Has prescription of Viagra, however, he has not utilized medication. Previously on the medication several years ago which was effective. Orders Placed This Encounter   Procedures    POCT Urinalysis no Micro        Return in about 6 months (around 3/21/2023) for with MARISELA Beyer, with labs prior. An electronic signature was used to authenticate this note. MARISELA GREGG - CNP    All information inputted into the note by the MA to include chief complaint, past medical history, past surgical history, medications, allergies, social and family history and review of systems has been reviewed and updated as needed by me.     EMR Dragon/transcription disclaimer: Much of this document is electronic transcription/translation of spoken language to printed text. The electronic translation of spoken language may be erroneous or, at times, nonsensical words or phrases may be inadvertently transcribed.  Although I have reviewed the document for such errors, some may still exist.

## 2022-09-26 DIAGNOSIS — E29.1 HYPOGONADISM MALE: ICD-10-CM

## 2022-09-26 DIAGNOSIS — N52.9 ERECTILE DYSFUNCTION, UNSPECIFIED ERECTILE DYSFUNCTION TYPE: ICD-10-CM

## 2022-09-27 RX ORDER — SILDENAFIL 25 MG/1
TABLET, FILM COATED ORAL
Qty: 30 TABLET | Refills: 1 | Status: SHIPPED | OUTPATIENT
Start: 2022-09-27

## 2022-09-28 ENCOUNTER — NURSE ONLY (OUTPATIENT)
Dept: UROLOGY | Age: 82
End: 2022-09-28
Payer: MEDICARE

## 2022-09-28 DIAGNOSIS — R79.89 LOW TESTOSTERONE IN MALE: Primary | ICD-10-CM

## 2022-09-28 PROCEDURE — 96372 THER/PROPH/DIAG INJ SC/IM: CPT | Performed by: NURSE PRACTITIONER

## 2022-09-28 RX ORDER — TESTOSTERONE CYPIONATE 200 MG/ML
200 INJECTION INTRAMUSCULAR ONCE
Status: COMPLETED | OUTPATIENT
Start: 2022-09-28 | End: 2022-09-28

## 2022-09-28 RX ADMIN — TESTOSTERONE CYPIONATE 200 MG: 200 INJECTION INTRAMUSCULAR at 08:36

## 2022-09-28 NOTE — PROGRESS NOTES
After obtaining consent from patient and [facility] I gave patient 1cc of Testosterone Cyp 200mg/ml injection in LEFT upper quad. gluteus, patient tolerated well. Medication was not supplied by the patient. Patient due for next shot in 2 weeks.

## 2022-09-29 RX ORDER — TESTOSTERONE CYPIONATE 200 MG/ML
INJECTION INTRAMUSCULAR
Qty: 2 ML | Refills: 0 | Status: SHIPPED | OUTPATIENT
Start: 2022-09-29 | End: 2022-10-28

## 2022-10-12 ENCOUNTER — NURSE ONLY (OUTPATIENT)
Dept: UROLOGY | Age: 82
End: 2022-10-12
Payer: MEDICARE

## 2022-10-12 DIAGNOSIS — R79.89 LOW TESTOSTERONE IN MALE: Primary | ICD-10-CM

## 2022-10-12 PROCEDURE — 96372 THER/PROPH/DIAG INJ SC/IM: CPT | Performed by: NURSE PRACTITIONER

## 2022-10-12 NOTE — PROGRESS NOTES
After obtaining consent from patient and receiving verbal/written orders from [facility], I gave patient 1cc of Testosterone Cyp 200mg/ml injection in RIGHT upper quad. gluteus, patient tolerated well. Medication was supplied by the patient. Agap[address] Escobedo [address]: [id number]  LOT: [id number]   EXP: 3/30/24    Patient due for next shot in 2 weeks.

## 2022-10-26 ENCOUNTER — NURSE ONLY (OUTPATIENT)
Dept: UROLOGY | Age: 82
End: 2022-10-26
Payer: MEDICARE

## 2022-10-26 DIAGNOSIS — R79.89 LOW TESTOSTERONE IN MALE: Primary | ICD-10-CM

## 2022-10-26 PROCEDURE — 96372 THER/PROPH/DIAG INJ SC/IM: CPT | Performed by: NURSE PRACTITIONER

## 2022-10-26 NOTE — PROGRESS NOTES
After obtaining consent from patient and receiving verbal/written orders from [facility], I gave patient 1cc of Testosterone Cyp 200mg/ml injection in LEFT upper quad. gluteus, patient tolerated well. Medication was supplied by the patient. Agapito Escobedo 47: [id number]  LOT: [id number]  EXP: 03/30/24    Patient due for next shot in 2 weeks.

## 2022-11-07 DIAGNOSIS — E29.1 HYPOGONADISM MALE: ICD-10-CM

## 2022-11-08 RX ORDER — TESTOSTERONE CYPIONATE 200 MG/ML
INJECTION INTRAMUSCULAR
Qty: 2 ML | Refills: 0 | Status: SHIPPED | OUTPATIENT
Start: 2022-11-08 | End: 2022-12-08

## 2022-11-09 ENCOUNTER — NURSE ONLY (OUTPATIENT)
Dept: UROLOGY | Age: 82
End: 2022-11-09
Payer: MEDICARE

## 2022-11-09 DIAGNOSIS — R79.89 LOW TESTOSTERONE IN MALE: Primary | ICD-10-CM

## 2022-11-09 PROCEDURE — 96372 THER/PROPH/DIAG INJ SC/IM: CPT | Performed by: NURSE PRACTITIONER

## 2022-11-09 NOTE — PROGRESS NOTES
After obtaining consent from patient and receiving verbal/written orders from [facility], I gave patient 1cc of Testosterone Cyp 200mg/ml injection in [address] quad. gluteus, patient tolerated well. Medication was supplied by the patient. Agapito Escobedo 47: [id number]  LOT: [id number]  EXP: 03/30/24    Patient due for next shot in 2 weeks.

## 2022-11-23 ENCOUNTER — NURSE ONLY (OUTPATIENT)
Dept: UROLOGY | Age: 82
End: 2022-11-23
Payer: MEDICARE

## 2022-11-23 ENCOUNTER — OFFICE VISIT (OUTPATIENT)
Age: 82
End: 2022-11-23
Payer: MEDICARE

## 2022-11-23 VITALS
RESPIRATION RATE: 17 BRPM | DIASTOLIC BLOOD PRESSURE: 70 MMHG | HEART RATE: 65 BPM | WEIGHT: 210 LBS | TEMPERATURE: 98 F | SYSTOLIC BLOOD PRESSURE: 140 MMHG | BODY MASS INDEX: 28.44 KG/M2 | HEIGHT: 72 IN | OXYGEN SATURATION: 96 %

## 2022-11-23 DIAGNOSIS — J01.01 ACUTE RECURRENT MAXILLARY SINUSITIS: Primary | ICD-10-CM

## 2022-11-23 DIAGNOSIS — J39.8 CONGESTION OF UPPER RESPIRATORY TRACT: ICD-10-CM

## 2022-11-23 DIAGNOSIS — J02.9 SORE THROAT: ICD-10-CM

## 2022-11-23 DIAGNOSIS — R79.89 LOW TESTOSTERONE IN MALE: Primary | ICD-10-CM

## 2022-11-23 LAB
INFLUENZA A ANTIBODY: NEGATIVE
INFLUENZA B ANTIBODY: NEGATIVE
S PYO AG THROAT QL: NORMAL

## 2022-11-23 PROCEDURE — 96372 THER/PROPH/DIAG INJ SC/IM: CPT | Performed by: NURSE PRACTITIONER

## 2022-11-23 PROCEDURE — 3074F SYST BP LT 130 MM HG: CPT | Performed by: PHYSICIAN ASSISTANT

## 2022-11-23 PROCEDURE — 87880 STREP A ASSAY W/OPTIC: CPT | Performed by: PHYSICIAN ASSISTANT

## 2022-11-23 PROCEDURE — G8484 FLU IMMUNIZE NO ADMIN: HCPCS | Performed by: PHYSICIAN ASSISTANT

## 2022-11-23 PROCEDURE — 87804 INFLUENZA ASSAY W/OPTIC: CPT | Performed by: PHYSICIAN ASSISTANT

## 2022-11-23 PROCEDURE — G8427 DOCREV CUR MEDS BY ELIG CLIN: HCPCS | Performed by: PHYSICIAN ASSISTANT

## 2022-11-23 PROCEDURE — 96372 THER/PROPH/DIAG INJ SC/IM: CPT | Performed by: PHYSICIAN ASSISTANT

## 2022-11-23 PROCEDURE — G8417 CALC BMI ABV UP PARAM F/U: HCPCS | Performed by: PHYSICIAN ASSISTANT

## 2022-11-23 PROCEDURE — 3078F DIAST BP <80 MM HG: CPT | Performed by: PHYSICIAN ASSISTANT

## 2022-11-23 PROCEDURE — 1123F ACP DISCUSS/DSCN MKR DOCD: CPT | Performed by: PHYSICIAN ASSISTANT

## 2022-11-23 PROCEDURE — 1036F TOBACCO NON-USER: CPT | Performed by: PHYSICIAN ASSISTANT

## 2022-11-23 PROCEDURE — 99213 OFFICE O/P EST LOW 20 MIN: CPT | Performed by: PHYSICIAN ASSISTANT

## 2022-11-23 RX ORDER — AMOXICILLIN AND CLAVULANATE POTASSIUM 875; 125 MG/1; MG/1
1 TABLET, FILM COATED ORAL EVERY 12 HOURS
Qty: 20 TABLET | Refills: 0 | Status: SHIPPED | OUTPATIENT
Start: 2022-11-23 | End: 2022-12-03

## 2022-11-23 RX ORDER — DEXAMETHASONE SODIUM PHOSPHATE 10 MG/ML
10 INJECTION INTRAMUSCULAR; INTRAVENOUS ONCE
Status: COMPLETED | OUTPATIENT
Start: 2022-11-23 | End: 2022-11-23

## 2022-11-23 RX ADMIN — DEXAMETHASONE SODIUM PHOSPHATE 10 MG: 10 INJECTION INTRAMUSCULAR; INTRAVENOUS at 14:24

## 2022-11-23 ASSESSMENT — ENCOUNTER SYMPTOMS
NAUSEA: 0
SHORTNESS OF BREATH: 0
ABDOMINAL PAIN: 0
ALLERGIC/IMMUNOLOGIC NEGATIVE: 1
SINUS PRESSURE: 1
COUGH: 1
VOMITING: 0
SORE THROAT: 1
DIARRHEA: 0
SINUS PAIN: 1
EYE PAIN: 0

## 2022-11-23 NOTE — PROGRESS NOTES
After obtaining consent from patient and receiving verbal/written orders from [facility], I gave patient 1cc of Testosterone Cyp 200mg/ml injection in LEFT upper quad. gluteus, patient tolerated well. Medication WAS supplied by the patient. Agapito Esc[address]: [id number]  LOT: [id number]  EXP: 04/01/24    Patient due for next shot in 2 weeks.

## 2022-11-23 NOTE — PROGRESS NOTES
[id number]  [address] [address] [place] [address]  Dept: [phone]  Dept Fax: [phone]  Loc: [phone]    Antonio Walton is a 80 y.o. male who presents today for his medical conditions/complaints as noted below. Antonio Walton is complaining of Drainage, Pharyngitis, Cough, and Congestion    HPI:   Pharyngitis  This is a new problem. The current episode started 1 to 4 weeks ago. The problem occurs constantly. The problem has been unchanged. Associated symptoms include congestion, coughing and a sore throat. Pertinent negatives include no abdominal pain, chest pain, chills, fatigue, fever, headaches, myalgias, nausea, rash, vomiting or weakness. The symptoms are aggravated by swallowing. He has tried rest for the symptoms. The treatment provided mild relief. Past Medical History:   Diagnosis Date    BPH with obstruction/lower urinary tract symptoms     Gastroesophageal reflux disease without esophagitis 5/6/2019    Hyperglycemia     Hyperlipidemia     Hypertension     Testicular hypofunction     Type 2 diabetes mellitus without complication (HCC)        Past Surgical History:   Procedure Laterality Date    ANKLE SURGERY      HERNIA REPAIR      TONSILLECTOMY         Family History   Problem Relation Age of Onset    No Known Problems Mother     Heart Attack Father        Social History     Tobacco Use    Smoking status: Never    Smokeless tobacco: Never   Substance Use Topics    Alcohol use: No        Current Outpatient Medications   Medication Sig Dispense Refill    amoxicillin-clavulanate (AUGMENTIN) 875-125 MG per tablet Take 1 tablet by mouth in the morning and 1 tablet in the evening. Do all this for 10 days.  20 tablet 0    testosterone cypionate (DEPOTESTOTERONE CYPIONATE) 200 MG/ML injection INJECT 1 ML INTRAMUSCULARLY EVERY 14 DAYS 2 mL 0    sildenafil (VIAGRA) 25 MG tablet TAKE ONE TABLET BY MOUTH DAILY AS NEEDED FOR ERECTILE DYSFUNCTION 30 tablet 1    atorvastatin (LIPITOR) 20 MG tablet TAKE 1 TABLET AT BEDTIME 14 tablet 0    glipiZIDE (GLUCOTROL XL) 10 MG extended release tablet TAKE 1 TABLET EVERY MORNING 14 tablet 0    losartan (COZAAR) 100 MG tablet TAKE 1 TABLET EVERY DAY 14 tablet 0    potassium chloride (KLOR-CON M) 10 MEQ extended release tablet TAKE 2 TABLETS TWICE DAILY 56 tablet 0    verapamil (CALAN SR) 240 MG extended release tablet TAKE 1 TABLET TWICE DAILY 28 tablet 0    spironolactone (ALDACTONE) 25 MG tablet TAKE 1 TABLET EVERY DAY 90 tablet 3    hydroCHLOROthiazide (HYDRODIURIL) 25 MG tablet TAKE 1 TABLET EVERY DAY 90 tablet 3    bumetanide (BUMEX) 0.5 MG tablet Take 1 tablet by mouth daily 90 tablet 1    doxycycline monohydrate (MONODOX) 100 MG capsule TAKE 1 CAPSULE BY MOUTH TWICE DAILY FOR 7 DAYS      cloNIDine (CATAPRES) 0.1 MG tablet TAKE 1 TABLET BY MOUTH ONCE DAILY      Needles & Syringes MISC 1 each by Does not apply route daily 100 each 3    nystatin (MYCOSTATIN) 412197 UNIT/GM cream APPLY  CREAM TOPICALLY TWICE DAILY 30 g 2    fluticasone (FLONASE) 50 MCG/ACT nasal spray USE 1 SPRAY IN EACH NOSTRIL TWICE DAILY 48 g 3    pantoprazole (PROTONIX) 40 MG tablet TAKE 1 TABLET EVERY DAY 90 tablet 3    aspirin 81 MG tablet Take 81 mg by mouth daily      Omega-3 Fatty Acids (FISH OIL) 1000 MG CAPS Take 1,200 mg by mouth 2 times daily      temazepam (RESTORIL) 15 MG capsule 2 at hs as needed for sleep 60 capsule 0     No current facility-administered medications for this visit.        No Known Allergies    Health Maintenance   Topic Date Due    Shingles vaccine (1 of 2) Never done    COVID-19 Vaccine (3 - Booster for Moderna series) 05/18/2021    Lipids  06/13/2023    Depression Screen  09/06/2023    Annual Wellness Visit (AWV)  09/07/2023    Colorectal Cancer Screen  03/09/2026    DTaP/Tdap/Td vaccine (2 - Td or Tdap) 08/15/2030    Flu vaccine  Completed    Pneumococcal 65+ years Vaccine  Completed Hepatitis C screen  Completed    Hepatitis A vaccine  Aged Out    Hib vaccine  Aged Out    Meningococcal (ACWY) vaccine  Aged Out       Subjective:   Review of Systems   Constitutional:  Negative for chills, fatigue and fever. HENT:  Positive for congestion, sinus pressure, sinus pain and sore throat. Negative for postnasal drip. Eyes:  Negative for pain and visual disturbance. Respiratory:  Positive for cough. Negative for shortness of breath. Cardiovascular:  Negative for chest pain. Gastrointestinal:  Negative for abdominal pain, diarrhea, nausea and vomiting. Endocrine: Negative for cold intolerance and heat intolerance. Genitourinary:  Negative for frequency, hematuria and urgency. Musculoskeletal:  Negative for myalgias. Skin:  Negative for rash. Allergic/Immunologic: Negative. Neurological:  Negative for syncope, weakness, light-headedness and headaches. Hematological: Negative. Psychiatric/Behavioral: Negative. Objective    Physical Exam  Vitals and nursing note reviewed. Constitutional:       General: He is not in acute distress. Appearance: Normal appearance. HENT:      Head: Normocephalic and atraumatic. Right Ear: External ear normal.      Left Ear: External ear normal.      Nose: Congestion present. Comments: Maxillary sinus pressure      Mouth/Throat:      Mouth: Mucous membranes are moist.      Pharynx: Oropharynx is clear. Posterior oropharyngeal erythema present. Eyes:      Extraocular Movements: Extraocular movements intact. Conjunctiva/sclera: Conjunctivae normal.   Cardiovascular:      Rate and Rhythm: Normal rate and regular rhythm. Pulses: Normal pulses. Heart sounds: Normal heart sounds. Pulmonary:      Effort: Pulmonary effort is normal. No respiratory distress. Breath sounds: Normal breath sounds. No stridor. No wheezing, rhonchi or rales. Chest:      Chest wall: No tenderness.    Abdominal:      General: Abdomen is flat. Bowel sounds are normal. There is no distension. Palpations: Abdomen is soft. Tenderness: There is no abdominal tenderness. Musculoskeletal:         General: Normal range of motion. Cervical back: Normal range of motion and neck supple. No tenderness. Skin:     General: Skin is warm and dry. Findings: No erythema. Neurological:      General: No focal deficit present. Mental Status: He is alert and oriented to person, place, and time. Psychiatric:         Mood and Affect: Mood normal.         Behavior: Behavior normal.       BP (!) 140/70   Pulse 65   Temp 98 °F (36.7 °C)   Resp 17   Ht 6' (1.829 m)   Wt 210 lb (95.3 kg)   SpO2 96%   BMI 28.48 kg/m²     Assessment         Diagnosis Orders   1. Acute recurrent maxillary sinusitis  dexamethasone (DECADRON) injection 10 mg    amoxicillin-clavulanate (AUGMENTIN) 875-125 MG per tablet      2. Congestion of upper respiratory tract  POCT Influenza A/B      3. Sore throat  POCT rapid strep A          Plan   Dex 10 IM in clinic. Complete full course of antibiotics. OTC cough and cold medication as needed for symptoms. Follow up as needed. Patient verbalizes understanding and agrees with treatment plan. Orders Placed This Encounter   Procedures    POCT Influenza A/B    POCT rapid strep A       Results for orders placed or performed in visit on 11/23/22   POCT Influenza A/B   Result Value Ref Range    Influenza A Ab negative     Influenza B Ab negative    POCT rapid strep A   Result Value Ref Range    Strep A Ag None Detected None Detected       Orders Placed This Encounter   Medications    dexamethasone (DECADRON) injection 10 mg    amoxicillin-clavulanate (AUGMENTIN) 875-125 MG per tablet     Sig: Take 1 tablet by mouth in the morning and 1 tablet in the evening. Do all this for 10 days.      Dispense:  20 tablet     Refill:  0        New Prescriptions    AMOXICILLIN-CLAVULANATE (AUGMENTIN) 875-125 MG PER TABLET    Take 1 tablet by mouth in the morning and 1 tablet in the evening. Do all this for 10 days. Return if symptoms worsen or fail to improve. Discussed use, benefits, and side effects of any prescribed medications. All patient questions were answered. Patient voiced understanding of care plan. Patient was given educational materials - see patient instructions below. Patient Instructions   Dex 10 IM in clinic. Complete full course of antibiotics. OTC cough and cold medication as needed for symptoms. Follow up as needed. Patient verbalizes understanding and agrees with treatment plan.       Electronically signed by Gerber Puri PA-C on 11/23/2022 at 2:42 PM

## 2022-11-23 NOTE — PATIENT INSTRUCTIONS
Dex 10 IM in clinic. Complete full course of antibiotics. OTC cough and cold medication as needed for symptoms. Follow up as needed. Patient verbalizes understanding and agrees with treatment plan.

## 2022-12-05 DIAGNOSIS — I10 ESSENTIAL HYPERTENSION: ICD-10-CM

## 2022-12-05 DIAGNOSIS — E11.9 TYPE 2 DIABETES MELLITUS WITHOUT COMPLICATION, WITHOUT LONG-TERM CURRENT USE OF INSULIN (HCC): ICD-10-CM

## 2022-12-05 LAB
ALBUMIN SERPL-MCNC: 4.4 G/DL (ref 3.5–5.2)
ALP BLD-CCNC: 72 U/L (ref 40–130)
ALT SERPL-CCNC: 39 U/L (ref 5–41)
ANION GAP SERPL CALCULATED.3IONS-SCNC: 10 MMOL/L (ref 7–19)
AST SERPL-CCNC: 29 U/L (ref 5–40)
BASOPHILS ABSOLUTE: 0.1 K/UL (ref 0–0.2)
BASOPHILS RELATIVE PERCENT: 1 % (ref 0–1)
BILIRUB SERPL-MCNC: 0.6 MG/DL (ref 0.2–1.2)
BUN BLDV-MCNC: 24 MG/DL (ref 8–23)
CALCIUM SERPL-MCNC: 9.7 MG/DL (ref 8.8–10.2)
CHLORIDE BLD-SCNC: 102 MMOL/L (ref 98–111)
CO2: 28 MMOL/L (ref 22–29)
CREAT SERPL-MCNC: 1.3 MG/DL (ref 0.5–1.2)
EOSINOPHILS ABSOLUTE: 0.3 K/UL (ref 0–0.6)
EOSINOPHILS RELATIVE PERCENT: 4.5 % (ref 0–5)
GFR SERPL CREATININE-BSD FRML MDRD: 56 ML/MIN/{1.73_M2}
GLUCOSE BLD-MCNC: 142 MG/DL (ref 74–109)
HBA1C MFR BLD: 6.5 % (ref 4–6)
HCT VFR BLD CALC: 60.9 % (ref 42–52)
HEMOGLOBIN: 18.8 G/DL (ref 14–18)
IMMATURE GRANULOCYTES #: 0 K/UL
LYMPHOCYTES ABSOLUTE: 1.7 K/UL (ref 1.1–4.5)
LYMPHOCYTES RELATIVE PERCENT: 28.2 % (ref 20–40)
MCH RBC QN AUTO: 27.4 PG (ref 27–31)
MCHC RBC AUTO-ENTMCNC: 30.9 G/DL (ref 33–37)
MCV RBC AUTO: 88.6 FL (ref 80–94)
MONOCYTES ABSOLUTE: 0.7 K/UL (ref 0–0.9)
MONOCYTES RELATIVE PERCENT: 12.4 % (ref 0–10)
NEUTROPHILS ABSOLUTE: 3.2 K/UL (ref 1.5–7.5)
NEUTROPHILS RELATIVE PERCENT: 53.6 % (ref 50–65)
PDW BLD-RTO: 17 % (ref 11.5–14.5)
PLATELET # BLD: 135 K/UL (ref 130–400)
PMV BLD AUTO: 10.3 FL (ref 9.4–12.4)
POTASSIUM SERPL-SCNC: 5.3 MMOL/L (ref 3.5–5)
RBC # BLD: 6.87 M/UL (ref 4.7–6.1)
SODIUM BLD-SCNC: 140 MMOL/L (ref 136–145)
TOTAL PROTEIN: 6.6 G/DL (ref 6.6–8.7)
WBC # BLD: 6 K/UL (ref 4.8–10.8)

## 2022-12-06 ENCOUNTER — OFFICE VISIT (OUTPATIENT)
Dept: INTERNAL MEDICINE | Age: 82
End: 2022-12-06
Payer: MEDICARE

## 2022-12-06 VITALS
HEART RATE: 72 BPM | DIASTOLIC BLOOD PRESSURE: 80 MMHG | WEIGHT: 209 LBS | HEIGHT: 72 IN | BODY MASS INDEX: 28.31 KG/M2 | OXYGEN SATURATION: 95 % | SYSTOLIC BLOOD PRESSURE: 138 MMHG

## 2022-12-06 DIAGNOSIS — E55.9 VITAMIN D DEFICIENCY: ICD-10-CM

## 2022-12-06 DIAGNOSIS — E11.9 TYPE 2 DIABETES MELLITUS WITHOUT COMPLICATION, WITHOUT LONG-TERM CURRENT USE OF INSULIN (HCC): Primary | ICD-10-CM

## 2022-12-06 DIAGNOSIS — E78.2 MIXED HYPERLIPIDEMIA: ICD-10-CM

## 2022-12-06 DIAGNOSIS — F51.01 PRIMARY INSOMNIA: ICD-10-CM

## 2022-12-06 PROCEDURE — G8427 DOCREV CUR MEDS BY ELIG CLIN: HCPCS | Performed by: NURSE PRACTITIONER

## 2022-12-06 PROCEDURE — 1123F ACP DISCUSS/DSCN MKR DOCD: CPT | Performed by: NURSE PRACTITIONER

## 2022-12-06 PROCEDURE — G8417 CALC BMI ABV UP PARAM F/U: HCPCS | Performed by: NURSE PRACTITIONER

## 2022-12-06 PROCEDURE — 3044F HG A1C LEVEL LT 7.0%: CPT | Performed by: NURSE PRACTITIONER

## 2022-12-06 PROCEDURE — 3078F DIAST BP <80 MM HG: CPT | Performed by: NURSE PRACTITIONER

## 2022-12-06 PROCEDURE — 99214 OFFICE O/P EST MOD 30 MIN: CPT | Performed by: NURSE PRACTITIONER

## 2022-12-06 PROCEDURE — 1036F TOBACCO NON-USER: CPT | Performed by: NURSE PRACTITIONER

## 2022-12-06 PROCEDURE — 3074F SYST BP LT 130 MM HG: CPT | Performed by: NURSE PRACTITIONER

## 2022-12-06 PROCEDURE — 80305 DRUG TEST PRSMV DIR OPT OBS: CPT | Performed by: NURSE PRACTITIONER

## 2022-12-06 PROCEDURE — G8484 FLU IMMUNIZE NO ADMIN: HCPCS | Performed by: NURSE PRACTITIONER

## 2022-12-06 RX ORDER — METHYLPREDNISOLONE ACETATE 40 MG/ML
40 INJECTION, SUSPENSION INTRA-ARTICULAR; INTRALESIONAL; INTRAMUSCULAR; SOFT TISSUE ONCE
Status: COMPLETED | OUTPATIENT
Start: 2022-12-06 | End: 2022-12-06

## 2022-12-06 RX ORDER — TEMAZEPAM 15 MG/1
CAPSULE ORAL
Qty: 60 CAPSULE | Refills: 0 | Status: SHIPPED | OUTPATIENT
Start: 2022-12-06 | End: 2023-01-06

## 2022-12-06 RX ORDER — CEFDINIR 300 MG/1
300 CAPSULE ORAL 2 TIMES DAILY
Qty: 14 CAPSULE | Refills: 0 | Status: SHIPPED | OUTPATIENT
Start: 2022-12-06 | End: 2022-12-13

## 2022-12-06 RX ORDER — METHYLPREDNISOLONE ACETATE 80 MG/ML
80 INJECTION, SUSPENSION INTRA-ARTICULAR; INTRALESIONAL; INTRAMUSCULAR; SOFT TISSUE ONCE
Status: DISCONTINUED | OUTPATIENT
Start: 2022-12-06 | End: 2022-12-06

## 2022-12-06 RX ADMIN — METHYLPREDNISOLONE ACETATE 40 MG: 40 INJECTION, SUSPENSION INTRA-ARTICULAR; INTRALESIONAL; INTRAMUSCULAR; SOFT TISSUE at 08:56

## 2022-12-06 RX ADMIN — METHYLPREDNISOLONE ACETATE 40 MG: 40 INJECTION, SUSPENSION INTRA-ARTICULAR; INTRALESIONAL; INTRAMUSCULAR; SOFT TISSUE at 08:55

## 2022-12-06 ASSESSMENT — ENCOUNTER SYMPTOMS
EYE DISCHARGE: 0
WHEEZING: 0
TROUBLE SWALLOWING: 0
CONSTIPATION: 0
SORE THROAT: 0
STRIDOR: 0
NAUSEA: 0
EYE ITCHING: 0
DIARRHEA: 0
VOMITING: 0
COUGH: 0
BLOOD IN STOOL: 0
ABDOMINAL PAIN: 0
SHORTNESS OF BREATH: 0
CHOKING: 0
COLOR CHANGE: 0
ABDOMINAL DISTENTION: 0

## 2022-12-06 NOTE — PROGRESS NOTES
[facility] PHYSICIAN SERVICES  [facility] INTERNAL MEDICINE  [id number] [facility] [address] [place] [address]  Dept: [phone]  Dept Fax: [phone]: [phone]    Radha Dawkins (:  1940) is a 80 y.o. male,Established patient  with green , here for evaluation of the following chief complaint(s): 3 Month Follow-Up      Radha Dawkins is a 80 y.o. male who presents today for his medical conditions/complaints as noted below. Radha Dawkins is c/loly 3 Month Follow-Up        HPI:     Chief Complaint   Patient presents with    3 Month Follow-Up     HPI    Chronic sinusitis   T2DM;  he has not been eating well nad missing some meds he would like a dexcom;   he will pay if needed   Insomnia stable with restoril   4. Elevated hgb  5.   Sinus congestion       Past Medical History:   Diagnosis Date    BPH with obstruction/lower urinary tract symptoms     Gastroesophageal reflux disease without esophagitis 2019    Hyperglycemia     Hyperlipidemia     Hypertension     Testicular hypofunction     Type 2 diabetes mellitus without complication [facility])       Past Surgical History:   Procedure Laterality Date    ANKLE SURGERY      HERNIA REPAIR      TONSILLECTOMY         Vitals 2022   SYSTOLIC 043 [phone]   DIASTOLIC 80 70 62 80 70 70   Pulse 72 - 65 - 68 60   Temp - - 98 97.7 - -   Resp - - 17 - - -   SpO2 95 - 96 - 94 98   Weight 209 lb - 210 lb 207 lb 205 lb 214 lb   Height 6' 0\" - 6' 0\" 6' 0\" 6' 0\" 6' 0\"   Body mass index 28.34 kg/m2 - 28.48 kg/m2 28.07 kg/m2 27.8 kg/m2 29.02 kg/m2   Some recent data might be hidden       Family History   Problem Relation Age of Onset    No Known Problems Mother     Heart Attack Father        Social History     Tobacco Use    Smoking status: Never    Smokeless tobacco: Never   Substance Use Topics    Alcohol use: No      Current Outpatient Medications   Medication Sig Dispense Refill    temazepam (RESTORIL) 15 MG capsule 2 at hs as needed for sleep 60 capsule 0    cefdinir (OMNICEF) 300 MG capsule Take 1 capsule by mouth 2 times daily for 7 days 14 capsule 0    testosterone cypionate (DEPOTESTOTERONE CYPIONATE) 200 MG/ML injection INJECT 1 ML INTRAMUSCULARLY EVERY 14 DAYS 2 mL 0    sildenafil (VIAGRA) 25 MG tablet TAKE ONE TABLET BY MOUTH DAILY AS NEEDED FOR ERECTILE DYSFUNCTION 30 tablet 1    atorvastatin (LIPITOR) 20 MG tablet TAKE 1 TABLET AT BEDTIME 14 tablet 0    glipiZIDE (GLUCOTROL XL) 10 MG extended release tablet TAKE 1 TABLET EVERY MORNING 14 tablet 0    losartan (COZAAR) 100 MG tablet TAKE 1 TABLET EVERY DAY 14 tablet 0    potassium chloride (KLOR-CON M) 10 MEQ extended release tablet TAKE 2 TABLETS TWICE DAILY 56 tablet 0    verapamil (CALAN SR) 240 MG extended release tablet TAKE 1 TABLET TWICE DAILY 28 tablet 0    spironolactone (ALDACTONE) 25 MG tablet TAKE 1 TABLET EVERY DAY 90 tablet 3    hydroCHLOROthiazide (HYDRODIURIL) 25 MG tablet TAKE 1 TABLET EVERY DAY 90 tablet 3    bumetanide (BUMEX) 0.5 MG tablet Take 1 tablet by mouth daily 90 tablet 1    doxycycline monohydrate (MONODOX) 100 MG capsule TAKE 1 CAPSULE BY MOUTH TWICE DAILY FOR 7 DAYS      cloNIDine (CATAPRES) 0.1 MG tablet TAKE 1 TABLET BY MOUTH ONCE DAILY      Needles & Syringes MISC 1 each by Does not apply route daily 100 each 3    nystatin (MYCOSTATIN) 154338 UNIT/GM cream APPLY  CREAM TOPICALLY TWICE DAILY 30 g 2    fluticasone (FLONASE) 50 MCG/ACT nasal spray USE 1 SPRAY IN EACH NOSTRIL TWICE DAILY 48 g 3    pantoprazole (PROTONIX) 40 MG tablet TAKE 1 TABLET EVERY DAY 90 tablet 3    aspirin 81 MG tablet Take 81 mg by mouth daily      Omega-3 Fatty Acids (FISH OIL) 1000 MG CAPS Take 1,200 mg by mouth 2 times daily       Current Facility-Administered Medications   Medication Dose Route Frequency Provider Last Rate Last Admin    methylPREDNISolone acetate (DEPO-MEDROL) injection 40 mg  40 mg IntraMUSCular Once Cheli Hercules APRN        methylPREDNISolone acetate (DEPO-MEDROL) injection 40 mg  40 mg IntraMUSCular Once Midge [place], APRN         No Known Allergies    Health Maintenance   Topic Date Due    Shingles vaccine (1 of 2) Never done    COVID-19 Vaccine (3 - Booster for Moderna series) 05/18/2021    Lipids  06/13/2023    Depression Screen  09/06/2023    Colorectal Cancer Screen  03/09/2026    DTaP/Tdap/Td vaccine (2 - Td or Tdap) 08/15/2030    Flu vaccine  Completed    Pneumococcal 65+ years Vaccine  Completed    Hepatitis C screen  Completed    Hepatitis A vaccine  Aged Out    Hib vaccine  Aged Out    Meningococcal (ACWY) vaccine  Aged Out       Lab Results   Component Value Date    LABA1C 6.5 (H) 12/05/2022     Lab Results   Component Value Date    PSA 0.82 09/21/2022    PSA 0.42 05/24/2021    PSA 0.47 05/08/2020     TSH   Date Value Ref Range Status   09/02/2022 2.970 0.270 - 4.200 uIU/mL Final   ]  Lab Results   Component Value Date     12/05/2022    K 5.3 (H) 12/05/2022     12/05/2022    CO2 28 12/05/2022    BUN 24 (H) 12/05/2022    CREATININE 1.3 (H) 12/05/2022    GLUCOSE 142 (H) 12/05/2022    CALCIUM 9.7 12/05/2022    PROT 6.6 12/05/2022    LABALBU 4.4 12/05/2022    BILITOT 0.6 12/05/2022    ALKPHOS 72 12/05/2022    AST 29 12/05/2022    ALT 39 12/05/2022    LABGLOM 56 (A) 12/05/2022    GFRAA >59 09/02/2022    GLOB 2.7 04/19/2022     Lab Results   Component Value Date    CHOL 120 (L) 06/13/2022    CHOL 194 05/25/2022    CHOL 127 (L) 11/22/2021     Lab Results   Component Value Date    TRIG 108 09/02/2022    TRIG 84 06/13/2022    TRIG 137 05/25/2022     Lab Results   Component Value Date    HDL 31 (L) 06/13/2022    HDL 36 (L) 05/25/2022    HDL 37 (L) 11/22/2021     Lab Results   Component Value Date    LDLCALC 72 06/13/2022    LDLCALC 131 05/25/2022    LDLCALC 78 11/22/2021     Lab Results   Component Value Date/Time     12/05/2022 06:38 AM    K 5.3 12/05/2022 06:38 AM     12/05/2022 06:38 AM    CO2 28 12/05/2022 06:38 AM    BUN 24 12/05/2022 06:38 AM    CREATININE 1.3 12/05/2022 06:38 AM    GLUCOSE 142 12/05/2022 06:38 AM    CALCIUM 9.7 12/05/2022 06:38 AM      Lab Results   Component Value Date    WBC 6.0 12/05/2022    HGB 18.8 (H) 12/05/2022    HCT 60.9 (H) 12/05/2022    MCV 88.6 12/05/2022     12/05/2022    LABLYMP 1.36 04/07/2015    LYMPHOPCT 28.2 12/05/2022    RBC 6.87 (H) 12/05/2022    MCH 27.4 12/05/2022    MCHC 30.9 (L) 12/05/2022    RDW 17.0 (H) 12/05/2022     Lab Results   Component Value Date    VITD25 37.3 05/25/2022     Labs reviewed from 12/5/2022    Subjective:      Review of Systems   Constitutional:  Negative for fatigue, fever and unexpected weight change. HENT:  Negative for ear discharge, ear pain, mouth sores, sore throat and trouble swallowing. Eyes:  Negative for discharge, itching and visual disturbance. Respiratory:  Negative for cough, choking, shortness of breath, wheezing and stridor. Cardiovascular:  Negative for chest pain, palpitations and leg swelling. Gastrointestinal:  Negative for abdominal distention, abdominal pain, blood in stool, constipation, diarrhea, nausea and vomiting. Endocrine: Negative for cold intolerance, polydipsia and polyuria. Genitourinary:  Negative for difficulty urinating, dysuria, frequency and urgency. Musculoskeletal:  Negative for arthralgias and gait problem. Skin:  Negative for color change and rash. Allergic/Immunologic: Negative for food allergies and immunocompromised state. Neurological:  Negative for dizziness, tremors, syncope, speech difficulty, weakness and headaches. Hematological:  Negative for adenopathy. Does not bruise/bleed easily. Psychiatric/Behavioral:  Positive for sleep disturbance. Negative for confusion and hallucinations. Objective:     Physical Exam  Constitutional:       General: He is not in acute distress. Appearance: He is well-developed.    HENT:      Head: Normocephalic and atraumatic. Eyes:      General: No scleral icterus. Right eye: No discharge. Left eye: No discharge. Pupils: Pupils are equal, round, and reactive to light. Neck:      Thyroid: No thyromegaly. Vascular: No JVD. Cardiovascular:      Rate and Rhythm: Normal rate and regular rhythm. Heart sounds: Normal heart sounds. No murmur heard. Pulmonary:      Effort: Pulmonary effort is normal. No respiratory distress. Breath sounds: Normal breath sounds. No wheezing or rales. Abdominal:      General: Bowel sounds are normal. There is no distension. Palpations: Abdomen is soft. There is no mass. Tenderness: There is no abdominal tenderness. There is no guarding or rebound. Musculoskeletal:         General: No tenderness. Normal range of motion. Cervical back: Normal range of motion and neck supple. Skin:     General: Skin is warm and dry. Findings: No erythema or rash. Neurological:      Mental Status: He is alert and oriented to person, place, and time. Cranial Nerves: No cranial nerve deficit. Coordination: Coordination normal.      Deep Tendon Reflexes: Reflexes are normal and symmetric. Reflexes normal.   Psychiatric:         Mood and Affect: Mood is not depressed. Behavior: Behavior normal.         Thought Content:  Thought content normal.         Judgment: Judgment normal.     /80   Pulse 72   Ht 6' (1.829 m)   Wt 209 lb (94.8 kg)   SpO2 95%   BMI 28.35 kg/m²           Assessment:      Problem List       Mixed hyperlipidemia    Relevant Medications    aspirin 81 MG tablet    cloNIDine (CATAPRES) 0.1 MG tablet    bumetanide (BUMEX) 0.5 MG tablet    spironolactone (ALDACTONE) 25 MG tablet    hydroCHLOROthiazide (HYDRODIURIL) 25 MG tablet    atorvastatin (LIPITOR) 20 MG tablet    losartan (COZAAR) 100 MG tablet    verapamil (CALAN SR) 240 MG extended release tablet    sildenafil (VIAGRA) 25 MG tablet    Other Relevant Orders CBC with Auto Differential    Comprehensive Metabolic Panel    Lipid Panel    Primary insomnia    Relevant Medications    temazepam (RESTORIL) 15 MG capsule    Type 2 diabetes mellitus without complication, without long-term current use of insulin (HCC) - Primary    Relevant Medications    glipiZIDE (GLUCOTROL XL) 10 MG extended release tablet    Other Relevant Orders    Hemoglobin A1C    Urinalysis with Reflex to Culture    TSH    Vitamin D deficiency    Relevant Orders    Vitamin D 25 Hydroxy       Plan:        Patient given educational materials - see patient instructions. Discussed use, benefit, and side effects of prescribed medications. Allpatient questions answered. Pt voiced understanding. Reviewed health maintenance. Instructed to continue current medications, diet and exercise. Patient agreed with treatment plan. Follow up as directed. MEDICATIONS:  Orders Placed This Encounter   Medications    temazepam (RESTORIL) 15 MG capsule     Si at hs as needed for sleep     Dispense:  60 capsule     Refill:  0    DISCONTD: methylPREDNISolone acetate (DEPO-MEDROL) injection 80 mg    cefdinir (OMNICEF) 300 MG capsule     Sig: Take 1 capsule by mouth 2 times daily for 7 days     Dispense:  14 capsule     Refill:  0    methylPREDNISolone acetate (DEPO-MEDROL) injection 40 mg    methylPREDNISolone acetate (DEPO-MEDROL) injection 40 mg         ORDERS:  Orders Placed This Encounter   Procedures    CBC with Auto Differential    Comprehensive Metabolic Panel    Hemoglobin A1C    Lipid Panel    Vitamin D 25 Hydroxy    Urinalysis with Reflex to Culture    TSH         Follow-up:  Return for have labs done prior to appt.     PATIENT INSTRUCTIONS:  Patient Instructions    Chronic sinusitis   medrol 80 IM today and cefdinir 300 twice daily for 7 days    T2DM  just watch diet and take meds   Insomnia  stable with restoril   Elevated HGB;  you need to donate blood   Electronically signed by MARISELA Abdalla on 12/6/2022 at 8:54 AM    @    Noemy/transcription disclaimer:  Much of this encounter note is electronic transcription/translation of spoken language to printed texts. The electronic translation of spoken language may be erroneous, or at times,nonsensical words or phrases may be inadvertently transcribed.   Although I have reviewed the note for such errors, some may still exist.

## 2022-12-06 NOTE — PATIENT INSTRUCTIONS
Chronic sinusitis   medrol 80 IM today and cefdinir 300 twice daily for 7 days    T2DM  just watch diet and take meds   Insomnia  stable with restoril   Elevated HGB;  you need to donate blood

## 2022-12-12 ENCOUNTER — OFFICE VISIT (OUTPATIENT)
Dept: HEMATOLOGY | Age: 82
End: 2022-12-12
Payer: MEDICARE

## 2022-12-12 ENCOUNTER — HOSPITAL ENCOUNTER (OUTPATIENT)
Dept: INFUSION THERAPY | Age: 82
Discharge: HOME | End: 2022-12-12
Payer: MEDICARE

## 2022-12-12 VITALS
WEIGHT: 208 LBS | OXYGEN SATURATION: 94 % | SYSTOLIC BLOOD PRESSURE: 152 MMHG | HEIGHT: 72 IN | HEART RATE: 63 BPM | DIASTOLIC BLOOD PRESSURE: 62 MMHG | BODY MASS INDEX: 28.17 KG/M2

## 2022-12-12 DIAGNOSIS — D50.9 IRON DEFICIENCY ANEMIA, UNSPECIFIED IRON DEFICIENCY ANEMIA TYPE: ICD-10-CM

## 2022-12-12 DIAGNOSIS — D75.1 SECONDARY ERYTHROCYTOSIS: ICD-10-CM

## 2022-12-12 DIAGNOSIS — D69.6 THROMBOCYTOPENIA (HCC): Primary | ICD-10-CM

## 2022-12-12 DIAGNOSIS — R79.89 LOW TESTOSTERONE IN MALE: ICD-10-CM

## 2022-12-12 DIAGNOSIS — D69.6 THROMBOCYTOPENIA (HCC): ICD-10-CM

## 2022-12-12 DIAGNOSIS — R16.1 SPLENOMEGALY: ICD-10-CM

## 2022-12-12 LAB
ALBUMIN SERPL-MCNC: 4.4 G/DL (ref 3.5–5.2)
ALP BLD-CCNC: 67 U/L (ref 40–130)
ALT SERPL-CCNC: 54 U/L (ref 21–72)
ANION GAP SERPL CALCULATED.3IONS-SCNC: 5 MMOL/L (ref 7–19)
AST SERPL-CCNC: 39 U/L (ref 17–59)
BASOPHILS ABSOLUTE: 0.06 K/UL (ref 0.01–0.08)
BASOPHILS RELATIVE PERCENT: 0.9 % (ref 0.1–1.2)
BILIRUB SERPL-MCNC: 0.8 MG/DL (ref 0.2–1.3)
BUN BLDV-MCNC: 35 MG/DL (ref 9–20)
CALCIUM SERPL-MCNC: 9.2 MG/DL (ref 8.4–10.2)
CHLORIDE BLD-SCNC: 102 MMOL/L (ref 98–111)
CO2: 31 MMOL/L (ref 22–29)
CREAT SERPL-MCNC: 1.2 MG/DL (ref 0.6–1.2)
EOSINOPHILS ABSOLUTE: 0.17 K/UL (ref 0.04–0.54)
EOSINOPHILS RELATIVE PERCENT: 2.4 % (ref 0.7–7)
FERRITIN: 90 NG/ML (ref 30–400)
GFR SERPL CREATININE-BSD FRML MDRD: >60 ML/MIN/{1.73_M2}
GLOBULIN: 2.7 G/DL
GLUCOSE BLD-MCNC: 124 MG/DL (ref 74–106)
HCT VFR BLD CALC: 54.1 % (ref 40.1–51)
HEMOGLOBIN: 18 G/DL (ref 13.7–17.5)
IRON SATURATION: 50 % (ref 14–50)
IRON: 181 UG/DL (ref 59–158)
LYMPHOCYTES ABSOLUTE: 1.61 K/UL (ref 1.18–3.74)
LYMPHOCYTES RELATIVE PERCENT: 22.9 % (ref 19.3–53.1)
MCH RBC QN AUTO: 28 PG (ref 25.7–32.2)
MCHC RBC AUTO-ENTMCNC: 33.3 G/DL (ref 32.3–36.5)
MCV RBC AUTO: 84 FL (ref 79–92.2)
MONOCYTES ABSOLUTE: 0.58 K/UL (ref 0.24–0.82)
MONOCYTES RELATIVE PERCENT: 8.2 % (ref 4.7–12.5)
NEUTROPHILS ABSOLUTE: 4.6 K/UL (ref 1.56–6.13)
NEUTROPHILS RELATIVE PERCENT: 65.3 % (ref 34–71.1)
PDW BLD-RTO: 17.2 % (ref 11.6–14.4)
PLATELET # BLD: 134 K/UL (ref 163–337)
PMV BLD AUTO: 9.9 FL (ref 7.4–10.4)
POTASSIUM SERPL-SCNC: 4.3 MMOL/L (ref 3.5–5.1)
RBC # BLD: 6.44 M/UL (ref 4.63–6.08)
SODIUM BLD-SCNC: 138 MMOL/L (ref 137–145)
TOTAL IRON BINDING CAPACITY: 363 UG/DL (ref 250–400)
TOTAL PROTEIN: 7.1 G/DL (ref 6.3–8.2)
WBC # BLD: 7.04 K/UL (ref 4.23–9.07)

## 2022-12-12 PROCEDURE — G8427 DOCREV CUR MEDS BY ELIG CLIN: HCPCS | Performed by: NURSE PRACTITIONER

## 2022-12-12 PROCEDURE — 3078F DIAST BP <80 MM HG: CPT | Performed by: NURSE PRACTITIONER

## 2022-12-12 PROCEDURE — 3074F SYST BP LT 130 MM HG: CPT | Performed by: NURSE PRACTITIONER

## 2022-12-12 PROCEDURE — G8484 FLU IMMUNIZE NO ADMIN: HCPCS | Performed by: NURSE PRACTITIONER

## 2022-12-12 PROCEDURE — G8417 CALC BMI ABV UP PARAM F/U: HCPCS | Performed by: NURSE PRACTITIONER

## 2022-12-12 PROCEDURE — 99213 OFFICE O/P EST LOW 20 MIN: CPT | Performed by: NURSE PRACTITIONER

## 2022-12-12 PROCEDURE — 99212 OFFICE O/P EST SF 10 MIN: CPT

## 2022-12-12 PROCEDURE — 36415 COLL VENOUS BLD VENIPUNCTURE: CPT

## 2022-12-12 PROCEDURE — 1036F TOBACCO NON-USER: CPT | Performed by: NURSE PRACTITIONER

## 2022-12-12 PROCEDURE — 1123F ACP DISCUSS/DSCN MKR DOCD: CPT | Performed by: NURSE PRACTITIONER

## 2022-12-12 PROCEDURE — 85025 COMPLETE CBC W/AUTO DIFF WBC: CPT

## 2022-12-12 PROCEDURE — 80053 COMPREHEN METABOLIC PANEL: CPT

## 2022-12-12 NOTE — PROGRESS NOTES
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 Current recommendation is for conservative monitoring, phlebotomy would be warranted for hematocrit >60 and/or symptomatic. Gloria Ames returns today in scheduled follow-up for evaluation, lab monitoring and further treatment recommendations. He reports he continues to self inject testosterone 100 mg IM every 2 weeks. He denied any excessive bruising or bleeding to include melena, epistaxis, hemoptysis, hematuria or hematochezia. Platelet count is stable at 134,000 and hemoglobin 18 with hematocrit of 54.1, he denied any headaches or vision changes. Today's clinic visit to include physical assessment, review of systems, any radiograph or lab findings that were available and plan of care are documented below. HEMATOLOGIC HISTORY:   Diagnosis:  Mild thrombocytopenia  Erythrocytosis  History of iron deficiency anemia  Hypergonadism (long-term testosterone replacement)  Mild splenomegaly    Treatment summary:  History of OTC iron replacement, not taking at initial consultation    Gloria Ames was seen and initial hematology consultation on 4/19/2022 for concern for erythrocytosis and upon review of medical records persistent mild thrombocytopenia was noted, referred by Kathie Almonte for further evaluation and recommendations.   Gloria Ames has a past medical history to include hypogonadism (initiated testosterone replacement with 1 mg injection every 2 weeks in January 2022), hypertension, gastroesophageal reflux, iron deficiency anemia (treated with OTC iron replacement) and diabetes mellitus type 2. He also reported having skin cancer managed by Dr. 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  He denied history of tobacco or alcohol abuse. He denied a history of thrombotic events to include deep vein thrombosis, pulmonary emboli or cerebrovascular accident. He denied any significant/chronic arthralgias. He has no known history of renal or liver disease. Nithya Kessler denied any history of excessive bruising or bleeding to include melena, epistaxis, hemoptysis, hematuria or hematochezia. He denied any personal or known family history of blood disorders. Reported last donating blood to the [facility] in 2014, prior to this he frequently donated blood on a routine basis (reported retired in 2014). Reviewed the following serology studies at initial consultation on 4/19/2022. CBC results:  11/19/2020:  WBC 6.3, RBC 4.66, Hgb 13.7, Hct 41.4, MCV 88.8, platelets 279,503  12/62/4701:  WBC 5.3, RBC 5.22, Hgb 15.2, Hct 46.2, MCV 88.5, platelets 668,283  33/70/9807:  WBC 5.7, RBC 5.07, Hgb 14.9, Hct 44.8, MCV 88.4, platelets 560,092  78/74/8718:  WBC 5.4, RBC 4.86, Hgb 14.6, Hct 43.1, MCV 88.7, platelets 044,220  24/72/2952:                   Hgb 15.9, Hct 49.4  11/22/2021:  WBC 7.3, RBC 6.31, Hgb 17.8, Hct 56.1, MCV 88.9, platelets 376,557  22/90/8302:  WBC 7.7, RBC 5.92, Hgb 17.1, Hct 52.5, MCV 88.7, platelets 914,894    No available radiographic imaging to evaluate spleen and liver, Nithya Kessler denied having any imaging completed.     CBC at initial consultation on 4/19/2022: WBC 7.9, ANC 5.2, RBC 6.25, hemoglobin 17.9, hematocrit 56.0 and a platelet count of 486,751.    04/19/2022 Serology results  Erythropoietin 6  Rheumatoid Factor <10  Sed Rate 1  C Reactive Protein <0  FARIDA 2- V617F negative; CALR negative; Exon 12-15 negative  Path Review- normocytic normochromic erythrocytosis  Ceat 1.3/ GFR 53  Iron 78  TIBC 371  Saturation 21%  Ferritin 57.9  B12/Folate 782/12.8    2022 US abdomen- Mild splenomegaly, the spleen measures 13.9 x 5.4 x 13.6 cm with an estimated volume of 534 mL. 2 small cysts are seen of the liver and appear benign. There is a small cyst at the inferior pole the left kidney, which appears benign. No gallstones are identified, suspended echoes within the gallbladder probably represents cholesterol crystals. The gallbladder appears partially contracted. No evidence of acute cholecystitis is identified. There is no significant biliary ductal dilatation. 2022-O2 saturation walk: 96% on room air at rest, maintained 96% during ambulation and recovery was up to 98% on room air.     Age-appropriate health screenin2021 Colonoscopy [facility])- adenomatous polyp, tubular type  2021- PSA 0.42  2022- PSA 0.82    Past Medical History:    Past Medical History:   Diagnosis Date    BPH with obstruction/lower urinary tract symptoms     Gastroesophageal reflux disease without esophagitis 2019    Hyperglycemia     Hyperlipidemia     Hypertension     Testicular hypofunction     Type 2 diabetes mellitus without complication (HCC)        Past Surgical History:    Past Surgical History:   Procedure Laterality Date    ANKLE SURGERY      HERNIA REPAIR      TONSILLECTOMY         Current Medications:    Current Outpatient Medications   Medication Sig Dispense Refill    temazepam (RESTORIL) 15 MG capsule 2 at hs as needed for sleep 60 capsule 0    sildenafil (VIAGRA) 25 MG tablet TAKE ONE TABLET BY MOUTH DAILY AS NEEDED FOR ERECTILE DYSFUNCTION 30 tablet 1    atorvastatin (LIPITOR) 20 MG tablet TAKE 1 TABLET AT BEDTIME 14 tablet 0    glipiZIDE (GLUCOTROL XL) 10 MG extended release tablet TAKE 1 TABLET EVERY MORNING 14 tablet 0    losartan (COZAAR) 100 MG tablet TAKE 1 TABLET EVERY DAY 14 tablet 0    potassium chloride (KLOR-CON M) 10 MEQ extended release tablet TAKE 2 TABLETS TWICE DAILY 56 tablet 0    verapamil (CALAN SR) 240 MG extended release tablet TAKE 1 TABLET TWICE DAILY 28 tablet 0    spironolactone (ALDACTONE) 25 MG tablet TAKE 1 TABLET EVERY DAY 90 tablet 3    hydroCHLOROthiazide (HYDRODIURIL) 25 MG tablet TAKE 1 TABLET EVERY DAY 90 tablet 3    bumetanide (BUMEX) 0.5 MG tablet Take 1 tablet by mouth daily 90 tablet 1    doxycycline monohydrate (MONODOX) 100 MG capsule TAKE 1 CAPSULE BY MOUTH TWICE DAILY FOR 7 DAYS      cloNIDine (CATAPRES) 0.1 MG tablet TAKE 1 TABLET BY MOUTH ONCE DAILY      Needles & Syringes MISC 1 each by Does not apply route daily 100 each 3    nystatin (MYCOSTATIN) 152256 UNIT/GM cream APPLY  CREAM TOPICALLY TWICE DAILY 30 g 2    fluticasone (FLONASE) 50 MCG/ACT nasal spray USE 1 SPRAY IN EACH NOSTRIL TWICE DAILY 48 g 3    pantoprazole (PROTONIX) 40 MG tablet TAKE 1 TABLET EVERY DAY 90 tablet 3    aspirin 81 MG tablet Take 81 mg by mouth daily      Omega-3 Fatty Acids (FISH OIL) 1000 MG CAPS Take 1,200 mg by mouth 2 times daily      Continuous Blood Gluc Transmit (DEXCOM G6 TRANSMITTER) MISC 1 transmitter refilled every 90 days 1 each 3    Continuous Blood Gluc  (DEXCOM G6 ) VERONICA One g6 reciever refill annually 1 each 0    Continuous Blood Gluc Sensor (DEXCOM G6 SENSOR) MISC 1 box sensors refill every 30 days 1 each 5    testosterone cypionate (DEPOTESTOTERONE CYPIONATE) 200 MG/ML injection INJECT 1 ML INTRAMUSCULARLY EVERY 14 DAYS 2 mL 0     No current facility-administered medications for this visit.         Allergies: No Known Allergies    Social History:    Social History     Tobacco Use    Smoking status: Never    Smokeless tobacco: Never   Vaping Use    Vaping Use: Never used   Substance Use Topics    Alcohol use: No    Drug use: No       Family History:   Family History Problem Relation Age of Onset    No Known Problems Mother     Heart Attack Father        Vitals:  Vitals:    12/12/22 0958   BP: (!) 152/62   Pulse: 63   SpO2: 94%   Weight: 208 lb (94.3 kg)   Height: 6' (1.829 m)        Subjective   REVIEW OF SYSTEMS:   Review of Systems   Constitutional:  Positive for fatigue. Negative for chills, diaphoresis and fever. HENT: Negative. Negative for congestion, ear pain, hearing loss, nosebleeds, sore throat and tinnitus. Eyes: Negative. Negative for pain, discharge and redness. Respiratory: Negative. Negative for cough, shortness of breath and wheezing. Cardiovascular: Negative. Negative for chest pain, palpitations and leg swelling. Gastrointestinal: Negative. Negative for abdominal pain, blood in stool, constipation, diarrhea, nausea and vomiting. Endocrine: Negative for polydipsia. Genitourinary:  Negative for dysuria, flank pain, frequency, hematuria and urgency. Musculoskeletal: Negative. Negative for back pain, myalgias and neck pain. Skin: Negative. Negative for rash. Neurological: Negative. Negative for dizziness, tremors, seizures, weakness and headaches. Hematological:  Does not bruise/bleed easily. Psychiatric/Behavioral: Negative. The patient is not nervous/anxious. Objective   PHYSICAL EXAM:  Physical Exam  Vitals reviewed. Constitutional:       General: He is not in acute distress. Appearance: He is well-developed. HENT:      Head: Normocephalic and atraumatic. Mouth/Throat:      Pharynx: Uvula midline. Tonsils: No tonsillar exudate. Eyes:      General: Lids are normal.      Conjunctiva/sclera: Conjunctivae normal.      Pupils: Pupils are equal, round, and reactive to light. Neck:      Thyroid: No thyroid mass or thyromegaly. Vascular: No JVD. Trachea: Trachea normal. No tracheal deviation. Cardiovascular:      Rate and Rhythm: Normal rate and regular rhythm. Pulses: Normal pulses. Heart sounds: Normal heart sounds. Pulmonary:      Effort: Pulmonary effort is normal. No respiratory distress. Breath sounds: Normal breath sounds. No wheezing or rales. Chest:      Chest wall: No tenderness. Abdominal:      General: Bowel sounds are normal. There is no distension. Palpations: Abdomen is soft. There is no mass. Tenderness: There is no abdominal tenderness. There is no guarding. Musculoskeletal:         General: No tenderness or deformity. Cervical back: Normal range of motion and neck supple. Comments: Range of motion within normal limits x4 extremities   Skin:     General: Skin is warm. Findings: No bruising, erythema or rash. Neurological:      Mental Status: He is alert and oriented to person, place, and time. Cranial Nerves: No cranial nerve deficit. Coordination: Coordination normal.   Psychiatric:         Behavior: Behavior normal.         Thought Content: Thought content normal.       Labs reviewed today:  Lab Results   Component Value Date    WBC 7.04 12/12/2022    HGB 18.0 (HH) 12/12/2022    HCT 54.1 (HH) 12/12/2022    MCV 84.0 12/12/2022     (L) 12/12/2022     Lab Results   Component Value Date    NEUTROABS 4.60 12/12/2022     CBC reviewed    ASSESSMENT/PLAN:      1. Erythrocytosis: Suspect secondary to testosterone replacement, Serology studies were negative for myeloproliferative disorder (AK 2 V617F negative; CALR negative; Exon 12-15 negative) and path review was within acceptable limits. Currently receiving testosterone replacement with 100 mg injection IM every 2 weeks under the direction of his PCP. Hemoglobin 18.0, hematocrit 54.1, MCV 84.0 and RBC 6.44 today. Asymptomatic    -No need for phlebotomy with hematocrit being <60 and asymptomatic  -Okay to continue testosterone replacement. Under the direction of MDCapsule, APRN  -CBC in 3 months    2. Thrombocytopenia ([address] with mild splenomegaly.  Platelet count of 134,000 today, stable. Denies any excessive bruising or bleeding to include melena, epistaxis, hemoptysis, hematuria or hematochezia. He has no known history of renal or liver disease and serology studies do not suggest chronic inflammatory or infectious process. .     -Continue conservative monitoring  -Contact the clinic between visits if any excessive bruising or bleeding occurs    3. Iron deficiency, iron substrates were within normal limits. -Repeat iron substrates today    4. Skin cancer, basal cell to right forearm, right lower back, left inferior medial back, and right superior lateral mid-back and squamous cell to left forearm and right medial superior chest  Reported no new skin concerns    -Keep follow up with Dr Evan Veronica in February, 2023    I discussed all of the above findings included in the assessment and plan with the patient and the patient is in agreement to move forward with current recommendations/treatment. I have addressed all of their questions and concerns that were verbalized. FOLLOW UP:  Follow up given for 6 months or sooner, if needed  CBC in 3 months  Continue to follow with other medical providers as recommended  Labs at next visit: CBC    EMR Dragon/Transcription disclaimer:   Much of this encounter note is an electronic transcription/translation of spoken language to printed text. The electronic translation of spoken language may permit erroneous, or at times, nonsensical words or phrases to be inadvertently transcribed; although attempts have made to review the note for such errors, some may still exist.  Please excuse any unrecognized transcription errors and contact us if the error is unintelligible or needs documented correction. Also, portions of this note have been copied forward, however, changed to reflect the most current clinical status of this patient.     Electronically signed by MARISELA Jenkins on 12/14/2022 at 2:00 PM  Lyle OLVERA am pre-charting as a registered nurse for MARISELA Camp.

## 2022-12-13 DIAGNOSIS — E11.9 TYPE 2 DIABETES MELLITUS WITHOUT COMPLICATION, WITH LONG-TERM CURRENT USE OF INSULIN (HCC): Primary | ICD-10-CM

## 2022-12-13 DIAGNOSIS — Z79.4 TYPE 2 DIABETES MELLITUS WITHOUT COMPLICATION, WITH LONG-TERM CURRENT USE OF INSULIN (HCC): Primary | ICD-10-CM

## 2022-12-13 RX ORDER — BLOOD-GLUCOSE TRANSMITTER
EACH MISCELLANEOUS
Qty: 1 EACH | Refills: 3 | Status: SHIPPED | OUTPATIENT
Start: 2022-12-13

## 2022-12-13 RX ORDER — BLOOD-GLUCOSE SENSOR
EACH MISCELLANEOUS
Qty: 1 EACH | Refills: 5 | Status: SHIPPED | OUTPATIENT
Start: 2022-12-13

## 2022-12-13 RX ORDER — BLOOD-GLUCOSE,RECEIVER,CONT
EACH MISCELLANEOUS
Qty: 1 EACH | Refills: 0 | Status: SHIPPED | OUTPATIENT
Start: 2022-12-13

## 2022-12-14 ASSESSMENT — ENCOUNTER SYMPTOMS
EYES NEGATIVE: 1
EYE DISCHARGE: 0
DIARRHEA: 0
NAUSEA: 0
WHEEZING: 0
GASTROINTESTINAL NEGATIVE: 1
CONSTIPATION: 0
VOMITING: 0
RESPIRATORY NEGATIVE: 1
SHORTNESS OF BREATH: 0
EYE PAIN: 0
EYE REDNESS: 0
ABDOMINAL PAIN: 0
SORE THROAT: 0
BLOOD IN STOOL: 0
BACK PAIN: 0
COUGH: 0

## 2022-12-27 RX ORDER — GLIPIZIDE 10 MG/1
TABLET, FILM COATED, EXTENDED RELEASE ORAL
Qty: 14 TABLET | Refills: 0 | Status: SHIPPED | OUTPATIENT
Start: 2022-12-27

## 2023-01-16 RX ORDER — GLIPIZIDE 10 MG/1
TABLET, FILM COATED, EXTENDED RELEASE ORAL
Qty: 14 TABLET | Refills: 0 | Status: SHIPPED | OUTPATIENT
Start: 2023-01-16

## 2023-01-16 NOTE — TELEPHONE ENCOUNTER
Laila Reyes called requesting a refill of the below medication which has been pended for you:     Requested Prescriptions     Pending Prescriptions Disp Refills    glipiZIDE (GLUCOTROL XL) 10 MG extended release tablet [Pharmacy Med Name: glipiZIDE ER 10 MG Oral Tablet Extended Release 24 Hour] 14 tablet 0     Sig: TAKE 1 TABLET BY MOUTH ONCE DAILY IN THE MORNING       Last Appointment Date: 12/6/2022  Next Appointment Date: 3/6/2023    No Known Allergies

## 2023-03-02 DIAGNOSIS — E11.9 TYPE 2 DIABETES MELLITUS WITHOUT COMPLICATION, WITHOUT LONG-TERM CURRENT USE OF INSULIN (HCC): ICD-10-CM

## 2023-03-02 DIAGNOSIS — E55.9 VITAMIN D DEFICIENCY: ICD-10-CM

## 2023-03-02 DIAGNOSIS — E78.2 MIXED HYPERLIPIDEMIA: ICD-10-CM

## 2023-03-02 LAB
ALBUMIN SERPL-MCNC: 4.2 G/DL (ref 3.5–5.2)
ALP BLD-CCNC: 72 U/L (ref 40–130)
ALT SERPL-CCNC: 60 U/L (ref 5–41)
ANION GAP SERPL CALCULATED.3IONS-SCNC: 13 MMOL/L (ref 7–19)
AST SERPL-CCNC: 40 U/L (ref 5–40)
BACTERIA: NEGATIVE /HPF
BASOPHILS ABSOLUTE: 0.1 K/UL (ref 0–0.2)
BASOPHILS RELATIVE PERCENT: 1.3 % (ref 0–1)
BILIRUB SERPL-MCNC: 0.8 MG/DL (ref 0.2–1.2)
BILIRUBIN URINE: NEGATIVE
BLOOD, URINE: NEGATIVE
BUN BLDV-MCNC: 18 MG/DL (ref 8–23)
CALCIUM SERPL-MCNC: 9.1 MG/DL (ref 8.8–10.2)
CHLORIDE BLD-SCNC: 102 MMOL/L (ref 98–111)
CHOLESTEROL, TOTAL: 183 MG/DL (ref 160–199)
CLARITY: CLEAR
CO2: 28 MMOL/L (ref 22–29)
COLOR: YELLOW
CREAT SERPL-MCNC: 0.7 MG/DL (ref 0.5–1.2)
CRYSTALS, UA: ABNORMAL /HPF
EOSINOPHILS ABSOLUTE: 0.2 K/UL (ref 0–0.6)
EOSINOPHILS RELATIVE PERCENT: 3.9 % (ref 0–5)
EPITHELIAL CELLS, UA: 1 /HPF (ref 0–5)
GFR SERPL CREATININE-BSD FRML MDRD: >60 ML/MIN/{1.73_M2}
GLUCOSE BLD-MCNC: 181 MG/DL (ref 74–109)
GLUCOSE URINE: NEGATIVE MG/DL
HBA1C MFR BLD: 7.1 % (ref 4–6)
HCT VFR BLD CALC: 44.2 % (ref 42–52)
HDLC SERPL-MCNC: 49 MG/DL (ref 55–121)
HEMOGLOBIN: 14.8 G/DL (ref 14–18)
HYALINE CASTS: 3 /HPF (ref 0–8)
IMMATURE GRANULOCYTES #: 0 K/UL
KETONES, URINE: NEGATIVE MG/DL
LDL CHOLESTEROL CALCULATED: 106 MG/DL
LEUKOCYTE ESTERASE, URINE: NEGATIVE
LYMPHOCYTES ABSOLUTE: 1.5 K/UL (ref 1.1–4.5)
LYMPHOCYTES RELATIVE PERCENT: 28.3 % (ref 20–40)
MCH RBC QN AUTO: 30 PG (ref 27–31)
MCHC RBC AUTO-ENTMCNC: 33.5 G/DL (ref 33–37)
MCV RBC AUTO: 89.7 FL (ref 80–94)
MONOCYTES ABSOLUTE: 0.4 K/UL (ref 0–0.9)
MONOCYTES RELATIVE PERCENT: 7.4 % (ref 0–10)
NEUTROPHILS ABSOLUTE: 3.2 K/UL (ref 1.5–7.5)
NEUTROPHILS RELATIVE PERCENT: 58.5 % (ref 50–65)
NITRITE, URINE: NEGATIVE
PDW BLD-RTO: 13.6 % (ref 11.5–14.5)
PH UA: 7 (ref 5–8)
PLATELET # BLD: 139 K/UL (ref 130–400)
PMV BLD AUTO: 10 FL (ref 9.4–12.4)
POTASSIUM SERPL-SCNC: 3.8 MMOL/L (ref 3.5–5)
PROTEIN UA: 100 MG/DL
RBC # BLD: 4.93 M/UL (ref 4.7–6.1)
RBC UA: 1 /HPF (ref 0–4)
SODIUM BLD-SCNC: 143 MMOL/L (ref 136–145)
SPECIFIC GRAVITY UA: 1.02 (ref 1–1.03)
TOTAL PROTEIN: 6.8 G/DL (ref 6.6–8.7)
TRIGL SERPL-MCNC: 138 MG/DL (ref 0–149)
TSH SERPL DL<=0.05 MIU/L-ACNC: 2.63 UIU/ML (ref 0.27–4.2)
UROBILINOGEN, URINE: 0.2 E.U./DL
VITAMIN D 25-HYDROXY: 26.3 NG/ML
WBC # BLD: 5.4 K/UL (ref 4.8–10.8)
WBC UA: 0 /HPF (ref 0–5)

## 2023-03-06 ENCOUNTER — OFFICE VISIT (OUTPATIENT)
Dept: INTERNAL MEDICINE | Age: 83
End: 2023-03-06

## 2023-03-06 VITALS
WEIGHT: 218 LBS | SYSTOLIC BLOOD PRESSURE: 150 MMHG | TEMPERATURE: 97.3 F | DIASTOLIC BLOOD PRESSURE: 64 MMHG | OXYGEN SATURATION: 97 % | HEART RATE: 60 BPM | BODY MASS INDEX: 29.57 KG/M2

## 2023-03-06 DIAGNOSIS — D69.6 THROMBOCYTOPENIA (HCC): ICD-10-CM

## 2023-03-06 DIAGNOSIS — F51.01 PRIMARY INSOMNIA: ICD-10-CM

## 2023-03-06 DIAGNOSIS — E11.9 TYPE 2 DIABETES MELLITUS WITHOUT COMPLICATION, WITHOUT LONG-TERM CURRENT USE OF INSULIN (HCC): ICD-10-CM

## 2023-03-06 DIAGNOSIS — K21.9 GASTROESOPHAGEAL REFLUX DISEASE WITHOUT ESOPHAGITIS: ICD-10-CM

## 2023-03-06 DIAGNOSIS — I10 ESSENTIAL HYPERTENSION: ICD-10-CM

## 2023-03-06 DIAGNOSIS — E55.9 VITAMIN D DEFICIENCY: ICD-10-CM

## 2023-03-06 DIAGNOSIS — R42 CHRONIC VERTIGO: ICD-10-CM

## 2023-03-06 DIAGNOSIS — R79.89 LOW TESTOSTERONE IN MALE: ICD-10-CM

## 2023-03-06 DIAGNOSIS — E87.6 CHRONIC HYPOKALEMIA: ICD-10-CM

## 2023-03-06 DIAGNOSIS — E11.9 TYPE 2 DIABETES MELLITUS WITHOUT COMPLICATION, WITH LONG-TERM CURRENT USE OF INSULIN (HCC): ICD-10-CM

## 2023-03-06 DIAGNOSIS — E78.2 MIXED HYPERLIPIDEMIA: ICD-10-CM

## 2023-03-06 DIAGNOSIS — Z79.4 TYPE 2 DIABETES MELLITUS WITHOUT COMPLICATION, WITH LONG-TERM CURRENT USE OF INSULIN (HCC): ICD-10-CM

## 2023-03-06 DIAGNOSIS — E29.1 HYPOGONADISM MALE: ICD-10-CM

## 2023-03-06 PROBLEM — R60.0 PERIPHERAL EDEMA: Status: RESOLVED | Noted: 2022-05-04 | Resolved: 2023-03-06

## 2023-03-06 PROBLEM — T81.31XA POSTOPERATIVE WOUND DEHISCENCE: Status: RESOLVED | Noted: 2022-04-21 | Resolved: 2023-03-06

## 2023-03-06 PROBLEM — R60.9 PERIPHERAL EDEMA: Status: RESOLVED | Noted: 2022-05-04 | Resolved: 2023-03-06

## 2023-03-06 RX ORDER — BLOOD-GLUCOSE,RECEIVER,CONT
EACH MISCELLANEOUS
Qty: 1 EACH | Refills: 0 | Status: SHIPPED | OUTPATIENT
Start: 2023-03-06

## 2023-03-06 RX ORDER — BLOOD-GLUCOSE TRANSMITTER
EACH MISCELLANEOUS
Qty: 1 EACH | Refills: 3 | Status: SHIPPED | OUTPATIENT
Start: 2023-03-06

## 2023-03-06 RX ORDER — TESTOSTERONE CYPIONATE 200 MG/ML
INJECTION INTRAMUSCULAR
Qty: 4 ML | Refills: 1 | Status: SHIPPED | OUTPATIENT
Start: 2023-03-06 | End: 2023-04-05

## 2023-03-06 RX ORDER — BLOOD-GLUCOSE SENSOR
EACH MISCELLANEOUS
Qty: 1 EACH | Refills: 5 | Status: SHIPPED | OUTPATIENT
Start: 2023-03-06

## 2023-03-06 SDOH — ECONOMIC STABILITY: INCOME INSECURITY: HOW HARD IS IT FOR YOU TO PAY FOR THE VERY BASICS LIKE FOOD, HOUSING, MEDICAL CARE, AND HEATING?: NOT HARD AT ALL

## 2023-03-06 SDOH — ECONOMIC STABILITY: FOOD INSECURITY: WITHIN THE PAST 12 MONTHS, THE FOOD YOU BOUGHT JUST DIDN'T LAST AND YOU DIDN'T HAVE MONEY TO GET MORE.: NEVER TRUE

## 2023-03-06 SDOH — ECONOMIC STABILITY: HOUSING INSECURITY
IN THE LAST 12 MONTHS, WAS THERE A TIME WHEN YOU DID NOT HAVE A STEADY PLACE TO SLEEP OR SLEPT IN A SHELTER (INCLUDING NOW)?: NO

## 2023-03-06 SDOH — ECONOMIC STABILITY: FOOD INSECURITY: WITHIN THE PAST 12 MONTHS, YOU WORRIED THAT YOUR FOOD WOULD RUN OUT BEFORE YOU GOT MONEY TO BUY MORE.: NEVER TRUE

## 2023-03-06 ASSESSMENT — ENCOUNTER SYMPTOMS
SORE THROAT: 0
NAUSEA: 0
STRIDOR: 0
EYE ITCHING: 0
COUGH: 0
VOMITING: 0
DIARRHEA: 0
COLOR CHANGE: 0
EYE DISCHARGE: 0
ABDOMINAL DISTENTION: 0
BLOOD IN STOOL: 0
ABDOMINAL PAIN: 0
CHOKING: 0
CONSTIPATION: 0
TROUBLE SWALLOWING: 0
SHORTNESS OF BREATH: 0
WHEEZING: 0

## 2023-03-06 ASSESSMENT — PATIENT HEALTH QUESTIONNAIRE - PHQ9
SUM OF ALL RESPONSES TO PHQ QUESTIONS 1-9: 0
SUM OF ALL RESPONSES TO PHQ QUESTIONS 1-9: 0
2. FEELING DOWN, DEPRESSED OR HOPELESS: 0
1. LITTLE INTEREST OR PLEASURE IN DOING THINGS: 0
SUM OF ALL RESPONSES TO PHQ QUESTIONS 1-9: 0
SUM OF ALL RESPONSES TO PHQ9 QUESTIONS 1 & 2: 0
SUM OF ALL RESPONSES TO PHQ QUESTIONS 1-9: 0

## 2023-03-06 NOTE — PROGRESS NOTES
Formerly [facility] PHYSICIAN SERVICES  [facility] INTERNAL MEDICINE  [address] [facility] [address] [address]  Dept: [phone]  Dept Fax: [phone]: [phone]    Todd Encinas (:  1940) is a [de-identified] y.o. male,Established patient  with green, here for evaluation of the following chief complaint(s): 3 Month Follow-Up      Todd Encinas is a [de-identified] y.o. male who presents today for his medical conditions/complaints as noted below. Todd Encinas is c/loly 3 Month Follow-Up        HPI:     Chief Complaint   Patient presents with    3 Month Follow-Up     HPI   #1 hypertension he is on Bumex 0.5 daily and HCTZ 25 with losartan 100 daily and he is also on spironolactone 25 daily and he is on verapamil 240 twice daily  2. GERD stable with Protonix 40 daily  3 type 2 diabetes mellitus glucose is high at 181 and A1c is gone up to 7.1 he is spilling protein in his urine; he has not gotten his  Dexcom we will reorder  and he is on glipizide 10 daily;  he has not been eating good at all;; he wants to get back on his diet   4. Mixed hyperlipidemia cholesterol is 183 triglycerides 138 he is stable with Lipitor 20 at bedtime  5. Vitamin D deficiency very low at 26.3;  has been out of his meds;   6. Low testosterone in male he takes testosterone every 14 days  7. Chronic vertigo  has not had any revent events;  Hct z was for that;   8. Chronic hypokalemia potassium 10 mill equivalents to twice daily  9.   Insomnia stable with Restoril  10 thrombocytopenia platelets are back to normal we will resolve this issue he has been followed by hematology  Past Medical History:   Diagnosis Date    BPH with obstruction/lower urinary tract symptoms     Chronic vertigo 8/3/2018    Gastroesophageal reflux disease without esophagitis 2019    Hyperglycemia     Hyperlipidemia     Hypertension     Peripheral edema 2022    Postoperative wound dehiscence 2022    Testicular hypofunction     Thrombocytopenia (HCC) 6/2/2022    Type 2 diabetes mellitus without complication [facility])       Past Surgical History:   Procedure Laterality Date    ANKLE SURGERY      HERNIA REPAIR      TONSILLECTOMY         Vitals 3/6/2023 12/12/2022 12/6/2022 11/23/2022 11/23/2022 9/71/2525   SYSTOLIC 814 800 738 539 116 944   DIASTOLIC 64 62 80 70 62 80   Pulse 60 63 72 - 65 -   Temp 97.3 - - - 98 97.7   Resp - - - - 17 -   SpO2 97 94 95 - 96 -   Weight 218 lb 208 lb 209 lb - 210 lb 207 lb   Height - 6' 0\" 6' 0\" - 6' 0\" 6' 0\"   Body mass index - 28.21 kg/m2 28.34 kg/m2 - 28.48 kg/m2 28.07 kg/m2   Some recent data might be hidden       Family History   Problem Relation Age of Onset    No Known Problems Mother     Heart Attack Father        Social History     Tobacco Use    Smoking status: Never    Smokeless tobacco: Never   Substance Use Topics    Alcohol use: No      Current Outpatient Medications   Medication Sig Dispense Refill    glipiZIDE (GLUCOTROL XL) 10 MG extended release tablet TAKE 1 TABLET BY MOUTH ONCE DAILY IN THE MORNING 14 tablet 0    Continuous Blood Gluc Transmit (DEXCOM G6 TRANSMITTER) MISC 1 transmitter refilled every 90 days 1 each 3    Continuous Blood Gluc  (DEXCOM G6 ) VERONICA One g6 reciever refill annually 1 each 0    Continuous Blood Gluc Sensor (DEXCOM G6 SENSOR) MISC 1 box sensors refill every 30 days 1 each 5    sildenafil (VIAGRA) 25 MG tablet TAKE ONE TABLET BY MOUTH DAILY AS NEEDED FOR ERECTILE DYSFUNCTION 30 tablet 1    atorvastatin (LIPITOR) 20 MG tablet TAKE 1 TABLET AT BEDTIME 14 tablet 0    losartan (COZAAR) 100 MG tablet TAKE 1 TABLET EVERY DAY 14 tablet 0    potassium chloride (KLOR-CON M) 10 MEQ extended release tablet TAKE 2 TABLETS TWICE DAILY 56 tablet 0    verapamil (CALAN SR) 240 MG extended release tablet TAKE 1 TABLET TWICE DAILY 28 tablet 0    spironolactone (ALDACTONE) 25 MG tablet TAKE 1 TABLET EVERY DAY 90 tablet 3    hydroCHLOROthiazide (HYDRODIURIL) 25 MG tablet TAKE 1 TABLET EVERY DAY 90 tablet 3    bumetanide (BUMEX) 0.5 MG tablet Take 1 tablet by mouth daily 90 tablet 1    doxycycline monohydrate (MONODOX) 100 MG capsule TAKE 1 CAPSULE BY MOUTH TWICE DAILY FOR 7 DAYS      cloNIDine (CATAPRES) 0.1 MG tablet TAKE 1 TABLET BY MOUTH ONCE DAILY      Needles & Syringes MISC 1 each by Does not apply route daily 100 each 3    nystatin (MYCOSTATIN) 331919 UNIT/GM cream APPLY  CREAM TOPICALLY TWICE DAILY 30 g 2    fluticasone (FLONASE) 50 MCG/ACT nasal spray USE 1 SPRAY IN EACH NOSTRIL TWICE DAILY 48 g 3    pantoprazole (PROTONIX) 40 MG tablet TAKE 1 TABLET EVERY DAY 90 tablet 3    aspirin 81 MG tablet Take 81 mg by mouth daily      Omega-3 Fatty Acids (FISH OIL) 1000 MG CAPS Take 1,200 mg by mouth 2 times daily      temazepam (RESTORIL) 15 MG capsule 2 at hs as needed for sleep 60 capsule 0    testosterone cypionate (DEPOTESTOTERONE CYPIONATE) 200 MG/ML injection INJECT 1 ML INTRAMUSCULARLY EVERY 14 DAYS 2 mL 0     No current facility-administered medications for this visit.      No Known Allergies    Health Maintenance   Topic Date Due    Shingles vaccine (1 of 2) Never done    COVID-19 Vaccine (3 - Booster for Moderna series) 01/02/2025 (Originally 5/18/2021)    Depression Screen  09/06/2023    Annual Wellness Visit (AWV)  09/07/2023    Lipids  03/02/2024    Colorectal Cancer Screen  03/09/2026    DTaP/Tdap/Td vaccine (2 - Td or Tdap) 08/15/2030    Flu vaccine  Completed    Pneumococcal 65+ years Vaccine  Completed    Hepatitis A vaccine  Aged Out    Hib vaccine  Aged Out    Meningococcal (ACWY) vaccine  Aged Out       Lab Results   Component Value Date    LABA1C 7.1 (H) 03/02/2023     Lab Results   Component Value Date    PSA 0.82 09/21/2022    PSA 0.42 05/24/2021    PSA 0.47 05/08/2020     TSH   Date Value Ref Range Status   03/02/2023 2.630 0.270 - 4.200 uIU/mL Final   ]  Lab Results   Component Value Date     03/02/2023    K 3.8 03/02/2023     03/02/2023    CO2 28 03/02/2023    BUN 18 03/02/2023    CREATININE 0.7 03/02/2023    GLUCOSE 181 (H) 03/02/2023    CALCIUM 9.1 03/02/2023    PROT 6.8 03/02/2023    LABALBU 4.2 03/02/2023    BILITOT 0.8 03/02/2023    ALKPHOS 72 03/02/2023    AST 40 03/02/2023    ALT 60 (H) 03/02/2023    LABGLOM >60 03/02/2023    GFRAA >59 09/02/2022    GLOB 2.7 12/12/2022     Lab Results   Component Value Date    CHOL 183 03/02/2023    CHOL 120 (L) 06/13/2022    CHOL 194 05/25/2022     Lab Results   Component Value Date    TRIG 138 03/02/2023    TRIG 108 09/02/2022    TRIG 84 06/13/2022     Lab Results   Component Value Date    HDL 49 (L) 03/02/2023    HDL 31 (L) 06/13/2022    HDL 36 (L) 05/25/2022     Lab Results   Component Value Date    LDLCALC 106 03/02/2023    LDLCALC 72 06/13/2022    LDLCALC 131 05/25/2022     Lab Results   Component Value Date/Time     03/02/2023 06:39 AM    K 3.8 03/02/2023 06:39 AM     03/02/2023 06:39 AM    CO2 28 03/02/2023 06:39 AM    BUN 18 03/02/2023 06:39 AM    CREATININE 0.7 03/02/2023 06:39 AM    GLUCOSE 181 03/02/2023 06:39 AM    CALCIUM 9.1 03/02/2023 06:39 AM      Lab Results   Component Value Date    WBC 5.4 03/02/2023    HGB 14.8 03/02/2023    HCT 44.2 03/02/2023    MCV 89.7 03/02/2023     03/02/2023    LABLYMP 1.36 04/07/2015    LYMPHOPCT 28.3 03/02/2023    RBC 4.93 03/02/2023    MCH 30.0 03/02/2023    MCHC 33.5 03/02/2023    RDW 13.6 03/02/2023     Lab Results   Component Value Date    VITD25 26.3 (L) 03/02/2023     Labs reviewed from 3/02/2023    Subjective:      Review of Systems   Constitutional:  Negative for fatigue, fever and unexpected weight change. HENT:  Negative for ear discharge, ear pain, mouth sores, sore throat and trouble swallowing. Eyes:  Negative for discharge, itching and visual disturbance. Respiratory:  Negative for cough, choking, shortness of breath, wheezing and stridor. Cardiovascular:  Negative for chest pain, palpitations and leg swelling.    Gastrointestinal:  Negative for abdominal distention, abdominal pain, blood in stool, constipation, diarrhea, nausea and vomiting. Endocrine: Negative for cold intolerance, polydipsia and polyuria. Genitourinary:  Negative for difficulty urinating, dysuria, frequency and urgency. Musculoskeletal:  Negative for arthralgias and gait problem. Skin:  Negative for color change and rash. Allergic/Immunologic: Negative for food allergies and immunocompromised state. Neurological:  Negative for dizziness, tremors, syncope, speech difficulty, weakness and headaches. Hematological:  Negative for adenopathy. Does not bruise/bleed easily. Psychiatric/Behavioral:  Negative for confusion and hallucinations. Objective:     Physical Exam  Constitutional:       General: He is not in acute distress. Appearance: He is well-developed. HENT:      Head: Normocephalic and atraumatic. Eyes:      General: No scleral icterus. Right eye: No discharge. Left eye: No discharge. Pupils: Pupils are equal, round, and reactive to light. Neck:      Thyroid: No thyromegaly. Vascular: No JVD. Cardiovascular:      Rate and Rhythm: Normal rate and regular rhythm. Heart sounds: Normal heart sounds. No murmur heard. Pulmonary:      Effort: Pulmonary effort is normal. No respiratory distress. Breath sounds: Normal breath sounds. No wheezing or rales. Abdominal:      General: Bowel sounds are normal. There is no distension. Palpations: Abdomen is soft. There is no mass. Tenderness: There is no abdominal tenderness. There is no guarding or rebound. Musculoskeletal:         General: No tenderness. Normal range of motion. Cervical back: Normal range of motion and neck supple. Skin:     General: Skin is warm and dry. Findings: No erythema or rash. Neurological:      Mental Status: He is alert and oriented to person, place, and time. Cranial Nerves: No cranial nerve deficit.       Coordination: Coordination normal.      Deep Tendon Reflexes: Reflexes are normal and symmetric. Reflexes normal.   Psychiatric:         Mood and Affect: Mood is not depressed. Behavior: Behavior normal.         Thought Content:  Thought content normal.         Judgment: Judgment normal.     BP (!) 150/64   Pulse 60   Temp 97.3 °F (36.3 °C)   Wt 218 lb (98.9 kg)   SpO2 97%   BMI 29.57 kg/m²           Assessment:      Problem List       RESOLVED: Thrombocytopenia (HCC)    Chronic hypokalemia     Calcium is normal today he is on 20 mill equivalents twice daily          Chronic vertigo     He is currently stable no recent exacerbations          Essential hypertension     He is stable with diuretics and losartan 100 daily and verapamil 240 twice daily          Relevant Medications    losartan (COZAAR) 100 MG tablet    potassium chloride (KLOR-CON M) 10 MEQ extended release tablet    verapamil (CALAN SR) 240 MG extended release tablet    Gastroesophageal reflux disease without esophagitis     Stable with Protonix          Relevant Medications    pantoprazole (PROTONIX) 40 MG tablet    Low testosterone in male     He is stable on current dose of 200 twice a month          Mixed hyperlipidemia     Stable cholesterol triglycerides he is on Lipitor 20 at bedtime          Relevant Medications    aspirin 81 MG tablet    cloNIDine (CATAPRES) 0.1 MG tablet    bumetanide (BUMEX) 0.5 MG tablet    spironolactone (ALDACTONE) 25 MG tablet    hydroCHLOROthiazide (HYDRODIURIL) 25 MG tablet    atorvastatin (LIPITOR) 20 MG tablet    losartan (COZAAR) 100 MG tablet    verapamil (CALAN SR) 240 MG extended release tablet    sildenafil (VIAGRA) 25 MG tablet    Primary insomnia     Stable with Restoril          Type 2 diabetes mellitus without complication, without long-term current use of insulin ([facility])     A1c and fastings are going up he is on glipizide 10 now he does have a Dexcom we will need to add another agent           Relevant Medications    glipiZIDE (GLUCOTROL XL) 10 MG extended release tablet    Vitamin D deficiency     Is very low at 26.3 will need to start medication             Plan:        Patient given educational materials - see patient instructions. Discussed use, benefit, and side effects of prescribed medications. Allpatient questions answered. Pt voiced understanding. Reviewed health maintenance. Instructed to continue current medications, diet and exercise. Patient agreed with treatment plan. Follow up as directed. MEDICATIONS:  No orders of the defined types were placed in this encounter. ORDERS:  No orders of the defined types were placed in this encounter. Follow-up:  No follow-ups on file. PATIENT INSTRUCTIONS:  Patient Instructions   1. Hypertension stable with current meds  2. GERD tonics controls symptoms  3. Type 2 diabetes mellitus back on your diet   4. Hyperlipidemia stable with Lipitor 20  5. Vitamin D deficiency we will need to start 50,000 units weekly  6.  low testosterone taking injections every 14 days  7. Chronic vertigo resolved   #8 chronic hypokalemia potassium is normal today he is taking 20 twice daily we will not make any changes on that  9. Insomnia stable with Restoril  10. Thrombocytopenia we have resolved this issue he sees having normal platelets and has been cleared by hematology  Electronically signed by MARISELA Alvarez on 3/6/2023 at 8:16 AM    @    [facility]/transcription disclaimer:  Much of this encounter note is electronic transcription/translation of spoken language to printed texts. The electronic translation of spoken language may be erroneous, or at times,nonsensical words or phrases may be inadvertently transcribed.   Although I have reviewed the note for such errors, some may still exist.

## 2023-03-06 NOTE — PATIENT INSTRUCTIONS
1.  Hypertension stable with current meds  2. GERD tonics controls symptoms  3. Type 2 diabetes mellitus back on your diet   4. Hyperlipidemia stable with Lipitor 20  5. Vitamin D deficiency we will need to start 50,000 units weekly  6.  low testosterone taking injections every 14 days  7. Chronic vertigo resolved   #8 chronic hypokalemia potassium is normal today he is taking 20 twice daily we will not make any changes on that  9. Insomnia stable with Restoril  10.   Thrombocytopenia we have resolved this issue he sees having normal platelets and has been cleared by hematology

## 2023-03-06 NOTE — ASSESSMENT & PLAN NOTE
A1c and fastings are going up he is on glipizide 10 now he does have a Dexcom we will need to add another agent

## 2023-03-23 DIAGNOSIS — R79.89 LOW TESTOSTERONE IN MALE: Primary | ICD-10-CM

## 2023-03-24 ENCOUNTER — OFFICE VISIT (OUTPATIENT)
Dept: UROLOGY | Age: 83
End: 2023-03-24
Payer: MEDICARE

## 2023-03-24 ENCOUNTER — HOSPITAL ENCOUNTER (OUTPATIENT)
Dept: INFUSION THERAPY | Age: 83
Discharge: HOME | End: 2023-03-24
Payer: MEDICARE

## 2023-03-24 VITALS — BODY MASS INDEX: 29.58 KG/M2 | HEIGHT: 72 IN | TEMPERATURE: 98.1 F | WEIGHT: 218.4 LBS

## 2023-03-24 DIAGNOSIS — R79.89 LOW TESTOSTERONE IN MALE: ICD-10-CM

## 2023-03-24 DIAGNOSIS — N52.9 ERECTILE DYSFUNCTION, UNSPECIFIED ERECTILE DYSFUNCTION TYPE: ICD-10-CM

## 2023-03-24 DIAGNOSIS — D75.1 SECONDARY ERYTHROCYTOSIS: ICD-10-CM

## 2023-03-24 DIAGNOSIS — E29.1 HYPOGONADISM MALE: Primary | ICD-10-CM

## 2023-03-24 LAB
APPEARANCE FLUID: CLEAR
BASOPHILS # BLD: 0.06 K/UL (ref 0.01–0.08)
BASOPHILS NFR BLD: 1.2 % (ref 0.1–1.2)
BILIRUBIN, POC: NORMAL
BLOOD URINE, POC: NORMAL
CLARITY, POC: CLEAR
COLOR, POC: YELLOW
EOSINOPHIL # BLD: 0.15 K/UL (ref 0.04–0.54)
EOSINOPHIL NFR BLD: 2.9 % (ref 0.7–7)
ERYTHROCYTE [DISTWIDTH] IN BLOOD BY AUTOMATED COUNT: 12.8 % (ref 11.5–14.5)
ERYTHROCYTE [DISTWIDTH] IN BLOOD BY AUTOMATED COUNT: 12.8 % (ref 11.6–14.4)
GLUCOSE URINE, POC: NORMAL
HCT VFR BLD AUTO: 42.5 % (ref 42–52)
HCT VFR BLD AUTO: 42.6 % (ref 40.1–51)
HGB BLD-MCNC: 14.3 G/DL (ref 13.7–17.5)
HGB BLD-MCNC: 14.4 G/DL (ref 14–18)
KETONES, POC: NORMAL
LEUKOCYTE EST, POC: NORMAL
LYMPHOCYTES # BLD: 1.33 K/UL (ref 1.18–3.74)
LYMPHOCYTES NFR BLD: 26 % (ref 19.3–53.1)
MCH RBC QN AUTO: 30.1 PG (ref 27–31)
MCH RBC QN AUTO: 30.3 PG (ref 25.7–32.2)
MCHC RBC AUTO-ENTMCNC: 33.6 G/DL (ref 32.3–36.5)
MCHC RBC AUTO-ENTMCNC: 33.9 G/DL (ref 33–37)
MCV RBC AUTO: 88.7 FL (ref 80–94)
MCV RBC AUTO: 90.3 FL (ref 79–92.2)
MONOCYTES # BLD: 0.35 K/UL (ref 0.24–0.82)
MONOCYTES NFR BLD: 6.8 % (ref 4.7–12.5)
NEUTROPHILS # BLD: 3.2 K/UL (ref 1.56–6.13)
NEUTS SEG NFR BLD: 62.7 % (ref 34–71.1)
NITRITE, POC: NORMAL
PH, POC: 6.5
PLATELET # BLD AUTO: 128 K/UL (ref 163–337)
PLATELET # BLD AUTO: 141 K/UL (ref 130–400)
PMV BLD AUTO: 10.1 FL (ref 9.4–12.4)
PMV BLD AUTO: 9.9 FL (ref 7.4–10.4)
PROTEIN, POC: NORMAL
RBC # BLD AUTO: 4.72 M/UL (ref 4.63–6.08)
RBC # BLD AUTO: 4.79 M/UL (ref 4.7–6.1)
SPECIFIC GRAVITY, POC: 1.02
TESTOST SERPL-MCNC: 27.8 NG/DL (ref 193–740)
UROBILINOGEN, POC: 0.2
WBC # BLD AUTO: 5.11 K/UL (ref 4.23–9.07)
WBC # BLD AUTO: 6.3 K/UL (ref 4.8–10.8)

## 2023-03-24 PROCEDURE — 85025 COMPLETE CBC W/AUTO DIFF WBC: CPT

## 2023-03-24 PROCEDURE — G8417 CALC BMI ABV UP PARAM F/U: HCPCS | Performed by: NURSE PRACTITIONER

## 2023-03-24 PROCEDURE — 36415 COLL VENOUS BLD VENIPUNCTURE: CPT

## 2023-03-24 PROCEDURE — 1123F ACP DISCUSS/DSCN MKR DOCD: CPT | Performed by: NURSE PRACTITIONER

## 2023-03-24 PROCEDURE — 1036F TOBACCO NON-USER: CPT | Performed by: NURSE PRACTITIONER

## 2023-03-24 PROCEDURE — 99214 OFFICE O/P EST MOD 30 MIN: CPT | Performed by: NURSE PRACTITIONER

## 2023-03-24 PROCEDURE — G8427 DOCREV CUR MEDS BY ELIG CLIN: HCPCS | Performed by: NURSE PRACTITIONER

## 2023-03-24 PROCEDURE — G8484 FLU IMMUNIZE NO ADMIN: HCPCS | Performed by: NURSE PRACTITIONER

## 2023-03-24 PROCEDURE — 81002 URINALYSIS NONAUTO W/O SCOPE: CPT | Performed by: NURSE PRACTITIONER

## 2023-03-24 ASSESSMENT — ENCOUNTER SYMPTOMS
BACK PAIN: 0
VOMITING: 0
ABDOMINAL DISTENTION: 0
ABDOMINAL PAIN: 0
NAUSEA: 0

## 2023-03-24 NOTE — PROGRESS NOTES
dysfunction, unspecified erectile dysfunction type  Hx of ED currently maintained on Viagra. He has not utilized this medication as of yet. 3. Secondary erythrocytosis  Follows with hematology. Per her note no phlebotomy needed unless the patient is symptomatic or hematocrit above 60. Orders Placed This Encounter   Procedures    POCT Urinalysis no Micro        Return for labs today upon leaving. F/U 6 months labs prior. All information inputted into the note by the MA to include chief complaint, past medical history, past surgical history, medications, allergies, social and family history and review of systems has been reviewed and updated as needed by me. EMR Dragon/transcription disclaimer: Much of this documentt is electronic  transcription/translation of spoken language to printed text. The  electronic translation of spoken language may be erroneous, or at times,  nonsensical words or phrases may be inadvertently transcribed.  Although I  have reviewed the document for such errors, some may still exist.

## 2023-05-10 ENCOUNTER — NURSE ONLY (OUTPATIENT)
Dept: UROLOGY | Age: 83
End: 2023-05-10
Payer: MEDICARE

## 2023-05-10 DIAGNOSIS — R79.89 LOW TESTOSTERONE IN MALE: Primary | ICD-10-CM

## 2023-05-10 PROCEDURE — 96372 THER/PROPH/DIAG INJ SC/IM: CPT | Performed by: NURSE PRACTITIONER

## 2023-05-10 RX ORDER — TESTOSTERONE CYPIONATE 200 MG/ML
200 INJECTION, SOLUTION INTRAMUSCULAR ONCE
Status: COMPLETED | OUTPATIENT
Start: 2023-05-10 | End: 2023-05-10

## 2023-05-10 RX ADMIN — TESTOSTERONE CYPIONATE 200 MG: 200 INJECTION, SOLUTION INTRAMUSCULAR at 08:26

## 2023-05-10 NOTE — PROGRESS NOTES
After obtaining consent from patient and receiving verbal/written orders from JEZ ANTONIO, I gave patient 1cc of testosterone cypionate injection in Right upper quad. gluteus, patient tolerated well. Medication was supplied by the patient.

## 2023-05-25 ENCOUNTER — NURSE ONLY (OUTPATIENT)
Dept: UROLOGY | Age: 83
End: 2023-05-25

## 2023-05-25 DIAGNOSIS — R79.89 LOW TESTOSTERONE IN MALE: Primary | ICD-10-CM

## 2023-05-25 NOTE — PROGRESS NOTES
After obtaining consent from patient and receiving verbal/written orders from JEZ ANTONIO, I gave patient 1cc of testosterone cypionate injection in left upper quad. gluteus, patient tolerated well. Medication was supplied by the patient.      CYF:[id number]  LOT: [id number]  EXP: 06/2025

## 2023-06-01 DIAGNOSIS — E78.2 MIXED HYPERLIPIDEMIA: ICD-10-CM

## 2023-06-01 DIAGNOSIS — E55.9 VITAMIN D DEFICIENCY: ICD-10-CM

## 2023-06-01 DIAGNOSIS — I10 ESSENTIAL HYPERTENSION: ICD-10-CM

## 2023-06-01 DIAGNOSIS — E11.9 TYPE 2 DIABETES MELLITUS WITHOUT COMPLICATION, WITHOUT LONG-TERM CURRENT USE OF INSULIN (HCC): ICD-10-CM

## 2023-06-01 LAB
25(OH)D3 SERPL-MCNC: 22.3 NG/ML
ALBUMIN SERPL-MCNC: 4.4 G/DL (ref 3.5–5.2)
ALP SERPL-CCNC: 112 U/L (ref 40–130)
ALT SERPL-CCNC: 42 U/L (ref 5–41)
ANION GAP SERPL CALCULATED.3IONS-SCNC: 16 MMOL/L (ref 7–19)
AST SERPL-CCNC: 26 U/L (ref 5–40)
BACTERIA URNS QL MICRO: NEGATIVE /HPF
BASOPHILS # BLD: 0.1 K/UL (ref 0–0.2)
BASOPHILS NFR BLD: 1.1 % (ref 0–1)
BILIRUB SERPL-MCNC: 0.6 MG/DL (ref 0.2–1.2)
BILIRUB UR QL STRIP: NEGATIVE
BUN SERPL-MCNC: 11 MG/DL (ref 8–23)
CALCIUM SERPL-MCNC: 9 MG/DL (ref 8.8–10.2)
CHLORIDE SERPL-SCNC: 102 MMOL/L (ref 98–111)
CLARITY UR: CLEAR
CO2 SERPL-SCNC: 24 MMOL/L (ref 22–29)
COLOR UR: YELLOW
CREAT SERPL-MCNC: 0.8 MG/DL (ref 0.5–1.2)
CREAT UR-MCNC: 67.2 MG/DL (ref 4.2–622)
CRYSTALS URNS MICRO: ABNORMAL /HPF
EOSINOPHIL # BLD: 0.2 K/UL (ref 0–0.6)
EOSINOPHIL NFR BLD: 2.9 % (ref 0–5)
EPI CELLS #/AREA URNS AUTO: 0 /HPF (ref 0–5)
ERYTHROCYTE [DISTWIDTH] IN BLOOD BY AUTOMATED COUNT: 13.2 % (ref 11.5–14.5)
GLUCOSE SERPL-MCNC: 273 MG/DL (ref 74–109)
GLUCOSE UR STRIP.AUTO-MCNC: =>1000 MG/DL
HBA1C MFR BLD: 8.4 % (ref 4–6)
HCT VFR BLD AUTO: 47.9 % (ref 42–52)
HGB BLD-MCNC: 16 G/DL (ref 14–18)
HGB UR STRIP.AUTO-MCNC: NEGATIVE MG/L
HYALINE CASTS #/AREA URNS AUTO: 0 /HPF (ref 0–8)
IMM GRANULOCYTES # BLD: 0 K/UL
KETONES UR STRIP.AUTO-MCNC: NEGATIVE MG/DL
LEUKOCYTE ESTERASE UR QL STRIP.AUTO: NEGATIVE
LYMPHOCYTES # BLD: 1.7 K/UL (ref 1.1–4.5)
LYMPHOCYTES NFR BLD: 21.8 % (ref 20–40)
MCH RBC QN AUTO: 29.5 PG (ref 27–31)
MCHC RBC AUTO-ENTMCNC: 33.4 G/DL (ref 33–37)
MCV RBC AUTO: 88.4 FL (ref 80–94)
MICROALBUMIN UR-MCNC: 83.7 MG/DL (ref 0–19)
MICROALBUMIN/CREAT UR-RTO: 1245.5 MG/G
MONOCYTES # BLD: 0.6 K/UL (ref 0–0.9)
MONOCYTES NFR BLD: 7.3 % (ref 0–10)
NEUTROPHILS # BLD: 5 K/UL (ref 1.5–7.5)
NEUTS SEG NFR BLD: 66.5 % (ref 50–65)
NITRITE UR QL STRIP.AUTO: NEGATIVE
PH UR STRIP.AUTO: 6.5 [PH] (ref 5–8)
PLATELET # BLD AUTO: 160 K/UL (ref 130–400)
PMV BLD AUTO: 10.5 FL (ref 9.4–12.4)
POTASSIUM SERPL-SCNC: 3 MMOL/L (ref 3.5–5)
PROT SERPL-MCNC: 7 G/DL (ref 6.6–8.7)
PROT UR STRIP.AUTO-MCNC: 100 MG/DL
RBC # BLD AUTO: 5.42 M/UL (ref 4.7–6.1)
RBC #/AREA URNS AUTO: 0 /HPF (ref 0–4)
SODIUM SERPL-SCNC: 142 MMOL/L (ref 136–145)
SP GR UR STRIP.AUTO: 1.02 (ref 1–1.03)
TRIGL SERPL-MCNC: 154 MG/DL (ref 0–149)
TSH SERPL DL<=0.005 MIU/L-ACNC: 2.52 UIU/ML (ref 0.27–4.2)
UROBILINOGEN UR STRIP.AUTO-MCNC: 0.2 E.U./DL
WBC # BLD AUTO: 7.6 K/UL (ref 4.8–10.8)
WBC #/AREA URNS AUTO: 0 /HPF (ref 0–5)

## 2023-06-06 ENCOUNTER — NURSE ONLY (OUTPATIENT)
Dept: UROLOGY | Age: 83
End: 2023-06-06
Payer: MEDICARE

## 2023-06-06 ENCOUNTER — HOSPITAL ENCOUNTER (OUTPATIENT)
Dept: CT IMAGING | Age: 83
Discharge: HOME | End: 2023-06-06
Payer: MEDICARE

## 2023-06-06 ENCOUNTER — OFFICE VISIT (OUTPATIENT)
Dept: INTERNAL MEDICINE | Age: 83
End: 2023-06-06
Payer: MEDICARE

## 2023-06-06 VITALS — TEMPERATURE: 97.7 F | WEIGHT: 221 LBS | HEART RATE: 78 BPM | OXYGEN SATURATION: 97 % | BODY MASS INDEX: 29.97 KG/M2

## 2023-06-06 DIAGNOSIS — R41.0 CONFUSION: ICD-10-CM

## 2023-06-06 DIAGNOSIS — E11.65 TYPE 2 DIABETES MELLITUS WITH HYPERGLYCEMIA, WITHOUT LONG-TERM CURRENT USE OF INSULIN (HCC): ICD-10-CM

## 2023-06-06 DIAGNOSIS — E78.2 MIXED HYPERLIPIDEMIA: ICD-10-CM

## 2023-06-06 DIAGNOSIS — R90.89 ABNORMAL CT OF BRAIN: ICD-10-CM

## 2023-06-06 DIAGNOSIS — E29.1 HYPOGONADISM MALE: Primary | ICD-10-CM

## 2023-06-06 DIAGNOSIS — R79.89 LOW TESTOSTERONE IN MALE: Primary | ICD-10-CM

## 2023-06-06 DIAGNOSIS — E11.9 TYPE 2 DIABETES MELLITUS WITHOUT COMPLICATION, WITHOUT LONG-TERM CURRENT USE OF INSULIN (HCC): Primary | ICD-10-CM

## 2023-06-06 DIAGNOSIS — E55.9 VITAMIN D DEFICIENCY: ICD-10-CM

## 2023-06-06 DIAGNOSIS — E29.1 HYPOGONADISM MALE: ICD-10-CM

## 2023-06-06 DIAGNOSIS — E11.9 TYPE 2 DIABETES MELLITUS WITHOUT COMPLICATION, WITHOUT LONG-TERM CURRENT USE OF INSULIN (HCC): ICD-10-CM

## 2023-06-06 LAB
ALBUMIN SERPL-MCNC: 4.4 G/DL (ref 3.5–5.2)
ALP SERPL-CCNC: 117 U/L (ref 40–130)
ALT SERPL-CCNC: 47 U/L (ref 5–41)
ANION GAP SERPL CALCULATED.3IONS-SCNC: 11 MMOL/L (ref 7–19)
AST SERPL-CCNC: 36 U/L (ref 5–40)
BILIRUB SERPL-MCNC: 0.7 MG/DL (ref 0.2–1.2)
BUN SERPL-MCNC: 10 MG/DL (ref 8–23)
CALCIUM SERPL-MCNC: 9.4 MG/DL (ref 8.8–10.2)
CHLORIDE SERPL-SCNC: 102 MMOL/L (ref 98–111)
CO2 SERPL-SCNC: 29 MMOL/L (ref 22–29)
CREAT SERPL-MCNC: 0.8 MG/DL (ref 0.5–1.2)
GLUCOSE SERPL-MCNC: 257 MG/DL (ref 74–109)
POTASSIUM SERPL-SCNC: 4.6 MMOL/L (ref 3.5–5)
PROT SERPL-MCNC: 6.8 G/DL (ref 6.6–8.7)
SODIUM SERPL-SCNC: 142 MMOL/L (ref 136–145)

## 2023-06-06 PROCEDURE — 96372 THER/PROPH/DIAG INJ SC/IM: CPT | Performed by: NURSE PRACTITIONER

## 2023-06-06 PROCEDURE — G8417 CALC BMI ABV UP PARAM F/U: HCPCS | Performed by: NURSE PRACTITIONER

## 2023-06-06 PROCEDURE — 1123F ACP DISCUSS/DSCN MKR DOCD: CPT | Performed by: NURSE PRACTITIONER

## 2023-06-06 PROCEDURE — 3052F HG A1C>EQUAL 8.0%<EQUAL 9.0%: CPT | Performed by: NURSE PRACTITIONER

## 2023-06-06 PROCEDURE — 99214 OFFICE O/P EST MOD 30 MIN: CPT | Performed by: NURSE PRACTITIONER

## 2023-06-06 PROCEDURE — 1036F TOBACCO NON-USER: CPT | Performed by: NURSE PRACTITIONER

## 2023-06-06 PROCEDURE — 70450 CT HEAD/BRAIN W/O DYE: CPT

## 2023-06-06 PROCEDURE — G8427 DOCREV CUR MEDS BY ELIG CLIN: HCPCS | Performed by: NURSE PRACTITIONER

## 2023-06-06 RX ORDER — TESTOSTERONE CYPIONATE 200 MG/ML
200 INJECTION, SOLUTION INTRAMUSCULAR ONCE
Status: COMPLETED | OUTPATIENT
Start: 2023-06-06 | End: 2023-06-06

## 2023-06-06 RX ORDER — TESTOSTERONE CYPIONATE 200 MG/ML
1 VIAL (ML) INTRAMUSCULAR
Qty: 2 ML | Refills: 0 | Status: SHIPPED | OUTPATIENT
Start: 2023-06-06 | End: 2023-07-06

## 2023-06-06 RX ORDER — TESTOSTERONE CYPIONATE 200 MG/ML
1 VIAL (ML) INTRAMUSCULAR
Qty: 1 ML | Refills: 0 | Status: CANCELLED | OUTPATIENT
Start: 2023-06-06

## 2023-06-06 RX ORDER — TESTOSTERONE CYPIONATE 200 MG/ML
1 VIAL (ML) INTRAMUSCULAR
Qty: 2 ML | Refills: 0 | Status: SHIPPED | OUTPATIENT
Start: 2023-06-06 | End: 2023-06-06 | Stop reason: SDUPTHER

## 2023-06-06 RX ADMIN — TESTOSTERONE CYPIONATE 200 MG: 200 INJECTION, SOLUTION INTRAMUSCULAR at 09:04

## 2023-06-06 ASSESSMENT — ENCOUNTER SYMPTOMS
WHEEZING: 0
ABDOMINAL DISTENTION: 0
COLOR CHANGE: 0
VOMITING: 0
EYE ITCHING: 0
ABDOMINAL PAIN: 0
EYE DISCHARGE: 0
BLOOD IN STOOL: 0
COUGH: 0
TROUBLE SWALLOWING: 0
SORE THROAT: 0
NAUSEA: 0
DIARRHEA: 0
CHOKING: 0
SHORTNESS OF BREATH: 0
STRIDOR: 0
CONSTIPATION: 0

## 2023-06-06 NOTE — PROGRESS NOTES
[address] INTERNAL MEDICINE  [address] [address]  [address] [address]  Dept: [phone]  Dept Fax: [phone]: [address] [facility] (:  1940) is a [de-identified] y.o. male,Established patient  with green, here for evaluation of the following chief complaint(s): Other (Pt doesn't seem like him self seems very confused . )      Mynor Berg is a [de-identified] y.o. male who presents today for his medical conditions/complaints as noted below. Mynor Berg is c/loly Other (Pt doesn't seem like him self seems very confused . )        HPI:     Chief Complaint   Patient presents with    Other     Pt doesn't seem like him self seems very confused . 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 and maybe more;  he is the only  on the job;  he is really stressed;    T2Dm  he is on glipizide fasting 273   a1c is up to 8.4  he has nad nothing to eat today;  he has been skipping meals;     Hypokalemia K+ was low at 3.0  ; he has been missing his meds;   Edema of feet and legs;  he has not been taking HCTZ;  but has his spironalactone   Vit d def;  low at 22.3   High trigs  have gone from 130 to 14       Unfortunately, the lab computers are down and they cannot draw blood;  so I drew his blood before going to get CT:      Past Medical History:   Diagnosis Date    BPH with obstruction/lower urinary tract symptoms     Chronic vertigo 8/3/2018    Gastroesophageal reflux disease without esophagitis 2019    Hyperglycemia     Hyperlipidemia     Hypertension     Peripheral edema 2022    Postoperative wound dehiscence 2022    Testicular hypofunction

## 2023-06-06 NOTE — PROGRESS NOTES
After obtaining consent from patient and receiving verbal/written orders from JEZ ANTONIO, I gave patient 1cc of testosterone cypionate injection in RIGHT upper quad. gluteus, patient tolerated well. Medication was not supplied by the patient.

## 2023-06-06 NOTE — PATIENT INSTRUCTIONS
1.  Hypokalemia that is resolved. Try to start taking a potassium twice daily  2. Abnormal CT of the brain today with confusion word finding skills MRI suggested we will get that set up  3. Type 2 diabetes mellitus try to get back on your glipizide take it every day and do not skip meals  4.   Edema feet and legs he has not been taking the HCTZ will need to get back on that  #5 vitamin D deficiency 50,000 units weekly will be called and

## 2023-06-06 NOTE — TELEPHONE ENCOUNTER
Patient stated he is out of his testosterone shots. He would like it to go to [facility] on [address]. He used our supply on 6/6/23.

## 2023-06-07 ENCOUNTER — HOSPITAL ENCOUNTER (OUTPATIENT)
Dept: MRI IMAGING | Age: 83
Discharge: HOME | End: 2023-06-07
Payer: MEDICARE

## 2023-06-07 DIAGNOSIS — R90.89 ABNORMAL CT OF BRAIN: ICD-10-CM

## 2023-06-07 DIAGNOSIS — R90.89 ABNORMAL CT OF BRAIN: Primary | ICD-10-CM

## 2023-06-07 DIAGNOSIS — Z01.818 PREOP TESTING: Primary | ICD-10-CM

## 2023-06-07 DIAGNOSIS — R41.0 CONFUSION: ICD-10-CM

## 2023-06-07 PROCEDURE — 70545 MR ANGIOGRAPHY HEAD W/DYE: CPT

## 2023-06-07 PROCEDURE — 70544 MR ANGIOGRAPHY HEAD W/O DYE: CPT

## 2023-06-07 PROCEDURE — 6360000004 HC RX CONTRAST MEDICATION: Performed by: NURSE PRACTITIONER

## 2023-06-07 PROCEDURE — A9577 INJ MULTIHANCE: HCPCS | Performed by: NURSE PRACTITIONER

## 2023-06-07 PROCEDURE — 70553 MRI BRAIN STEM W/O & W/DYE: CPT

## 2023-06-07 RX ORDER — DIAZEPAM 5 MG/1
TABLET ORAL
Qty: 2 TABLET | Refills: 0 | Status: SHIPPED | OUTPATIENT
Start: 2023-06-07 | End: 2023-06-07

## 2023-06-07 RX ADMIN — GADOBENATE DIMEGLUMINE 19 ML: 529 INJECTION, SOLUTION INTRAVENOUS at 15:51

## 2023-06-08 ENCOUNTER — HOSPITAL ENCOUNTER (EMERGENCY)
Age: 83
Discharge: ANOTHER HOSPITAL | End: 2023-06-08
Attending: EMERGENCY MEDICINE
Payer: MEDICARE

## 2023-06-08 ENCOUNTER — APPOINTMENT (OUTPATIENT)
Dept: GENERAL RADIOLOGY | Age: 83
End: 2023-06-08
Payer: MEDICARE

## 2023-06-08 VITALS
OXYGEN SATURATION: 97 % | DIASTOLIC BLOOD PRESSURE: 105 MMHG | SYSTOLIC BLOOD PRESSURE: 191 MMHG | RESPIRATION RATE: 18 BRPM | TEMPERATURE: 98 F | HEART RATE: 75 BPM

## 2023-06-08 DIAGNOSIS — G93.9 LESION OF BRAIN: Primary | ICD-10-CM

## 2023-06-08 LAB
ALBUMIN SERPL-MCNC: 3.8 G/DL (ref 3.5–5.2)
ALP SERPL-CCNC: 107 U/L (ref 40–130)
ALT SERPL-CCNC: 34 U/L (ref 5–41)
ANION GAP SERPL CALCULATED.3IONS-SCNC: 10 MMOL/L (ref 7–19)
APPEARANCE CSF: CLEAR
APTT PPP: 24.8 SEC (ref 26–36.2)
AST SERPL-CCNC: 29 U/L (ref 5–40)
BASOPHILS # BLD: 0 K/UL (ref 0–0.2)
BASOPHILS NFR BLD: 0.7 % (ref 0–1)
BILIRUB SERPL-MCNC: 0.6 MG/DL (ref 0.2–1.2)
BUN SERPL-MCNC: 17 MG/DL (ref 8–23)
C GATTII+NEOFOR DNA CSF QL NAA+NON-PROBE: NOT DETECTED
CALCIUM SERPL-MCNC: 9.3 MG/DL (ref 8.8–10.2)
CHLORIDE SERPL-SCNC: 102 MMOL/L (ref 98–111)
CLOT EVALUATION CSF: ABNORMAL
CMV DNA CSF QL NAA+NON-PROBE: NOT DETECTED
CO2 SERPL-SCNC: 24 MMOL/L (ref 22–29)
COLOR CSF: COLORLESS
CREAT SERPL-MCNC: 0.9 MG/DL (ref 0.5–1.2)
CRP SERPL HS-MCNC: 0.55 MG/DL (ref 0–0.5)
E COLI K1 DNA CSF QL NAA+NON-PROBE: NOT DETECTED
EKG P AXIS: 41 DEGREES
EKG P-R INTERVAL: 216 MS
EKG Q-T INTERVAL: 430 MS
EKG QRS DURATION: 96 MS
EKG QTC CALCULATION (BAZETT): 435 MS
EKG T AXIS: 31 DEGREES
EOSINOPHIL # BLD: 0.2 K/UL (ref 0–0.6)
EOSINOPHIL NFR BLD: 3.4 % (ref 0–5)
ERYTHROCYTE [DISTWIDTH] IN BLOOD BY AUTOMATED COUNT: 13 % (ref 11.5–14.5)
ERYTHROCYTE [SEDIMENTATION RATE] IN BLOOD BY WESTERGREN METHOD: 13 MM/HR (ref 0–15)
EV RNA CSF QL NAA+NON-PROBE: NOT DETECTED
GLUCOSE CSF-MCNC: 139 MG/DL (ref 40–70)
GLUCOSE SERPL-MCNC: 318 MG/DL (ref 74–109)
GP B STREP DNA CSF QL NAA+NON-PROBE: NOT DETECTED
HAEM INFLU DNA CSF QL NAA+NON-PROBE: NOT DETECTED
HCT VFR BLD AUTO: 48.2 % (ref 42–52)
HGB BLD-MCNC: 16.1 G/DL (ref 14–18)
HHV6 DNA CSF QL NAA+NON-PROBE: NOT DETECTED
HSV1 DNA CSF QL NAA+NON-PROBE: NOT DETECTED
HSV2 DNA CSF QL NAA+NON-PROBE: NOT DETECTED
IMM GRANULOCYTES # BLD: 0 K/UL
INR PPP: 1.02 (ref 0.88–1.18)
L MONOCYTOG DNA CSF QL NAA+NON-PROBE: NOT DETECTED
LYMPHOCYTES # BLD: 1.4 K/UL (ref 1.1–4.5)
LYMPHOCYTES NFR BLD: 24.3 % (ref 20–40)
MCH RBC QN AUTO: 29.4 PG (ref 27–31)
MCHC RBC AUTO-ENTMCNC: 33.4 G/DL (ref 33–37)
MCV RBC AUTO: 88.1 FL (ref 80–94)
MONOCYTES # BLD: 0.6 K/UL (ref 0–0.9)
MONOCYTES NFR BLD: 9.4 % (ref 0–10)
N MEN DNA CSF QL NAA+NON-PROBE: NOT DETECTED
NEUTROPHILS # BLD: 3.6 K/UL (ref 1.5–7.5)
NEUTS SEG NFR BLD: 62 % (ref 50–65)
PARECHOVIRUS A RNA CSF QL NAA+NON-PROBE: NOT DETECTED
PLATELET # BLD AUTO: 160 K/UL (ref 130–400)
PMV BLD AUTO: 9.9 FL (ref 9.4–12.4)
POTASSIUM SERPL-SCNC: 3.7 MMOL/L (ref 3.5–5)
PROT CSF-MCNC: 57 MG/DL (ref 15–45)
PROT SERPL-MCNC: 6.6 G/DL (ref 6.6–8.7)
PROTHROMBIN TIME: 13.1 SEC (ref 12–14.6)
RBC # BLD AUTO: 5.47 M/UL (ref 4.7–6.1)
RBC CSF: 11 /CUMM (ref 0–5)
S PNEUM DNA CSF QL NAA+NON-PROBE: NOT DETECTED
SODIUM SERPL-SCNC: 136 MMOL/L (ref 136–145)
TUBE # CSF: ABNORMAL
VZV DNA CSF QL NAA+NON-PROBE: NOT DETECTED
WBC # BLD AUTO: 5.8 K/UL (ref 4.8–10.8)
WBC CSF: 0 /CUMM (ref 0–8)

## 2023-06-08 PROCEDURE — 36415 COLL VENOUS BLD VENIPUNCTURE: CPT

## 2023-06-08 PROCEDURE — 88185 FLOWCYTOMETRY/TC ADD-ON: CPT

## 2023-06-08 PROCEDURE — 80053 COMPREHEN METABOLIC PANEL: CPT

## 2023-06-08 PROCEDURE — 85610 PROTHROMBIN TIME: CPT

## 2023-06-08 PROCEDURE — 85730 THROMBOPLASTIN TIME PARTIAL: CPT

## 2023-06-08 PROCEDURE — 87070 CULTURE OTHR SPECIMN AEROBIC: CPT

## 2023-06-08 PROCEDURE — 93005 ELECTROCARDIOGRAM TRACING: CPT | Performed by: EMERGENCY MEDICINE

## 2023-06-08 PROCEDURE — 87483 CNS DNA AMP PROBE TYPE 12-25: CPT

## 2023-06-08 PROCEDURE — 6370000000 HC RX 637 (ALT 250 FOR IP): Performed by: EMERGENCY MEDICINE

## 2023-06-08 PROCEDURE — 62328 DX LMBR SPI PNXR W/FLUOR/CT: CPT

## 2023-06-08 PROCEDURE — 88184 FLOWCYTOMETRY/ TC 1 MARKER: CPT

## 2023-06-08 PROCEDURE — 89050 BODY FLUID CELL COUNT: CPT

## 2023-06-08 PROCEDURE — 85652 RBC SED RATE AUTOMATED: CPT

## 2023-06-08 PROCEDURE — 86140 C-REACTIVE PROTEIN: CPT

## 2023-06-08 PROCEDURE — 71045 X-RAY EXAM CHEST 1 VIEW: CPT

## 2023-06-08 PROCEDURE — 87205 SMEAR GRAM STAIN: CPT

## 2023-06-08 PROCEDURE — 84157 ASSAY OF PROTEIN OTHER: CPT

## 2023-06-08 PROCEDURE — 85025 COMPLETE CBC W/AUTO DIFF WBC: CPT

## 2023-06-08 PROCEDURE — 82945 GLUCOSE OTHER FLUID: CPT

## 2023-06-08 PROCEDURE — 87075 CULTR BACTERIA EXCEPT BLOOD: CPT

## 2023-06-08 RX ORDER — ATORVASTATIN CALCIUM 40 MG/1
20 TABLET, FILM COATED ORAL NIGHTLY
Status: COMPLETED | OUTPATIENT
Start: 2023-06-08 | End: 2023-06-08

## 2023-06-08 RX ORDER — POTASSIUM CHLORIDE 750 MG/1
10 TABLET, EXTENDED RELEASE ORAL ONCE
Status: COMPLETED | OUTPATIENT
Start: 2023-06-08 | End: 2023-06-08

## 2023-06-08 RX ORDER — VERAPAMIL HYDROCHLORIDE 240 MG/1
240 TABLET, FILM COATED, EXTENDED RELEASE ORAL ONCE
Status: COMPLETED | OUTPATIENT
Start: 2023-06-08 | End: 2023-06-08

## 2023-06-08 RX ADMIN — ATORVASTATIN CALCIUM 20 MG: 40 TABLET, FILM COATED ORAL at 20:29

## 2023-06-08 RX ADMIN — VERAPAMIL HYDROCHLORIDE 240 MG: 240 TABLET, FILM COATED, EXTENDED RELEASE ORAL at 20:37

## 2023-06-08 RX ADMIN — POTASSIUM CHLORIDE 10 MEQ: 750 TABLET, EXTENDED RELEASE ORAL at 20:29

## 2023-06-08 ASSESSMENT — ENCOUNTER SYMPTOMS
CHOKING: 0
SINUS PRESSURE: 0
CONSTIPATION: 0
SORE THROAT: 0
DIARRHEA: 0
FACIAL SWELLING: 0
EYE DISCHARGE: 0
SHORTNESS OF BREATH: 0
APNEA: 0
BLOOD IN STOOL: 0
NAUSEA: 0
VOICE CHANGE: 0

## 2023-06-08 ASSESSMENT — PAIN - FUNCTIONAL ASSESSMENT: PAIN_FUNCTIONAL_ASSESSMENT: NONE - DENIES PAIN

## 2023-06-08 NOTE — ED PROVIDER NOTES
Julio Cesar Daugherty.  He is excepted the patient. And prefers the patient, via ambulance. Patient stable at this time. I have asked staff to get his CTs and MRIs that were done today as well on hard discs to go. CONSULTS:  IP CONSULT TO NEUROLOGY    PROCEDURES:  Unless otherwise notedbelow, none     Procedures    FINAL IMPRESSION     1.  Lesion of brain          DISPOSITION/PLAN   DISPOSITION Decision To Transfer 06/08/2023 03:07:55 PM      PATIENT REFERRED TO:  @FUP@    DISCHARGE MEDICATIONS:  New Prescriptions    No medications on file          (Please note that portions of this note were completed with a voice recognition program.  Efforts were made to edit the dictations butoccasionally words are mis-transcribed.)    Hugo Perez MD (electronically signed)  AttendingEmergency Physician          Dian Man MD  06/08/23 [id number]

## 2023-06-08 NOTE — ED NOTES
Delgado Boudreaux with [facility] declined due to capacity, but sent referral to Angelika Hyman has reached out to Dr. Rosy Hurley.      Governor Hakeem  06/08/23 Memo Pineda  06/08/23 [id number]

## 2023-06-08 NOTE — ED NOTES
[address]     Bed number upon arrival    [facility] needs to call report to [phone]    Accepting:     Dr. Brown Jobs    Call report: [phone] or   [address] [facility]  06/08/23 [facility]  06/08/23 7889

## 2023-06-09 DIAGNOSIS — D69.6 THROMBOCYTOPENIA (HCC): Primary | ICD-10-CM

## 2023-06-09 LAB
BACTERIA CSF CULT: NORMAL
BACTERIA SPEC ANAEROBE CULT: NORMAL
GRAM STN SPEC: NORMAL

## 2023-06-09 NOTE — PROGRESS NOTES
limits. Currently receiving testosterone replacement with 100 mg injection IM every 2 weeks under the direction of his PCP. Hemoglobin 16.1, hematocrit 48.0, MCV 86.2 and RBC 5.57 today. Asymptomatic. He reports he has been donating blood to the [employer] about every 57 days, he will be due within a week to donate.    -No need for phlebotomy with hematocrit being <60 and asymptomatic  -Okay to continue testosterone replacement. Under the direction of MARISELA Stanton  -CBC in 3 months  -Donated blood approximately 50 days ago    2. Thrombocytopenia ([address]) with mild splenomegaly. Platelet count of 944,986 today, stable. He has no known history of renal or liver disease and serology studies do not suggest chronic inflammatory or infectious process. .   Denies any bleeding to include melena, epistaxis, hemoptysis, hematuria or hematochezia. -Continue conservative monitoring  -Contact the clinic between visits if any excessive bruising or bleeding occurs    3. Iron deficiency, iron substrates were within normal limits. -Repeat iron substrates today    12/12/2022 Serology results  Iron 181  TIBC 363  Saturation 50%  Ferritin 90    4. Skin cancer, basal cell to right forearm, right lower back, left inferior medial back, and right superior lateral mid-back and squamous cell to left forearm and right medial superior chest denies any concerning skin lesions    -Follow up with Dr Nora Carpenter as recommended    5. Recently diagnosed with cerebral amyloid angioplasty after presenting with severe confusion he is currently being followed by Dr. Brent Collet at [facility]. He has had a complete work-up to include MRI of the brain labs and a lumbar puncture which was all completed at [facility]. He is currently on a titrating steroid dosing and feels he is not having any cognitive issues at this time.   Questions were answered appropriately and correctly although at times additional comments were made that could be

## 2023-06-10 LAB
ACUTE LEUKEMIA MARKERS SPEC-IMP: NORMAL
EVENTS COUNTED SPEC: 16 MARKERS
PROF LEUK/LYMPH PHENO 16 OR MORE MARKERS: NORMAL
SOURCE: NORMAL
TECHNICAL LEUK/LYMPH PHENO, 16 MARKERS: NORMAL

## 2023-06-19 ENCOUNTER — TELEPHONE (OUTPATIENT)
Dept: INTERNAL MEDICINE | Age: 83
End: 2023-06-19

## 2023-06-19 ENCOUNTER — TELEPHONE (OUTPATIENT)
Dept: UROLOGY | Age: 83
End: 2023-06-19

## 2023-06-19 NOTE — TELEPHONE ENCOUNTER
Tabitha Maria, patient's spouse, called asking if he had to fast for his appointment tomorrow 06/20/23 for the 2 week test. Please return Gisselle's call @ [phone].

## 2023-06-19 NOTE — TELEPHONE ENCOUNTER
----- Message from Collin Rich sent at 6/19/2023  9:42 AM CDT -----  Subject: Hospital Follow Up    QUESTIONS  What hospital was the Patient Discharged from? [facility]  Date of Discharge? 2023-06-16  Discharge Location? Home  Reason for hospitalization as patient stated? Lesions on the brain  What question does the patient have, if applicable? n/a  ---------------------------------------------------------------------------  --------------  CALL BACK INFO  What is the best way for the office to contact you? OK to leave message on   voicemail  Preferred Call Back Phone Number? [phone]  ---------------------------------------------------------------------------  --------------  SCRIPT ANSWERS  Relationship to Patient? Other/Third Party  Representative Name? Cliff Contreras - friend  Additional information verified (besides Name and Date of Birth)?  Address

## 2023-06-19 NOTE — TELEPHONE ENCOUNTER
----- Message from Sylvester Born sent at 6/19/2023  9:42 AM CDT -----  Subject: Hospital Follow Up    QUESTIONS  What hospital was the Patient Discharged from? [facility]  Date of Discharge? 2023-06-16  Discharge Location? Home  Reason for hospitalization as patient stated? Lesions on the brain  What question does the patient have, if applicable? n/a  ---------------------------------------------------------------------------  --------------  CALL BACK INFO  What is the best way for the office to contact you? OK to leave message on   voicemail  Preferred Call Back Phone Number? [phone]  ---------------------------------------------------------------------------  --------------  SCRIPT ANSWERS  Relationship to Patient? Other/Third Party  Representative Name? Mercy Nelson - friend  Additional information verified (besides Name and Date of Birth)?  Address

## 2023-06-20 ENCOUNTER — NURSE ONLY (OUTPATIENT)
Dept: UROLOGY | Age: 83
End: 2023-06-20
Payer: MEDICARE

## 2023-06-20 ENCOUNTER — OFFICE VISIT (OUTPATIENT)
Dept: INTERNAL MEDICINE | Age: 83
End: 2023-06-20

## 2023-06-20 VITALS
HEART RATE: 79 BPM | SYSTOLIC BLOOD PRESSURE: 170 MMHG | BODY MASS INDEX: 29.29 KG/M2 | TEMPERATURE: 98.3 F | OXYGEN SATURATION: 94 % | DIASTOLIC BLOOD PRESSURE: 80 MMHG | WEIGHT: 216 LBS

## 2023-06-20 DIAGNOSIS — Z79.4 TYPE 2 DIABETES MELLITUS WITHOUT COMPLICATION, WITH LONG-TERM CURRENT USE OF INSULIN (HCC): ICD-10-CM

## 2023-06-20 DIAGNOSIS — I68.0 CEREBRAL AMYLOID ANGIOPATHY (CODE): ICD-10-CM

## 2023-06-20 DIAGNOSIS — E11.9 TYPE 2 DIABETES MELLITUS WITHOUT COMPLICATION, WITH LONG-TERM CURRENT USE OF INSULIN (HCC): ICD-10-CM

## 2023-06-20 DIAGNOSIS — Z09 HOSPITAL DISCHARGE FOLLOW-UP: Primary | ICD-10-CM

## 2023-06-20 DIAGNOSIS — I10 ESSENTIAL HYPERTENSION: ICD-10-CM

## 2023-06-20 DIAGNOSIS — R79.89 LOW TESTOSTERONE IN MALE: Primary | ICD-10-CM

## 2023-06-20 PROCEDURE — 96372 THER/PROPH/DIAG INJ SC/IM: CPT | Performed by: NURSE PRACTITIONER

## 2023-06-20 RX ORDER — SULFAMETHOXAZOLE AND TRIMETHOPRIM 800; 160 MG/1; MG/1
TABLET ORAL
COMMUNITY
Start: 2023-06-17

## 2023-06-20 RX ORDER — TESTOSTERONE CYPIONATE 200 MG/ML
INJECTION, SOLUTION INTRAMUSCULAR
COMMUNITY
Start: 2023-06-06 | End: 2023-07-14

## 2023-06-20 RX ORDER — PROCHLORPERAZINE 25 MG/1
SUPPOSITORY RECTAL
Qty: 1 EACH | Refills: 3 | Status: SHIPPED | OUTPATIENT
Start: 2023-06-20

## 2023-06-20 RX ORDER — PREDNISONE 20 MG/1
TABLET ORAL
COMMUNITY
Start: 2023-06-17

## 2023-06-20 RX ORDER — DIAZEPAM 5 MG/1
TABLET ORAL
COMMUNITY
Start: 2023-06-07

## 2023-06-20 RX ORDER — INSULIN ASPART 100 [IU]/ML
INJECTION, SOLUTION INTRAVENOUS; SUBCUTANEOUS
COMMUNITY
Start: 2023-06-16

## 2023-06-20 RX ORDER — BLOOD-GLUCOSE METER
KIT MISCELLANEOUS
COMMUNITY
Start: 2023-06-17 | End: 2023-08-03

## 2023-06-20 RX ORDER — LANCETS 28 GAUGE
EACH MISCELLANEOUS
COMMUNITY
Start: 2023-06-17 | End: 2023-08-03

## 2023-06-20 RX ORDER — LOSARTAN POTASSIUM 50 MG/1
100 TABLET ORAL DAILY
COMMUNITY
Start: 2023-06-17 | End: 2023-06-20

## 2023-06-20 RX ORDER — PROCHLORPERAZINE 25 MG/1
SUPPOSITORY RECTAL
Qty: 1 EACH | Refills: 0 | Status: SHIPPED | OUTPATIENT
Start: 2023-06-20

## 2023-06-20 RX ORDER — PROCHLORPERAZINE 25 MG/1
SUPPOSITORY RECTAL
Qty: 1 EACH | Refills: 5 | Status: SHIPPED | OUTPATIENT
Start: 2023-06-20

## 2023-06-20 RX ORDER — HYDRALAZINE HYDROCHLORIDE 10 MG/1
10 TABLET, FILM COATED ORAL 3 TIMES DAILY
Qty: 90 TABLET | Refills: 1 | Status: SHIPPED | OUTPATIENT
Start: 2023-06-20 | End: 2023-12-17

## 2023-06-20 RX ORDER — INSULIN GLARGINE 100 [IU]/ML
INJECTION, SOLUTION SUBCUTANEOUS
COMMUNITY
Start: 2023-06-16

## 2023-06-20 RX ORDER — VERAPAMIL HYDROCHLORIDE 240 MG/1
240 TABLET, FILM COATED, EXTENDED RELEASE ORAL 2 TIMES DAILY
Qty: 180 TABLET | Refills: 0 | Status: SHIPPED | OUTPATIENT
Start: 2023-06-20

## 2023-06-20 NOTE — PROGRESS NOTES
Post-Discharge Transitional Care  Follow Up      Glentana Josh   YOB: 1940    Date of Office Visit:  6/20/2023  Date of Hospital Admission: 6/8/23  Date of Hospital Discharge: 6/8/23  then transferred to [address] [place] [address] Se of hospital readmission (high >=14%. Medium >=10%) :No data recorded    Care management risk score Rising risk (score 2-5) and Complex Care (Scores >=6): No Risk Score On File     Non face to face  following discharge, date last encounter closed (first attempt may have been earlier): *No documented post hospital discharge outreach found in the last 14 days    Call initiated 2 business days of discharge: *No response recorded in the last 14 days    ASSESSMENT/PLAN:   Hospital discharge follow-up  -     UT DISCHARGE MEDS RECONCILED W/ CURRENT OUTPATIENT MED LIST  Type 2 diabetes mellitus without complication, with long-term current use of insulin ([facility] [address]  -     Continuous Blood Gluc  ([address]) VERONICA; One g6 reciever refill annually, Disp-1 each, R-0Normal  -     Continuous Blood Gluc Sensor (DEXCOM G6 SENSOR) MISC; 1 box sensors refill every 30 days, Disp-1 each, R-5Normal  -     Continuous Blood Gluc Transmit (DEXCOM G6 TRANSMITTER) MISC; 1 transmitter refilled every 90 days, Disp-1 each, R-3Normal  -     CBC with Auto Differential; Future  -     Comprehensive Metabolic Panel; Future  Cerebral amyloid angiopathy (CODE)  Assessment & Plan: On steroids; And followed by ludy;   Essential hypertension  -     verapamil (CALAN SR) 240 MG extended release tablet; Take 1 tablet by mouth 2 times daily, Disp-180 tablet, R-0Normal      Medical Decision Making: moderate complexity  Return in about 4 weeks (around 7/18/2023) for have labs done prior to appt.     On this date 6/20/2023 I have spent 40 minutes reviewing previous notes, test results and face to face with the patient discussing the diagnosis and importance of compliance with the treatment plan as well as

## 2023-06-20 NOTE — PROGRESS NOTES
After obtaining consent from patient and receiving verbal/written orders from JEZ ANTONIO, I gave patient 1cc of testosterone cypionate injection in LEFT upper quad. gluteus, patient tolerated well. Medication was supplied by the patient.

## 2023-07-03 ENCOUNTER — TELEPHONE (OUTPATIENT)
Dept: INTERNAL MEDICINE | Age: 83
End: 2023-07-03

## 2023-07-03 RX ORDER — HYDRALAZINE HYDROCHLORIDE 10 MG/1
TABLET, FILM COATED ORAL
Qty: 360 TABLET | Refills: 1 | Status: SHIPPED | OUTPATIENT
Start: 2023-07-03

## 2023-07-03 NOTE — TELEPHONE ENCOUNTER
Called stating pts blood presure running 190/110 average even after giving hydralazine 3 4xs a day. Gisselle ordered 4 4 xs times a day if needed.

## 2023-07-05 ENCOUNTER — NURSE ONLY (OUTPATIENT)
Dept: UROLOGY | Age: 83
End: 2023-07-05

## 2023-07-05 DIAGNOSIS — R79.89 LOW TESTOSTERONE IN MALE: Primary | ICD-10-CM

## 2023-07-05 DIAGNOSIS — Z79.4 TYPE 2 DIABETES MELLITUS WITHOUT COMPLICATION, WITH LONG-TERM CURRENT USE OF INSULIN (HCC): ICD-10-CM

## 2023-07-05 DIAGNOSIS — E11.9 TYPE 2 DIABETES MELLITUS WITHOUT COMPLICATION, WITH LONG-TERM CURRENT USE OF INSULIN (HCC): ICD-10-CM

## 2023-07-05 LAB
ALBUMIN SERPL-MCNC: 4 G/DL (ref 3.5–5.2)
ALP SERPL-CCNC: 69 U/L (ref 40–130)
ALT SERPL-CCNC: 65 U/L (ref 5–41)
ANION GAP SERPL CALCULATED.3IONS-SCNC: 11 MMOL/L (ref 7–19)
AST SERPL-CCNC: 25 U/L (ref 5–40)
BASOPHILS # BLD: 0 K/UL (ref 0–0.2)
BASOPHILS NFR BLD: 0.2 % (ref 0–1)
BILIRUB SERPL-MCNC: 0.7 MG/DL (ref 0.2–1.2)
BUN SERPL-MCNC: 42 MG/DL (ref 8–23)
CALCIUM SERPL-MCNC: 9.4 MG/DL (ref 8.8–10.2)
CHLORIDE SERPL-SCNC: 104 MMOL/L (ref 98–111)
CO2 SERPL-SCNC: 25 MMOL/L (ref 22–29)
CREAT SERPL-MCNC: 1.3 MG/DL (ref 0.5–1.2)
EOSINOPHIL # BLD: 0.1 K/UL (ref 0–0.6)
EOSINOPHIL NFR BLD: 0.6 % (ref 0–5)
ERYTHROCYTE [DISTWIDTH] IN BLOOD BY AUTOMATED COUNT: 13.5 % (ref 11.5–14.5)
GLUCOSE SERPL-MCNC: 164 MG/DL (ref 74–109)
HCT VFR BLD AUTO: 52.7 % (ref 42–52)
HGB BLD-MCNC: 17.1 G/DL (ref 14–18)
IMM GRANULOCYTES # BLD: 0.1 K/UL
LYMPHOCYTES # BLD: 1.6 K/UL (ref 1.1–4.5)
LYMPHOCYTES NFR BLD: 18.4 % (ref 20–40)
MCH RBC QN AUTO: 29.6 PG (ref 27–31)
MCHC RBC AUTO-ENTMCNC: 32.4 G/DL (ref 33–37)
MCV RBC AUTO: 91.3 FL (ref 80–94)
MONOCYTES # BLD: 0.5 K/UL (ref 0–0.9)
MONOCYTES NFR BLD: 5.7 % (ref 0–10)
NEUTROPHILS # BLD: 6.6 K/UL (ref 1.5–7.5)
NEUTS SEG NFR BLD: 74.3 % (ref 50–65)
PLATELET # BLD AUTO: 139 K/UL (ref 130–400)
PMV BLD AUTO: 10.2 FL (ref 9.4–12.4)
POTASSIUM SERPL-SCNC: 4.5 MMOL/L (ref 3.5–5)
PROT SERPL-MCNC: 6.5 G/DL (ref 6.6–8.7)
RBC # BLD AUTO: 5.77 M/UL (ref 4.7–6.1)
SODIUM SERPL-SCNC: 140 MMOL/L (ref 136–145)
WBC # BLD AUTO: 8.8 K/UL (ref 4.8–10.8)

## 2023-07-05 RX ORDER — TESTOSTERONE CYPIONATE 200 MG/ML
200 INJECTION, SOLUTION INTRAMUSCULAR ONCE
Status: COMPLETED | OUTPATIENT
Start: 2023-07-05 | End: 2023-07-05

## 2023-07-05 RX ADMIN — TESTOSTERONE CYPIONATE 200 MG: 200 INJECTION, SOLUTION INTRAMUSCULAR at 08:27

## 2023-07-05 NOTE — PROGRESS NOTES
After obtaining consent from patient and receiving verbal/written orders from JEZ ANTONIO, I gave patient 1cc of testosterone cypionate injection in RIGHT upper quad. gluteus, patient tolerated well. Medication was supplied by the patient.      [address]: [id number]  LOT: [id number]  EXP: 12/31/2025

## 2023-07-06 ENCOUNTER — OFFICE VISIT (OUTPATIENT)
Dept: INTERNAL MEDICINE | Age: 83
End: 2023-07-06
Payer: MEDICARE

## 2023-07-06 VITALS
WEIGHT: 211 LBS | SYSTOLIC BLOOD PRESSURE: 140 MMHG | TEMPERATURE: 97 F | BODY MASS INDEX: 28.62 KG/M2 | HEART RATE: 84 BPM | OXYGEN SATURATION: 93 % | DIASTOLIC BLOOD PRESSURE: 68 MMHG

## 2023-07-06 DIAGNOSIS — I68.0 CEREBRAL AMYLOID ANGIOPATHY (CODE): ICD-10-CM

## 2023-07-06 DIAGNOSIS — E11.65 TYPE 2 DIABETES MELLITUS WITH HYPERGLYCEMIA, UNSPECIFIED WHETHER LONG TERM INSULIN USE (HCC): ICD-10-CM

## 2023-07-06 DIAGNOSIS — I10 ESSENTIAL HYPERTENSION: ICD-10-CM

## 2023-07-06 PROCEDURE — 99213 OFFICE O/P EST LOW 20 MIN: CPT | Performed by: NURSE PRACTITIONER

## 2023-07-06 PROCEDURE — 3077F SYST BP >= 140 MM HG: CPT | Performed by: NURSE PRACTITIONER

## 2023-07-06 PROCEDURE — 3078F DIAST BP <80 MM HG: CPT | Performed by: NURSE PRACTITIONER

## 2023-07-06 PROCEDURE — 1123F ACP DISCUSS/DSCN MKR DOCD: CPT | Performed by: NURSE PRACTITIONER

## 2023-07-06 PROCEDURE — 1036F TOBACCO NON-USER: CPT | Performed by: NURSE PRACTITIONER

## 2023-07-06 PROCEDURE — 3052F HG A1C>EQUAL 8.0%<EQUAL 9.0%: CPT | Performed by: NURSE PRACTITIONER

## 2023-07-06 PROCEDURE — G8427 DOCREV CUR MEDS BY ELIG CLIN: HCPCS | Performed by: NURSE PRACTITIONER

## 2023-07-06 PROCEDURE — G8417 CALC BMI ABV UP PARAM F/U: HCPCS | Performed by: NURSE PRACTITIONER

## 2023-07-06 ASSESSMENT — ENCOUNTER SYMPTOMS
VOMITING: 0
TROUBLE SWALLOWING: 0
STRIDOR: 0
BLOOD IN STOOL: 0
ABDOMINAL DISTENTION: 0
SHORTNESS OF BREATH: 0
COUGH: 0
CHOKING: 0
EYE DISCHARGE: 0
EYE ITCHING: 0
WHEEZING: 0
CONSTIPATION: 0
COLOR CHANGE: 0
DIARRHEA: 0
NAUSEA: 0
SORE THROAT: 0
ABDOMINAL PAIN: 0

## 2023-07-06 NOTE — PATIENT INSTRUCTIONS
PATIENT INSTRUCTIONS:  There are no Patient Instructions on file for this visit. CAA stable keep follow-up at [facility]  T2DM we will continue the long-acting and short acting insulins. HTN blood pressure will be elevated and they are watching and changing dosing related to what how his pressures are running they are doing a really good job      @Life simple 7  1) Manage blood pressure - high blood pressure is a major risk factor for heart disease and stroke. Keeping blood pressure in health range reduces strain on your heart, arteries and kidneys. Blood pressure goal is less than 130/80. 2) Control cholesterol - contributes to plaque, which can clog arteries and lead to heart disease and stroke. When you control your cholesterol you are giving your arteries their best chance to remain clear. It is recommended that you get cholesterol lab work done once a year. 3) Reduce blood sugar - most of the food we eat is turning into glucose or blood sugar that our body uses for energy. Over time, high levels of blood sugar can damage your heart, kidneys, eyes and nerves. 4) Get active - living an active life is one of the most rewarding gifts you can give yourself and those you love. Simply put, daily physical activity increases your length and quality of life. Strive to exercise 15 minutes most days of the week. 5)  Eat better - A healthy diet is one of your best weapons for fighting cardiovascular disease. When you eat a heart healthy diet, you improve your chances for feeling good and staying healthy for life. 6)  Lose weight - when you shed extra fat an unnecessary pounds, you reduce the burden on your hear, lungs, blood vessels and skeleton. You give yourself the gift of active living, you lower your blood pressure and help yourself feel better. 7) Stop smoking - cigarette smokers have a higher risk of developing cardiovascular disease. If  You smoke, quitting is the best thing you can do for your health.

## 2023-07-06 NOTE — PROGRESS NOTES
which can clog arteries and lead to heart disease and stroke. When you control your cholesterol you are giving your arteries their best chance to remain clear. It is recommended that you get cholesterol lab work done once a year. 3) Reduce blood sugar - most of the food we eat is turning into glucose or blood sugar that our body uses for energy. Over time, high levels of blood sugar can damage your heart, kidneys, eyes and nerves. 4) Get active - living an active life is one of the most rewarding gifts you can give yourself and those you love. Simply put, daily physical activity increases your length and quality of life. Strive to exercise 15 minutes most days of the week. 5)  Eat better - A healthy diet is one of your best weapons for fighting cardiovascular disease. When you eat a heart healthy diet, you improve your chances for feeling good and staying healthy for life. 6)  Lose weight - when you shed extra fat an unnecessary pounds, you reduce the burden on your hear, lungs, blood vessels and skeleton. You give yourself the gift of active living, you lower your blood pressure and help yourself feel better. 7) Stop smoking - cigarette smokers have a higher risk of developing cardiovascular disease. If  You smoke, quitting is the best thing you can do for your health. CAA stable keep follow-up at [facility]  T2DM we will continue the long-acting and short acting insulins. HTN blood pressure will be elevated and they are watching and changing dosing related to what how his pressures are running they are doing a really good job      @Life simple 7  1) Manage blood pressure - high blood pressure is a major risk factor for heart disease and stroke. Keeping blood pressure in health range reduces strain on your heart, arteries and kidneys. Blood pressure goal is less than 130/80. 2) Control cholesterol - contributes to plaque, which can clog arteries and lead to heart disease and stroke.  When you control

## 2023-07-13 DIAGNOSIS — E29.1 HYPOGONADISM MALE: Primary | ICD-10-CM

## 2023-07-14 RX ORDER — TESTOSTERONE CYPIONATE 200 MG/ML
INJECTION, SOLUTION INTRAMUSCULAR
Qty: 2 ML | Refills: 0 | Status: SHIPPED | OUTPATIENT
Start: 2023-07-14 | End: 2023-08-13

## 2023-07-19 ENCOUNTER — NURSE ONLY (OUTPATIENT)
Dept: UROLOGY | Age: 83
End: 2023-07-19
Payer: MEDICARE

## 2023-07-19 DIAGNOSIS — E29.1 HYPOGONADISM IN MALE: Primary | ICD-10-CM

## 2023-07-19 PROCEDURE — 96372 THER/PROPH/DIAG INJ SC/IM: CPT | Performed by: NURSE PRACTITIONER

## 2023-07-19 NOTE — PROGRESS NOTES
After obtaining consent from patient and receiving verbal/written orders from JEZ ANTONIO, I gave patient 1cc of testosterone cypionate injection in LEFT upper quad. gluteus, patient tolerated well. Medication was supplied by the patient.      [address]: [id number]  LOT: [id number]  EXP: 01/31/2025

## 2023-08-01 ENCOUNTER — OFFICE VISIT (OUTPATIENT)
Dept: HEMATOLOGY | Age: 83
End: 2023-08-01
Payer: MEDICARE

## 2023-08-01 ENCOUNTER — NURSE ONLY (OUTPATIENT)
Dept: UROLOGY | Age: 83
End: 2023-08-01
Payer: MEDICARE

## 2023-08-01 ENCOUNTER — HOSPITAL ENCOUNTER (OUTPATIENT)
Dept: INFUSION THERAPY | Age: 83
Discharge: HOME | End: 2023-08-01
Payer: MEDICARE

## 2023-08-01 VITALS
HEIGHT: 72 IN | HEART RATE: 72 BPM | WEIGHT: 217 LBS | DIASTOLIC BLOOD PRESSURE: 72 MMHG | OXYGEN SATURATION: 97 % | SYSTOLIC BLOOD PRESSURE: 160 MMHG | BODY MASS INDEX: 29.39 KG/M2

## 2023-08-01 DIAGNOSIS — Z86.39 HISTORY OF IRON DEFICIENCY: ICD-10-CM

## 2023-08-01 DIAGNOSIS — D69.6 THROMBOCYTOPENIA (HCC): ICD-10-CM

## 2023-08-01 DIAGNOSIS — D75.1 SECONDARY ERYTHROCYTOSIS: Primary | ICD-10-CM

## 2023-08-01 DIAGNOSIS — E29.1 HYPOGONADISM IN MALE: Primary | ICD-10-CM

## 2023-08-01 DIAGNOSIS — R16.1 SPLENOMEGALY: ICD-10-CM

## 2023-08-01 LAB
ERYTHROCYTE [DISTWIDTH] IN BLOOD BY AUTOMATED COUNT: 14.5 % (ref 11.6–14.4)
HCT VFR BLD AUTO: 48 % (ref 40.1–51)
HGB BLD-MCNC: 16.1 G/DL (ref 13.7–17.5)
LYMPHOCYTES # BLD: 1.65 K/UL (ref 1.18–3.74)
LYMPHOCYTES NFR BLD: 18.1 % (ref 19.3–53.1)
MCH RBC QN AUTO: 28.9 PG (ref 25.7–32.2)
MCHC RBC AUTO-ENTMCNC: 33.5 G/DL (ref 32.3–36.5)
MCV RBC AUTO: 86.2 FL (ref 79–92.2)
MONOCYTES # BLD: 0.7 K/UL (ref 0.24–0.82)
MONOCYTES NFR BLD: 7.7 % (ref 4.7–12.5)
NEUTROPHILS # BLD: 6.58 K/UL (ref 1.56–6.13)
NEUTS SEG NFR BLD: 72.2 % (ref 34–71.1)
PLATELET # BLD AUTO: 111 K/UL (ref 163–337)
PMV BLD AUTO: 10.1 FL (ref 7.4–10.4)
RBC # BLD AUTO: 5.57 M/UL (ref 4.63–6.08)
WBC # BLD AUTO: 9.11 K/UL (ref 4.23–9.07)

## 2023-08-01 PROCEDURE — 1123F ACP DISCUSS/DSCN MKR DOCD: CPT | Performed by: NURSE PRACTITIONER

## 2023-08-01 PROCEDURE — 85025 COMPLETE CBC W/AUTO DIFF WBC: CPT

## 2023-08-01 PROCEDURE — 1036F TOBACCO NON-USER: CPT | Performed by: NURSE PRACTITIONER

## 2023-08-01 PROCEDURE — 99212 OFFICE O/P EST SF 10 MIN: CPT

## 2023-08-01 PROCEDURE — 3074F SYST BP LT 130 MM HG: CPT | Performed by: NURSE PRACTITIONER

## 2023-08-01 PROCEDURE — 3078F DIAST BP <80 MM HG: CPT | Performed by: NURSE PRACTITIONER

## 2023-08-01 PROCEDURE — 96372 THER/PROPH/DIAG INJ SC/IM: CPT | Performed by: NURSE PRACTITIONER

## 2023-08-01 PROCEDURE — 36415 COLL VENOUS BLD VENIPUNCTURE: CPT

## 2023-08-01 PROCEDURE — G8417 CALC BMI ABV UP PARAM F/U: HCPCS | Performed by: NURSE PRACTITIONER

## 2023-08-01 PROCEDURE — 99212 OFFICE O/P EST SF 10 MIN: CPT | Performed by: NURSE PRACTITIONER

## 2023-08-01 PROCEDURE — G8427 DOCREV CUR MEDS BY ELIG CLIN: HCPCS | Performed by: NURSE PRACTITIONER

## 2023-08-01 NOTE — PROGRESS NOTES
After obtaining consent from patient and receiving verbal/written orders from MARISELA Holloway, I gave patient 1cc of testosterone cypionate injection in RIGHT upper quad. gluteus, patient tolerated well. Medication was supplied by the patient.      [address]: [id number]  LOT: [id number]  EXP: 01/31/2025

## 2023-08-02 ENCOUNTER — TELEPHONE (OUTPATIENT)
Dept: INTERNAL MEDICINE | Age: 83
End: 2023-08-02

## 2023-08-02 NOTE — TELEPHONE ENCOUNTER
TRIED CALLING THE DAUGHTER BACK WE GOT DISCONNECTED THROUGH [facility]. DAUGHTER CALLED TO LET US KNOW DANA IS HAVING INCREASED CONFUSION. TRIED CALLING THEM BACK.

## 2023-08-03 ENCOUNTER — OFFICE VISIT (OUTPATIENT)
Dept: INTERNAL MEDICINE | Age: 83
End: 2023-08-03
Payer: MEDICARE

## 2023-08-03 VITALS
HEIGHT: 72 IN | WEIGHT: 217 LBS | HEART RATE: 64 BPM | SYSTOLIC BLOOD PRESSURE: 164 MMHG | BODY MASS INDEX: 29.39 KG/M2 | DIASTOLIC BLOOD PRESSURE: 80 MMHG | OXYGEN SATURATION: 98 %

## 2023-08-03 DIAGNOSIS — I68.0 CEREBRAL AMYLOID ANGIOPATHY (CODE): Primary | ICD-10-CM

## 2023-08-03 DIAGNOSIS — E11.65 TYPE 2 DIABETES MELLITUS WITH HYPERGLYCEMIA, UNSPECIFIED WHETHER LONG TERM INSULIN USE (HCC): ICD-10-CM

## 2023-08-03 PROCEDURE — 3077F SYST BP >= 140 MM HG: CPT | Performed by: NURSE PRACTITIONER

## 2023-08-03 PROCEDURE — G8427 DOCREV CUR MEDS BY ELIG CLIN: HCPCS | Performed by: NURSE PRACTITIONER

## 2023-08-03 PROCEDURE — G8417 CALC BMI ABV UP PARAM F/U: HCPCS | Performed by: NURSE PRACTITIONER

## 2023-08-03 PROCEDURE — 99214 OFFICE O/P EST MOD 30 MIN: CPT | Performed by: NURSE PRACTITIONER

## 2023-08-03 PROCEDURE — 3052F HG A1C>EQUAL 8.0%<EQUAL 9.0%: CPT | Performed by: NURSE PRACTITIONER

## 2023-08-03 PROCEDURE — 1036F TOBACCO NON-USER: CPT | Performed by: NURSE PRACTITIONER

## 2023-08-03 PROCEDURE — 1123F ACP DISCUSS/DSCN MKR DOCD: CPT | Performed by: NURSE PRACTITIONER

## 2023-08-03 PROCEDURE — 3079F DIAST BP 80-89 MM HG: CPT | Performed by: NURSE PRACTITIONER

## 2023-08-03 RX ORDER — PANTOPRAZOLE SODIUM 20 MG/1
TABLET, DELAYED RELEASE ORAL
COMMUNITY
Start: 2023-07-11 | End: 2023-08-03

## 2023-08-03 ASSESSMENT — ENCOUNTER SYMPTOMS
WHEEZING: 0
DIARRHEA: 0
ABDOMINAL PAIN: 0
CHOKING: 0
ABDOMINAL DISTENTION: 0
NAUSEA: 0
VOMITING: 0
EYE ITCHING: 0
CONSTIPATION: 0
SHORTNESS OF BREATH: 0
STRIDOR: 0
TROUBLE SWALLOWING: 0
COUGH: 0
EYE DISCHARGE: 0
COLOR CHANGE: 0
BLOOD IN STOOL: 0
SORE THROAT: 0

## 2023-08-03 NOTE — ASSESSMENT & PLAN NOTE
He remains on insulin long-acting and mealtime. This is never been a real issue for him in the past his A1c is always been less than 6. So is the high-dose of steroids that is because of this.

## 2023-08-03 NOTE — PROGRESS NOTES
Discontinued       Lab Results   Component Value Date    LABA1C 8.4 (H) 06/01/2023     Lab Results   Component Value Date    PSA 0.82 09/21/2022    PSA 0.42 05/24/2021    PSA 0.47 05/08/2020     TSH   Date Value Ref Range Status   06/01/2023 2.520 0.270 - 4.200 uIU/mL Final   ]  Lab Results   Component Value Date     07/05/2023    K 4.5 07/05/2023     07/05/2023    CO2 25 07/05/2023    BUN 42 (H) 07/05/2023    CREATININE 1.3 (H) 07/05/2023    GLUCOSE 164 (H) 07/05/2023    CALCIUM 9.4 07/05/2023    PROT 6.5 (L) 07/05/2023    LABALBU 4.0 07/05/2023    BILITOT 0.7 07/05/2023    ALKPHOS 69 07/05/2023    AST 25 07/05/2023    ALT 65 (H) 07/05/2023    LABGLOM 55 (A) 07/05/2023    GFRAA >59 09/02/2022    GLOB 2.7 12/12/2022     Lab Results   Component Value Date    CHOL 183 03/02/2023    CHOL 120 (L) 06/13/2022    CHOL 194 05/25/2022     Lab Results   Component Value Date    TRIG 154 (H) 06/01/2023    TRIG 138 03/02/2023    TRIG 108 09/02/2022     Lab Results   Component Value Date    HDL 49 (L) 03/02/2023    HDL 31 (L) 06/13/2022    HDL 36 (L) 05/25/2022     Lab Results   Component Value Date    LDLCALC 106 03/02/2023    LDLCALC 72 06/13/2022    LDLCALC 131 05/25/2022     Lab Results   Component Value Date/Time     07/05/2023 08:06 AM    K 4.5 07/05/2023 08:06 AM     07/05/2023 08:06 AM    CO2 25 07/05/2023 08:06 AM    BUN 42 07/05/2023 08:06 AM    CREATININE 1.3 07/05/2023 08:06 AM    GLUCOSE 164 07/05/2023 08:06 AM    CALCIUM 9.4 07/05/2023 08:06 AM      Lab Results   Component Value Date    WBC 9.11 (H) 08/01/2023    HGB 16.1 08/01/2023    HCT 48.0 08/01/2023    MCV 86.2 08/01/2023     (L) 08/01/2023    LABLYMP 1.36 04/07/2015    LYMPHOPCT 18.1 (L) 08/01/2023    RBC 5.57 08/01/2023    MCH 28.9 08/01/2023    MCHC 33.5 08/01/2023    RDW 14.5 (H) 08/01/2023     Lab Results   Component Value Date    VITD25 22.3 (L) 06/01/2023     @assessment requiring independent historian girlfriend@

## 2023-08-03 NOTE — PATIENT INSTRUCTIONS
1.  Cerebral amyloid angiopathy. Keep appointment with Rita Gordillo let me know when you have your MRI and I will look at it for you. 2.  Type 2 diabetes mellitus continue with your long-acting insulin as you have been and we will use sliding scale to help get better control.   Even if you wean completely off the steroid or probably be a few months before your sugar normalizes

## 2023-08-06 ASSESSMENT — ENCOUNTER SYMPTOMS
COUGH: 0
EYE DISCHARGE: 0
EYES NEGATIVE: 1
EYE REDNESS: 0
BLOOD IN STOOL: 0
SHORTNESS OF BREATH: 0
DIARRHEA: 0
RESPIRATORY NEGATIVE: 1
BACK PAIN: 0
NAUSEA: 0
ABDOMINAL PAIN: 0
WHEEZING: 0
EYE PAIN: 0
SORE THROAT: 0
CONSTIPATION: 0
GASTROINTESTINAL NEGATIVE: 1
VOMITING: 0

## 2023-08-07 ENCOUNTER — TELEPHONE (OUTPATIENT)
Dept: INTERNAL MEDICINE | Age: 83
End: 2023-08-07

## 2023-08-07 DIAGNOSIS — I68.0 CEREBRAL AMYLOID ANGIOPATHY (CODE): Primary | ICD-10-CM

## 2023-08-07 NOTE — TELEPHONE ENCOUNTER
Sw pt's spouse she is wanting to know if pt can have a MRI order on his head W/WO contrast . Pt was to get this done in [place] but they cant get him in for over a month they would like to see if we could schedule it with [facility] , and get in sooner please advise .

## 2023-08-07 NOTE — TELEPHONE ENCOUNTER
PLANS FOR THIS HAS CHANGED WE COULD NOT GET SET UP ANY SOONER BUT WIFE HAS A CONNECTION SHE IS GOING TO USE A BAPT FOR TOMORROW

## 2023-08-08 ENCOUNTER — HOSPITAL ENCOUNTER (OUTPATIENT)
Dept: MRI IMAGING | Facility: HOSPITAL | Age: 83
Discharge: HOME | End: 2023-08-08
Admitting: PSYCHIATRY & NEUROLOGY
Payer: MEDICARE

## 2023-08-08 DIAGNOSIS — R41.82 ALTERED MENTAL STATUS, UNSPECIFIED ALTERED MENTAL STATUS TYPE: ICD-10-CM

## 2023-08-08 LAB — CREAT BLDA-MCNC: 1.3 MG/DL (ref 0.6–1.3)

## 2023-08-08 PROCEDURE — 0 GADOBENATE DIMEGLUMINE 529 MG/ML SOLUTION: Performed by: PSYCHIATRY & NEUROLOGY

## 2023-08-08 PROCEDURE — 82565 ASSAY OF CREATININE: CPT

## 2023-08-08 PROCEDURE — A9577 INJ MULTIHANCE: HCPCS | Performed by: PSYCHIATRY & NEUROLOGY

## 2023-08-08 PROCEDURE — 70553 MRI BRAIN STEM W/O & W/DYE: CPT

## 2023-08-08 RX ADMIN — GADOBENATE DIMEGLUMINE 19 ML: 529 INJECTION, SOLUTION INTRAVENOUS at 13:55

## 2023-08-09 ENCOUNTER — TELEPHONE (OUTPATIENT)
Dept: INTERNAL MEDICINE | Age: 83
End: 2023-08-09

## 2023-08-09 NOTE — TELEPHONE ENCOUNTER
----- Message from MARISELA Lance sent at 8/9/2023 12:52 PM CDT -----  So please call and let his girlfirend Wendi know that I cant really tell much from the dictation;  there are no sizes given on either report to tell you if it has lessened;  he still has innumerable area of the microhemorrhages that are seen with the CAA that goes with his current diagnosis;  they are mentioned that way on both MRI;  so to say he is better or worse, is hard to discern as I have nothing \"measure\"   and since they didn't compare the 2 MRI's it is problematic;     I would suggest to just wait until the appt down there and let them look at all the films together and tell you what they think;  I know this is not what you wanted to hear but  your neurosurgeon or radiologist should be the one to go over this with you;  I dont want to say something to confuse the situation;

## 2023-08-10 RX ORDER — GLIPIZIDE 10 MG/1
TABLET, FILM COATED, EXTENDED RELEASE ORAL
Qty: 30 TABLET | Refills: 5 | Status: SHIPPED | OUTPATIENT
Start: 2023-08-10

## 2023-08-10 NOTE — TELEPHONE ENCOUNTER
Lucia Seymour called requesting a refill of the below medication which has been pended for you:     Requested Prescriptions     Pending Prescriptions Disp Refills    glipiZIDE (GLUCOTROL XL) 10 MG extended release tablet [Pharmacy Med Name: glipiZIDE ER 10 MG Oral Tablet Extended Release 24 Hour] 30 tablet 5     Sig: TAKE 1 TABLET BY MOUTH ONCE DAILY IN THE MORNING       Last Appointment Date: 8/3/2023  Next Appointment Date: 8/17/2023    No Known Allergies

## 2023-08-14 DIAGNOSIS — E29.1 HYPOGONADISM MALE: ICD-10-CM

## 2023-08-14 DIAGNOSIS — E29.1 HYPOGONADISM IN MALE: Primary | ICD-10-CM

## 2023-08-14 RX ORDER — TESTOSTERONE CYPIONATE 200 MG/ML
1 VIAL (ML) INTRAMUSCULAR
Qty: 2 ML | Refills: 0 | OUTPATIENT
Start: 2023-08-14 | End: 2023-09-13

## 2023-08-14 NOTE — TELEPHONE ENCOUNTER
Lucio Manningwanda called to request a refill on his medication. Last office visit : 8/3/2023   Next office visit : 9/18/2023     Last UDS:   Amphetamine Screen, Urine   Date Value Ref Range Status   03/06/2023 neg  Final     Barbiturate Screen, Urine   Date Value Ref Range Status   03/06/2023 neg  Final     Benzodiazepine Screen, Urine   Date Value Ref Range Status   03/06/2023 pos  Final     Buprenorphine Urine   Date Value Ref Range Status   03/06/2023 neg  Final     Cocaine Metabolite Screen, Urine   Date Value Ref Range Status   03/06/2023 neg  Final     Gabapentin Screen, Urine   Date Value Ref Range Status   03/06/2023 neg  Final     MDMA, Urine   Date Value Ref Range Status   03/06/2023 neg  Final     Methamphetamine, Urine   Date Value Ref Range Status   03/06/2023 neg  Final     Opiate Scrn, Ur   Date Value Ref Range Status   03/06/2023 neg  Final     Oxycodone Screen, Ur   Date Value Ref Range Status   03/06/2023 neg  Final     PCP Screen, Urine   Date Value Ref Range Status   03/06/2023 neg  Final     Propoxyphene Screen, Urine   Date Value Ref Range Status   03/06/2023 neg  Final     THC Screen, Urine   Date Value Ref Range Status   03/06/2023 neg  Final     Tricyclic Antidepressants, Urine   Date Value Ref Range Status   03/06/2023 neg  Final       Last [place] Chime: 8/14/23  Medication Contract: 3/6/23      Requested Prescriptions     Pending Prescriptions Disp Refills    Testosterone Cypionate 200 MG/ML SOLN 2 mL 0     Sig: Inject 1 mL as directed every 14 days Max Daily Amount: 1 mL         Please approve or refuse this medication.    Saúl Rodriguez MA

## 2023-08-15 RX ORDER — TESTOSTERONE CYPIONATE 200 MG/ML
INJECTION, SOLUTION INTRAMUSCULAR
Qty: 2 ML | Refills: 0 | Status: SHIPPED | OUTPATIENT
Start: 2023-08-15 | End: 2023-09-14

## 2023-08-17 ENCOUNTER — NURSE ONLY (OUTPATIENT)
Dept: UROLOGY | Age: 83
End: 2023-08-17
Payer: MEDICARE

## 2023-08-17 DIAGNOSIS — E29.1 HYPOGONADISM IN MALE: Primary | ICD-10-CM

## 2023-08-17 PROCEDURE — 96372 THER/PROPH/DIAG INJ SC/IM: CPT | Performed by: NURSE PRACTITIONER

## 2023-08-17 RX ORDER — TESTOSTERONE CYPIONATE 200 MG/ML
200 INJECTION, SOLUTION INTRAMUSCULAR ONCE
Status: SHIPPED | OUTPATIENT
Start: 2023-08-17

## 2023-08-17 NOTE — PROGRESS NOTES
After obtaining consent from patient and receiving verbal/written orders from MARISELA Holloway, I gave patient 1cc of testosterone cypionate injection in LEFT upper quad. gluteus, patient tolerated well. Medication was supplied by the patient.      [address]: [id number]  LOT: [id number]  EXP: 03/28/2026

## 2023-08-21 ENCOUNTER — OFFICE VISIT (OUTPATIENT)
Dept: INTERNAL MEDICINE | Age: 83
End: 2023-08-21
Payer: MEDICARE

## 2023-08-21 VITALS
HEART RATE: 76 BPM | DIASTOLIC BLOOD PRESSURE: 56 MMHG | OXYGEN SATURATION: 95 % | TEMPERATURE: 101.1 F | SYSTOLIC BLOOD PRESSURE: 118 MMHG

## 2023-08-21 DIAGNOSIS — R22.0 FACIAL SWELLING: ICD-10-CM

## 2023-08-21 DIAGNOSIS — R50.9 FEVER, UNSPECIFIED FEVER CAUSE: Primary | ICD-10-CM

## 2023-08-21 DIAGNOSIS — R50.9 FEVER, UNSPECIFIED FEVER CAUSE: ICD-10-CM

## 2023-08-21 DIAGNOSIS — I68.0 CEREBRAL AMYLOID ANGIOPATHY (CODE): ICD-10-CM

## 2023-08-21 LAB
ALBUMIN SERPL-MCNC: 4.4 G/DL (ref 3.5–5.2)
ALP SERPL-CCNC: 71 U/L (ref 40–130)
ALT SERPL-CCNC: 42 U/L (ref 5–41)
ANION GAP SERPL CALCULATED.3IONS-SCNC: 10 MMOL/L (ref 7–19)
AST SERPL-CCNC: 18 U/L (ref 5–40)
BACTERIA URNS QL MICRO: NEGATIVE /HPF
BASOPHILS # BLD: 0.1 K/UL (ref 0–0.2)
BASOPHILS NFR BLD: 0.9 % (ref 0–1)
BILIRUB SERPL-MCNC: 1.7 MG/DL (ref 0.2–1.2)
BILIRUB UR QL STRIP: NEGATIVE
BUN SERPL-MCNC: 40 MG/DL (ref 8–23)
CALCIUM SERPL-MCNC: 9.9 MG/DL (ref 8.8–10.2)
CHLORIDE SERPL-SCNC: 102 MMOL/L (ref 98–111)
CLARITY UR: CLEAR
CO2 SERPL-SCNC: 26 MMOL/L (ref 22–29)
COLOR UR: YELLOW
CREAT SERPL-MCNC: 1.4 MG/DL (ref 0.5–1.2)
CRYSTALS URNS MICRO: ABNORMAL /HPF
EOSINOPHIL # BLD: 0 K/UL (ref 0–0.6)
EOSINOPHIL NFR BLD: 0.3 % (ref 0–5)
EPI CELLS #/AREA URNS AUTO: 0 /HPF (ref 0–5)
ERYTHROCYTE [DISTWIDTH] IN BLOOD BY AUTOMATED COUNT: 15.6 % (ref 11.5–14.5)
GLUCOSE SERPL-MCNC: 173 MG/DL (ref 74–109)
GLUCOSE UR STRIP.AUTO-MCNC: NEGATIVE MG/DL
HCT VFR BLD AUTO: 49.9 % (ref 42–52)
HGB BLD-MCNC: 16.3 G/DL (ref 14–18)
HGB UR STRIP.AUTO-MCNC: NEGATIVE MG/L
HYALINE CASTS #/AREA URNS AUTO: 1 /HPF (ref 0–8)
IMM GRANULOCYTES # BLD: 0.3 K/UL
INFLUENZA A ANTIGEN, POC: NEGATIVE
INFLUENZA B ANTIGEN, POC: NEGATIVE
KETONES UR STRIP.AUTO-MCNC: NEGATIVE MG/DL
LEUKOCYTE ESTERASE UR QL STRIP.AUTO: ABNORMAL
LOT EXPIRE DATE: NORMAL
LOT KIT NUMBER: NORMAL
LYMPHOCYTES # BLD: 1.1 K/UL (ref 1.1–4.5)
LYMPHOCYTES NFR BLD: 14.7 % (ref 20–40)
MCH RBC QN AUTO: 29.1 PG (ref 27–31)
MCHC RBC AUTO-ENTMCNC: 32.7 G/DL (ref 33–37)
MCV RBC AUTO: 88.9 FL (ref 80–94)
MONOCYTES # BLD: 0.8 K/UL (ref 0–0.9)
MONOCYTES NFR BLD: 10.7 % (ref 0–10)
NEUTROPHILS # BLD: 5.2 K/UL (ref 1.5–7.5)
NEUTS SEG NFR BLD: 69 % (ref 50–65)
NITRITE UR QL STRIP.AUTO: NEGATIVE
PH UR STRIP.AUTO: 5.5 [PH] (ref 5–8)
PLATELET # BLD AUTO: 124 K/UL (ref 130–400)
PMV BLD AUTO: 10.5 FL (ref 9.4–12.4)
POTASSIUM SERPL-SCNC: 5.3 MMOL/L (ref 3.5–5)
PROT SERPL-MCNC: 7.2 G/DL (ref 6.6–8.7)
PROT UR STRIP.AUTO-MCNC: ABNORMAL MG/DL
RBC # BLD AUTO: 5.61 M/UL (ref 4.7–6.1)
RBC #/AREA URNS AUTO: 1 /HPF (ref 0–4)
SARS-COV-2, POC: NORMAL
SODIUM SERPL-SCNC: 138 MMOL/L (ref 136–145)
SP GR UR STRIP.AUTO: 1.02 (ref 1–1.03)
UROBILINOGEN UR STRIP.AUTO-MCNC: 1 E.U./DL
VALID INTERNAL CONTROL: NORMAL
VENDOR AND KIT NAME POC: NORMAL
WBC # BLD AUTO: 7.6 K/UL (ref 4.8–10.8)
WBC #/AREA URNS AUTO: 0 /HPF (ref 0–5)

## 2023-08-21 PROCEDURE — G8427 DOCREV CUR MEDS BY ELIG CLIN: HCPCS | Performed by: NURSE PRACTITIONER

## 2023-08-21 PROCEDURE — 1036F TOBACCO NON-USER: CPT | Performed by: NURSE PRACTITIONER

## 2023-08-21 PROCEDURE — 3074F SYST BP LT 130 MM HG: CPT | Performed by: NURSE PRACTITIONER

## 2023-08-21 PROCEDURE — G8417 CALC BMI ABV UP PARAM F/U: HCPCS | Performed by: NURSE PRACTITIONER

## 2023-08-21 PROCEDURE — 1123F ACP DISCUSS/DSCN MKR DOCD: CPT | Performed by: NURSE PRACTITIONER

## 2023-08-21 PROCEDURE — 99213 OFFICE O/P EST LOW 20 MIN: CPT | Performed by: NURSE PRACTITIONER

## 2023-08-21 PROCEDURE — 3078F DIAST BP <80 MM HG: CPT | Performed by: NURSE PRACTITIONER

## 2023-08-21 ASSESSMENT — ENCOUNTER SYMPTOMS
COUGH: 0
CONSTIPATION: 0
VOMITING: 0
SHORTNESS OF BREATH: 0
BLOOD IN STOOL: 0
ABDOMINAL DISTENTION: 0
CHOKING: 0
COLOR CHANGE: 0
NAUSEA: 0
DIARRHEA: 0
ABDOMINAL PAIN: 0
EYE DISCHARGE: 0
WHEEZING: 0
EYE ITCHING: 0
STRIDOR: 0
SORE THROAT: 1
TROUBLE SWALLOWING: 0

## 2023-08-21 NOTE — PATIENT INSTRUCTIONS
1.  Fever labs  2.   Facial swelling labs we will call you when we get the result of the blood work we are getting flu and COVID as well

## 2023-08-21 NOTE — ASSESSMENT & PLAN NOTE
I do not really appreciate 1 side to be swollen more than the other or have any tenderness soreness with exam.  All the lymph nodes are good

## 2023-08-21 NOTE — PROGRESS NOTES
50% of the time was spent counseling and coordinating care   EMRDragon/transcription disclaimer:  Much of this encounter note is electronic transcription/translation of spoken language to printed texts. The electronic translation of spoken language may be erroneous, or at times,nonsensical words or phrases may be inadvertently transcribed.   Although I have reviewed the note for such errors, some may still exist.

## 2023-08-22 ENCOUNTER — TELEPHONE (OUTPATIENT)
Dept: INTERNAL MEDICINE | Age: 83
End: 2023-08-22

## 2023-08-22 RX ORDER — MECLIZINE HYDROCHLORIDE 25 MG/1
25 TABLET ORAL 3 TIMES DAILY PRN
Qty: 30 TABLET | Refills: 0 | Status: SHIPPED | OUTPATIENT
Start: 2023-08-22 | End: 2023-09-01

## 2023-08-22 NOTE — TELEPHONE ENCOUNTER
Meghan pt's girlfriend pt is requesting meclizine 25 mg for dizziness pt states that he has been on this medication the past would like this sent to [facility] side please advise .

## 2023-08-30 DIAGNOSIS — F51.01 PRIMARY INSOMNIA: ICD-10-CM

## 2023-08-30 RX ORDER — SPIRONOLACTONE 25 MG/1
TABLET ORAL
Qty: 90 TABLET | Refills: 0 | Status: SHIPPED | OUTPATIENT
Start: 2023-08-30

## 2023-08-30 RX ORDER — TEMAZEPAM 15 MG/1
CAPSULE ORAL
Qty: 60 CAPSULE | Refills: 0 | Status: SHIPPED | OUTPATIENT
Start: 2023-08-30 | End: 2023-09-30

## 2023-08-30 RX ORDER — HYDROCHLOROTHIAZIDE 25 MG/1
TABLET ORAL
Qty: 90 TABLET | Refills: 0 | Status: SHIPPED | OUTPATIENT
Start: 2023-08-30

## 2023-08-30 NOTE — TELEPHONE ENCOUNTER
Nallely Zamora called requesting a refill of the below medication which has been pended for you:     Requested Prescriptions     Pending Prescriptions Disp Refills    spironolactone (ALDACTONE) 25 MG tablet [Pharmacy Med Name: Spironolactone 25 MG Oral Tablet] 90 tablet 0     Sig: Take 1 tablet by mouth once daily    temazepam (RESTORIL) 15 MG capsule [Pharmacy Med Name: Temazepam 15 MG Oral Capsule] 60 capsule 0     Sig: TAKE 2 CAPSULES BY MOUTH AT BEDTIME AS NEEDED FOR SLEEP    hydroCHLOROthiazide (HYDRODIURIL) 25 MG tablet [Pharmacy Med Name: hydroCHLOROthiazide 25 MG Oral Tablet] 90 tablet 0     Sig: Take 1 tablet by mouth once daily       Last Appointment Date: 8/21/2023  Next Appointment Date: 9/18/2023    No Known Allergies

## 2023-08-31 ENCOUNTER — NURSE ONLY (OUTPATIENT)
Dept: UROLOGY | Age: 83
End: 2023-08-31

## 2023-08-31 DIAGNOSIS — R79.89 LOW TESTOSTERONE IN MALE: Primary | ICD-10-CM

## 2023-08-31 NOTE — PROGRESS NOTES
After obtaining consent from patient and receiving verbal/written orders from MARISELA Holloway, I gave patient 1cc of testosterone cypionate injection in RIGHT upper quad. gluteus, patient tolerated well. Medication was supplied by the patient.      [address]: [id number]  LOT: [id number]  EXP: 03/31/2026

## 2023-09-12 DIAGNOSIS — E29.1 HYPOGONADISM IN MALE: ICD-10-CM

## 2023-09-12 RX ORDER — TESTOSTERONE CYPIONATE 200 MG/ML
INJECTION, SOLUTION INTRAMUSCULAR
Qty: 2 ML | Refills: 0 | Status: SHIPPED | OUTPATIENT
Start: 2023-09-12 | End: 2023-09-12 | Stop reason: SDUPTHER

## 2023-09-12 RX ORDER — TESTOSTERONE CYPIONATE 200 MG/ML
INJECTION, SOLUTION INTRAMUSCULAR
Qty: 2 ML | Refills: 0 | Status: SHIPPED | OUTPATIENT
Start: 2023-09-12 | End: 2023-10-12

## 2023-09-12 NOTE — TELEPHONE ENCOUNTER
Dipesh Mail called to request a refill on his medication. Last office visit : 8/21/2023   Next office visit : 9/18/2023     Last UDS:   Amphetamine Screen, Urine   Date Value Ref Range Status   03/06/2023 neg  Final     Barbiturate Screen, Urine   Date Value Ref Range Status   03/06/2023 neg  Final     Benzodiazepine Screen, Urine   Date Value Ref Range Status   03/06/2023 pos  Final     Buprenorphine Urine   Date Value Ref Range Status   03/06/2023 neg  Final     Cocaine Metabolite Screen, Urine   Date Value Ref Range Status   03/06/2023 neg  Final     Gabapentin Screen, Urine   Date Value Ref Range Status   03/06/2023 neg  Final     MDMA, Urine   Date Value Ref Range Status   03/06/2023 neg  Final     Methamphetamine, Urine   Date Value Ref Range Status   03/06/2023 neg  Final     Opiate Scrn, Ur   Date Value Ref Range Status   03/06/2023 neg  Final     Oxycodone Screen, Ur   Date Value Ref Range Status   03/06/2023 neg  Final     PCP Screen, Urine   Date Value Ref Range Status   03/06/2023 neg  Final     Propoxyphene Screen, Urine   Date Value Ref Range Status   03/06/2023 neg  Final     THC Screen, Urine   Date Value Ref Range Status   03/06/2023 neg  Final     Tricyclic Antidepressants, Urine   Date Value Ref Range Status   03/06/2023 neg  Final       Last Pierre Sauce: 8/14/23  Medication Contract: 3/6/23     Requested Prescriptions     Pending Prescriptions Disp Refills    testosterone cypionate (DEPOTESTOTERONE CYPIONATE) 200 MG/ML injection 2 mL 0     Sig: INJECT 1ML INTO THE SKIN EVERY 14 DAYS         Please approve or refuse this medication.    Pedro Irwin MA

## 2023-09-12 NOTE — TELEPHONE ENCOUNTER
Tried to contact patient to r/s nurse visit. His wife answered and said she was at the gym but she would have him give office a call.

## 2023-09-14 DIAGNOSIS — E78.2 MIXED HYPERLIPIDEMIA: ICD-10-CM

## 2023-09-14 DIAGNOSIS — E55.9 VITAMIN D DEFICIENCY: ICD-10-CM

## 2023-09-14 DIAGNOSIS — E11.9 TYPE 2 DIABETES MELLITUS WITHOUT COMPLICATION, WITHOUT LONG-TERM CURRENT USE OF INSULIN (HCC): ICD-10-CM

## 2023-09-14 LAB
25(OH)D3 SERPL-MCNC: 21.5 NG/ML
ALBUMIN SERPL-MCNC: 4.2 G/DL (ref 3.5–5.2)
ALP SERPL-CCNC: 70 U/L (ref 40–130)
ALT SERPL-CCNC: 25 U/L (ref 5–41)
ANION GAP SERPL CALCULATED.3IONS-SCNC: 13 MMOL/L (ref 7–19)
AST SERPL-CCNC: 20 U/L (ref 5–40)
BASOPHILS # BLD: 0 K/UL (ref 0–0.2)
BASOPHILS NFR BLD: 0.6 % (ref 0–1)
BILIRUB SERPL-MCNC: 0.7 MG/DL (ref 0.2–1.2)
BILIRUB UR QL STRIP: NEGATIVE
BUN SERPL-MCNC: 20 MG/DL (ref 8–23)
CALCIUM SERPL-MCNC: 9.5 MG/DL (ref 8.8–10.2)
CHLORIDE SERPL-SCNC: 104 MMOL/L (ref 98–111)
CHOLEST SERPL-MCNC: 135 MG/DL (ref 160–199)
CLARITY UR: CLEAR
CO2 SERPL-SCNC: 25 MMOL/L (ref 22–29)
COLOR UR: YELLOW
CREAT SERPL-MCNC: 1.4 MG/DL (ref 0.5–1.2)
EOSINOPHIL # BLD: 0 K/UL (ref 0–0.6)
EOSINOPHIL NFR BLD: 0.2 % (ref 0–5)
ERYTHROCYTE [DISTWIDTH] IN BLOOD BY AUTOMATED COUNT: 14.8 % (ref 11.5–14.5)
GLUCOSE SERPL-MCNC: 125 MG/DL (ref 74–109)
GLUCOSE UR STRIP.AUTO-MCNC: NEGATIVE MG/DL
HBA1C MFR BLD: 6.9 % (ref 4–6)
HCT VFR BLD AUTO: 42.6 % (ref 42–52)
HDLC SERPL-MCNC: 31 MG/DL (ref 55–121)
HGB BLD-MCNC: 13.6 G/DL (ref 14–18)
HGB UR STRIP.AUTO-MCNC: NEGATIVE MG/L
IMM GRANULOCYTES # BLD: 0 K/UL
KETONES UR STRIP.AUTO-MCNC: NEGATIVE MG/DL
LDLC SERPL CALC-MCNC: 51 MG/DL
LEUKOCYTE ESTERASE UR QL STRIP.AUTO: NEGATIVE
LYMPHOCYTES # BLD: 1.9 K/UL (ref 1.1–4.5)
LYMPHOCYTES NFR BLD: 29.7 % (ref 20–40)
MCH RBC QN AUTO: 29.2 PG (ref 27–31)
MCHC RBC AUTO-ENTMCNC: 31.9 G/DL (ref 33–37)
MCV RBC AUTO: 91.6 FL (ref 80–94)
MONOCYTES # BLD: 0.6 K/UL (ref 0–0.9)
MONOCYTES NFR BLD: 9.8 % (ref 0–10)
NEUTROPHILS # BLD: 3.8 K/UL (ref 1.5–7.5)
NEUTS SEG NFR BLD: 59.4 % (ref 50–65)
NITRITE UR QL STRIP.AUTO: NEGATIVE
PH UR STRIP.AUTO: 6 [PH] (ref 5–8)
PLATELET # BLD AUTO: 171 K/UL (ref 130–400)
PMV BLD AUTO: 10.7 FL (ref 9.4–12.4)
POTASSIUM SERPL-SCNC: 4 MMOL/L (ref 3.5–5)
PROT SERPL-MCNC: 7.3 G/DL (ref 6.6–8.7)
PROT UR STRIP.AUTO-MCNC: NEGATIVE MG/DL
RBC # BLD AUTO: 4.65 M/UL (ref 4.7–6.1)
SODIUM SERPL-SCNC: 142 MMOL/L (ref 136–145)
SP GR UR STRIP.AUTO: 1.01 (ref 1–1.03)
TRIGL SERPL-MCNC: 263 MG/DL (ref 0–149)
TSH SERPL DL<=0.005 MIU/L-ACNC: 1.68 UIU/ML (ref 0.27–4.2)
UROBILINOGEN UR STRIP.AUTO-MCNC: 1 E.U./DL
WBC # BLD AUTO: 6.3 K/UL (ref 4.8–10.8)

## 2023-09-18 ENCOUNTER — OFFICE VISIT (OUTPATIENT)
Dept: INTERNAL MEDICINE | Age: 83
End: 2023-09-18

## 2023-09-18 VITALS
DIASTOLIC BLOOD PRESSURE: 64 MMHG | OXYGEN SATURATION: 97 % | HEART RATE: 66 BPM | BODY MASS INDEX: 28.99 KG/M2 | SYSTOLIC BLOOD PRESSURE: 138 MMHG | HEIGHT: 72 IN | WEIGHT: 214 LBS

## 2023-09-18 DIAGNOSIS — E11.9 TYPE 2 DIABETES MELLITUS WITHOUT COMPLICATION, WITHOUT LONG-TERM CURRENT USE OF INSULIN (HCC): ICD-10-CM

## 2023-09-18 DIAGNOSIS — Z00.00 MEDICARE ANNUAL WELLNESS VISIT, SUBSEQUENT: ICD-10-CM

## 2023-09-18 DIAGNOSIS — E78.2 MIXED HYPERLIPIDEMIA: ICD-10-CM

## 2023-09-18 DIAGNOSIS — K21.9 GASTROESOPHAGEAL REFLUX DISEASE WITHOUT ESOPHAGITIS: ICD-10-CM

## 2023-09-18 DIAGNOSIS — I10 ESSENTIAL HYPERTENSION: ICD-10-CM

## 2023-09-18 DIAGNOSIS — E55.9 VITAMIN D DEFICIENCY: ICD-10-CM

## 2023-09-18 DIAGNOSIS — I49.9 IRREGULAR HEART RATE: Primary | ICD-10-CM

## 2023-09-18 DIAGNOSIS — I68.0 CEREBRAL AMYLOID ANGIOPATHY (CODE): ICD-10-CM

## 2023-09-18 RX ORDER — LOSARTAN POTASSIUM 100 MG/1
100 TABLET ORAL DAILY
Qty: 90 TABLET | Refills: 1 | Status: SHIPPED | OUTPATIENT
Start: 2023-09-18

## 2023-09-18 RX ORDER — POTASSIUM CHLORIDE 750 MG/1
TABLET, EXTENDED RELEASE ORAL
Qty: 90 TABLET | Refills: 1 | Status: SHIPPED | OUTPATIENT
Start: 2023-09-18

## 2023-09-18 RX ORDER — VERAPAMIL HYDROCHLORIDE 240 MG/1
240 TABLET, FILM COATED, EXTENDED RELEASE ORAL 2 TIMES DAILY
Qty: 180 TABLET | Refills: 1 | Status: SHIPPED | OUTPATIENT
Start: 2023-09-18

## 2023-09-18 ASSESSMENT — ENCOUNTER SYMPTOMS
TROUBLE SWALLOWING: 0
CHOKING: 0
NAUSEA: 0
VOMITING: 0
BLOOD IN STOOL: 0
DIARRHEA: 0
ABDOMINAL PAIN: 0
WHEEZING: 0
EYE DISCHARGE: 0
SORE THROAT: 0
STRIDOR: 0
SHORTNESS OF BREATH: 0
COUGH: 0
COLOR CHANGE: 0
CONSTIPATION: 0
ABDOMINAL DISTENTION: 0
EYE ITCHING: 0

## 2023-09-18 ASSESSMENT — PATIENT HEALTH QUESTIONNAIRE - PHQ9
SUM OF ALL RESPONSES TO PHQ QUESTIONS 1-9: 0
1. LITTLE INTEREST OR PLEASURE IN DOING THINGS: 0
SUM OF ALL RESPONSES TO PHQ9 QUESTIONS 1 & 2: 0
SUM OF ALL RESPONSES TO PHQ QUESTIONS 1-9: 0
2. FEELING DOWN, DEPRESSED OR HOPELESS: 0
SUM OF ALL RESPONSES TO PHQ QUESTIONS 1-9: 0
SUM OF ALL RESPONSES TO PHQ QUESTIONS 1-9: 0

## 2023-09-18 ASSESSMENT — LIFESTYLE VARIABLES: HOW MANY STANDARD DRINKS CONTAINING ALCOHOL DO YOU HAVE ON A TYPICAL DAY: PATIENT DOES NOT DRINK

## 2023-09-18 NOTE — PROGRESS NOTES
Provider, MD MARYCRUZ JARAMILLOOSTAR 100 UNIT/ML injection pen INJECT [facility] Provider, MD   Testosterone Cypionate 200 MG/ML SOLN Inject 1 mL as directed every 14 days Max Daily Amount: 1 mL  MARISELA Michael CNP   atorvastatin (LIPITOR) 20 MG tablet TAKE 1 TABLET AT BEDTIME  MARISELA Kim   losartan (COZAAR) 100 MG tablet TAKE 1 TABLET EVERY DAY  MARISELA Kim   sildenafil (VIAGRA) 25 MG tablet TAKE ONE TABLET BY MOUTH DAILY AS NEEDED FOR ERECTILE DYSFUNCTION  MARISELA Michael CNP   nystatin (MYCOSTATIN) 448343 UNIT/GM cream APPLY  CREAM TOPICALLY TWICE DAILY  MARISELA Kim   fluticasone (FLONASE) 50 MCG/ACT nasal spray USE 1 SPRAY IN EACH NOSTRIL TWICE DAILY  MARISELA Kim   pantoprazole ([address] [place] [employer]) 40 MG tablet TAKE 1 TABLET EVERY DAY  MARISELA Kim       CareTeam (Including outside providers/suppliers regularly involved in providing care):   Patient Care Team:  MARISELA Kim as PCP - General (Nurse Practitioner Acute Care)  MARISELA Kim as PCP - Empaneled Provider     Reviewed and updated this visit:  Tobacco  Allergies  Meds  Med Hx  Surg Hx  Soc Hx  Fam Hx

## 2023-09-18 NOTE — TELEPHONE ENCOUNTER
Mauricio Wang called requesting a refill of the below medication which has been pended for you:     Requested Prescriptions     Pending Prescriptions Disp Refills    losartan (COZAAR) 100 MG tablet 90 tablet 1     Sig: Take 1 tablet by mouth daily       Last Appointment Date: 9/18/2023  Next Appointment Date: 12/18/2023    No Known Allergies

## 2023-09-19 ENCOUNTER — NURSE ONLY (OUTPATIENT)
Dept: UROLOGY | Age: 83
End: 2023-09-19
Payer: MEDICARE

## 2023-09-19 DIAGNOSIS — R79.89 LOW TESTOSTERONE IN MALE: Primary | ICD-10-CM

## 2023-09-19 PROCEDURE — 96372 THER/PROPH/DIAG INJ SC/IM: CPT | Performed by: NURSE PRACTITIONER

## 2023-09-19 NOTE — PROGRESS NOTES
After obtaining consent from patient and receiving verbal/written orders from MARISELA Holloway, I gave patient 1cc of testosterone cypionate injection in LEFT upper quad. gluteus, patient tolerated well. Medication was supplied by the patient.      [facility]: [id number]  LOT: [id number]  EXP: 03/31/2026

## 2023-09-22 DIAGNOSIS — R79.89 LOW TESTOSTERONE IN MALE: Primary | ICD-10-CM

## 2023-09-22 DIAGNOSIS — R79.89 LOW TESTOSTERONE IN MALE: ICD-10-CM

## 2023-09-22 LAB
ERYTHROCYTE [DISTWIDTH] IN BLOOD BY AUTOMATED COUNT: 14.1 % (ref 11.5–14.5)
HCT VFR BLD AUTO: 41.1 % (ref 42–52)
HGB BLD-MCNC: 13.6 G/DL (ref 14–18)
MCH RBC QN AUTO: 29.5 PG (ref 27–31)
MCHC RBC AUTO-ENTMCNC: 33.1 G/DL (ref 33–37)
MCV RBC AUTO: 89.2 FL (ref 80–94)
PLATELET # BLD AUTO: 146 K/UL (ref 130–400)
PMV BLD AUTO: 10.4 FL (ref 9.4–12.4)
PSA SERPL-MCNC: 1.18 NG/ML (ref 0–4)
RBC # BLD AUTO: 4.61 M/UL (ref 4.7–6.1)
TESTOST SERPL-MCNC: 935.4 NG/DL (ref 193–740)
WBC # BLD AUTO: 5.5 K/UL (ref 4.8–10.8)

## 2023-09-25 ENCOUNTER — OFFICE VISIT (OUTPATIENT)
Dept: UROLOGY | Age: 83
End: 2023-09-25
Payer: MEDICARE

## 2023-09-25 VITALS — TEMPERATURE: 97.3 F | HEIGHT: 72 IN | WEIGHT: 213.8 LBS | BODY MASS INDEX: 28.96 KG/M2

## 2023-09-25 DIAGNOSIS — N52.9 ERECTILE DYSFUNCTION, UNSPECIFIED ERECTILE DYSFUNCTION TYPE: ICD-10-CM

## 2023-09-25 DIAGNOSIS — E29.1 HYPOGONADISM IN MALE: Primary | ICD-10-CM

## 2023-09-25 DIAGNOSIS — D75.1 SECONDARY ERYTHROCYTOSIS: ICD-10-CM

## 2023-09-25 DIAGNOSIS — E29.1 HYPOGONADISM MALE: ICD-10-CM

## 2023-09-25 LAB
APPEARANCE FLUID: CLEAR
BILIRUBIN, POC: NORMAL
BLOOD URINE, POC: NORMAL
CLARITY, POC: CLEAR
COLOR, POC: YELLOW
GLUCOSE URINE, POC: NORMAL
KETONES, POC: NORMAL
LEUKOCYTE EST, POC: NORMAL
NITRITE, POC: NORMAL
PH, POC: 5.5
PROTEIN, POC: NORMAL
SPECIFIC GRAVITY, POC: 1.01
UROBILINOGEN, POC: 0.2

## 2023-09-25 PROCEDURE — 99214 OFFICE O/P EST MOD 30 MIN: CPT | Performed by: NURSE PRACTITIONER

## 2023-09-25 PROCEDURE — 81002 URINALYSIS NONAUTO W/O SCOPE: CPT | Performed by: NURSE PRACTITIONER

## 2023-09-25 PROCEDURE — 1123F ACP DISCUSS/DSCN MKR DOCD: CPT | Performed by: NURSE PRACTITIONER

## 2023-09-25 PROCEDURE — 1036F TOBACCO NON-USER: CPT | Performed by: NURSE PRACTITIONER

## 2023-09-25 PROCEDURE — G8417 CALC BMI ABV UP PARAM F/U: HCPCS | Performed by: NURSE PRACTITIONER

## 2023-09-25 PROCEDURE — G8427 DOCREV CUR MEDS BY ELIG CLIN: HCPCS | Performed by: NURSE PRACTITIONER

## 2023-09-25 RX ORDER — TESTOSTERONE CYPIONATE 200 MG/ML
1 VIAL (ML) INTRAMUSCULAR
Qty: 2 ML | Refills: 0 | Status: SHIPPED | OUTPATIENT
Start: 2023-09-25 | End: 2023-10-25

## 2023-09-25 ASSESSMENT — ENCOUNTER SYMPTOMS
BACK PAIN: 0
NAUSEA: 0
ABDOMINAL PAIN: 0
VOMITING: 0
ABDOMINAL DISTENTION: 0

## 2023-10-02 NOTE — TELEPHONE ENCOUNTER
Zoila Gloria called requesting a refill of the below medication which has been pended for you:     Requested Prescriptions     Pending Prescriptions Disp Refills    meclizine (ANTIVERT) 25 MG tablet [Pharmacy Med Name: Meclizine HCl 25 MG Oral Tablet] 30 tablet 0     Sig: TAKE 1 TABLET BY MOUTH THREE TIMES DAILY AS NEEDED FOR DIZZINESS       Last Appointment Date: 9/18/2023  Next Appointment Date: 12/18/2023    No Known Allergies

## 2023-10-03 RX ORDER — MECLIZINE HYDROCHLORIDE 25 MG/1
TABLET ORAL
Qty: 30 TABLET | Refills: 0 | Status: SHIPPED | OUTPATIENT
Start: 2023-10-03

## 2023-10-18 ENCOUNTER — NURSE ONLY (OUTPATIENT)
Dept: UROLOGY | Age: 83
End: 2023-10-18
Payer: MEDICARE

## 2023-10-18 DIAGNOSIS — E29.1 HYPOGONADISM IN MALE: Primary | ICD-10-CM

## 2023-10-18 PROCEDURE — 96372 THER/PROPH/DIAG INJ SC/IM: CPT | Performed by: NURSE PRACTITIONER

## 2023-10-18 RX ORDER — TESTOSTERONE CYPIONATE 200 MG/ML
200 INJECTION, SOLUTION INTRAMUSCULAR ONCE
Status: COMPLETED | OUTPATIENT
Start: 2023-10-18 | End: 2023-10-18

## 2023-10-18 RX ADMIN — TESTOSTERONE CYPIONATE 200 MG: 200 INJECTION, SOLUTION INTRAMUSCULAR at 07:50

## 2023-10-18 NOTE — PROGRESS NOTES
After obtaining consent from patient and receiving verbal/written orders from MARISELA Holloway, I gave patient 1cc of testosterone cypionate injection in RIGHT upper quad. gluteus, patient tolerated well. Medication was supplied by the patient.      [address]: [id number]  LOT: [id number]  EXP: 12-

## 2023-11-01 ENCOUNTER — NURSE ONLY (OUTPATIENT)
Dept: UROLOGY | Age: 83
End: 2023-11-01
Payer: MEDICARE

## 2023-11-01 DIAGNOSIS — E29.1 HYPOGONADISM IN MALE: Primary | ICD-10-CM

## 2023-11-01 PROCEDURE — 96372 THER/PROPH/DIAG INJ SC/IM: CPT | Performed by: NURSE PRACTITIONER

## 2023-11-01 RX ORDER — TESTOSTERONE CYPIONATE 200 MG/ML
200 INJECTION, SOLUTION INTRAMUSCULAR ONCE
Status: COMPLETED | OUTPATIENT
Start: 2023-11-01 | End: 2023-11-01

## 2023-11-01 RX ADMIN — TESTOSTERONE CYPIONATE 200 MG: 200 INJECTION, SOLUTION INTRAMUSCULAR at 08:08

## 2023-11-01 NOTE — PROGRESS NOTES
After obtaining consent from patient and receiving verbal/written orders from JEZ ANTONIO, I gave patient 1cc of testosterone cypionate injection in LEFT upper quad. gluteus, patient tolerated well. Medication was supplied by the patient.      [address]: [id number]  LOT: [id number]  EXP: 03-

## 2023-11-15 ENCOUNTER — NURSE ONLY (OUTPATIENT)
Dept: UROLOGY | Age: 83
End: 2023-11-15
Payer: MEDICARE

## 2023-11-15 DIAGNOSIS — E29.1 HYPOGONADISM IN MALE: Primary | ICD-10-CM

## 2023-11-15 PROCEDURE — 96372 THER/PROPH/DIAG INJ SC/IM: CPT | Performed by: NURSE PRACTITIONER

## 2023-11-15 RX ORDER — TESTOSTERONE CYPIONATE 200 MG/ML
200 INJECTION, SOLUTION INTRAMUSCULAR ONCE
Status: COMPLETED | OUTPATIENT
Start: 2023-11-15 | End: 2023-11-15

## 2023-11-15 RX ADMIN — TESTOSTERONE CYPIONATE 200 MG: 200 INJECTION, SOLUTION INTRAMUSCULAR at 07:51

## 2023-11-15 NOTE — PROGRESS NOTES
After obtaining consent from patient and receiving verbal/written orders from MARISELA Holloway, I gave patient 1cc of testosterone cypionate injection in RIGHT upper quad. gluteus, patient tolerated well. Medication was supplied by the patient. [address]: [id number]  LOT: [id number]  EXP: 03-    Patient will follow up in 14 days fro next injection.

## 2023-11-26 ENCOUNTER — OFFICE VISIT (OUTPATIENT)
Age: 83
End: 2023-11-26
Payer: MEDICARE

## 2023-11-26 VITALS
HEIGHT: 72 IN | SYSTOLIC BLOOD PRESSURE: 130 MMHG | DIASTOLIC BLOOD PRESSURE: 70 MMHG | TEMPERATURE: 98.1 F | WEIGHT: 219 LBS | BODY MASS INDEX: 29.66 KG/M2 | RESPIRATION RATE: 20 BRPM | HEART RATE: 68 BPM | OXYGEN SATURATION: 95 %

## 2023-11-26 DIAGNOSIS — R05.1 ACUTE COUGH: ICD-10-CM

## 2023-11-26 DIAGNOSIS — J32.9 SINUSITIS, UNSPECIFIED CHRONICITY, UNSPECIFIED LOCATION: Primary | ICD-10-CM

## 2023-11-26 PROCEDURE — G8484 FLU IMMUNIZE NO ADMIN: HCPCS | Performed by: NURSE PRACTITIONER

## 2023-11-26 PROCEDURE — 1123F ACP DISCUSS/DSCN MKR DOCD: CPT | Performed by: NURSE PRACTITIONER

## 2023-11-26 PROCEDURE — G8417 CALC BMI ABV UP PARAM F/U: HCPCS | Performed by: NURSE PRACTITIONER

## 2023-11-26 PROCEDURE — 3075F SYST BP GE 130 - 139MM HG: CPT | Performed by: NURSE PRACTITIONER

## 2023-11-26 PROCEDURE — 96372 THER/PROPH/DIAG INJ SC/IM: CPT | Performed by: NURSE PRACTITIONER

## 2023-11-26 PROCEDURE — 3078F DIAST BP <80 MM HG: CPT | Performed by: NURSE PRACTITIONER

## 2023-11-26 PROCEDURE — G8427 DOCREV CUR MEDS BY ELIG CLIN: HCPCS | Performed by: NURSE PRACTITIONER

## 2023-11-26 PROCEDURE — 99213 OFFICE O/P EST LOW 20 MIN: CPT | Performed by: NURSE PRACTITIONER

## 2023-11-26 PROCEDURE — 1036F TOBACCO NON-USER: CPT | Performed by: NURSE PRACTITIONER

## 2023-11-26 RX ORDER — DEXAMETHASONE SODIUM PHOSPHATE 10 MG/ML
10 INJECTION, SOLUTION INTRAMUSCULAR; INTRAVENOUS ONCE
Status: COMPLETED | OUTPATIENT
Start: 2023-11-26 | End: 2023-11-26

## 2023-11-26 RX ORDER — AMOXICILLIN AND CLAVULANATE POTASSIUM 500; 125 MG/1; MG/1
1 TABLET, FILM COATED ORAL 2 TIMES DAILY
Qty: 20 TABLET | Refills: 0 | Status: SHIPPED | OUTPATIENT
Start: 2023-11-26 | End: 2023-12-06

## 2023-11-26 RX ADMIN — DEXAMETHASONE SODIUM PHOSPHATE 10 MG: 10 INJECTION, SOLUTION INTRAMUSCULAR; INTRAVENOUS at 13:16

## 2023-11-26 ASSESSMENT — ENCOUNTER SYMPTOMS
ALLERGIC/IMMUNOLOGIC NEGATIVE: 1
GASTROINTESTINAL NEGATIVE: 1
RESPIRATORY NEGATIVE: 1
RHINORRHEA: 1
EYES NEGATIVE: 1

## 2023-11-29 ENCOUNTER — NURSE ONLY (OUTPATIENT)
Dept: UROLOGY | Age: 83
End: 2023-11-29
Payer: MEDICARE

## 2023-11-29 DIAGNOSIS — R79.89 LOW TESTOSTERONE IN MALE: Primary | ICD-10-CM

## 2023-11-29 PROCEDURE — 96372 THER/PROPH/DIAG INJ SC/IM: CPT | Performed by: NURSE PRACTITIONER

## 2023-11-29 RX ADMIN — TESTOSTERONE CYPIONATE 200 MG: 200 INJECTION, SOLUTION INTRAMUSCULAR at 08:02

## 2023-11-29 NOTE — PROGRESS NOTES
After obtaining consent from patient and receiving verbal/written orders from MARISELA KHAN, I gave patient 1cc of testosterone cypionate injection in LEFT upper quad. gluteus, patient tolerated well. Medication was supplied by the patient. [address]: [id number]  LOT: [id number]  EXP: 04-    Patient will follow up in 14 days for next injection.

## 2023-11-30 ENCOUNTER — TELEPHONE (OUTPATIENT)
Dept: HEMATOLOGY | Age: 83
End: 2023-11-30

## 2023-11-30 DIAGNOSIS — D50.8 IRON DEFICIENCY ANEMIA SECONDARY TO INADEQUATE DIETARY IRON INTAKE: ICD-10-CM

## 2023-11-30 DIAGNOSIS — I10 ESSENTIAL HYPERTENSION: Primary | ICD-10-CM

## 2023-11-30 NOTE — TELEPHONE ENCOUNTER
Called patient and reminded patient of their appointment on 12/01/2023. A detailed voicemail with all appointment details was left for patient.

## 2023-12-06 DIAGNOSIS — E29.1 HYPOGONADISM MALE: ICD-10-CM

## 2023-12-06 RX ORDER — TESTOSTERONE CYPIONATE 200 MG/ML
INJECTION, SOLUTION INTRAMUSCULAR
Qty: 2 ML | Refills: 0 | Status: SHIPPED | OUTPATIENT
Start: 2023-12-06 | End: 2024-01-05

## 2023-12-11 NOTE — PROGRESS NOTES
After obtaining consent from patient and receiving verbal/written orders from MARISELA KHAN, I gave patient 1cc of testosterone cypionate injection in RIGHT upper quad. gluteus, patient tolerated well. Medication was supplied by the patient. [facility]: [id number]  LOT: [id number]  EXP: 03-    Patient will follow up in 14 days for next injection.

## 2023-12-13 ENCOUNTER — NURSE ONLY (OUTPATIENT)
Dept: UROLOGY | Age: 83
End: 2023-12-13
Payer: MEDICARE

## 2023-12-13 DIAGNOSIS — E29.1 HYPOGONADISM MALE: Primary | ICD-10-CM

## 2023-12-13 PROCEDURE — 96372 THER/PROPH/DIAG INJ SC/IM: CPT | Performed by: NURSE PRACTITIONER

## 2023-12-13 RX ORDER — TESTOSTERONE CYPIONATE 200 MG/ML
200 INJECTION, SOLUTION INTRAMUSCULAR ONCE
Status: COMPLETED | OUTPATIENT
Start: 2023-12-13 | End: 2023-12-13

## 2023-12-13 RX ADMIN — TESTOSTERONE CYPIONATE 200 MG: 200 INJECTION, SOLUTION INTRAMUSCULAR at 07:51

## 2023-12-14 DIAGNOSIS — E78.2 MIXED HYPERLIPIDEMIA: ICD-10-CM

## 2023-12-14 DIAGNOSIS — E11.9 TYPE 2 DIABETES MELLITUS WITHOUT COMPLICATION, WITHOUT LONG-TERM CURRENT USE OF INSULIN (HCC): ICD-10-CM

## 2023-12-14 DIAGNOSIS — I10 ESSENTIAL HYPERTENSION: ICD-10-CM

## 2023-12-14 DIAGNOSIS — E55.9 VITAMIN D DEFICIENCY: ICD-10-CM

## 2023-12-14 LAB
25(OH)D3 SERPL-MCNC: 21.2 NG/ML
ALBUMIN SERPL-MCNC: 4.4 G/DL (ref 3.5–5.2)
ALP SERPL-CCNC: 70 U/L (ref 40–130)
ALT SERPL-CCNC: 30 U/L (ref 5–41)
ANION GAP SERPL CALCULATED.3IONS-SCNC: 13 MMOL/L (ref 7–19)
AST SERPL-CCNC: 21 U/L (ref 5–40)
BASOPHILS # BLD: 0.1 K/UL (ref 0–0.2)
BASOPHILS NFR BLD: 0.7 % (ref 0–1)
BILIRUB SERPL-MCNC: 0.5 MG/DL (ref 0.2–1.2)
BILIRUB UR QL STRIP: NEGATIVE
BUN SERPL-MCNC: 23 MG/DL (ref 8–23)
CALCIUM SERPL-MCNC: 9.2 MG/DL (ref 8.8–10.2)
CHLORIDE SERPL-SCNC: 104 MMOL/L (ref 98–111)
CHOLEST SERPL-MCNC: 187 MG/DL (ref 160–199)
CLARITY UR: CLEAR
CO2 SERPL-SCNC: 25 MMOL/L (ref 22–29)
COLOR UR: YELLOW
CREAT SERPL-MCNC: 1 MG/DL (ref 0.5–1.2)
EOSINOPHIL # BLD: 0.3 K/UL (ref 0–0.6)
EOSINOPHIL NFR BLD: 4.1 % (ref 0–5)
ERYTHROCYTE [DISTWIDTH] IN BLOOD BY AUTOMATED COUNT: 13.2 % (ref 11.5–14.5)
GLUCOSE SERPL-MCNC: 128 MG/DL (ref 74–109)
GLUCOSE UR STRIP.AUTO-MCNC: NEGATIVE MG/DL
HBA1C MFR BLD: 6 % (ref 4–6)
HCT VFR BLD AUTO: 53.9 % (ref 42–52)
HDLC SERPL-MCNC: 34 MG/DL (ref 55–121)
HGB BLD-MCNC: 17.3 G/DL (ref 14–18)
HGB UR STRIP.AUTO-MCNC: NEGATIVE MG/L
IMM GRANULOCYTES # BLD: 0 K/UL
KETONES UR STRIP.AUTO-MCNC: NEGATIVE MG/DL
LDLC SERPL CALC-MCNC: 134 MG/DL
LEUKOCYTE ESTERASE UR QL STRIP.AUTO: NEGATIVE
LYMPHOCYTES # BLD: 1.6 K/UL (ref 1.1–4.5)
LYMPHOCYTES NFR BLD: 22.9 % (ref 20–40)
MCH RBC QN AUTO: 28.1 PG (ref 27–31)
MCV RBC AUTO: 87.6 FL (ref 80–94)
MONOCYTES # BLD: 0.6 K/UL (ref 0–0.9)
MONOCYTES NFR BLD: 9.1 % (ref 0–10)
NEUTROPHILS # BLD: 4.3 K/UL (ref 1.5–7.5)
NEUTS SEG NFR BLD: 63.1 % (ref 50–65)
NITRITE UR QL STRIP.AUTO: NEGATIVE
PH UR STRIP.AUTO: 5.5 [PH] (ref 5–8)
PLATELET # BLD AUTO: 167 K/UL (ref 130–400)
PMV BLD AUTO: 10.6 FL (ref 9.4–12.4)
POTASSIUM SERPL-SCNC: 4.3 MMOL/L (ref 3.5–5)
PROT SERPL-MCNC: 7.2 G/DL (ref 6.6–8.7)
PROT UR STRIP.AUTO-MCNC: ABNORMAL MG/DL
RBC # BLD AUTO: 6.15 M/UL (ref 4.7–6.1)
SODIUM SERPL-SCNC: 142 MMOL/L (ref 136–145)
SP GR UR STRIP.AUTO: 1.02 (ref 1–1.03)
TRIGL SERPL-MCNC: 97 MG/DL (ref 0–149)
TSH SERPL DL<=0.005 MIU/L-ACNC: 1.96 UIU/ML (ref 0.27–4.2)
UROBILINOGEN UR STRIP.AUTO-MCNC: 0.2 E.U./DL
WBC # BLD AUTO: 6.9 K/UL (ref 4.8–10.8)

## 2023-12-18 PROBLEM — L60.2 THICK NAIL: Status: ACTIVE | Noted: 2023-12-18

## 2023-12-18 RX ORDER — HYDROCHLOROTHIAZIDE 25 MG/1
25 TABLET ORAL DAILY
Qty: 90 TABLET | Refills: 0 | Status: CANCELLED | OUTPATIENT
Start: 2023-12-18

## 2023-12-18 RX ORDER — SPIRONOLACTONE 25 MG/1
25 TABLET ORAL DAILY
Qty: 90 TABLET | Refills: 0 | Status: CANCELLED | OUTPATIENT
Start: 2023-12-18

## 2023-12-27 ENCOUNTER — NURSE ONLY (OUTPATIENT)
Dept: UROLOGY | Age: 83
End: 2023-12-27
Payer: MEDICARE

## 2023-12-27 DIAGNOSIS — E29.1 HYPOGONADISM MALE: Primary | ICD-10-CM

## 2023-12-27 PROCEDURE — 96372 THER/PROPH/DIAG INJ SC/IM: CPT | Performed by: NURSE PRACTITIONER

## 2023-12-27 RX ORDER — TESTOSTERONE CYPIONATE 200 MG/ML
200 INJECTION, SOLUTION INTRAMUSCULAR ONCE
Status: COMPLETED | OUTPATIENT
Start: 2023-12-27 | End: 2023-12-27

## 2023-12-27 RX ADMIN — TESTOSTERONE CYPIONATE 200 MG: 200 INJECTION, SOLUTION INTRAMUSCULAR at 08:15

## 2023-12-27 NOTE — PROGRESS NOTES
After obtaining consent from patient and receiving verbal/written orders from MARISELA KHAN, I gave patient 1cc of testosterone cypionate injection in RIGHT upper quad. gluteus, patient tolerated well. Medication was supplied by the patient. [address]: [id number]  LOT: [id number]  EXP: 04-    Patient will follow up in 14 days for next injection.

## 2024-01-04 DIAGNOSIS — E29.1 HYPOGONADISM MALE: ICD-10-CM

## 2024-01-04 RX ORDER — TESTOSTERONE CYPIONATE 200 MG/ML
INJECTION, SOLUTION INTRAMUSCULAR
Qty: 2 ML | Refills: 0 | Status: SHIPPED | OUTPATIENT
Start: 2024-01-04 | End: 2024-02-03

## 2024-01-06 ENCOUNTER — OFFICE VISIT (OUTPATIENT)
Age: 84
End: 2024-01-06
Payer: MEDICARE

## 2024-01-06 ENCOUNTER — HOSPITAL ENCOUNTER (EMERGENCY)
Age: 84
Discharge: HOME | End: 2024-01-06
Attending: EMERGENCY MEDICINE
Payer: MEDICARE

## 2024-01-06 VITALS
OXYGEN SATURATION: 96 % | DIASTOLIC BLOOD PRESSURE: 88 MMHG | HEIGHT: 72 IN | BODY MASS INDEX: 30.07 KG/M2 | TEMPERATURE: 97.8 F | WEIGHT: 222 LBS | RESPIRATION RATE: 20 BRPM | SYSTOLIC BLOOD PRESSURE: 140 MMHG | HEART RATE: 79 BPM

## 2024-01-06 VITALS
HEART RATE: 74 BPM | RESPIRATION RATE: 16 BRPM | OXYGEN SATURATION: 97 % | TEMPERATURE: 97.7 F | SYSTOLIC BLOOD PRESSURE: 173 MMHG | DIASTOLIC BLOOD PRESSURE: 76 MMHG

## 2024-01-06 DIAGNOSIS — M79.651 PAIN IN RIGHT THIGH: Primary | ICD-10-CM

## 2024-01-06 DIAGNOSIS — S76.111A STRAIN OF RIGHT QUADRICEPS MUSCLE, INITIAL ENCOUNTER: Primary | ICD-10-CM

## 2024-01-06 LAB
ALBUMIN SERPL-MCNC: 4.1 G/DL (ref 3.5–5.2)
ALP SERPL-CCNC: 56 U/L (ref 40–130)
ALT SERPL-CCNC: 22 U/L (ref 5–41)
ANION GAP SERPL CALCULATED.3IONS-SCNC: 11 MMOL/L (ref 7–19)
AST SERPL-CCNC: 22 U/L (ref 5–40)
BASOPHILS # BLD: 0.1 K/UL (ref 0–0.2)
BASOPHILS NFR BLD: 1.1 % (ref 0–1)
BILIRUB SERPL-MCNC: 0.6 MG/DL (ref 0.2–1.2)
BUN SERPL-MCNC: 21 MG/DL (ref 8–23)
CALCIUM SERPL-MCNC: 9.2 MG/DL (ref 8.8–10.2)
CHLORIDE SERPL-SCNC: 103 MMOL/L (ref 98–111)
CK SERPL-CCNC: 156 U/L (ref 39–308)
CO2 SERPL-SCNC: 27 MMOL/L (ref 22–29)
CREAT SERPL-MCNC: 0.9 MG/DL (ref 0.5–1.2)
D DIMER PPP FEU-MCNC: 0.74 UG/ML FEU (ref 0–0.48)
EOSINOPHIL # BLD: 0.3 K/UL (ref 0–0.6)
EOSINOPHIL NFR BLD: 3.9 % (ref 0–5)
ERYTHROCYTE [DISTWIDTH] IN BLOOD BY AUTOMATED COUNT: 13.6 % (ref 11.5–14.5)
GLUCOSE SERPL-MCNC: 143 MG/DL (ref 74–109)
HCT VFR BLD AUTO: 51.6 % (ref 42–52)
HGB BLD-MCNC: 17.4 G/DL (ref 14–18)
IMM GRANULOCYTES # BLD: 0 K/UL
LYMPHOCYTES # BLD: 1.6 K/UL (ref 1.1–4.5)
LYMPHOCYTES NFR BLD: 24.6 % (ref 20–40)
MCH RBC QN AUTO: 27.8 PG (ref 27–31)
MCHC RBC AUTO-ENTMCNC: 33.7 G/DL (ref 33–37)
MCV RBC AUTO: 82.3 FL (ref 80–94)
MONOCYTES # BLD: 0.7 K/UL (ref 0–0.9)
MONOCYTES NFR BLD: 10.9 % (ref 0–10)
NEUTROPHILS # BLD: 3.9 K/UL (ref 1.5–7.5)
NEUTS SEG NFR BLD: 59.2 % (ref 50–65)
PLATELET # BLD AUTO: 159 K/UL (ref 130–400)
PMV BLD AUTO: 10.1 FL (ref 9.4–12.4)
POTASSIUM SERPL-SCNC: 3.1 MMOL/L (ref 3.5–5)
PROT SERPL-MCNC: 6.3 G/DL (ref 6.6–8.7)
RBC # BLD AUTO: 6.27 M/UL (ref 4.7–6.1)
SODIUM SERPL-SCNC: 141 MMOL/L (ref 136–145)
WBC # BLD AUTO: 6.6 K/UL (ref 4.8–10.8)

## 2024-01-06 PROCEDURE — 36415 COLL VENOUS BLD VENIPUNCTURE: CPT

## 2024-01-06 PROCEDURE — 99283 EMERGENCY DEPT VISIT LOW MDM: CPT

## 2024-01-06 PROCEDURE — 85379 FIBRIN DEGRADATION QUANT: CPT

## 2024-01-06 PROCEDURE — 1123F ACP DISCUSS/DSCN MKR DOCD: CPT

## 2024-01-06 PROCEDURE — 82550 ASSAY OF CK (CPK): CPT

## 2024-01-06 PROCEDURE — 3077F SYST BP >= 140 MM HG: CPT

## 2024-01-06 PROCEDURE — 99213 OFFICE O/P EST LOW 20 MIN: CPT

## 2024-01-06 PROCEDURE — 3079F DIAST BP 80-89 MM HG: CPT

## 2024-01-06 PROCEDURE — 80053 COMPREHEN METABOLIC PANEL: CPT

## 2024-01-06 PROCEDURE — 85025 COMPLETE CBC W/AUTO DIFF WBC: CPT

## 2024-01-06 RX ORDER — TIZANIDINE 2 MG/1
2-4 TABLET ORAL EVERY 8 HOURS PRN
Qty: 20 TABLET | Refills: 0 | Status: SHIPPED | OUTPATIENT
Start: 2024-01-06

## 2024-01-06 ASSESSMENT — ENCOUNTER SYMPTOMS
SINUS PRESSURE: 0
VOMITING: 0
RESPIRATORY NEGATIVE: 1
RHINORRHEA: 0
DIARRHEA: 0
NAUSEA: 0
SINUS PAIN: 0
COUGH: 0
GASTROINTESTINAL NEGATIVE: 1
SHORTNESS OF BREATH: 0
SORE THROAT: 0
EYES NEGATIVE: 1

## 2024-01-06 NOTE — PROGRESS NOTES
dry.      Capillary Refill: Capillary refill takes less than 2 seconds.   Neurological:      Mental Status: He is alert and oriented to person, place, and time.   Psychiatric:         Mood and Affect: Mood normal.         Behavior: Behavior normal.         BP (!) 140/88   Pulse 79   Temp 97.8 °F (36.6 °C)   Resp 20   Ht 1.829 m (6')   Wt 100.7 kg (222 lb)   SpO2 96%   BMI 30.11 kg/m²     Assessment         Diagnosis Orders   1. Pain in right thigh            Plan     No orders of the defined types were placed in this encounter.      No results found for this visit on 01/06/24.    No orders of the defined types were placed in this encounter.     New Prescriptions    No medications on file        Return if symptoms worsen or fail to improve.         Discussed use, benefits, and side effects of any prescribed medications. All patient questions were answered. Patient voiced understanding of care plan.   Patient was given educational materials - see patient instructions below.     Patient Instructions   Recommend pt present to ER as I am unable to get US scheduled over the weekend.  Pt verbalized understanding and agreement with POC.      Electronically signed by MARISELA Guerrero CNP on 1/6/2024 at 7:49 AM

## 2024-01-06 NOTE — PATIENT INSTRUCTIONS
Recommend pt present to ER as I am unable to get US scheduled over the weekend.  Pt verbalized understanding and agreement with POC.

## 2024-01-06 NOTE — ED PROVIDER NOTES
[facility] EMERGENCY DEPT  EMERGENCY DEPARTMENT ENCOUNTER      Pt Name: Cristian Regan  MRN: [id number]  Birthdate 1940  Date of evaluation: 1/6/2024  Provider: Garland Wayne Jr, MD    CHIEF COMPLAINT       Chief Complaint   Patient presents with    Leg Pain     Pt here with pain right upper leg x2 days. Pt sent here from urgent care for blood clot r/o         HISTORY OF PRESENT ILLNESS   (Location/Symptom, Timing/Onset,Context/Setting, Quality, Duration, Modifying Factors, Severity)  Note limiting factors.   Cristian Regan is a 83 y.o. male who presents to the emergency department with complaint of right thigh pain that has been present over the last 3 days or so.  Denies any specific injury.  Has pain flexing the hip.  Says that he does not have any pain at rest or even with walking but has pain when he tries to raise his leg such as trying to get up into his truck.  Has some chronic intermittent leg swelling.  Denies any recent worsening of leg swelling.  No pain in groin, behind knee, or in calf.    No history of blood clots in the past.  Does take testosterone replacement therapy    HPI    NursingNotes were reviewed.    REVIEW OF SYSTEMS    (2-9 systems for level 4, 10 or more for level 5)     Review of Systems   Constitutional: Negative.    HENT: Negative.     Eyes: Negative.    Respiratory: Negative.     Cardiovascular: Negative.    Gastrointestinal: Negative.    Genitourinary: Negative.    Musculoskeletal:  Positive for myalgias.   Skin: Negative.    Neurological: Negative.    Hematological: Negative.    Psychiatric/Behavioral: Negative.         A complete review of systems was performed and is negative except as noted above in the HPI.       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

## 2024-01-10 ENCOUNTER — NURSE ONLY (OUTPATIENT)
Dept: UROLOGY | Age: 84
End: 2024-01-10
Payer: MEDICARE

## 2024-01-10 DIAGNOSIS — E29.1 HYPOGONADISM MALE: Primary | ICD-10-CM

## 2024-01-10 PROCEDURE — 96372 THER/PROPH/DIAG INJ SC/IM: CPT | Performed by: NURSE PRACTITIONER

## 2024-01-10 RX ORDER — TESTOSTERONE CYPIONATE 200 MG/ML
200 INJECTION, SOLUTION INTRAMUSCULAR ONCE
Status: COMPLETED | OUTPATIENT
Start: 2024-01-10 | End: 2024-01-10

## 2024-01-10 RX ADMIN — TESTOSTERONE CYPIONATE 200 MG: 200 INJECTION, SOLUTION INTRAMUSCULAR at 07:46

## 2024-01-10 NOTE — PROGRESS NOTES
After obtaining consent from patient and receiving verbal/written orders from MARISELA KHAN, I gave patient 1cc of testosterone cypionate injection in LEFT upper quad. gluteus, patient tolerated well.  Medication was supplied by the patient.     NDC: 5497-0510-93  LOT: [id number]  EXP: 04-    Patient will follow up in 14 days for next injection.

## 2024-01-24 ENCOUNTER — NURSE ONLY (OUTPATIENT)
Dept: UROLOGY | Age: 84
End: 2024-01-24
Payer: MEDICARE

## 2024-01-24 DIAGNOSIS — R79.89 LOW TESTOSTERONE IN MALE: Primary | ICD-10-CM

## 2024-01-24 PROCEDURE — 96372 THER/PROPH/DIAG INJ SC/IM: CPT | Performed by: NURSE PRACTITIONER

## 2024-01-24 NOTE — PROGRESS NOTES
After obtaining consent from patient and receiving verbal/written orders from MARISELA KHAN, I gave patient 1cc of testosterone cypionate injection in RIGHT upper quad. gluteus, patient tolerated well.  Medication was supplied by the patient.     NDC: 6125-3868-14  LOT: [id number]  EXP: 04-    Patient will follow up in 14 days for next injection.

## 2024-01-26 DIAGNOSIS — E29.1 HYPOGONADISM MALE: ICD-10-CM

## 2024-01-26 RX ORDER — TESTOSTERONE CYPIONATE 200 MG/ML
INJECTION, SOLUTION INTRAMUSCULAR
Qty: 2 ML | Refills: 0 | Status: SHIPPED | OUTPATIENT
Start: 2024-01-26 | End: 2024-02-25

## 2024-01-29 ENCOUNTER — TELEPHONE (OUTPATIENT)
Dept: HEMATOLOGY | Age: 84
End: 2024-01-29

## 2024-01-29 NOTE — TELEPHONE ENCOUNTER
Called pt. to remind them of appointment on 1/31/2024 and had to leave a detailed voicemail with appointment date and time

## 2024-01-29 NOTE — PROGRESS NOTES
occurs    3. Iron deficiency, iron substrates were within normal limits.  -Repeat iron substrates today    12/12/2022 Serology results  Iron 181  TIBC 363  Saturation 50%  Ferritin 90    4. 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  Recently diagnosed with cerebral amyloid angioplasty after presenting with severe confusion he is currently being followed by Dr. Jesse José at [facility].  He has had a complete work-up to include MRI of the brain labs and a lumbar puncture which was all completed at [facility].  He is currently on a titrating steroid dosing and feels he is not having any cognitive issues at this time.  Questions were answered appropriately and correctly although at times additional comments were made that could be considered as some confusion.    Currently under the direction of Dr. Jesse José he is taking prednisone 40 mg p.o. daily for 2 weeks then will titrate down to 20 mg p.o. for 2 weeks then 10 mg for 2 weeks and 5 mg for 2 weeks then discontinue    -Continue titrating dosing recommendation and follow-up with Dr. Jesse José as recommended for further treatment of new diagnosis    I discussed all of the above findings included in the assessment and plan with the patient and the patient is in agreement to move forward with current recommendations/treatment.  I have addressed all of their questions and concerns that were verbalized.    FOLLOW UP:  Follow up given for 4 months or sooner if needed secondary polycythemia and mild thrombocytopenia with splenomegaly  Continue to follow with other medical providers as recommended  Labs at next visit: CBC    EMR Dragon/Transcription disclaimer:   Much of this encounter note is an electronic transcription/translation 
negative; CALR negative; Exon 12-15 negative on 4/19/2022) and path review was within acceptable limits. Currently receiving testosterone replacement with 200 mg injection IM every 2 weeks under the direction of his PCP.    Hemoglobin 18.0, hematocrit 54.5, MCV 82.7 and RBC 5.57 today.  Asymptomatic, denies any brain fogginess or other complaints.    He reports he has not recently been donating blood to the [facility], reports will attempt to donate blood today if not definitely this week    -No need for phlebotomy with hematocrit being <60 and asymptomatic: Strongly encourage donating blood  -Okay to continue testosterone replacement.  Under the direction of MARISELA Steel  -CBC in 3 months    2. Thrombocytopenia (HCC) with mild splenomegaly. Platelet count of 149,000 today, stable.  Denies any bleeding to include melena, epistaxis, hemoptysis, hematuria or hematochezia.    -Continue conservative monitoring  -Contact the clinic between visits if any excessive bruising or bleeding occurs    3. Iron deficiency, iron substrates were within normal limits on 12/12/2022.  -Repeat iron substrates today    4. Skin cancer, basal cell to right forearm, right lower back, left inferior medial back, and right superior lateral mid-back and squamous cell to left forearm and right medial superior chest.  Denies any concerning skin lesions    -Follow up with Dr Haji as recommended    5. Cerebral amyloid angioplasty after presenting with severe confusion on 6/8/2023.  He is currently being followed by Dr. Jesse José at [facility].  He has completed steroid dose tapering and currently receiving routine monitoring.  He is asymptomatic with no cognitive concerns or identified today during office visit.      -Scheduled follow-up with Dr. Jesse José as recommended with MRI brain in May 2024    I discussed all of the above findings included in the assessment and plan with the patient and the patient is in agreement to

## 2024-01-31 ENCOUNTER — OFFICE VISIT (OUTPATIENT)
Dept: HEMATOLOGY | Age: 84
End: 2024-01-31
Payer: MEDICARE

## 2024-01-31 ENCOUNTER — HOSPITAL ENCOUNTER (OUTPATIENT)
Dept: INFUSION THERAPY | Age: 84
Discharge: HOME | End: 2024-01-31
Payer: MEDICARE

## 2024-01-31 VITALS
BODY MASS INDEX: 29.39 KG/M2 | TEMPERATURE: 98 F | HEIGHT: 72 IN | HEART RATE: 57 BPM | SYSTOLIC BLOOD PRESSURE: 152 MMHG | OXYGEN SATURATION: 96 % | DIASTOLIC BLOOD PRESSURE: 86 MMHG | WEIGHT: 217 LBS

## 2024-01-31 DIAGNOSIS — D69.6 THROMBOCYTOPENIA (HCC): ICD-10-CM

## 2024-01-31 DIAGNOSIS — D50.9 IRON DEFICIENCY ANEMIA, UNSPECIFIED IRON DEFICIENCY ANEMIA TYPE: ICD-10-CM

## 2024-01-31 DIAGNOSIS — C44.90 SKIN CANCER: ICD-10-CM

## 2024-01-31 DIAGNOSIS — D50.8 IRON DEFICIENCY ANEMIA SECONDARY TO INADEQUATE DIETARY IRON INTAKE: ICD-10-CM

## 2024-01-31 DIAGNOSIS — D75.1 SECONDARY ERYTHROCYTOSIS: Primary | ICD-10-CM

## 2024-01-31 DIAGNOSIS — I10 ESSENTIAL HYPERTENSION: ICD-10-CM

## 2024-01-31 LAB
ALBUMIN SERPL-MCNC: 4.3 G/DL (ref 3.5–5.2)
ALP SERPL-CCNC: 67 U/L (ref 40–130)
ALT SERPL-CCNC: 34 U/L (ref 21–72)
ANION GAP SERPL CALCULATED.3IONS-SCNC: 11 MMOL/L (ref 7–19)
AST SERPL-CCNC: 43 U/L (ref 17–59)
BASOPHILS # BLD: 0.06 K/UL (ref 0.01–0.08)
BASOPHILS NFR BLD: 0.6 % (ref 0.1–1.2)
BILIRUB SERPL-MCNC: 1.1 MG/DL (ref 0.2–1.3)
BUN SERPL-MCNC: 28 MG/DL (ref 9–20)
CALCIUM SERPL-MCNC: 8.5 MG/DL (ref 8.4–10.2)
CHLORIDE SERPL-SCNC: 107 MMOL/L (ref 98–111)
CO2 SERPL-SCNC: 25 MMOL/L (ref 22–29)
CREAT SERPL-MCNC: 1.3 MG/DL (ref 0.6–1.2)
EOSINOPHIL # BLD: 0.3 K/UL (ref 0.04–0.54)
EOSINOPHIL NFR BLD: 3.1 % (ref 0.7–7)
ERYTHROCYTE [DISTWIDTH] IN BLOOD BY AUTOMATED COUNT: 14.8 % (ref 11.6–14.4)
FERRITIN SERPL-MCNC: 97.4 NG/ML (ref 30–400)
GLOBULIN: 3 G/DL
GLUCOSE SERPL-MCNC: 146 MG/DL (ref 74–106)
HCT VFR BLD AUTO: 54.5 % (ref 40.1–51)
HGB BLD-MCNC: 18 G/DL (ref 13.7–17.5)
IRON SATN MFR SERPL: 24 % (ref 14–50)
IRON SERPL-MCNC: 81 UG/DL (ref 59–158)
LYMPHOCYTES # BLD: 2.05 K/UL (ref 1.18–3.74)
LYMPHOCYTES NFR BLD: 21 % (ref 19.3–53.1)
MCH RBC QN AUTO: 27.3 PG (ref 25.7–32.2)
MCHC RBC AUTO-ENTMCNC: 33 G/DL (ref 32.3–36.5)
MCV RBC AUTO: 82.7 FL (ref 79–92.2)
MONOCYTES # BLD: 0.62 K/UL (ref 0.24–0.82)
MONOCYTES NFR BLD: 6.3 % (ref 4.7–12.5)
NEUTROPHILS # BLD: 6.7 K/UL (ref 1.56–6.13)
NEUTS SEG NFR BLD: 68.6 % (ref 34–71.1)
PLATELET # BLD AUTO: 149 K/UL (ref 163–337)
PMV BLD AUTO: 9.5 FL (ref 7.4–10.4)
POTASSIUM SERPL-SCNC: 4.6 MMOL/L (ref 3.5–5.1)
PROT SERPL-MCNC: 7.3 G/DL (ref 6.3–8.2)
RBC # BLD AUTO: 6.59 M/UL (ref 4.63–6.08)
SODIUM SERPL-SCNC: 143 MMOL/L (ref 137–145)
TIBC SERPL-MCNC: 344 UG/DL (ref 250–400)
WBC # BLD AUTO: 9.77 K/UL (ref 4.23–9.07)

## 2024-01-31 PROCEDURE — G8417 CALC BMI ABV UP PARAM F/U: HCPCS | Performed by: NURSE PRACTITIONER

## 2024-01-31 PROCEDURE — 99212 OFFICE O/P EST SF 10 MIN: CPT

## 2024-01-31 PROCEDURE — 3079F DIAST BP 80-89 MM HG: CPT | Performed by: NURSE PRACTITIONER

## 2024-01-31 PROCEDURE — G8484 FLU IMMUNIZE NO ADMIN: HCPCS | Performed by: NURSE PRACTITIONER

## 2024-01-31 PROCEDURE — 80053 COMPREHEN METABOLIC PANEL: CPT

## 2024-01-31 PROCEDURE — 3077F SYST BP >= 140 MM HG: CPT | Performed by: NURSE PRACTITIONER

## 2024-01-31 PROCEDURE — 1123F ACP DISCUSS/DSCN MKR DOCD: CPT | Performed by: NURSE PRACTITIONER

## 2024-01-31 PROCEDURE — 85025 COMPLETE CBC W/AUTO DIFF WBC: CPT

## 2024-01-31 PROCEDURE — 36415 COLL VENOUS BLD VENIPUNCTURE: CPT

## 2024-01-31 PROCEDURE — 1036F TOBACCO NON-USER: CPT | Performed by: NURSE PRACTITIONER

## 2024-01-31 PROCEDURE — G8427 DOCREV CUR MEDS BY ELIG CLIN: HCPCS | Performed by: NURSE PRACTITIONER

## 2024-01-31 PROCEDURE — 99214 OFFICE O/P EST MOD 30 MIN: CPT | Performed by: NURSE PRACTITIONER

## 2024-02-02 ASSESSMENT — ENCOUNTER SYMPTOMS
WHEEZING: 0
COUGH: 0
DIARRHEA: 0
SHORTNESS OF BREATH: 0
BACK PAIN: 0
ABDOMINAL PAIN: 0
EYE REDNESS: 0
GASTROINTESTINAL NEGATIVE: 1
NAUSEA: 0
EYE DISCHARGE: 0
RESPIRATORY NEGATIVE: 1
SORE THROAT: 0
VOMITING: 0
EYES NEGATIVE: 1
EYE PAIN: 0
BLOOD IN STOOL: 0
CONSTIPATION: 0

## 2024-02-07 ENCOUNTER — NURSE ONLY (OUTPATIENT)
Dept: UROLOGY | Age: 84
End: 2024-02-07

## 2024-02-07 DIAGNOSIS — R79.89 LOW TESTOSTERONE IN MALE: Primary | ICD-10-CM

## 2024-02-07 RX ORDER — TESTOSTERONE CYPIONATE 200 MG/ML
200 INJECTION, SOLUTION INTRAMUSCULAR ONCE
Status: COMPLETED | OUTPATIENT
Start: 2024-02-07 | End: 2024-02-07

## 2024-02-07 RX ADMIN — TESTOSTERONE CYPIONATE 200 MG: 200 INJECTION, SOLUTION INTRAMUSCULAR at 09:28

## 2024-02-07 NOTE — PROGRESS NOTES
After obtaining consent from patient and receiving verbal/written orders from MARISELA KHAN, I gave patient 1cc of testosterone cypionate injection in LEFT upper quad. gluteus, patient tolerated well.  Medication was supplied by the patient.     NDC: 9120-8570-12  LOT: [id number]  EXP: 04-    Patient will follow up in 14 days for next injection.

## 2024-02-09 ENCOUNTER — OFFICE VISIT (OUTPATIENT)
Age: 84
End: 2024-02-09
Payer: MEDICARE

## 2024-02-09 VITALS
BODY MASS INDEX: 29.29 KG/M2 | DIASTOLIC BLOOD PRESSURE: 70 MMHG | RESPIRATION RATE: 20 BRPM | HEART RATE: 81 BPM | OXYGEN SATURATION: 96 % | WEIGHT: 216 LBS | TEMPERATURE: 98.1 F | SYSTOLIC BLOOD PRESSURE: 138 MMHG

## 2024-02-09 DIAGNOSIS — R05.9 COUGH, UNSPECIFIED TYPE: ICD-10-CM

## 2024-02-09 DIAGNOSIS — J39.8 CONGESTION OF UPPER RESPIRATORY TRACT: Primary | ICD-10-CM

## 2024-02-09 LAB
INFLUENZA A ANTIBODY: NEGATIVE
INFLUENZA B ANTIBODY: NEGATIVE
S PYO AG THROAT QL: NORMAL
SARS-COV-2 N GENE RESP QL NAA+PROBE: NOT DETECTED

## 2024-02-09 PROCEDURE — 3075F SYST BP GE 130 - 139MM HG: CPT

## 2024-02-09 PROCEDURE — 1123F ACP DISCUSS/DSCN MKR DOCD: CPT

## 2024-02-09 PROCEDURE — 99213 OFFICE O/P EST LOW 20 MIN: CPT

## 2024-02-09 PROCEDURE — 3078F DIAST BP <80 MM HG: CPT

## 2024-02-09 ASSESSMENT — ENCOUNTER SYMPTOMS
SHORTNESS OF BREATH: 0
SORE THROAT: 0
COUGH: 1
VOMITING: 0
NAUSEA: 0
RHINORRHEA: 0
BACK PAIN: 0
WHEEZING: 0
SINUS PAIN: 0
ABDOMINAL PAIN: 0
DIARRHEA: 0
EYE DISCHARGE: 0
EYE REDNESS: 0
ALLERGIC/IMMUNOLOGIC NEGATIVE: 1
CONSTIPATION: 0
EYE ITCHING: 0
CHEST TIGHTNESS: 0

## 2024-02-09 NOTE — PROGRESS NOTES
[facility]  [facility]  [address] [place] [id number]  Dept: [phone]  Dept Fax: [phone]  Loc: [phone]    Cristian Regan is a 83 y.o. male who presents today for his medical conditions/complaints as noted below.  Cristian Regan is complaining of Congestion and Cough (Seen4 weeks ago )    HPI:     Cristian Regan is ambulatory to clinic for evaluation of cough and congestion x1.5 days. Reports exposure to illness due to patient's wife's symptoms beginning before his. Denies fever, pharyngitis, vomiting, otalgia. Patient was seen here in November for sinusitis and given Dexamethasone injection and prescribed Augmentin. 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    Gastroesophageal reflux disease without esophagitis 05/06/2019    Hyperglycemia     Hyperlipidemia     Hypertension     Peripheral edema 05/04/2022    Postoperative wound dehiscence 04/21/2022    Testicular hypofunction     Thrombocytopenia (HCC) 06/02/2022    Type 2 diabetes mellitus without complication (HCC)        Past Surgical History:   Procedure Laterality Date    ANKLE SURGERY      HERNIA REPAIR      TONSILLECTOMY         Family History   Problem Relation Age of Onset    Hypertension Mother     Heart Attack Father     No Known Problems Brother     No Known Problems Brother     No Known Problems Brother     No Known Problems Brother     No Known Problems Brother     No Known Problems Brother     No Known Problems Maternal Grandmother     No Known Problems Maternal Grandfather     No Known Problems Paternal Grandmother     No Known Problems Paternal Grandfather        Social History     Tobacco Use    Smoking status: Never    Smokeless tobacco: Never   Substance Use Topics    Alcohol use: No

## 2024-02-09 NOTE — PATIENT INSTRUCTIONS
Strep and flu negative.  COVID test pending.    - Increase fluid intake, as tolerated.   - Encouraged adequate rest  - Recommended OTC antihistamine, such as Claritin or zyrtec  - Recommended OTC Flonase  - Recommended OTC Robitussin or Delsym for cough   - Recommended OTC Mucinex to aide in sputum expulsion  - Recommended OTC motrin/tylenol for fever and/or body aches, unless contraindicated.   - Utilize cool mist humidifier, Vicks Vapor Rub, and steam inhalation to help nasal congestion  -The patient is to follow up with PCP or return to clinic if symptoms worsen/fail to improve.    Any condition can change, despite proper treatment. Therefore, if symptoms still persist or worsen after treatment plan intitated today, patient is to go to the nearest ER, call PCP, or return to urgent care for further evaluation. Urgent Care evaluation today is not a substitute for PCP visit. Follow up care is the patient's responsibility to discuss and review this UC visit.

## 2024-02-21 ENCOUNTER — NURSE ONLY (OUTPATIENT)
Dept: UROLOGY | Age: 84
End: 2024-02-21
Payer: MEDICARE

## 2024-02-21 DIAGNOSIS — R79.89 LOW TESTOSTERONE IN MALE: Primary | ICD-10-CM

## 2024-02-21 PROCEDURE — 96372 THER/PROPH/DIAG INJ SC/IM: CPT | Performed by: NURSE PRACTITIONER

## 2024-02-21 NOTE — PROGRESS NOTES
After obtaining consent from patient and receiving verbal/written orders from MARISELA KHAN, I gave patient 1cc of testosterone cypionate injection in RIGHT upper quad. gluteus, patient tolerated well.  Medication was supplied by the patient.     NDC: 9814-2174-27  LOT: [id number]  EXP: 04-    Patient will follow up in 14 days for next injection.

## 2024-02-22 ENCOUNTER — HOSPITAL ENCOUNTER (OUTPATIENT)
Dept: MRI IMAGING | Facility: HOSPITAL | Age: 84
Discharge: HOME | End: 2024-02-22
Admitting: STUDENT IN AN ORGANIZED HEALTH CARE EDUCATION/TRAINING PROGRAM
Payer: MEDICARE

## 2024-02-22 DIAGNOSIS — E85.4 CEREBRAL AMYLOID ANGIOPATHY: ICD-10-CM

## 2024-02-22 DIAGNOSIS — I68.0 CEREBRAL AMYLOID ANGIOPATHY: ICD-10-CM

## 2024-02-22 PROCEDURE — 70551 MRI BRAIN STEM W/O DYE: CPT

## 2024-02-29 DIAGNOSIS — E29.1 HYPOGONADISM MALE: ICD-10-CM

## 2024-02-29 RX ORDER — TESTOSTERONE CYPIONATE 200 MG/ML
INJECTION, SOLUTION INTRAMUSCULAR
Qty: 2 ML | Refills: 0 | Status: SHIPPED | OUTPATIENT
Start: 2024-02-29 | End: 2024-03-30

## 2024-03-06 ENCOUNTER — NURSE ONLY (OUTPATIENT)
Dept: UROLOGY | Age: 84
End: 2024-03-06
Payer: MEDICARE

## 2024-03-06 DIAGNOSIS — R79.89 LOW TESTOSTERONE IN MALE: Primary | ICD-10-CM

## 2024-03-06 PROCEDURE — 96372 THER/PROPH/DIAG INJ SC/IM: CPT | Performed by: NURSE PRACTITIONER

## 2024-03-06 NOTE — PROGRESS NOTES
After obtaining consent from patient and receiving verbal/written orders from MARISELA KHAN, I gave patient 1cc of testosterone cypionate injection in LEFTupper quad. gluteus, patient tolerated well.  Medication was supplied by the patient.     NDC: 1200-2727-04  LOT: [id number]  EXP: 04-    Patient will follow up in 14 days for next injection.

## 2024-03-13 ENCOUNTER — TELEPHONE (OUTPATIENT)
Dept: UROLOGY | Age: 84
End: 2024-03-13

## 2024-03-13 DIAGNOSIS — E29.1 HYPOGONADISM IN MALE: ICD-10-CM

## 2024-03-13 DIAGNOSIS — I10 ESSENTIAL HYPERTENSION: ICD-10-CM

## 2024-03-13 DIAGNOSIS — D75.1 SECONDARY ERYTHROCYTOSIS: ICD-10-CM

## 2024-03-13 DIAGNOSIS — Z12.5 ENCOUNTER FOR PROSTATE CANCER SCREENING: ICD-10-CM

## 2024-03-13 DIAGNOSIS — E78.2 MIXED HYPERLIPIDEMIA: ICD-10-CM

## 2024-03-13 DIAGNOSIS — E55.9 VITAMIN D DEFICIENCY: ICD-10-CM

## 2024-03-13 DIAGNOSIS — E11.65 TYPE 2 DIABETES MELLITUS WITH HYPERGLYCEMIA, UNSPECIFIED WHETHER LONG TERM INSULIN USE (HCC): ICD-10-CM

## 2024-03-13 LAB
25(OH)D3 SERPL-MCNC: 36.1 NG/ML
ALBUMIN SERPL-MCNC: 4.4 G/DL (ref 3.5–5.2)
ALP SERPL-CCNC: 70 U/L (ref 40–130)
ALT SERPL-CCNC: 38 U/L (ref 5–41)
ANION GAP SERPL CALCULATED.3IONS-SCNC: 12 MMOL/L (ref 7–19)
AST SERPL-CCNC: 24 U/L (ref 5–40)
BASOPHILS # BLD: 0.1 K/UL (ref 0–0.2)
BASOPHILS NFR BLD: 1 % (ref 0–1)
BILIRUB SERPL-MCNC: 0.7 MG/DL (ref 0.2–1.2)
BILIRUB UR QL STRIP: NEGATIVE
BUN SERPL-MCNC: 27 MG/DL (ref 8–23)
CALCIUM SERPL-MCNC: 9.4 MG/DL (ref 8.8–10.2)
CHLORIDE SERPL-SCNC: 104 MMOL/L (ref 98–111)
CHOLEST SERPL-MCNC: 170 MG/DL (ref 160–199)
CLARITY UR: CLEAR
CO2 SERPL-SCNC: 25 MMOL/L (ref 22–29)
COLOR UR: YELLOW
CREAT SERPL-MCNC: 1.3 MG/DL (ref 0.5–1.2)
EOSINOPHIL # BLD: 0.4 K/UL (ref 0–0.6)
EOSINOPHIL NFR BLD: 4.2 % (ref 0–5)
ERYTHROCYTE [DISTWIDTH] IN BLOOD BY AUTOMATED COUNT: 15 % (ref 11.5–14.5)
GLUCOSE SERPL-MCNC: 119 MG/DL (ref 74–109)
GLUCOSE UR STRIP.AUTO-MCNC: NEGATIVE MG/DL
HBA1C MFR BLD: 6 % (ref 4–6)
HCT VFR BLD AUTO: 54 % (ref 42–52)
HDLC SERPL-MCNC: 31 MG/DL (ref 55–121)
HGB BLD-MCNC: 17.6 G/DL (ref 14–18)
HGB UR STRIP.AUTO-MCNC: NEGATIVE MG/L
IMM GRANULOCYTES # BLD: 0 K/UL
KETONES UR STRIP.AUTO-MCNC: NEGATIVE MG/DL
LDLC SERPL CALC-MCNC: 115 MG/DL
LEUKOCYTE ESTERASE UR QL STRIP.AUTO: NEGATIVE
LYMPHOCYTES # BLD: 2.1 K/UL (ref 1.1–4.5)
LYMPHOCYTES NFR BLD: 23.9 % (ref 20–40)
MCH RBC QN AUTO: 28.1 PG (ref 27–31)
MCHC RBC AUTO-ENTMCNC: 32.6 G/DL (ref 33–37)
MCV RBC AUTO: 86.3 FL (ref 80–94)
MONOCYTES # BLD: 0.7 K/UL (ref 0–0.9)
MONOCYTES NFR BLD: 7.8 % (ref 0–10)
NEUTROPHILS # BLD: 5.4 K/UL (ref 1.5–7.5)
NEUTS SEG NFR BLD: 62.8 % (ref 50–65)
NITRITE UR QL STRIP.AUTO: NEGATIVE
PH UR STRIP.AUTO: 5.5 [PH] (ref 5–8)
PLATELET # BLD AUTO: 162 K/UL (ref 130–400)
PMV BLD AUTO: 10.1 FL (ref 9.4–12.4)
POTASSIUM SERPL-SCNC: 4.5 MMOL/L (ref 3.5–5)
PROT SERPL-MCNC: 7.2 G/DL (ref 6.6–8.7)
PROT UR STRIP.AUTO-MCNC: NEGATIVE MG/DL
PSA SERPL-MCNC: 0.78 NG/ML (ref 0–4)
RBC # BLD AUTO: 6.26 M/UL (ref 4.7–6.1)
SODIUM SERPL-SCNC: 141 MMOL/L (ref 136–145)
SP GR UR STRIP.AUTO: 1.01 (ref 1–1.03)
TESTOST SERPL-MCNC: 1163 NG/DL (ref 193–740)
TRIGL SERPL-MCNC: 122 MG/DL (ref 0–149)
TSH SERPL DL<=0.005 MIU/L-ACNC: 3.13 UIU/ML (ref 0.27–4.2)
UROBILINOGEN UR STRIP.AUTO-MCNC: 0.2 E.U./DL
WBC # BLD AUTO: 8.6 K/UL (ref 4.8–10.8)

## 2024-03-13 NOTE — TELEPHONE ENCOUNTER
Patient returned call to office. Patient stated he already sees hem/onc and they are aware of his elevated levels and they werent any higher than before. Patient also stated he's unable to give blood at this time, he's given blood recently and has to wait a certain amount of time.

## 2024-03-20 ENCOUNTER — NURSE ONLY (OUTPATIENT)
Dept: UROLOGY | Age: 84
End: 2024-03-20

## 2024-03-20 DIAGNOSIS — R79.89 LOW TESTOSTERONE IN MALE: Primary | ICD-10-CM

## 2024-03-20 NOTE — PROGRESS NOTES
After obtaining consent from patient and receiving verbal/written orders from MARISELA KHAN, I gave patient 1cc of testosterone cypionate injection in LEFTupper quad. gluteus, patient tolerated well.  Medication was supplied by the patient.     DX: Low Testosterone in Male [R79.89]  NDC: 7290-8337-97  LOT: [id number]  EXP: 07/01/2026    Patient will follow up in 14 days for next injection.

## 2024-03-26 ENCOUNTER — HOSPITAL ENCOUNTER (OUTPATIENT)
Dept: NON INVASIVE DIAGNOSTICS | Age: 84
Discharge: HOME | End: 2024-03-26
Payer: MEDICARE

## 2024-03-26 ENCOUNTER — OFFICE VISIT (OUTPATIENT)
Dept: INTERNAL MEDICINE | Age: 84
End: 2024-03-26
Payer: MEDICARE

## 2024-03-26 VITALS
WEIGHT: 216 LBS | SYSTOLIC BLOOD PRESSURE: 122 MMHG | HEIGHT: 72 IN | HEART RATE: 63 BPM | BODY MASS INDEX: 29.26 KG/M2 | OXYGEN SATURATION: 95 % | DIASTOLIC BLOOD PRESSURE: 55 MMHG

## 2024-03-26 DIAGNOSIS — R00.1 BRADYCARDIA: ICD-10-CM

## 2024-03-26 DIAGNOSIS — E78.2 MIXED HYPERLIPIDEMIA: ICD-10-CM

## 2024-03-26 DIAGNOSIS — E29.1 HYPOGONADISM MALE: ICD-10-CM

## 2024-03-26 DIAGNOSIS — K21.9 GASTROESOPHAGEAL REFLUX DISEASE WITHOUT ESOPHAGITIS: ICD-10-CM

## 2024-03-26 DIAGNOSIS — E11.65 TYPE 2 DIABETES MELLITUS WITH HYPERGLYCEMIA, UNSPECIFIED WHETHER LONG TERM INSULIN USE (HCC): ICD-10-CM

## 2024-03-26 DIAGNOSIS — E55.9 VITAMIN D DEFICIENCY: ICD-10-CM

## 2024-03-26 DIAGNOSIS — F51.01 PRIMARY INSOMNIA: ICD-10-CM

## 2024-03-26 DIAGNOSIS — I10 ESSENTIAL HYPERTENSION: ICD-10-CM

## 2024-03-26 DIAGNOSIS — R79.89 LOW TESTOSTERONE IN MALE: ICD-10-CM

## 2024-03-26 DIAGNOSIS — I68.0 CEREBRAL AMYLOID ANGIOPATHY (CODE): Primary | ICD-10-CM

## 2024-03-26 PROCEDURE — 3074F SYST BP LT 130 MM HG: CPT | Performed by: NURSE PRACTITIONER

## 2024-03-26 PROCEDURE — G8427 DOCREV CUR MEDS BY ELIG CLIN: HCPCS | Performed by: NURSE PRACTITIONER

## 2024-03-26 PROCEDURE — 1123F ACP DISCUSS/DSCN MKR DOCD: CPT | Performed by: NURSE PRACTITIONER

## 2024-03-26 PROCEDURE — 3078F DIAST BP <80 MM HG: CPT | Performed by: NURSE PRACTITIONER

## 2024-03-26 PROCEDURE — G8417 CALC BMI ABV UP PARAM F/U: HCPCS | Performed by: NURSE PRACTITIONER

## 2024-03-26 PROCEDURE — 99214 OFFICE O/P EST MOD 30 MIN: CPT | Performed by: NURSE PRACTITIONER

## 2024-03-26 PROCEDURE — 1036F TOBACCO NON-USER: CPT | Performed by: NURSE PRACTITIONER

## 2024-03-26 PROCEDURE — 3044F HG A1C LEVEL LT 7.0%: CPT | Performed by: NURSE PRACTITIONER

## 2024-03-26 PROCEDURE — 93000 ELECTROCARDIOGRAM COMPLETE: CPT | Performed by: NURSE PRACTITIONER

## 2024-03-26 PROCEDURE — G8484 FLU IMMUNIZE NO ADMIN: HCPCS | Performed by: NURSE PRACTITIONER

## 2024-03-26 PROCEDURE — 93246 EXT ECG>7D<15D RECORDING: CPT

## 2024-03-26 RX ORDER — GLIPIZIDE 10 MG/1
10 TABLET, FILM COATED, EXTENDED RELEASE ORAL EVERY MORNING
Qty: 30 TABLET | Refills: 5 | Status: SHIPPED | OUTPATIENT
Start: 2024-03-26

## 2024-03-26 RX ORDER — SPIRONOLACTONE 25 MG/1
25 TABLET ORAL DAILY
Qty: 90 TABLET | Refills: 0 | Status: SHIPPED | OUTPATIENT
Start: 2024-03-26

## 2024-03-26 RX ORDER — TEMAZEPAM 15 MG/1
CAPSULE ORAL
Qty: 60 CAPSULE | Refills: 0 | Status: SHIPPED | OUTPATIENT
Start: 2024-03-26 | End: 2024-04-26

## 2024-03-26 RX ORDER — VERAPAMIL HYDROCHLORIDE 240 MG/1
240 TABLET, FILM COATED, EXTENDED RELEASE ORAL 2 TIMES DAILY
Qty: 180 TABLET | Refills: 1 | Status: SHIPPED | OUTPATIENT
Start: 2024-03-26

## 2024-03-26 RX ORDER — MECLIZINE HYDROCHLORIDE 25 MG/1
25 TABLET ORAL 3 TIMES DAILY PRN
Qty: 30 TABLET | Refills: 0 | Status: SHIPPED | OUTPATIENT
Start: 2024-03-26

## 2024-03-26 RX ORDER — FLUTICASONE PROPIONATE 50 MCG
SPRAY, SUSPENSION (ML) NASAL
Qty: 48 G | Refills: 3 | Status: SHIPPED | OUTPATIENT
Start: 2024-03-26

## 2024-03-26 RX ORDER — TESTOSTERONE CYPIONATE 200 MG/ML
INJECTION, SOLUTION INTRAMUSCULAR
Qty: 2 ML | Refills: 0 | Status: SHIPPED | OUTPATIENT
Start: 2024-03-26 | End: 2024-03-28

## 2024-03-26 RX ORDER — HYDROCHLOROTHIAZIDE 25 MG/1
25 TABLET ORAL DAILY
Qty: 90 TABLET | Refills: 0 | Status: SHIPPED | OUTPATIENT
Start: 2024-03-26

## 2024-03-26 RX ORDER — ATORVASTATIN CALCIUM 20 MG/1
20 TABLET, FILM COATED ORAL NIGHTLY
Qty: 90 TABLET | Refills: 1 | Status: SHIPPED | OUTPATIENT
Start: 2024-03-26

## 2024-03-26 RX ORDER — LOSARTAN POTASSIUM 100 MG/1
100 TABLET ORAL DAILY
Qty: 90 TABLET | Refills: 1 | Status: SHIPPED | OUTPATIENT
Start: 2024-03-26

## 2024-03-26 RX ORDER — ERGOCALCIFEROL 1.25 MG/1
50000 CAPSULE ORAL WEEKLY
Qty: 30 CAPSULE | Refills: 3 | Status: SHIPPED | OUTPATIENT
Start: 2024-03-26

## 2024-03-26 RX ORDER — HYDRALAZINE HYDROCHLORIDE 10 MG/1
TABLET, FILM COATED ORAL
Qty: 360 TABLET | Refills: 1 | Status: SHIPPED | OUTPATIENT
Start: 2024-03-26

## 2024-03-26 RX ORDER — POTASSIUM CHLORIDE 750 MG/1
TABLET, EXTENDED RELEASE ORAL
Qty: 90 TABLET | Refills: 1 | Status: SHIPPED | OUTPATIENT
Start: 2024-03-26

## 2024-03-26 RX ORDER — PANTOPRAZOLE SODIUM 40 MG/1
40 TABLET, DELAYED RELEASE ORAL DAILY
Qty: 90 TABLET | Refills: 3 | Status: SHIPPED | OUTPATIENT
Start: 2024-03-26

## 2024-03-26 ASSESSMENT — ENCOUNTER SYMPTOMS
COLOR CHANGE: 0
CONSTIPATION: 0
COUGH: 0
VOMITING: 0
ABDOMINAL DISTENTION: 0
STRIDOR: 0
CHOKING: 0
EYE DISCHARGE: 0
SHORTNESS OF BREATH: 0
TROUBLE SWALLOWING: 0
ABDOMINAL PAIN: 0
NAUSEA: 0
WHEEZING: 0
DIARRHEA: 0
BLOOD IN STOOL: 0
SORE THROAT: 0
EYE ITCHING: 0

## 2024-03-26 NOTE — PATIENT INSTRUCTIONS
and quality of life. Strive to exercise 15 minutes most days of the week.  5)  Eat better - A healthy diet is one of your best weapons for fighting cardiovascular disease.  When you eat a heart healthy diet, you improve your chances for feeling good and staying healthy for life.  6)  Lose weight - when you shed extra fat an unnecessary pounds, you reduce the burden on your hear, lungs, blood vessels and skeleton.  You give yourself the gift of active living, you lower your blood pressure and help yourself feel better.  7) Stop smoking - cigarette smokers have a higher risk of developing cardiovascular disease.  If  You smoke, quitting is the best thing you can do for your health.

## 2024-03-26 NOTE — PROGRESS NOTES
printed texts.  The electronic translation of spoken language may be erroneous, or at times,nonsensical words or phrases may be inadvertently transcribed.  Although I have reviewed the note for such errors, some may still exist.

## 2024-03-28 ENCOUNTER — OFFICE VISIT (OUTPATIENT)
Dept: UROLOGY | Age: 84
End: 2024-03-28
Payer: MEDICARE

## 2024-03-28 VITALS — TEMPERATURE: 97.4 F | WEIGHT: 214.4 LBS | BODY MASS INDEX: 29.04 KG/M2 | HEIGHT: 72 IN

## 2024-03-28 DIAGNOSIS — E29.1 HYPOGONADISM MALE: Primary | ICD-10-CM

## 2024-03-28 DIAGNOSIS — D75.1 SECONDARY ERYTHROCYTOSIS: ICD-10-CM

## 2024-03-28 DIAGNOSIS — N52.9 ERECTILE DYSFUNCTION, UNSPECIFIED ERECTILE DYSFUNCTION TYPE: ICD-10-CM

## 2024-03-28 PROCEDURE — G8484 FLU IMMUNIZE NO ADMIN: HCPCS | Performed by: NURSE PRACTITIONER

## 2024-03-28 PROCEDURE — G8427 DOCREV CUR MEDS BY ELIG CLIN: HCPCS | Performed by: NURSE PRACTITIONER

## 2024-03-28 PROCEDURE — G8417 CALC BMI ABV UP PARAM F/U: HCPCS | Performed by: NURSE PRACTITIONER

## 2024-03-28 PROCEDURE — 99214 OFFICE O/P EST MOD 30 MIN: CPT | Performed by: NURSE PRACTITIONER

## 2024-03-28 PROCEDURE — 1036F TOBACCO NON-USER: CPT | Performed by: NURSE PRACTITIONER

## 2024-03-28 PROCEDURE — 1123F ACP DISCUSS/DSCN MKR DOCD: CPT | Performed by: NURSE PRACTITIONER

## 2024-03-28 PROCEDURE — 81002 URINALYSIS NONAUTO W/O SCOPE: CPT | Performed by: NURSE PRACTITIONER

## 2024-03-28 RX ORDER — TESTOSTERONE CYPIONATE 200 MG/ML
150 INJECTION, SOLUTION INTRAMUSCULAR
Qty: 2 ML | Refills: 0 | Status: SHIPPED | OUTPATIENT
Start: 2024-03-28 | End: 2024-04-27

## 2024-03-28 ASSESSMENT — ENCOUNTER SYMPTOMS
ABDOMINAL DISTENTION: 0
BACK PAIN: 0
NAUSEA: 0
ABDOMINAL PAIN: 0
VOMITING: 0

## 2024-03-28 NOTE — PROGRESS NOTES
No current facility-administered medications for this visit.       No Known Allergies    Social History     Socioeconomic History    Marital status:      Spouse name: None    Number of children: None    Years of education: None    Highest education level: None   Tobacco Use    Smoking status: Never    Smokeless tobacco: Never   Vaping Use    Vaping Use: Never used   Substance and Sexual Activity    Alcohol use: No    Drug use: No    Sexual activity: Defer     Social Determinants of Health     Financial Resource Strain: Low Risk  (3/6/2023)    Overall Financial Resource Strain (CARDIA)     Difficulty of Paying Living Expenses: Not hard at all   Transportation Needs: Unknown (3/6/2023)    PRAPARE - Transportation     Lack of Transportation (Non-Medical): No   Physical Activity: Inactive (9/18/2023)    Exercise Vital Sign     Days of Exercise per Week: 0 days     Minutes of Exercise per Session: 0 min   Housing Stability: Unknown (3/6/2023)    Housing Stability Vital Sign     Unstable Housing in the Last Year: No       Family History   Problem Relation Age of Onset    Hypertension Mother     Heart Attack Father     No Known Problems Brother     No Known Problems Brother     No Known Problems Brother     No Known Problems Brother     No Known Problems Brother     No Known Problems Brother     No Known Problems Maternal Grandmother     No Known Problems Maternal Grandfather     No Known Problems Paternal Grandmother     No Known Problems Paternal Grandfather        REVIEW OF SYSTEMS:  Review of Systems   Constitutional:  Negative for chills and fever.   Gastrointestinal:  Negative for abdominal distention, abdominal pain, nausea and vomiting.   Genitourinary:  Negative for difficulty urinating, dysuria, flank pain, frequency, hematuria and urgency.   Musculoskeletal:  Negative for back pain and gait problem.   Psychiatric/Behavioral:  Negative for agitation and confusion.        PHYSICAL EXAM:  Temp 97.4 °F

## 2024-04-03 ENCOUNTER — NURSE ONLY (OUTPATIENT)
Dept: UROLOGY | Age: 84
End: 2024-04-03
Payer: MEDICARE

## 2024-04-03 DIAGNOSIS — R79.89 LOW TESTOSTERONE IN MALE: Primary | ICD-10-CM

## 2024-04-03 PROCEDURE — 96372 THER/PROPH/DIAG INJ SC/IM: CPT | Performed by: NURSE PRACTITIONER

## 2024-04-03 NOTE — PROGRESS NOTES
After obtaining consent from patient and receiving verbal/written orders from MARISELA KHAN, I gave patient 0.75mL of testosterone cypionate injection in RIGHT upper quad. gluteus, patient tolerated well.  Medication was supplied by the patient.     DX: Low Testosterone in Male [R79.89]  NDC: 3488-9493-45  LOT: [id number]  EXP: 07/01/2026    Patient will follow up in 14 days for next injection.

## 2024-04-17 ENCOUNTER — NURSE ONLY (OUTPATIENT)
Dept: UROLOGY | Age: 84
End: 2024-04-17
Payer: MEDICARE

## 2024-04-17 VITALS — BODY MASS INDEX: 29.26 KG/M2 | WEIGHT: 216 LBS | TEMPERATURE: 98.6 F | HEIGHT: 72 IN

## 2024-04-17 DIAGNOSIS — R79.89 LOW TESTOSTERONE IN MALE: Primary | ICD-10-CM

## 2024-04-17 PROCEDURE — 96372 THER/PROPH/DIAG INJ SC/IM: CPT | Performed by: NURSE PRACTITIONER

## 2024-04-17 NOTE — PROGRESS NOTES
After obtaining consent from patient and receiving verbal/written orders from MARISELA KHAN, I gave patient 0.75mL of testosterone cypionate injection in LEFT upper quad. gluteus, patient tolerated well.  Medication was supplied by the patient.     DX: Low Testosterone in Male [R79.89]  NDC: 4474-5374-22  LOT: [id number]  EXP: 07/01/2026    Patient will follow up in 14 days for next injection.

## 2024-04-24 ENCOUNTER — TELEPHONE (OUTPATIENT)
Dept: INTERNAL MEDICINE | Age: 84
End: 2024-04-24

## 2024-04-29 ENCOUNTER — TELEPHONE (OUTPATIENT)
Dept: HEMATOLOGY | Age: 84
End: 2024-04-29

## 2024-04-29 NOTE — TELEPHONE ENCOUNTER
Called patient and reminded patient of their appointment on 5/1/2024 and patient confirmed they would be here.

## 2024-04-30 DIAGNOSIS — I10 ESSENTIAL HYPERTENSION: Primary | ICD-10-CM

## 2024-04-30 NOTE — PROGRESS NOTES
Progress Note      Pt Name: Cristian Regan  YOB: 1940  MRN: [id number]    Date of evaluation: 06/24/2024  History Obtained From:  patient, electronic medical record    CHIEF COMPLAINT:    Chief Complaint   Patient presents with    Follow-up     Secondary erythrocytosis     HISTORY OF PRESENT ILLNESS:    Cristian Regan is a 83 y.o.  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  Current recommendation is for conservative monitoring, phlebotomy would be warranted for hemoglobin >18 or hematocrit >50 and/or symptomatic, he intermittently donates blood to the [facility].  Cristian returns today in scheduled follow-up for evaluation, lab monitoring and further treatment recommendations.  He reports he continues to self inject testosterone 75 mg IM every 2 weeks (dose has been reduced from 100).     Today's clinic visit to include physical assessment, review of systems, any radiograph or lab findings that were available and plan of care are documented below.     HEMATOLOGIC HISTORY:   Diagnosis:  Mild thrombocytopenia  Erythrocytosis  History of iron deficiency anemia  Hypergonadism (long-term testosterone replacement)  Mild splenomegaly    Treatment summary:  History of OTC iron replacement, not taking at initial consultation  Phlebotomize for hemoglobin >18 or hematocrit >50 and/or symptomatic.    Cristian was seen and initial hematology consultation on 4/19/2022 for concern for erythrocytosis and upon review of medical records persistent mild thrombocytopenia was noted, referred by BASSAM Steel for further evaluation and recommendations.  Cristian has a past medical history to include hypogonadism (initiated testosterone replacement with 1 mg injection every 2 weeks in January 2022), hypertension, gastroesophageal reflux, iron deficiency anemia (treated with OTC iron replacement) and diabetes

## 2024-05-01 ENCOUNTER — NURSE ONLY (OUTPATIENT)
Dept: UROLOGY | Age: 84
End: 2024-05-01
Payer: MEDICARE

## 2024-05-01 DIAGNOSIS — R79.89 LOW TESTOSTERONE IN MALE: Primary | ICD-10-CM

## 2024-05-01 PROCEDURE — 96372 THER/PROPH/DIAG INJ SC/IM: CPT | Performed by: NURSE PRACTITIONER

## 2024-05-01 NOTE — PROGRESS NOTES
After obtaining consent from patient and receiving verbal/written orders from MARISELA KHAN, I gave patient 0.75mL of testosterone cypionate injection in RIGHT upper quad. gluteus, patient tolerated well.  Medication was supplied by the patient.     DX: Low Testosterone in Male [R79.89]  NDC: 2203-2732-75  LOT: [id number]  EXP: 07/01/2026    Patient will follow up in 14 days for next injection.

## 2024-05-15 ENCOUNTER — NURSE ONLY (OUTPATIENT)
Dept: UROLOGY | Age: 84
End: 2024-05-15
Payer: MEDICARE

## 2024-05-15 DIAGNOSIS — R79.89 LOW TESTOSTERONE IN MALE: Primary | ICD-10-CM

## 2024-05-15 PROCEDURE — 96372 THER/PROPH/DIAG INJ SC/IM: CPT | Performed by: NURSE PRACTITIONER

## 2024-05-15 NOTE — PROGRESS NOTES
After obtaining consent from patient and receiving verbal/written orders from MARISELA KHAN, I gave patient 0.75mL of testosterone cypionate injection in LEFT upper quad. gluteus, patient tolerated well.  Medication was supplied by the patient.     DX: Low Testosterone in Male [R79.89]  NDC: 5762-8684-50  LOT: [id number]  EXP: 07/01/2026    Patient will follow up in 14 days for next injection.

## 2024-05-29 ENCOUNTER — NURSE ONLY (OUTPATIENT)
Dept: UROLOGY | Age: 84
End: 2024-05-29
Payer: MEDICARE

## 2024-05-29 DIAGNOSIS — R79.89 LOW TESTOSTERONE IN MALE: Primary | ICD-10-CM

## 2024-05-29 PROCEDURE — 96372 THER/PROPH/DIAG INJ SC/IM: CPT | Performed by: NURSE PRACTITIONER

## 2024-05-29 NOTE — PROGRESS NOTES
After obtaining consent from patient and receiving verbal/written orders from MARISELA KHAN, I gave patient 0.75mL of testosterone cypionate injection in RIGHT upper quad. gluteus, patient tolerated well.  Medication was supplied by the patient.     DX: Low Testosterone in Male [R79.89]  NDC: 4396-4953-53  LOT: [id number]  EXP: 07/01/2026    Patient will follow up in 14 days for next injection.

## 2024-05-30 DIAGNOSIS — E29.1 HYPOGONADISM MALE: ICD-10-CM

## 2024-05-31 RX ORDER — TESTOSTERONE CYPIONATE 200 MG/ML
INJECTION, SOLUTION INTRAMUSCULAR
Qty: 2 ML | Refills: 0 | Status: SHIPPED | OUTPATIENT
Start: 2024-05-31 | End: 2024-06-30

## 2024-06-12 ENCOUNTER — NURSE ONLY (OUTPATIENT)
Dept: UROLOGY | Age: 84
End: 2024-06-12
Payer: MEDICARE

## 2024-06-12 VITALS — TEMPERATURE: 98.6 F | HEIGHT: 72 IN | BODY MASS INDEX: 29.26 KG/M2 | WEIGHT: 216 LBS

## 2024-06-12 DIAGNOSIS — R79.89 LOW TESTOSTERONE IN MALE: Primary | ICD-10-CM

## 2024-06-12 PROCEDURE — 96372 THER/PROPH/DIAG INJ SC/IM: CPT | Performed by: NURSE PRACTITIONER

## 2024-06-12 NOTE — PROGRESS NOTES
After obtaining consent from patient and receiving verbal/written orders from MARISELA KHAN, I gave patient 0.75mL of testosterone cypionate injection in LEFT upper quad. gluteus, patient tolerated well.  Medication was supplied by the patient.     DX: Low Testosterone in Male [R79.89]  NDC: 0274-1296-58  LOT: [id number]  EXP: 07/01/2026    Patient will follow up in 14 days for next injection.

## 2024-06-17 RX ORDER — SPIRONOLACTONE 25 MG/1
25 TABLET ORAL DAILY
Qty: 90 TABLET | Refills: 3 | Status: SHIPPED | OUTPATIENT
Start: 2024-06-17

## 2024-06-17 RX ORDER — HYDROCHLOROTHIAZIDE 25 MG/1
25 TABLET ORAL DAILY
Qty: 90 TABLET | Refills: 3 | Status: SHIPPED | OUTPATIENT
Start: 2024-06-17

## 2024-06-17 NOTE — TELEPHONE ENCOUNTER
Cristian called requesting a refill of the below medication which has been pended for you:     Requested Prescriptions     Pending Prescriptions Disp Refills    spironolactone (ALDACTONE) 25 MG tablet [Pharmacy Med Name: SPIRONOLACTONE 25 MG Tablet] 90 tablet 3     Sig: TAKE 1 TABLET EVERY DAY    hydroCHLOROthiazide (HYDRODIURIL) 25 MG tablet [Pharmacy Med Name: HYDROCHLOROTHIAZIDE 25 MG Tablet] 90 tablet 3     Sig: TAKE 1 TABLET EVERY DAY       Last Appointment Date: 3/26/2024  Next Appointment Date: 6/27/2024    No Known Allergies

## 2024-06-18 DIAGNOSIS — I10 ESSENTIAL HYPERTENSION: ICD-10-CM

## 2024-06-18 RX ORDER — POTASSIUM CHLORIDE 750 MG/1
20 TABLET, FILM COATED, EXTENDED RELEASE ORAL 2 TIMES DAILY
Qty: 360 TABLET | Refills: 0 | Status: SHIPPED | OUTPATIENT
Start: 2024-06-18

## 2024-06-18 RX ORDER — VERAPAMIL HYDROCHLORIDE 240 MG/1
240 TABLET, FILM COATED, EXTENDED RELEASE ORAL 2 TIMES DAILY
Qty: 180 TABLET | Refills: 0 | Status: SHIPPED | OUTPATIENT
Start: 2024-06-18

## 2024-06-18 NOTE — TELEPHONE ENCOUNTER
Cristian called requesting a refill of the below medication which has been pended for you:     Requested Prescriptions     Pending Prescriptions Disp Refills    verapamil (CALAN SR) 240 MG extended release tablet [Pharmacy Med Name: Verapamil HCl  MG Oral Tablet Extended Release] 180 tablet 0     Sig: Take 1 tablet by mouth twice daily    potassium chloride (KLOR-CON) 10 MEQ extended release tablet [Pharmacy Med Name: Potassium Chloride ER 10 MEQ Oral Tablet Extended Release] 360 tablet 0     Sig: Take 2 tablets by mouth twice daily       Last Appointment Date: 3/26/2024  Next Appointment Date: 6/27/2024    No Known Allergies

## 2024-06-19 ENCOUNTER — TELEPHONE (OUTPATIENT)
Dept: HEMATOLOGY | Age: 84
End: 2024-06-19

## 2024-06-19 NOTE — TELEPHONE ENCOUNTER
Called patient and reminded patient of their appointment on 6/24/2024 and patient confirmed they would be here.

## 2024-06-21 ENCOUNTER — TELEPHONE (OUTPATIENT)
Dept: UROLOGY | Age: 84
End: 2024-06-21

## 2024-06-21 DIAGNOSIS — E29.1 HYPOGONADISM MALE: ICD-10-CM

## 2024-06-21 LAB
HCT VFR BLD AUTO: 58.4 % (ref 42–52)
HGB BLD-MCNC: 18.8 G/DL (ref 14–18)
TESTOST SERPL-MCNC: 680.9 NG/DL (ref 193–740)

## 2024-06-21 NOTE — TELEPHONE ENCOUNTER
I attempted to reach the patient. We need him to go give blood, specifically red blood cells due to him having high HGB and HCT.  JEZ ANTONIO has put in a hematology referral but this will likely take a couple of weeks so he needs to go give within the next few days.    He does not have a VM set up, so we will have to try again at a later time, or wait for a call back.

## 2024-06-24 ENCOUNTER — HOSPITAL ENCOUNTER (OUTPATIENT)
Dept: INFUSION THERAPY | Age: 84
Discharge: HOME | End: 2024-06-24
Payer: MEDICARE

## 2024-06-24 ENCOUNTER — OFFICE VISIT (OUTPATIENT)
Dept: HEMATOLOGY | Age: 84
End: 2024-06-24
Payer: MEDICARE

## 2024-06-24 VITALS
HEIGHT: 72 IN | BODY MASS INDEX: 29.39 KG/M2 | WEIGHT: 217 LBS | HEART RATE: 56 BPM | OXYGEN SATURATION: 98 % | SYSTOLIC BLOOD PRESSURE: 126 MMHG | DIASTOLIC BLOOD PRESSURE: 70 MMHG | TEMPERATURE: 98.2 F

## 2024-06-24 DIAGNOSIS — R16.1 SPLENOMEGALY: ICD-10-CM

## 2024-06-24 DIAGNOSIS — D75.1 SECONDARY ERYTHROCYTOSIS: Primary | ICD-10-CM

## 2024-06-24 DIAGNOSIS — D50.8 IRON DEFICIENCY ANEMIA SECONDARY TO INADEQUATE DIETARY IRON INTAKE: ICD-10-CM

## 2024-06-24 DIAGNOSIS — D75.1 SECONDARY ERYTHROCYTOSIS: ICD-10-CM

## 2024-06-24 DIAGNOSIS — I68.0 CEREBRAL AMYLOID ANGIOPATHY (CODE): ICD-10-CM

## 2024-06-24 DIAGNOSIS — D50.9 IRON DEFICIENCY ANEMIA, UNSPECIFIED IRON DEFICIENCY ANEMIA TYPE: ICD-10-CM

## 2024-06-24 DIAGNOSIS — I10 ESSENTIAL HYPERTENSION: ICD-10-CM

## 2024-06-24 DIAGNOSIS — R79.89 LOW TESTOSTERONE IN MALE: ICD-10-CM

## 2024-06-24 DIAGNOSIS — D69.6 THROMBOCYTOPENIA (HCC): ICD-10-CM

## 2024-06-24 LAB
ALBUMIN SERPL-MCNC: 4.3 G/DL (ref 3.5–5.2)
ALP SERPL-CCNC: 65 U/L (ref 40–130)
ALT SERPL-CCNC: 49 U/L (ref 5–41)
ANION GAP SERPL CALCULATED.3IONS-SCNC: 11 MMOL/L (ref 7–19)
AST SERPL-CCNC: 29 U/L (ref 5–40)
BASOPHILS # BLD: 0.04 K/UL (ref 0.01–0.08)
BASOPHILS NFR BLD: 0.5 % (ref 0.1–1.2)
BILIRUB SERPL-MCNC: 0.8 MG/DL (ref 0.2–1.2)
BUN SERPL-MCNC: 32 MG/DL (ref 8–23)
CALCIUM SERPL-MCNC: 9.2 MG/DL (ref 8.8–10.2)
CHLORIDE SERPL-SCNC: 104 MMOL/L (ref 98–111)
CO2 SERPL-SCNC: 23 MMOL/L (ref 22–29)
CREAT SERPL-MCNC: 1.5 MG/DL (ref 0.5–1.2)
EOSINOPHIL # BLD: 0.22 K/UL (ref 0.04–0.54)
EOSINOPHIL NFR BLD: 2.7 % (ref 0.7–7)
ERYTHROCYTE [DISTWIDTH] IN BLOOD BY AUTOMATED COUNT: 15.7 % (ref 11.6–14.4)
FERRITIN SERPL-MCNC: 60.8 NG/ML (ref 30–400)
GLUCOSE SERPL-MCNC: 126 MG/DL (ref 74–109)
HCT VFR BLD AUTO: 54.1 % (ref 40.1–51)
HGB BLD-MCNC: 18.3 G/DL (ref 13.7–17.5)
IRON SATN MFR SERPL: 21 % (ref 14–50)
IRON SERPL-MCNC: 73 UG/DL (ref 59–158)
LYMPHOCYTES # BLD: 1.82 K/UL (ref 1.18–3.74)
LYMPHOCYTES NFR BLD: 22.3 % (ref 19.3–53.1)
MCH RBC QN AUTO: 28.5 PG (ref 25.7–32.2)
MCHC RBC AUTO-ENTMCNC: 33.8 G/DL (ref 32.3–36.5)
MCV RBC AUTO: 84.1 FL (ref 79–92.2)
MONOCYTES # BLD: 0.76 K/UL (ref 0.24–0.82)
MONOCYTES NFR BLD: 9.3 % (ref 4.7–12.5)
NEUTROPHILS # BLD: 5.27 K/UL (ref 1.56–6.13)
NEUTS SEG NFR BLD: 64.7 % (ref 34–71.1)
PLATELET # BLD AUTO: 144 K/UL (ref 163–337)
PMV BLD AUTO: 10.1 FL (ref 7.4–10.4)
POTASSIUM SERPL-SCNC: 4.8 MMOL/L (ref 3.5–5)
PROT SERPL-MCNC: 6.9 G/DL (ref 6.6–8.7)
RBC # BLD AUTO: 6.43 M/UL (ref 4.63–6.08)
SODIUM SERPL-SCNC: 138 MMOL/L (ref 136–145)
TIBC SERPL-MCNC: 351 UG/DL (ref 250–400)
WBC # BLD AUTO: 8.15 K/UL (ref 4.23–9.07)

## 2024-06-24 PROCEDURE — G8417 CALC BMI ABV UP PARAM F/U: HCPCS | Performed by: NURSE PRACTITIONER

## 2024-06-24 PROCEDURE — 36415 COLL VENOUS BLD VENIPUNCTURE: CPT | Performed by: NURSE PRACTITIONER

## 2024-06-24 PROCEDURE — 85025 COMPLETE CBC W/AUTO DIFF WBC: CPT

## 2024-06-24 PROCEDURE — 3074F SYST BP LT 130 MM HG: CPT | Performed by: NURSE PRACTITIONER

## 2024-06-24 PROCEDURE — 1123F ACP DISCUSS/DSCN MKR DOCD: CPT | Performed by: NURSE PRACTITIONER

## 2024-06-24 PROCEDURE — 99214 OFFICE O/P EST MOD 30 MIN: CPT | Performed by: NURSE PRACTITIONER

## 2024-06-24 PROCEDURE — 1036F TOBACCO NON-USER: CPT | Performed by: NURSE PRACTITIONER

## 2024-06-24 PROCEDURE — G8427 DOCREV CUR MEDS BY ELIG CLIN: HCPCS | Performed by: NURSE PRACTITIONER

## 2024-06-24 PROCEDURE — 99212 OFFICE O/P EST SF 10 MIN: CPT

## 2024-06-24 PROCEDURE — 36415 COLL VENOUS BLD VENIPUNCTURE: CPT

## 2024-06-24 PROCEDURE — 3078F DIAST BP <80 MM HG: CPT | Performed by: NURSE PRACTITIONER

## 2024-06-24 RX ORDER — 0.9 % SODIUM CHLORIDE 0.9 %
500 INTRAVENOUS SOLUTION INTRAVENOUS ONCE
OUTPATIENT
Start: 2024-06-25 | End: 2024-06-25

## 2024-06-25 ENCOUNTER — HOSPITAL ENCOUNTER (OUTPATIENT)
Dept: INFUSION THERAPY | Age: 84
Setting detail: INFUSION SERIES
Discharge: HOME | End: 2024-06-25
Payer: MEDICARE

## 2024-06-25 VITALS
SYSTOLIC BLOOD PRESSURE: 144 MMHG | RESPIRATION RATE: 18 BRPM | DIASTOLIC BLOOD PRESSURE: 74 MMHG | OXYGEN SATURATION: 98 % | HEART RATE: 52 BPM | TEMPERATURE: 97.4 F

## 2024-06-25 DIAGNOSIS — D75.1 SECONDARY ERYTHROCYTOSIS: Primary | ICD-10-CM

## 2024-06-25 PROCEDURE — 99195 PHLEBOTOMY: CPT

## 2024-06-25 RX ORDER — 0.9 % SODIUM CHLORIDE 0.9 %
500 INTRAVENOUS SOLUTION INTRAVENOUS ONCE
OUTPATIENT
Start: 2024-06-25 | End: 2024-06-25

## 2024-06-25 NOTE — PROGRESS NOTES
[facility] to [facility]. Therapeutic phlebotomy preformed for a Hct of 54. 1. 500ml of whole blood removed. Pt tolerated well.     Electronically signed by Malaika Moreau RN on 6/25/2024 at 8:42 AM

## 2024-06-26 ENCOUNTER — OFFICE VISIT (OUTPATIENT)
Dept: UROLOGY | Age: 84
End: 2024-06-26

## 2024-06-26 ENCOUNTER — TELEPHONE (OUTPATIENT)
Dept: UROLOGY | Age: 84
End: 2024-06-26

## 2024-06-26 VITALS — WEIGHT: 218.6 LBS | BODY MASS INDEX: 29.61 KG/M2 | TEMPERATURE: 97.5 F | HEIGHT: 72 IN

## 2024-06-26 DIAGNOSIS — N52.9 ERECTILE DYSFUNCTION, UNSPECIFIED ERECTILE DYSFUNCTION TYPE: ICD-10-CM

## 2024-06-26 DIAGNOSIS — E29.1 HYPOGONADISM MALE: Primary | ICD-10-CM

## 2024-06-26 DIAGNOSIS — D75.1 SECONDARY ERYTHROCYTOSIS: ICD-10-CM

## 2024-06-26 LAB
APPEARANCE FLUID: CLEAR
BILIRUBIN, POC: NORMAL
BLOOD URINE, POC: NORMAL
CLARITY, POC: CLEAR
COLOR, POC: YELLOW
GLUCOSE URINE, POC: NORMAL
KETONES, POC: NORMAL
LEUKOCYTE EST, POC: NORMAL
NITRITE, POC: NORMAL
PH, POC: 6
PROTEIN, POC: NORMAL
SPECIFIC GRAVITY, POC: 1.01
UROBILINOGEN, POC: 0.2

## 2024-06-26 RX ORDER — TESTOSTERONE ENANTHATE 75 MG/.5ML
75 INJECTION SUBCUTANEOUS WEEKLY
Qty: 4 ADJUSTABLE DOSE PRE-FILLED PEN SYRINGE | Refills: 0 | Status: SHIPPED | OUTPATIENT
Start: 2024-06-26

## 2024-06-26 NOTE — TELEPHONE ENCOUNTER
I called the patients insurance to see if Prior Authorization was required for the patient to switch to Xyosted 75mg/ 0.5mL self-injections. His HGB is continuing to be elevated and this medication is less likely to increase these levels. I spoke with Akilah who stated that as long as there is a valid diagnosis associated with the medication, all medications are covered through Part B for this patient, and if any further information is needed they reach out to the provider.

## 2024-06-26 NOTE — PROGRESS NOTES
After obtaining consent from patient and receiving verbal/written orders from MARISELA Alejandra, I gave patient 0.75mL of testosterone cypionate injection in RIGHT upper quad. gluteus, patient tolerated well.  Medication was supplied by the patient.     DX: Low Testosterone in Male [R79.89]  NDC: 3051-0793-03  LOT: [id number]  EXP: 09/30/26    Patient will follow up in 2 wks for next injection.

## 2024-06-26 NOTE — PROGRESS NOTES
Cristian Regan is a 83 y.o. male who presents today   Chief Complaint   Patient presents with    Follow-up     I am here today for my 3 month testosterone follow up        Hypogonadism:  Patient is here today for symptoms of low testosterone which started year(s) ago.  The patient's last testosterone level was:  Lab Results   Component Value Date    TESTOSTERONE 680.9 06/21/2024     Recently his hypogonadism symptoms: Stable and unchanged  Current medical Rx for hypogonadism: Testosterone cypionate 200 mg/ML, 0.75 mL every 14 days.   Energy level:  normal           Lab Results   Component Value Date     PSA 0.78 03/13/2024      Lab Results   Component Value Date    WBC 8.15 06/24/2024    HGB 18.3 (HH) 06/24/2024    HCT 54.1 (HH) 06/24/2024    MCV 84.1 06/24/2024     (L) 06/24/2024       History of secondary polycythemia follows with hematology.  I originally obtained his H&H this revealing hemoglobin 18.8 and hematocrit of 58.4.  We let him know at the time of results to follow-up with hematology.  He did this about 3 days later on 6/24/2024 hemoglobin at 18.3 and 54.1 he did have a therapeutic phlebotomy on this day.  Repeat labs have not been obtained.    Despite the change in dose regarding his testosterone cypionate down to 0.75 mL every 2 weeks he has continued to have erythrocytosis.        Past Medical History:   Diagnosis Date    BPH with obstruction/lower urinary tract symptoms     Cerebral amyloid angiopathy (CODE) 06/20/2023    Chronic vertigo 08/03/2018    Gastroesophageal reflux disease without esophagitis 05/06/2019    Hyperglycemia     Hyperlipidemia     Hypertension     Peripheral edema 05/04/2022    Postoperative wound dehiscence 04/21/2022    Stage 3 chronic kidney disease (HCC) 6/27/2024    Testicular hypofunction     Thrombocytopenia (HCC) 06/02/2022    Type 2 diabetes mellitus without complication (HCC)        Past Surgical History:   Procedure Laterality Date    ANKLE SURGERY      HERNIA

## 2024-06-27 ENCOUNTER — OFFICE VISIT (OUTPATIENT)
Dept: INTERNAL MEDICINE | Age: 84
End: 2024-06-27
Payer: MEDICARE

## 2024-06-27 VITALS
DIASTOLIC BLOOD PRESSURE: 80 MMHG | BODY MASS INDEX: 29.26 KG/M2 | TEMPERATURE: 97.3 F | SYSTOLIC BLOOD PRESSURE: 116 MMHG | OXYGEN SATURATION: 97 % | HEIGHT: 72 IN | HEART RATE: 52 BPM | WEIGHT: 216 LBS

## 2024-06-27 DIAGNOSIS — R79.89 LOW TESTOSTERONE IN MALE: ICD-10-CM

## 2024-06-27 DIAGNOSIS — E55.9 VITAMIN D DEFICIENCY: ICD-10-CM

## 2024-06-27 DIAGNOSIS — K21.9 GASTROESOPHAGEAL REFLUX DISEASE WITHOUT ESOPHAGITIS: ICD-10-CM

## 2024-06-27 DIAGNOSIS — I68.0 CEREBRAL AMYLOID ANGIOPATHY (CODE): Primary | ICD-10-CM

## 2024-06-27 DIAGNOSIS — E11.9 TYPE 2 DIABETES MELLITUS WITHOUT COMPLICATION, WITHOUT LONG-TERM CURRENT USE OF INSULIN (HCC): ICD-10-CM

## 2024-06-27 DIAGNOSIS — N18.31 STAGE 3A CHRONIC KIDNEY DISEASE (HCC): ICD-10-CM

## 2024-06-27 DIAGNOSIS — D75.1 SECONDARY ERYTHROCYTOSIS: ICD-10-CM

## 2024-06-27 DIAGNOSIS — F51.01 PRIMARY INSOMNIA: ICD-10-CM

## 2024-06-27 DIAGNOSIS — Z00.00 MEDICARE ANNUAL WELLNESS VISIT, SUBSEQUENT: ICD-10-CM

## 2024-06-27 DIAGNOSIS — I10 ESSENTIAL HYPERTENSION: ICD-10-CM

## 2024-06-27 DIAGNOSIS — E78.2 MIXED HYPERLIPIDEMIA: ICD-10-CM

## 2024-06-27 DIAGNOSIS — D69.6 THROMBOCYTOPENIA (HCC): ICD-10-CM

## 2024-06-27 PROBLEM — N18.30 STAGE 3 CHRONIC KIDNEY DISEASE (HCC): Status: ACTIVE | Noted: 2024-06-27

## 2024-06-27 PROCEDURE — 1036F TOBACCO NON-USER: CPT | Performed by: NURSE PRACTITIONER

## 2024-06-27 PROCEDURE — G8427 DOCREV CUR MEDS BY ELIG CLIN: HCPCS | Performed by: NURSE PRACTITIONER

## 2024-06-27 PROCEDURE — 99214 OFFICE O/P EST MOD 30 MIN: CPT | Performed by: NURSE PRACTITIONER

## 2024-06-27 PROCEDURE — G0439 PPPS, SUBSEQ VISIT: HCPCS | Performed by: NURSE PRACTITIONER

## 2024-06-27 PROCEDURE — 3044F HG A1C LEVEL LT 7.0%: CPT | Performed by: NURSE PRACTITIONER

## 2024-06-27 PROCEDURE — 3074F SYST BP LT 130 MM HG: CPT | Performed by: NURSE PRACTITIONER

## 2024-06-27 PROCEDURE — 1123F ACP DISCUSS/DSCN MKR DOCD: CPT | Performed by: NURSE PRACTITIONER

## 2024-06-27 PROCEDURE — 3079F DIAST BP 80-89 MM HG: CPT | Performed by: NURSE PRACTITIONER

## 2024-06-27 PROCEDURE — G8417 CALC BMI ABV UP PARAM F/U: HCPCS | Performed by: NURSE PRACTITIONER

## 2024-06-27 PROCEDURE — 80305 DRUG TEST PRSMV DIR OPT OBS: CPT | Performed by: NURSE PRACTITIONER

## 2024-06-27 RX ORDER — TEMAZEPAM 15 MG/1
CAPSULE ORAL
Qty: 60 CAPSULE | Refills: 0 | Status: SHIPPED | OUTPATIENT
Start: 2024-06-27 | End: 2024-09-27

## 2024-06-27 SDOH — ECONOMIC STABILITY: FOOD INSECURITY: WITHIN THE PAST 12 MONTHS, YOU WORRIED THAT YOUR FOOD WOULD RUN OUT BEFORE YOU GOT MONEY TO BUY MORE.: NEVER TRUE

## 2024-06-27 SDOH — ECONOMIC STABILITY: FOOD INSECURITY: WITHIN THE PAST 12 MONTHS, THE FOOD YOU BOUGHT JUST DIDN'T LAST AND YOU DIDN'T HAVE MONEY TO GET MORE.: NEVER TRUE

## 2024-06-27 SDOH — ECONOMIC STABILITY: INCOME INSECURITY: HOW HARD IS IT FOR YOU TO PAY FOR THE VERY BASICS LIKE FOOD, HOUSING, MEDICAL CARE, AND HEATING?: NOT VERY HARD

## 2024-06-27 ASSESSMENT — ENCOUNTER SYMPTOMS
WHEEZING: 0
EYE DISCHARGE: 0
EYE REDNESS: 0
EYE DISCHARGE: 0
VOMITING: 0
GASTROINTESTINAL NEGATIVE: 1
CONSTIPATION: 0
SORE THROAT: 0
RESPIRATORY NEGATIVE: 1
ABDOMINAL DISTENTION: 0
COLOR CHANGE: 0
SHORTNESS OF BREATH: 0
BLOOD IN STOOL: 0
SHORTNESS OF BREATH: 0
EYES NEGATIVE: 1
STRIDOR: 0
ABDOMINAL PAIN: 0
VOMITING: 0
BACK PAIN: 0
NAUSEA: 0
DIARRHEA: 0
CHOKING: 0
COUGH: 0
COUGH: 0
WHEEZING: 0
TROUBLE SWALLOWING: 0
SORE THROAT: 0
EYE PAIN: 0
DIARRHEA: 0
NAUSEA: 0
EYE ITCHING: 0
BLOOD IN STOOL: 0
ABDOMINAL PAIN: 0
CONSTIPATION: 0

## 2024-06-27 ASSESSMENT — PATIENT HEALTH QUESTIONNAIRE - PHQ9
SUM OF ALL RESPONSES TO PHQ QUESTIONS 1-9: 0
2. FEELING DOWN, DEPRESSED OR HOPELESS: NOT AT ALL
SUM OF ALL RESPONSES TO PHQ9 QUESTIONS 1 & 2: 0
1. LITTLE INTEREST OR PLEASURE IN DOING THINGS: NOT AT ALL

## 2024-06-27 ASSESSMENT — LIFESTYLE VARIABLES
HOW MANY STANDARD DRINKS CONTAINING ALCOHOL DO YOU HAVE ON A TYPICAL DAY: PATIENT DOES NOT DRINK
HOW OFTEN DO YOU HAVE A DRINK CONTAINING ALCOHOL: NEVER

## 2024-06-27 NOTE — ASSESSMENT & PLAN NOTE
He is followed by urology they are changing his dose timing and possibly his medications to due to the side effects of polycythemia

## 2024-06-27 NOTE — ASSESSMENT & PLAN NOTE
He has been using insulin for mealtimes we had issues when he was diagnosed with this CAA a last summer and had to have high doses of steroids we have really never got him back down to a good goal continue to monitor he is also on glipizide

## 2024-06-27 NOTE — ASSESSMENT & PLAN NOTE
Stable with losartan Cardizem and hydralazine he has been on HCTZ but we need to stop that for his renal function will stay on spironolactone for right now

## 2024-06-27 NOTE — PATIENT INSTRUCTIONS
labs, imaging, and/or referrals for you.  A list of these orders (if applicable) as well as your Preventive Care list are included within your After Visit Summary for your review.    Other Preventive Recommendations:    A preventive eye exam performed by an eye specialist is recommended every 1-2 years to screen for glaucoma; cataracts, macular degeneration, and other eye disorders.  A preventive dental visit is recommended every 6 months.  Try to get at least 150 minutes of exercise per week or 10,000 steps per day on a pedometer .  Order or download the FREE \"Exercise & Physical Activity: Your Everyday Guide\" from [employer]. Call 1-775.749.9631 or search [employer] online.  You need 6181-0391 mg of calcium and 5084-2405 IU of vitamin D per day. It is possible to meet your calcium requirement with diet alone, but a vitamin D supplement is usually necessary to meet this goal.  When exposed to the sun, use a sunscreen that protects against both UVA and UVB radiation with an SPF of 30 or greater. Reapply every 2 to 3 hours or after sweating, drying off with a towel, or swimming.  Always wear a seat belt when traveling in a car. Always wear a helmet when riding a bicycle or motorcycle.

## 2024-06-27 NOTE — PROGRESS NOTES
A1C    Urinalysis with Reflex to Culture    TSH    CBC with Auto Differential    Comprehensive Metabolic Panel    Vitamin D deficiency     Stable with 50,000 weekly          Relevant Orders    Vitamin D 25 Hydroxy       Plan:        Patient given educational materials - see patient instructions.  Discussed use, benefit, and side effects of prescribed medications.  Allpatient questions answered.  Pt voiced understanding. Reviewed health maintenance.  Instructed to continue current medications, diet and exercise.  Patient agreed with treatment plan. Follow up as directed.   MEDICATIONS:  Orders Placed This Encounter   Medications    temazepam (RESTORIL) 15 MG capsule     Sig: TAKE 2 CAPSULES BY MOUTH AT BEDTIME AS NEEDED FOR SLEEP     Dispense:  60 capsule     Refill:  0         ORDERS:  Orders Placed This Encounter   Procedures    CBC with Auto Differential    Comprehensive Metabolic Panel    Hemoglobin A1C    Lipid Panel    Vitamin D 25 Hydroxy    Urinalysis with Reflex to Culture    TSH    CBC with Auto Differential    Comprehensive Metabolic Panel       Follow-up:  Return in about 3 months (around 9/27/2024) for have labs done prior to appt.    Patient Instructions   Cerebral amyloid angiopathy followed by [place]  2.  Type 2 diabetes mellitus stable with mealtime insulin and glipizide  3.  GERD stable with Protonix  4 hypertension continue same meds no changes  5 close hyperlipidemia cholesterol and triglycerides are doing okay continue Lipitor  6.  Vitamin D deficiency level is fine on 50,000 weekly  7.  Low testosterone levels followed by urology  8.  Insomnia stable with Restoril  9.  Thrombocytopenia followed by hematology felt to be result of testosterone  10.  History of basal and squamous cell skin cancers followed by Dr. Bautista  11.  Chronic kidney disease GFR is gone down we will stop HCTZ for now-follow-up urology has labs upcoming CBC and testosterone level we will add a CMP as well  PATIENT

## 2024-07-08 DIAGNOSIS — E29.1 HYPOGONADISM MALE: ICD-10-CM

## 2024-07-08 RX ORDER — TESTOSTERONE CYPIONATE 200 MG/ML
INJECTION, SOLUTION INTRAMUSCULAR
Qty: 2 ML | Refills: 0 | OUTPATIENT
Start: 2024-07-08 | End: 2024-08-07

## 2024-07-08 NOTE — TELEPHONE ENCOUNTER
I have already sent in his xyosted at the pharm at his last visit. If it is approved he needs to bring it in on a nurse visit to be taught how to inject

## 2024-07-09 DIAGNOSIS — E29.1 HYPOGONADISM MALE: ICD-10-CM

## 2024-07-10 ENCOUNTER — NURSE ONLY (OUTPATIENT)
Dept: UROLOGY | Age: 84
End: 2024-07-10
Payer: MEDICARE

## 2024-07-10 DIAGNOSIS — E29.1 HYPOGONADISM MALE: ICD-10-CM

## 2024-07-10 DIAGNOSIS — E11.9 TYPE 2 DIABETES MELLITUS WITHOUT COMPLICATION, WITHOUT LONG-TERM CURRENT USE OF INSULIN (HCC): ICD-10-CM

## 2024-07-10 DIAGNOSIS — R79.89 LOW TESTOSTERONE IN MALE: Primary | ICD-10-CM

## 2024-07-10 LAB
ALBUMIN SERPL-MCNC: 4.5 G/DL (ref 3.5–5.2)
ALP SERPL-CCNC: 58 U/L (ref 40–130)
ALT SERPL-CCNC: 46 U/L (ref 5–41)
ANION GAP SERPL CALCULATED.3IONS-SCNC: 10 MMOL/L (ref 7–19)
AST SERPL-CCNC: 27 U/L (ref 5–40)
BASOPHILS # BLD: 0.1 K/UL (ref 0–0.2)
BASOPHILS NFR BLD: 0.9 % (ref 0–1)
BILIRUB SERPL-MCNC: 1 MG/DL (ref 0.2–1.2)
BUN SERPL-MCNC: 25 MG/DL (ref 8–23)
CALCIUM SERPL-MCNC: 9.6 MG/DL (ref 8.8–10.2)
CHLORIDE SERPL-SCNC: 104 MMOL/L (ref 98–111)
CO2 SERPL-SCNC: 27 MMOL/L (ref 22–29)
CREAT SERPL-MCNC: 1.3 MG/DL (ref 0.5–1.2)
EOSINOPHIL # BLD: 0.2 K/UL (ref 0–0.6)
EOSINOPHIL NFR BLD: 3.2 % (ref 0–5)
ERYTHROCYTE [DISTWIDTH] IN BLOOD BY AUTOMATED COUNT: 15.3 % (ref 11.5–14.5)
GLUCOSE SERPL-MCNC: 111 MG/DL (ref 74–109)
HCT VFR BLD AUTO: 54.1 % (ref 42–52)
HGB BLD-MCNC: 17.1 G/DL (ref 14–18)
IMM GRANULOCYTES # BLD: 0 K/UL
LYMPHOCYTES # BLD: 1.7 K/UL (ref 1.1–4.5)
LYMPHOCYTES NFR BLD: 26.4 % (ref 20–40)
MCH RBC QN AUTO: 27.8 PG (ref 27–31)
MCHC RBC AUTO-ENTMCNC: 31.6 G/DL (ref 33–37)
MCV RBC AUTO: 87.8 FL (ref 80–94)
MONOCYTES # BLD: 0.6 K/UL (ref 0–0.9)
MONOCYTES NFR BLD: 9.3 % (ref 0–10)
NEUTROPHILS # BLD: 3.9 K/UL (ref 1.5–7.5)
NEUTS SEG NFR BLD: 59.9 % (ref 50–65)
PLATELET # BLD AUTO: 155 K/UL (ref 130–400)
PMV BLD AUTO: 9.9 FL (ref 9.4–12.4)
POTASSIUM SERPL-SCNC: 5.2 MMOL/L (ref 3.5–5)
PROT SERPL-MCNC: 7 G/DL (ref 6.6–8.7)
RBC # BLD AUTO: 6.16 M/UL (ref 4.7–6.1)
SODIUM SERPL-SCNC: 141 MMOL/L (ref 136–145)
TESTOST SERPL-MCNC: 419.4 NG/DL (ref 193–740)
WBC # BLD AUTO: 6.6 K/UL (ref 4.8–10.8)

## 2024-07-10 PROCEDURE — 96372 THER/PROPH/DIAG INJ SC/IM: CPT | Performed by: NURSE PRACTITIONER

## 2024-07-10 RX ORDER — TESTOSTERONE CYPIONATE 200 MG/ML
150 INJECTION, SOLUTION INTRAMUSCULAR
Qty: 2 ML | Refills: 0 | Status: SHIPPED | OUTPATIENT
Start: 2024-07-10 | End: 2024-08-09

## 2024-07-10 RX ORDER — TESTOSTERONE CYPIONATE 200 MG/ML
INJECTION, SOLUTION INTRAMUSCULAR
Qty: 2 ML | Refills: 0 | OUTPATIENT
Start: 2024-07-10 | End: 2024-08-09

## 2024-07-10 RX ORDER — TESTOSTERONE CYPIONATE 200 MG/ML
200 INJECTION, SOLUTION INTRAMUSCULAR ONCE
Status: COMPLETED | OUTPATIENT
Start: 2024-07-10 | End: 2024-07-10

## 2024-07-10 RX ADMIN — TESTOSTERONE CYPIONATE 200 MG: 200 INJECTION, SOLUTION INTRAMUSCULAR at 08:14

## 2024-07-10 NOTE — PROGRESS NOTES
After obtaining consent from patient and receiving verbal/written orders from MARISELA KHAN, I gave patient 0.75mL of testosterone cypionate injection in RIGHT upper quad. gluteus, patient tolerated well.  Medication was not supplied by the patient.     DX: Low Testosterone in Male [R79.89]  NDC: 5678-6235-84  LOT: [id number]  EXP: 02/01/2026    Patient will follow up in 14 days for next injection.

## 2024-07-23 RX ORDER — INSULIN GLARGINE 100 [IU]/ML
INJECTION, SOLUTION SUBCUTANEOUS
Qty: 5 ADJUSTABLE DOSE PRE-FILLED PEN SYRINGE | Refills: 1 | Status: SHIPPED | OUTPATIENT
Start: 2024-07-23

## 2024-07-23 NOTE — TELEPHONE ENCOUNTER
Cristian called requesting a refill of the below medication which has been pended for you:     Requested Prescriptions     Pending Prescriptions Disp Refills    LANTUS SOLOSTAR 100 UNIT/ML injection pen 5 Adjustable Dose Pre-filled Pen Syringe 1     Sig: INJECT 25 UNITS SUBCUTANEOUSLY ONCE DAILY       Last Appointment Date: Visit date not found  Next Appointment Date: Visit date not found    No Known Allergies

## 2024-07-24 ENCOUNTER — NURSE ONLY (OUTPATIENT)
Dept: UROLOGY | Age: 84
End: 2024-07-24
Payer: MEDICARE

## 2024-07-24 DIAGNOSIS — R79.89 LOW TESTOSTERONE IN MALE: Primary | ICD-10-CM

## 2024-07-24 PROCEDURE — 96372 THER/PROPH/DIAG INJ SC/IM: CPT | Performed by: NURSE PRACTITIONER

## 2024-07-24 NOTE — PROGRESS NOTES
After obtaining consent from patient and receiving verbal/written orders from MARISELA Alejandra, I gave patient 0.75mL of testosterone cypionate injection in LEFT upper quad. gluteus, patient tolerated well.  Medication was supplied by the patient.     DX: Low Testosterone in Male [R79.89]  NDC: 1199-1410-12  LOT: [id number]  EXP: 09/30/26    Patient will follow up in 2 wks for next injection.

## 2024-08-07 ENCOUNTER — NURSE ONLY (OUTPATIENT)
Dept: UROLOGY | Age: 84
End: 2024-08-07
Payer: MEDICARE

## 2024-08-07 DIAGNOSIS — E29.1 HYPOGONADISM MALE: ICD-10-CM

## 2024-08-07 DIAGNOSIS — R79.89 LOW TESTOSTERONE IN MALE: Primary | ICD-10-CM

## 2024-08-07 PROCEDURE — 96372 THER/PROPH/DIAG INJ SC/IM: CPT | Performed by: NURSE PRACTITIONER

## 2024-08-07 RX ORDER — TESTOSTERONE CYPIONATE 200 MG/ML
INJECTION, SOLUTION INTRAMUSCULAR
Qty: 2 ML | Refills: 0 | Status: SHIPPED | OUTPATIENT
Start: 2024-08-07 | End: 2024-09-06

## 2024-08-07 NOTE — PROGRESS NOTES
After obtaining consent from patient and receiving verbal/written orders from MARISELA Alejandra, I gave patient 0.75mL of testosterone cypionate injection in RIGHT upper quad. gluteus, patient tolerated well.  Medication was supplied by the patient.     DX: Low Testosterone in Male [R79.89]  NDC: 7658-4169-93  LOT: [id number]  EXP: 09/30/26    Patient will follow up in 2 wks for next injection.

## 2024-08-20 ENCOUNTER — NURSE ONLY (OUTPATIENT)
Dept: UROLOGY | Age: 84
End: 2024-08-20

## 2024-08-20 DIAGNOSIS — E29.1 HYPOGONADISM MALE: Primary | ICD-10-CM

## 2024-08-20 NOTE — PROGRESS NOTES
After obtaining consent from patient and receiving verbal/written orders from MARISELA Alejandra, I gave patient 0.75mL of testosterone cypionate injection in LEFT  upper quad. gluteus, patient tolerated well.  Medication was supplied by the patient.     DX: Low Testosterone in Male [R79.89]  NDC: 6855-5324-72  LOT: [id number]  EXP: 09/30/26    Patient will follow up in 2 wks for next injection.

## 2024-09-03 ENCOUNTER — OFFICE VISIT (OUTPATIENT)
Dept: GASTROENTEROLOGY | Facility: CLINIC | Age: 84
End: 2024-09-03
Payer: MEDICARE

## 2024-09-03 VITALS
OXYGEN SATURATION: 96 % | HEIGHT: 72 IN | BODY MASS INDEX: 29.61 KG/M2 | TEMPERATURE: 97.7 F | WEIGHT: 218.6 LBS | DIASTOLIC BLOOD PRESSURE: 80 MMHG | SYSTOLIC BLOOD PRESSURE: 160 MMHG | HEART RATE: 71 BPM

## 2024-09-03 DIAGNOSIS — Z78.9 NONSMOKER: ICD-10-CM

## 2024-09-03 DIAGNOSIS — Z86.010 HX OF ADENOMATOUS COLONIC POLYPS: Primary | ICD-10-CM

## 2024-09-03 DIAGNOSIS — E11.9 CONTROLLED TYPE 2 DIABETES MELLITUS WITHOUT COMPLICATION, WITHOUT LONG-TERM CURRENT USE OF INSULIN: ICD-10-CM

## 2024-09-03 DIAGNOSIS — I10 HTN (HYPERTENSION), BENIGN: ICD-10-CM

## 2024-09-03 PROCEDURE — 3077F SYST BP >= 140 MM HG: CPT | Performed by: CLINICAL NURSE SPECIALIST

## 2024-09-03 PROCEDURE — 3079F DIAST BP 80-89 MM HG: CPT | Performed by: CLINICAL NURSE SPECIALIST

## 2024-09-03 PROCEDURE — 1159F MED LIST DOCD IN RCRD: CPT | Performed by: CLINICAL NURSE SPECIALIST

## 2024-09-03 PROCEDURE — 1160F RVW MEDS BY RX/DR IN RCRD: CPT | Performed by: CLINICAL NURSE SPECIALIST

## 2024-09-03 PROCEDURE — 99214 OFFICE O/P EST MOD 30 MIN: CPT | Performed by: CLINICAL NURSE SPECIALIST

## 2024-09-03 RX ORDER — INSULIN GLARGINE 100 [IU]/ML
INJECTION, SOLUTION SUBCUTANEOUS DAILY
COMMUNITY
Start: 2024-07-23

## 2024-09-03 RX ORDER — INSULIN ASPART 100 [IU]/ML
INJECTION, SOLUTION INTRAVENOUS; SUBCUTANEOUS AS NEEDED
COMMUNITY

## 2024-09-03 RX ORDER — ERGOCALCIFEROL 1.25 MG/1
1 CAPSULE, LIQUID FILLED ORAL WEEKLY
COMMUNITY
Start: 2023-12-18

## 2024-09-03 RX ORDER — SODIUM, POTASSIUM,MAG SULFATES 17.5-3.13G
SOLUTION, RECONSTITUTED, ORAL ORAL
Qty: 177 ML | Refills: 0 | Status: SHIPPED | OUTPATIENT
Start: 2024-09-03

## 2024-09-03 NOTE — PROGRESS NOTES
Mike Kay  1940      9/3/2024  Chief Complaint   Patient presents with    Colonoscopy     Hx of polyps-colon recall     Subjective   HPI  Mike Kay is a 83 y.o. male who presents as a referral for preventative maintenance. He has no complaints of nausea or vomiting. No change in bowels. No wt loss. No BRBPR. No melena. There is no family hx for colon cancer. No abdominal pain.  Past Medical History:   Diagnosis Date    ACC (agenesis of corpus callosum)     Acid reflux     Diabetes mellitus type 2, diet-controlled     Hay fever     High cholesterol     BORDERLINE    Hx of colonic polyp     Hypertension     Mitral valve prolapse     PVC (premature ventricular contraction)     Umbilical hernia      Past Surgical History:   Procedure Laterality Date    CARDIAC CATHETERIZATION      COLONOSCOPY N/A 02/20/2020    Dr. briceno-One 18 mm adenoma polyp in the ascending colon, removed piecemeal using a hot snare. Resected and retrieved. Clip was placed. - The examination was otherwise normal on direct and retroflexion views    COLONOSCOPY N/A 03/04/2021    Dr. Briceno-One 7 mm tubular adenoma polyp at the hepatic flexure, removed with a hot snare. Resected and retrieved. - One 10 mm polyp at 20 cm proximal to the anus -Benign granulation tissue polyp with ulceration and marked chronic active inflammation. B. No adenomatous changes identified    COLONOSCOPY W/ POLYPECTOMY  05/12/2014    Adenomatous polyp at 30 cm, Hyperplastic polyp cecum, Diverticulosis repeat exam in 5 years    FOOT SURGERY Left 2015    R/T FRACTURE, PLATE AND SCREWS     INGUINAL HERNIA REPAIR Left 2010    OTHER SURGICAL HISTORY      VOCAL CORD     TONSILLECTOMY  1991    UMBILICAL HERNIA REPAIR N/A 01/12/2017    Procedure: UMBILICAL HERNIA REPAIR;  Surgeon: Kavitha Navarro MD;  Location: [facility] OR;  Service:      Outpatient Medications Marked as Taking for the 9/3/24 encounter (Office Visit) with Kassandra Pollard APRN   Medication Sig  Dispense Refill    atorvastatin (LIPITOR) 20 MG tablet Take 0.5 tablets by mouth Daily. PT TAKES 1/2 20 MG TABLET TO EQUAL 10 MG DAILY      fluticasone (FLONASE) 50 MCG/ACT nasal spray 1 spray by Each Nare route Daily As Needed for Rhinitis or Allergies.      glipiZIDE (GLUCOTROL XL) 10 MG 24 hr tablet Take 1 tablet by mouth Daily.      Insulin Aspart (novoLOG) 100 UNIT/ML injection Inject  under the skin into the appropriate area as directed As Needed for High Blood Sugar. As needed      Lantus SoloStar 100 UNIT/ML injection pen Inject  under the skin into the appropriate area as directed Daily.      losartan (COZAAR) 100 MG tablet Take 1 tablet by mouth Daily.      potassium chloride (K-DUR,KLOR-CON) 10 MEQ CR tablet Take 1 tablet by mouth 2 (Two) Times a Day.      spironolactone (ALDACTONE) 25 MG tablet Take 1 tablet by mouth Daily.      temazepam (RESTORIL) 15 MG capsule TAKE 2 CAPSULES BY MOUTH NIGHTLY AS NEEDED FOR SLEEP FOR UP TO 30 DAYS      Testosterone Cypionate (DEPOTESTOTERONE CYPIONATE) 200 MG/ML injection INJECT 1 ML INTRAMUSCULARLY EVERY OTHER WEEK.      verapamil SR (CALAN-SR) 240 MG CR tablet Take 1 tablet by mouth 2 (Two) Times a Day.      vitamin D (ERGOCALCIFEROL) 1.25 MG (12643 UT) capsule capsule Take 1 capsule by mouth 1 (One) Time Per Week.       No Known Allergies  Social History     Socioeconomic History    Marital status:    Tobacco Use    Smoking status: Never    Smokeless tobacco: Never   Vaping Use    Vaping status: Never Used   Substance and Sexual Activity    Alcohol use: No    Drug use: No    Sexual activity: Defer     Family History   Problem Relation Age of Onset    Heart disease Father     Colon cancer Neg Hx     Colon polyps Neg Hx      Health Maintenance   Topic Date Due    Pneumococcal Vaccine 65+ (1 of 2 - PCV) Never done    DIABETIC EYE EXAM  Never done    ZOSTER VACCINE (1 of 2) Never done    RSV Vaccine - Adults (1 - 1-dose 60+ series) Never done    BMI FOLLOWUP   "06/04/2020    COLORECTAL CANCER SCREENING  03/09/2024    URINE MICROALBUMIN  06/01/2024    COVID-19 Vaccine (3 - 2023-24 season) 09/01/2024    INFLUENZA VACCINE  08/01/2024    HEMOGLOBIN A1C  09/13/2024    LIPID PANEL  03/13/2025    ANNUAL WELLNESS VISIT  06/27/2025    TDAP/TD VACCINES (2 - Td or Tdap) 08/15/2030       REVIEW OF SYSTEMS  General: well appearing, no fever chills or sweats, no unexplained wt loss  HEENT: no acute visual or hearing disturbances  Cardiovascular: No chest pain or palpitations  Pulmonary: No shortness of breath, coughing, wheezing or hemoptysis  : No burning, urgency, hematuria, or dysuria  Musculoskeletal: No joint pain or stiffness  Peripheral: no edema  Skin: No lesions or rashes  Neuro: No dizziness, headaches, stroke, syncope  Endocrine: No hot or cold intolerances  Hematological: No blood dyscrasias    Objective   Vitals:    09/03/24 0840   BP: 160/80   BP Location: Right arm   Pulse: 71   Temp: 97.7 °F (36.5 °C)   TempSrc: Temporal   SpO2: 96%   Weight: 99.2 kg (218 lb 9.6 oz)   Height: 182.9 cm (72.01\")     Body mass index is 29.64 kg/m².  BMI is >= 25 and <30. (Overweight) The following options were offered after discussion;: weight loss educational material (shared in after visit summary)      PHYSICAL EXAM  General: age appropriate well nourished well appearing, no acute distress  Head: normocephalic and atraumatic  Global assessment-supple  Neck-No JVD noted, no lymphadenopathy  Pulmonary-clear to auscultation bilaterally, normal respiratory effort  Cardiovascular-normal rate and rhythm, normal heart sounds, S1 and S2 noted  Abdomen-soft, non tender, non distended, normal bowel sounds all 4 quadrants, no hepatosplenomegaly noted  Extremities-No clubbing cyanosis or edema  Neuro-Non focal, converses appropriately, awake, alert, oriented    Assessment & Plan     Diagnoses and all orders for this visit:    1. Hx of adenomatous colonic polyps (Primary)  -     Case Request; " Standing  -     Case Request  -     sodium-potassium-magnesium sulfates (Suprep Bowel Prep Kit) 17.5-3.13-1.6 GM/177ML solution oral solution; Take as directed by office instructions provided  Dispense: 177 mL; Refill: 0    2. Nonsmoker    3. HTN (hypertension), benign  Comments:  cont BP medication the day of procedure    4. Controlled type 2 diabetes mellitus without complication, without long-term current use of insulin  Comments:  Hold medication the am of procedure    Other orders  -     Implement Anesthesia Orders Day of Procedure; Standing  -     Follow Anesthesia Guidelines / Protocol; Future  -     Obtain Informed Consent; Future  -     Verify bowel prep was successful; Standing  -     Obtain Informed Consent; Standing        COLONOSCOPY WITH ANESTHESIA (N/A)  Body mass index is 29.64 kg/m².    Patient instructions on prep prior to procedure provided to the patient.    All risks, benefits, alternatives, and indications of colonoscopy procedure have been discussed with the patient. Risks to include perforation of the colon requiring possible surgery or colostomy, risk of bleeding from biopsies or removal of colon tissue, possibility of missing a colon polyp or cancer, or adverse drug reaction.  Benefits to include the diagnosis and management of disease of the colon and rectum. Alternatives to include barium enema, radiographic evaluation, lab testing or no intervention. Pt verbalizes understanding and agrees.     Kassandra Pollard, APRN  9/3/2024  09:22 CDT      IF YOU SMOKE OR USE TOBACCO PLEASE READ THE FOLLOWIN minutes reading provided    Why is smoking bad for me?  Smoking increases the risk of heart disease, lung disease, vascular disease, stroke, and cancer.     If you smoke, STOP!    If you would like more information on quitting smoking, please visit the [employer] website: [email or web address]   This link will provide additional resources  including the QUIT line and the Beat the Pack support groups.     For more information:    Quit Now [place]  1-800-QUIT-NOW  [email or web address]

## 2024-09-04 ENCOUNTER — NURSE ONLY (OUTPATIENT)
Dept: UROLOGY | Age: 84
End: 2024-09-04

## 2024-09-04 NOTE — PROGRESS NOTES
After obtaining consent from patient and receiving verbal/written orders from MARISELA Alejandra, I gave patient 0.75mL of testosterone cypionate injection in RIGHT  upper quad. gluteus, patient tolerated well.  Medication was supplied by the patient.     DX: Low Testosterone in Male [R79.89]  NDC: 1779-0944-73  LOT: [id number]  EXP: 09/30/26    Patient will follow up in 2 wks for next injection.

## 2024-09-13 ENCOUNTER — OFFICE VISIT (OUTPATIENT)
Age: 84
End: 2024-09-13

## 2024-09-13 VITALS
OXYGEN SATURATION: 95 % | BODY MASS INDEX: 29.99 KG/M2 | DIASTOLIC BLOOD PRESSURE: 70 MMHG | HEIGHT: 72 IN | WEIGHT: 221.4 LBS | SYSTOLIC BLOOD PRESSURE: 120 MMHG | RESPIRATION RATE: 20 BRPM | TEMPERATURE: 97.5 F | HEART RATE: 51 BPM

## 2024-09-13 DIAGNOSIS — R05.1 ACUTE COUGH: ICD-10-CM

## 2024-09-13 DIAGNOSIS — R09.81 SINUS CONGESTION: Primary | ICD-10-CM

## 2024-09-13 LAB
Lab: NORMAL
QC PASS/FAIL: NORMAL
SARS-COV-2, POC: NORMAL

## 2024-09-13 RX ORDER — BENZONATATE 100 MG/1
100-200 CAPSULE ORAL 3 TIMES DAILY PRN
Qty: 60 CAPSULE | Refills: 0 | Status: SHIPPED | OUTPATIENT
Start: 2024-09-13 | End: 2024-09-20

## 2024-09-13 RX ORDER — DEXAMETHASONE SODIUM PHOSPHATE 10 MG/ML
8 INJECTION INTRAMUSCULAR; INTRAVENOUS ONCE
Status: COMPLETED | OUTPATIENT
Start: 2024-09-13 | End: 2024-09-13

## 2024-09-13 RX ADMIN — DEXAMETHASONE SODIUM PHOSPHATE 8 MG: 10 INJECTION INTRAMUSCULAR; INTRAVENOUS at 13:36

## 2024-09-13 ASSESSMENT — ENCOUNTER SYMPTOMS
SINUS PAIN: 0
EYE PAIN: 0
EYE DISCHARGE: 0
NAUSEA: 0
WHEEZING: 0
COUGH: 1
CHEST TIGHTNESS: 0
COLOR CHANGE: 0
CONSTIPATION: 0
APNEA: 0
DIARRHEA: 0
TROUBLE SWALLOWING: 0
ABDOMINAL PAIN: 0
SHORTNESS OF BREATH: 0
ABDOMINAL DISTENTION: 0
STRIDOR: 0
CHOKING: 0
SINUS PRESSURE: 0
FACIAL SWELLING: 0
SORE THROAT: 0
EYE REDNESS: 0
VOMITING: 0

## 2024-09-14 DIAGNOSIS — E29.1 HYPOGONADISM MALE: ICD-10-CM

## 2024-09-17 RX ORDER — TESTOSTERONE CYPIONATE 200 MG/ML
INJECTION, SOLUTION INTRAMUSCULAR
Qty: 2 ML | Refills: 0 | Status: SHIPPED | OUTPATIENT
Start: 2024-09-17 | End: 2024-10-17

## 2024-09-18 ENCOUNTER — NURSE ONLY (OUTPATIENT)
Dept: UROLOGY | Age: 84
End: 2024-09-18
Payer: MEDICARE

## 2024-09-18 DIAGNOSIS — R79.89 LOW TESTOSTERONE IN MALE: Primary | ICD-10-CM

## 2024-09-19 DIAGNOSIS — E11.9 TYPE 2 DIABETES MELLITUS WITHOUT COMPLICATION, WITHOUT LONG-TERM CURRENT USE OF INSULIN (HCC): ICD-10-CM

## 2024-09-19 DIAGNOSIS — E55.9 VITAMIN D DEFICIENCY: ICD-10-CM

## 2024-09-19 DIAGNOSIS — E78.2 MIXED HYPERLIPIDEMIA: ICD-10-CM

## 2024-09-19 LAB
25(OH)D3 SERPL-MCNC: 47.3 NG/ML
ALBUMIN SERPL-MCNC: 4.3 G/DL (ref 3.5–5.2)
ALP SERPL-CCNC: 62 U/L (ref 40–129)
ALT SERPL-CCNC: 38 U/L (ref 5–41)
ANION GAP SERPL CALCULATED.3IONS-SCNC: 9 MMOL/L (ref 7–19)
AST SERPL-CCNC: 19 U/L (ref 5–40)
BASOPHILS # BLD: 0.1 K/UL (ref 0–0.2)
BASOPHILS NFR BLD: 0.5 % (ref 0–1)
BILIRUB SERPL-MCNC: 0.8 MG/DL (ref 0.2–1.2)
BILIRUB UR QL STRIP: NEGATIVE
BUN SERPL-MCNC: 38 MG/DL (ref 8–23)
CALCIUM SERPL-MCNC: 9.2 MG/DL (ref 8.8–10.2)
CHLORIDE SERPL-SCNC: 104 MMOL/L (ref 98–111)
CHOLEST SERPL-MCNC: 176 MG/DL (ref 0–199)
CLARITY UR: CLEAR
CO2 SERPL-SCNC: 27 MMOL/L (ref 22–29)
COLOR UR: YELLOW
CREAT SERPL-MCNC: 1.5 MG/DL (ref 0.7–1.2)
EOSINOPHIL # BLD: 0.3 K/UL (ref 0–0.6)
EOSINOPHIL NFR BLD: 3.2 % (ref 0–5)
ERYTHROCYTE [DISTWIDTH] IN BLOOD BY AUTOMATED COUNT: 14.1 % (ref 11.5–14.5)
GLUCOSE SERPL-MCNC: 119 MG/DL (ref 70–99)
GLUCOSE UR STRIP.AUTO-MCNC: NEGATIVE MG/DL
HBA1C MFR BLD: 6.2 % (ref 4–5.6)
HCT VFR BLD AUTO: 57.1 % (ref 42–52)
HDLC SERPL-MCNC: 37 MG/DL (ref 40–60)
HGB BLD-MCNC: 18 G/DL (ref 14–18)
HGB UR STRIP.AUTO-MCNC: NEGATIVE MG/L
IMM GRANULOCYTES # BLD: 0 K/UL
KETONES UR STRIP.AUTO-MCNC: NEGATIVE MG/DL
LDLC SERPL CALC-MCNC: 114 MG/DL
LEUKOCYTE ESTERASE UR QL STRIP.AUTO: NEGATIVE
LYMPHOCYTES # BLD: 2.2 K/UL (ref 1.1–4.5)
LYMPHOCYTES NFR BLD: 23.5 % (ref 20–40)
MCH RBC QN AUTO: 28.8 PG (ref 27–31)
MCHC RBC AUTO-ENTMCNC: 31.5 G/DL (ref 33–37)
MCV RBC AUTO: 91.4 FL (ref 80–94)
MONOCYTES # BLD: 0.9 K/UL (ref 0–0.9)
MONOCYTES NFR BLD: 9.2 % (ref 0–10)
NEUTROPHILS # BLD: 5.9 K/UL (ref 1.5–7.5)
NEUTS SEG NFR BLD: 63.3 % (ref 50–65)
NITRITE UR QL STRIP.AUTO: NEGATIVE
PH UR STRIP.AUTO: 5.5 [PH] (ref 5–8)
PLATELET # BLD AUTO: 143 K/UL (ref 130–400)
PMV BLD AUTO: 10.8 FL (ref 9.4–12.4)
POTASSIUM SERPL-SCNC: 5.3 MMOL/L (ref 3.5–5)
PROT SERPL-MCNC: 6.7 G/DL (ref 6.4–8.3)
PROT UR STRIP.AUTO-MCNC: ABNORMAL MG/DL
RBC # BLD AUTO: 6.25 M/UL (ref 4.7–6.1)
SODIUM SERPL-SCNC: 140 MMOL/L (ref 136–145)
SP GR UR STRIP.AUTO: 1.02 (ref 1–1.03)
TRIGL SERPL-MCNC: 124 MG/DL (ref 0–149)
TSH SERPL DL<=0.005 MIU/L-ACNC: 3.61 UIU/ML (ref 0.27–4.2)
UROBILINOGEN UR STRIP.AUTO-MCNC: 0.2 E.U./DL
WBC # BLD AUTO: 9.3 K/UL (ref 4.8–10.8)

## 2024-09-20 ENCOUNTER — OFFICE VISIT (OUTPATIENT)
Dept: INTERNAL MEDICINE | Age: 84
End: 2024-09-20
Payer: MEDICARE

## 2024-09-20 VITALS
OXYGEN SATURATION: 98 % | BODY MASS INDEX: 29.93 KG/M2 | HEIGHT: 72 IN | WEIGHT: 221 LBS | TEMPERATURE: 97.9 F | HEART RATE: 60 BPM

## 2024-09-20 DIAGNOSIS — R09.81 CONGESTION OF NASAL SINUS: Primary | ICD-10-CM

## 2024-09-20 DIAGNOSIS — J01.10 SUBACUTE FRONTAL SINUSITIS: ICD-10-CM

## 2024-09-20 PROBLEM — J32.9 SINUSITIS: Status: ACTIVE | Noted: 2024-09-20

## 2024-09-20 PROCEDURE — 1123F ACP DISCUSS/DSCN MKR DOCD: CPT | Performed by: INTERNAL MEDICINE

## 2024-09-20 PROCEDURE — G8427 DOCREV CUR MEDS BY ELIG CLIN: HCPCS | Performed by: INTERNAL MEDICINE

## 2024-09-20 PROCEDURE — 99213 OFFICE O/P EST LOW 20 MIN: CPT | Performed by: INTERNAL MEDICINE

## 2024-09-20 PROCEDURE — 1036F TOBACCO NON-USER: CPT | Performed by: INTERNAL MEDICINE

## 2024-09-20 PROCEDURE — G8417 CALC BMI ABV UP PARAM F/U: HCPCS | Performed by: INTERNAL MEDICINE

## 2024-09-20 RX ORDER — OLMESARTAN MEDOXOMIL 40 MG/1
40 TABLET ORAL DAILY
COMMUNITY
Start: 2024-09-06 | End: 2025-09-07

## 2024-09-20 RX ORDER — BENZONATATE 100 MG/1
100 CAPSULE ORAL 3 TIMES DAILY PRN
Qty: 21 CAPSULE | Refills: 0 | Status: SHIPPED | OUTPATIENT
Start: 2024-09-20 | End: 2024-09-27

## 2024-09-20 ASSESSMENT — ENCOUNTER SYMPTOMS
BACK PAIN: 0
ABDOMINAL PAIN: 0
BLOOD IN STOOL: 0
SORE THROAT: 1
TROUBLE SWALLOWING: 0
WHEEZING: 0
SHORTNESS OF BREATH: 0
NAUSEA: 0
ABDOMINAL DISTENTION: 0
EYE DISCHARGE: 0
VOMITING: 0
EYE ITCHING: 0
COUGH: 1
SINUS PRESSURE: 0
SINUS PAIN: 1

## 2024-09-24 PROBLEM — Z96.1 PSEUDOPHAKIA: Status: ACTIVE | Noted: 2024-08-19

## 2024-09-24 PROBLEM — H00.029 HORDEOLUM INTERNUM: Status: ACTIVE | Noted: 2024-08-19

## 2024-09-24 PROBLEM — H43.819 VITREOUS DEGENERATION: Status: ACTIVE | Noted: 2024-08-19

## 2024-09-24 PROBLEM — H52.4 PRESBYOPIA: Status: ACTIVE | Noted: 2024-08-19

## 2024-09-24 PROBLEM — R60.0 EDEMA OF BOTH LOWER LEGS: Status: RESOLVED | Noted: 2024-09-24 | Resolved: 2024-09-24

## 2024-09-24 PROBLEM — M12.572 TRAUMATIC ARTHRITIS OF LEFT ANKLE: Status: RESOLVED | Noted: 2024-09-24 | Resolved: 2024-09-24

## 2024-09-24 PROBLEM — H35.3132 NONEXUDATIVE AGE-RELATED MACULAR DEGENERATION, BILATERAL, INTERMEDIATE DRY STAGE: Status: ACTIVE | Noted: 2024-08-19

## 2024-09-24 PROBLEM — H04.129 TEAR FILM INSUFFICIENCY: Status: ACTIVE | Noted: 2024-08-19

## 2024-09-25 PROCEDURE — 96372 THER/PROPH/DIAG INJ SC/IM: CPT | Performed by: NURSE PRACTITIONER

## 2024-09-25 RX ORDER — TESTOSTERONE CYPIONATE 200 MG/ML
150 INJECTION, SOLUTION INTRAMUSCULAR ONCE
Status: COMPLETED | OUTPATIENT
Start: 2024-09-25 | End: 2024-09-25

## 2024-09-25 RX ADMIN — TESTOSTERONE CYPIONATE 150 MG: 200 INJECTION, SOLUTION INTRAMUSCULAR at 13:52

## 2024-09-26 ENCOUNTER — TELEPHONE (OUTPATIENT)
Dept: HEMATOLOGY | Age: 84
End: 2024-09-26

## 2024-09-27 ENCOUNTER — OFFICE VISIT (OUTPATIENT)
Dept: INTERNAL MEDICINE | Age: 84
End: 2024-09-27
Payer: MEDICARE

## 2024-09-27 VITALS
SYSTOLIC BLOOD PRESSURE: 140 MMHG | HEART RATE: 65 BPM | HEIGHT: 72 IN | BODY MASS INDEX: 29.5 KG/M2 | OXYGEN SATURATION: 96 % | WEIGHT: 217.8 LBS | DIASTOLIC BLOOD PRESSURE: 86 MMHG

## 2024-09-27 DIAGNOSIS — E11.22 TYPE 2 DIABETES MELLITUS WITH CHRONIC KIDNEY DISEASE, WITHOUT LONG-TERM CURRENT USE OF INSULIN, UNSPECIFIED CKD STAGE (HCC): ICD-10-CM

## 2024-09-27 DIAGNOSIS — I10 ESSENTIAL HYPERTENSION: ICD-10-CM

## 2024-09-27 DIAGNOSIS — E78.2 MIXED HYPERLIPIDEMIA: Primary | ICD-10-CM

## 2024-09-27 DIAGNOSIS — E11.9 TYPE 2 DIABETES MELLITUS WITHOUT COMPLICATION, WITHOUT LONG-TERM CURRENT USE OF INSULIN (HCC): ICD-10-CM

## 2024-09-27 PROCEDURE — 99214 OFFICE O/P EST MOD 30 MIN: CPT | Performed by: INTERNAL MEDICINE

## 2024-09-27 PROCEDURE — G8427 DOCREV CUR MEDS BY ELIG CLIN: HCPCS | Performed by: INTERNAL MEDICINE

## 2024-09-27 PROCEDURE — 1036F TOBACCO NON-USER: CPT | Performed by: INTERNAL MEDICINE

## 2024-09-27 PROCEDURE — 3079F DIAST BP 80-89 MM HG: CPT | Performed by: INTERNAL MEDICINE

## 2024-09-27 PROCEDURE — 3044F HG A1C LEVEL LT 7.0%: CPT | Performed by: INTERNAL MEDICINE

## 2024-09-27 PROCEDURE — 3077F SYST BP >= 140 MM HG: CPT | Performed by: INTERNAL MEDICINE

## 2024-09-27 PROCEDURE — 1123F ACP DISCUSS/DSCN MKR DOCD: CPT | Performed by: INTERNAL MEDICINE

## 2024-09-27 PROCEDURE — G8417 CALC BMI ABV UP PARAM F/U: HCPCS | Performed by: INTERNAL MEDICINE

## 2024-09-27 ASSESSMENT — ENCOUNTER SYMPTOMS
BLOOD IN STOOL: 0
BACK PAIN: 0
EYE ITCHING: 0
SHORTNESS OF BREATH: 0
NAUSEA: 0
SINUS PRESSURE: 0
TROUBLE SWALLOWING: 0
ABDOMINAL PAIN: 0
SORE THROAT: 0
VOMITING: 0
ABDOMINAL DISTENTION: 0
WHEEZING: 0
EYE DISCHARGE: 0

## 2024-10-01 DIAGNOSIS — R79.89 LOW TESTOSTERONE IN MALE: Primary | ICD-10-CM

## 2024-10-01 DIAGNOSIS — D50.8 IRON DEFICIENCY ANEMIA SECONDARY TO INADEQUATE DIETARY IRON INTAKE: Primary | ICD-10-CM

## 2024-10-01 ASSESSMENT — ENCOUNTER SYMPTOMS
EYE REDNESS: 0
VOMITING: 0
SORE THROAT: 0
ABDOMINAL PAIN: 0
RESPIRATORY NEGATIVE: 1
BACK PAIN: 0
GASTROINTESTINAL NEGATIVE: 1
WHEEZING: 0
COUGH: 0
BLOOD IN STOOL: 0
EYE DISCHARGE: 0
NAUSEA: 0
EYES NEGATIVE: 1
DIARRHEA: 0
SHORTNESS OF BREATH: 0
EYE PAIN: 0
CONSTIPATION: 0

## 2024-10-01 NOTE — PROGRESS NOTES
Progress Note      Pt Name: Cristian Regan  YOB: 1940  MRN: [id number]    Date of evaluation: 10/02/2024  History Obtained From:  patient, electronic medical record    CHIEF COMPLAINT:    Chief Complaint   Patient presents with    Follow-up     Secondary erythrocytosis     HISTORY OF PRESENT ILLNESS:    Cristian Regan is a 83 y.o.  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  Current recommendation is for conservative lab monitoring for his thrombocytopenia and phlebotomy would be warranted for hemoglobin >18 or hematocrit >50 and/or symptomatic (he intermittently donates blood to the [facility] or will have phlebotomy completed in clinic).  Cristian returns today in scheduled follow-up for evaluation, lab monitoring and further treatment recommendations.  He reports he continues to self inject testosterone 75 mg IM every 2 weeks (dose has been reduced from 100).  He is asymptomatic other than chronic fatigue.  He has a hematocrit of 52.9 and hemoglobin of 16.9, recommendation is for a phlebotomy today, he was last phlebotomized on 6/25/2024.    Today's clinic visit to include physical assessment, review of systems, any radiograph or lab findings that were available and plan of care are documented below.     HEMATOLOGIC HISTORY:   Diagnosis:  Mild thrombocytopenia  Erythrocytosis  History of iron deficiency anemia  Hypergonadism (long-term testosterone replacement)  Mild splenomegaly    Treatment summary:  History of OTC iron replacement, not taking at initial consultation  Phlebotomize for hemoglobin >18 or hematocrit >50 and/or symptomatic.    Cristian was seen and initial hematology consultation on 4/19/2022 for concern for erythrocytosis and upon review of medical records persistent mild thrombocytopenia was noted, referred by BASSAM Steel for further evaluation and  to printed text. The electronic translation of spoken language may permit erroneous, or at times, nonsensical words or phrases to be inadvertently transcribed; although attempts have made to review the note for such errors, some may still exist.  Please excuse any unrecognized transcription errors and contact us if the error is unintelligible or needs documented correction.  Also, portions of this note have been copied forward, however, changed to reflect the most current clinical status of this patient.    Electronically signed by MARISELA Bob on 10/2/2024 at 9:55 AM  Francisco OLVERA, josephine starting this note as a registered nurse for MARISELA Bucio.

## 2024-10-02 ENCOUNTER — HOSPITAL ENCOUNTER (OUTPATIENT)
Dept: INFUSION THERAPY | Age: 84
Setting detail: INFUSION SERIES
Discharge: HOME | End: 2024-10-02
Payer: MEDICARE

## 2024-10-02 ENCOUNTER — HOSPITAL ENCOUNTER (OUTPATIENT)
Dept: INFUSION THERAPY | Age: 84
Discharge: HOME | End: 2024-10-02
Payer: MEDICARE

## 2024-10-02 ENCOUNTER — NURSE ONLY (OUTPATIENT)
Dept: UROLOGY | Age: 84
End: 2024-10-02
Payer: MEDICARE

## 2024-10-02 ENCOUNTER — OFFICE VISIT (OUTPATIENT)
Dept: HEMATOLOGY | Age: 84
End: 2024-10-02
Payer: MEDICARE

## 2024-10-02 VITALS
OXYGEN SATURATION: 95 % | DIASTOLIC BLOOD PRESSURE: 78 MMHG | HEIGHT: 72 IN | WEIGHT: 215.8 LBS | SYSTOLIC BLOOD PRESSURE: 128 MMHG | BODY MASS INDEX: 29.23 KG/M2 | TEMPERATURE: 97.4 F | HEART RATE: 60 BPM

## 2024-10-02 VITALS
SYSTOLIC BLOOD PRESSURE: 123 MMHG | OXYGEN SATURATION: 94 % | RESPIRATION RATE: 18 BRPM | TEMPERATURE: 97.6 F | DIASTOLIC BLOOD PRESSURE: 59 MMHG | HEART RATE: 51 BPM

## 2024-10-02 DIAGNOSIS — R79.89 LOW TESTOSTERONE IN MALE: Primary | ICD-10-CM

## 2024-10-02 DIAGNOSIS — R79.89 LOW TESTOSTERONE IN MALE: ICD-10-CM

## 2024-10-02 DIAGNOSIS — D69.6 THROMBOCYTOPENIA (HCC): ICD-10-CM

## 2024-10-02 DIAGNOSIS — D75.1 SECONDARY ERYTHROCYTOSIS: Primary | ICD-10-CM

## 2024-10-02 DIAGNOSIS — D75.1 SECONDARY ERYTHROCYTOSIS: ICD-10-CM

## 2024-10-02 DIAGNOSIS — D50.8 IRON DEFICIENCY ANEMIA SECONDARY TO INADEQUATE DIETARY IRON INTAKE: ICD-10-CM

## 2024-10-02 LAB
BASOPHILS # BLD: 0.04 K/UL (ref 0.01–0.08)
BASOPHILS NFR BLD: 0.7 % (ref 0.1–1.2)
EOSINOPHIL # BLD: 0.23 K/UL (ref 0.04–0.54)
EOSINOPHIL NFR BLD: 3.8 % (ref 0.7–7)
ERYTHROCYTE [DISTWIDTH] IN BLOOD BY AUTOMATED COUNT: 14.2 % (ref 11.5–14.5)
ERYTHROCYTE [DISTWIDTH] IN BLOOD BY AUTOMATED COUNT: 14.2 % (ref 11.6–14.4)
FERRITIN SERPL-MCNC: 85.6 NG/ML (ref 30–400)
HCT VFR BLD AUTO: 52.9 % (ref 42–52)
HCT VFR BLD AUTO: 54 % (ref 40.1–51)
HGB BLD-MCNC: 16.9 G/DL (ref 14–18)
HGB BLD-MCNC: 18.1 G/DL (ref 13.7–17.5)
IRON SATN MFR SERPL: 27 % (ref 14–50)
IRON SERPL-MCNC: 93 UG/DL (ref 59–158)
LYMPHOCYTES # BLD: 1.69 K/UL (ref 1.18–3.74)
LYMPHOCYTES NFR BLD: 27.8 % (ref 19.3–53.1)
MCH RBC QN AUTO: 29.3 PG (ref 25.7–32.2)
MCH RBC QN AUTO: 29.3 PG (ref 27–31)
MCHC RBC AUTO-ENTMCNC: 31.9 G/DL (ref 33–37)
MCHC RBC AUTO-ENTMCNC: 33.5 G/DL (ref 32.3–36.5)
MCV RBC AUTO: 87.4 FL (ref 79–92.2)
MCV RBC AUTO: 91.7 FL (ref 80–94)
MONOCYTES # BLD: 0.58 K/UL (ref 0.24–0.82)
MONOCYTES NFR BLD: 9.6 % (ref 4.7–12.5)
NEUTROPHILS # BLD: 3.52 K/UL (ref 1.56–6.13)
NEUTS SEG NFR BLD: 57.9 % (ref 34–71.1)
PLATELET # BLD AUTO: 121 K/UL (ref 163–337)
PLATELET # BLD AUTO: 130 K/UL (ref 130–400)
PMV BLD AUTO: 10.8 FL (ref 9.4–12.4)
PMV BLD AUTO: 9.7 FL (ref 7.4–10.4)
RBC # BLD AUTO: 5.77 M/UL (ref 4.7–6.1)
RBC # BLD AUTO: 6.18 M/UL (ref 4.63–6.08)
TIBC SERPL-MCNC: 343 UG/DL (ref 250–400)
WBC # BLD AUTO: 6.07 K/UL (ref 4.23–9.07)
WBC # BLD AUTO: 6.8 K/UL (ref 4.8–10.8)

## 2024-10-02 PROCEDURE — 3074F SYST BP LT 130 MM HG: CPT | Performed by: NURSE PRACTITIONER

## 2024-10-02 PROCEDURE — 3078F DIAST BP <80 MM HG: CPT | Performed by: NURSE PRACTITIONER

## 2024-10-02 PROCEDURE — 85025 COMPLETE CBC W/AUTO DIFF WBC: CPT

## 2024-10-02 PROCEDURE — 99212 OFFICE O/P EST SF 10 MIN: CPT

## 2024-10-02 PROCEDURE — G8417 CALC BMI ABV UP PARAM F/U: HCPCS | Performed by: NURSE PRACTITIONER

## 2024-10-02 PROCEDURE — 1123F ACP DISCUSS/DSCN MKR DOCD: CPT | Performed by: NURSE PRACTITIONER

## 2024-10-02 PROCEDURE — 99213 OFFICE O/P EST LOW 20 MIN: CPT | Performed by: NURSE PRACTITIONER

## 2024-10-02 PROCEDURE — G8427 DOCREV CUR MEDS BY ELIG CLIN: HCPCS | Performed by: NURSE PRACTITIONER

## 2024-10-02 PROCEDURE — 99195 PHLEBOTOMY: CPT

## 2024-10-02 PROCEDURE — 96372 THER/PROPH/DIAG INJ SC/IM: CPT | Performed by: NURSE PRACTITIONER

## 2024-10-02 PROCEDURE — 36415 COLL VENOUS BLD VENIPUNCTURE: CPT

## 2024-10-02 PROCEDURE — 1036F TOBACCO NON-USER: CPT | Performed by: NURSE PRACTITIONER

## 2024-10-02 PROCEDURE — G8482 FLU IMMUNIZE ORDER/ADMIN: HCPCS | Performed by: NURSE PRACTITIONER

## 2024-10-02 RX ORDER — CLONIDINE HYDROCHLORIDE 0.1 MG/1
0.1 TABLET ORAL DAILY
COMMUNITY

## 2024-10-02 RX ORDER — 0.9 % SODIUM CHLORIDE 0.9 %
500 INTRAVENOUS SOLUTION INTRAVENOUS ONCE
OUTPATIENT
Start: 2024-10-02 | End: 2024-10-02

## 2024-10-02 NOTE — PROGRESS NOTES
After obtaining consent from patient and receiving verbal/written orders from MARISELA Alejandra, I gave patient 0.75mL of testosterone cypionate injection in RIGHT upper quad. gluteus, patient tolerated well.  Medication was supplied by the patient.     DX: Low Testosterone in Male [R79.89]  NDC: 7802-4381-62  LOT: [id number]  EXP: 07/2027    Patient will follow up in 2 wks for next injection.

## 2024-10-03 DIAGNOSIS — I10 ESSENTIAL HYPERTENSION: ICD-10-CM

## 2024-10-03 RX ORDER — VERAPAMIL HYDROCHLORIDE 240 MG/1
240 TABLET, FILM COATED, EXTENDED RELEASE ORAL 2 TIMES DAILY
Qty: 180 TABLET | Refills: 0 | Status: SHIPPED | OUTPATIENT
Start: 2024-10-03

## 2024-10-03 NOTE — TELEPHONE ENCOUNTER
Last OV 9/27/2024  Next OV 3/27/2025      Requested Prescriptions     Pending Prescriptions Disp Refills    verapamil (CALAN SR) 240 MG extended release tablet 180 tablet 0     Sig: Take 1 tablet by mouth 2 times daily

## 2024-10-07 RX ORDER — GLIPIZIDE 10 MG/1
10 TABLET, FILM COATED, EXTENDED RELEASE ORAL EVERY MORNING
Qty: 30 TABLET | Refills: 5 | Status: SHIPPED | OUTPATIENT
Start: 2024-10-07

## 2024-10-07 NOTE — TELEPHONE ENCOUNTER
Cristian called requesting a refill of the below medication which has been pended for you:     Requested Prescriptions     Pending Prescriptions Disp Refills    glipiZIDE (GLUCOTROL XL) 10 MG extended release tablet 30 tablet 5     Sig: Take 1 tablet by mouth every morning       Last Appointment Date: Visit date not found  Next Appointment Date: 3/27/2025    No Known Allergies

## 2024-10-08 DIAGNOSIS — I10 ESSENTIAL HYPERTENSION: ICD-10-CM

## 2024-10-08 RX ORDER — VERAPAMIL HYDROCHLORIDE 240 MG/1
240 TABLET, FILM COATED, EXTENDED RELEASE ORAL 2 TIMES DAILY
Qty: 180 TABLET | Refills: 0 | Status: SHIPPED | OUTPATIENT
Start: 2024-10-08

## 2024-10-08 RX ORDER — LOSARTAN POTASSIUM 100 MG/1
100 TABLET ORAL DAILY
Qty: 90 TABLET | Refills: 1 | Status: SHIPPED | OUTPATIENT
Start: 2024-10-08

## 2024-10-08 ASSESSMENT — ENCOUNTER SYMPTOMS
EYE DISCHARGE: 0
RESPIRATORY NEGATIVE: 1
EYES NEGATIVE: 1
GASTROINTESTINAL NEGATIVE: 1
WHEEZING: 0
EYE REDNESS: 0
DIARRHEA: 0
BLOOD IN STOOL: 0
BACK PAIN: 0
NAUSEA: 0
SHORTNESS OF BREATH: 0
SORE THROAT: 0
EYE PAIN: 0
COUGH: 0
VOMITING: 0
CONSTIPATION: 0
ABDOMINAL PAIN: 0

## 2024-10-08 NOTE — TELEPHONE ENCOUNTER
Cristian called requesting a refill of the below medication which has been pended for you:     Requested Prescriptions     Pending Prescriptions Disp Refills    verapamil (CALAN SR) 240 MG extended release tablet 180 tablet 0     Sig: Take 1 tablet by mouth 2 times daily       Last Appointment Date: Visit date not found  Next Appointment Date: 3/27/2025    No Known Allergies

## 2024-10-10 ENCOUNTER — ANESTHESIA EVENT (OUTPATIENT)
Dept: GASTROENTEROLOGY | Facility: HOSPITAL | Age: 84
End: 2024-10-10
Payer: MEDICARE

## 2024-10-10 ENCOUNTER — HOSPITAL ENCOUNTER (OUTPATIENT)
Facility: HOSPITAL | Age: 84
Setting detail: HOSPITAL OUTPATIENT SURGERY
Discharge: HOME | End: 2024-10-10
Attending: INTERNAL MEDICINE | Admitting: INTERNAL MEDICINE
Payer: MEDICARE

## 2024-10-10 ENCOUNTER — ANESTHESIA (OUTPATIENT)
Dept: GASTROENTEROLOGY | Facility: HOSPITAL | Age: 84
End: 2024-10-10
Payer: MEDICARE

## 2024-10-10 VITALS
HEART RATE: 91 BPM | TEMPERATURE: 97.1 F | WEIGHT: 210 LBS | DIASTOLIC BLOOD PRESSURE: 58 MMHG | RESPIRATION RATE: 18 BRPM | OXYGEN SATURATION: 92 % | HEIGHT: 72 IN | BODY MASS INDEX: 28.44 KG/M2 | SYSTOLIC BLOOD PRESSURE: 108 MMHG

## 2024-10-10 DIAGNOSIS — Z86.0101 HX OF ADENOMATOUS COLONIC POLYPS: ICD-10-CM

## 2024-10-10 LAB — GLUCOSE BLDC GLUCOMTR-MCNC: 127 MG/DL (ref 70–130)

## 2024-10-10 PROCEDURE — 88305 TISSUE EXAM BY PATHOLOGIST: CPT | Performed by: INTERNAL MEDICINE

## 2024-10-10 PROCEDURE — 25010000002 LIDOCAINE PF 2% 2 % SOLUTION: Performed by: NURSE ANESTHETIST, CERTIFIED REGISTERED

## 2024-10-10 PROCEDURE — 82948 REAGENT STRIP/BLOOD GLUCOSE: CPT

## 2024-10-10 PROCEDURE — 25010000002 PROPOFOL 10 MG/ML EMULSION: Performed by: NURSE ANESTHETIST, CERTIFIED REGISTERED

## 2024-10-10 PROCEDURE — 45385 COLONOSCOPY W/LESION REMOVAL: CPT | Performed by: INTERNAL MEDICINE

## 2024-10-10 RX ORDER — LIDOCAINE HYDROCHLORIDE 10 MG/ML
0.5 INJECTION, SOLUTION EPIDURAL; INFILTRATION; INTRACAUDAL; PERINEURAL ONCE AS NEEDED
Status: DISCONTINUED | OUTPATIENT
Start: 2024-10-10 | End: 2024-10-10 | Stop reason: HOSPADM

## 2024-10-10 RX ORDER — LIDOCAINE HYDROCHLORIDE 20 MG/ML
INJECTION, SOLUTION EPIDURAL; INFILTRATION; INTRACAUDAL; PERINEURAL AS NEEDED
Status: DISCONTINUED | OUTPATIENT
Start: 2024-10-10 | End: 2024-10-10 | Stop reason: SURG

## 2024-10-10 RX ORDER — SODIUM CHLORIDE 0.9 % (FLUSH) 0.9 %
10 SYRINGE (ML) INJECTION AS NEEDED
Status: DISCONTINUED | OUTPATIENT
Start: 2024-10-10 | End: 2024-10-10 | Stop reason: HOSPADM

## 2024-10-10 RX ORDER — SODIUM CHLORIDE 0.9 % (FLUSH) 0.9 %
10 SYRINGE (ML) INJECTION AS NEEDED
Status: CANCELLED | OUTPATIENT
Start: 2024-10-10

## 2024-10-10 RX ORDER — SODIUM CHLORIDE 9 MG/ML
500 INJECTION, SOLUTION INTRAVENOUS CONTINUOUS PRN
Status: DISCONTINUED | OUTPATIENT
Start: 2024-10-10 | End: 2024-10-10 | Stop reason: HOSPADM

## 2024-10-10 RX ORDER — SODIUM CHLORIDE 0.9 % (FLUSH) 0.9 %
10 SYRINGE (ML) INJECTION EVERY 12 HOURS SCHEDULED
Status: CANCELLED | OUTPATIENT
Start: 2024-10-10

## 2024-10-10 RX ORDER — PROPOFOL 10 MG/ML
VIAL (ML) INTRAVENOUS AS NEEDED
Status: DISCONTINUED | OUTPATIENT
Start: 2024-10-10 | End: 2024-10-10 | Stop reason: SURG

## 2024-10-10 RX ADMIN — PROPOFOL 230 MG: 10 INJECTION, EMULSION INTRAVENOUS at 08:18

## 2024-10-10 RX ADMIN — LIDOCAINE HYDROCHLORIDE 50 MG: 20 INJECTION, SOLUTION EPIDURAL; INFILTRATION; INTRACAUDAL; PERINEURAL at 08:18

## 2024-10-10 NOTE — ANESTHESIA PREPROCEDURE EVALUATION
Anesthesia Evaluation     Patient summary reviewed and Nursing notes reviewed   NPO Solid Status: > 8 hours  NPO Liquid Status: > 4 hours           Airway   Mallampati: II  TM distance: >3 FB  Neck ROM: full  No difficulty expected  Dental - normal exam     Pulmonary - normal exam   (+) ,sleep apnea  (-) not a smoker  Cardiovascular - normal exam  Exercise tolerance: excellent (>7 METS)    (+) hypertension, valvular problems/murmurs MVP, hyperlipidemia      Neuro/Psych  (-) seizures, CVA  GI/Hepatic/Renal/Endo    (+) GERD, diabetes mellitus type 2 well controlled using insulin    Musculoskeletal (-) negative ROS    Abdominal    Substance History      OB/GYN          Other        (-) history of cancer, autoimmune disease                    Anesthesia Plan    ASA 2     MAC     intravenous induction     Anesthetic plan, risks, benefits, and alternatives have been provided, discussed and informed consent has been obtained with: patient.    Plan discussed with CRNA.

## 2024-10-10 NOTE — ANESTHESIA POSTPROCEDURE EVALUATION
"Patient: Mike Kay    Procedure Summary       Date: 10/10/24 Room / Location: [facility] ENDOSCOPY 4 / [facility] ENDOSCOPY    Anesthesia Start: 0816 Anesthesia Stop: 0841    Procedure: COLONOSCOPY WITH ANESTHESIA Diagnosis:       Hx of adenomatous colonic polyps      (Hx of adenomatous colonic polyps [Z86.010])    Surgeons: Mohan Quinn MD Provider: Edinson Blancas CRNA    Anesthesia Type: MAC ASA Status: 2            Anesthesia Type: MAC    Vitals  No vitals data found for the desired time range.          Post Anesthesia Care and Evaluation    Patient location during evaluation: PHASE II  Patient participation: complete - patient participated  Level of consciousness: awake and alert  Pain score: 0  Pain management: adequate    Airway patency: patent  Anesthetic complications: No anesthetic complications  PONV Status: none  Cardiovascular status: acceptable  Respiratory status: acceptable  Hydration status: acceptable    Comments: Blood pressure 146/69, pulse 67, temperature 97.1 °F (36.2 °C), temperature source Tympanic, resp. rate 18, height 182.9 cm (72\"), weight 95.3 kg (210 lb), SpO2 97%.    Pt discharged from PACU based on carley score >8    "

## 2024-10-10 NOTE — H&P
[facility] Gastroenterology  Pre Procedure History & Physical    Chief Complaint:   History of polyps    Subjective     HPI:   Here for colonoscopy.  History of polyps    Past Medical History:   Past Medical History:   Diagnosis Date    ACC (agenesis of corpus callosum)     Acid reflux     Diabetes mellitus type 2, diet-controlled     Hay fever     High cholesterol     BORDERLINE    Hx of colonic polyp     Hypertension     Mitral valve prolapse     PVC (premature ventricular contraction)     Umbilical hernia        Past Surgical History:  Past Surgical History:   Procedure Laterality Date    CARDIAC CATHETERIZATION      COLONOSCOPY N/A 02/20/2020    Dr. briceno-One 18 mm adenoma polyp in the ascending colon, removed piecemeal using a hot snare. Resected and retrieved. Clip was placed. - The examination was otherwise normal on direct and retroflexion views    COLONOSCOPY N/A 03/04/2021    Dr. Briceno-One 7 mm tubular adenoma polyp at the hepatic flexure, removed with a hot snare. Resected and retrieved. - One 10 mm polyp at 20 cm proximal to the anus -Benign granulation tissue polyp with ulceration and marked chronic active inflammation. B. No adenomatous changes identified    COLONOSCOPY W/ POLYPECTOMY  05/12/2014    Adenomatous polyp at 30 cm, Hyperplastic polyp cecum, Diverticulosis repeat exam in 5 years    FOOT SURGERY Left 2015    R/T FRACTURE, PLATE AND SCREWS     INGUINAL HERNIA REPAIR Left 2010    OTHER SURGICAL HISTORY      VOCAL CORD     TONSILLECTOMY  1991    UMBILICAL HERNIA REPAIR N/A 01/12/2017    Procedure: UMBILICAL HERNIA REPAIR;  Surgeon: Kavitha Navarro MD;  Location: [facility] OR;  Service:        Family History:  Family History   Problem Relation Age of Onset    Heart disease Father     Colon cancer Neg Hx     Colon polyps Neg Hx        Social History:   reports that he has never smoked. He has never used smokeless tobacco. He reports that he does not drink alcohol and does not use  drugs.    Medications:   Prior to Admission medications    Medication Sig Start Date End Date Taking? Authorizing Provider   atorvastatin (LIPITOR) 20 MG tablet Take 0.5 tablets by mouth Daily. PT TAKES 1/2 20 MG TABLET TO EQUAL 10 MG DAILY 5/4/16  Yes Yasmin Oliveira MD   fluticasone (FLONASE) 50 MCG/ACT nasal spray 1 spray by Each Nare route Daily As Needed for Rhinitis or Allergies. 5/4/16  Yes Yasmin Oliveira MD   losartan (COZAAR) 100 MG tablet Take 1 tablet by mouth Daily. 5/4/16  Yes Yasmin Oliveira MD   potassium chloride (K-DUR,KLOR-CON) 10 MEQ CR tablet Take 1 tablet by mouth 2 (Two) Times a Day. 5/4/16  Yes Yasmin Oliveira MD   sodium-potassium-magnesium sulfates (Suprep Bowel Prep Kit) 17.5-3.13-1.6 GM/177ML solution oral solution Take as directed by office instructions provided 9/3/24  Yes Kassandra Pollard APRN   spironolactone (ALDACTONE) 25 MG tablet Take 1 tablet by mouth Daily.   Yes Yasmin Oliveira MD   temazepam (RESTORIL) 15 MG capsule TAKE 2 CAPSULES BY MOUTH NIGHTLY AS NEEDED FOR SLEEP FOR UP TO 30 DAYS 9/30/20  Yes Emergency, Nurse Eugene, RN   Testosterone Cypionate (DEPOTESTOTERONE CYPIONATE) 200 MG/ML injection INJECT 1 ML INTRAMUSCULARLY EVERY OTHER WEEK. 11/22/21  Yes Yasmin Oliveira MD   verapamil SR (CALAN-SR) 240 MG CR tablet Take 1 tablet by mouth 2 (Two) Times a Day. 5/4/16  Yes Yasmin Oliveira MD   aspirin 81 MG tablet Take 81 mg by mouth Daily.  Patient not taking: Reported on 9/3/2024    Yasmin Oliveira MD   cloNIDine (CATAPRES) 0.1 MG tablet Take 1 tablet by mouth Daily.  Patient not taking: Reported on 9/3/2024 12/28/21   Gwen Spain APRN   glipiZIDE (GLUCOTROL XL) 10 MG 24 hr tablet Take 1 tablet by mouth Daily.    Emergency, Nurse Eugene, RN   hydrochlorothiazide (HYDRODIURIL) 25 MG tablet Take 25 mg by mouth Daily.  Patient not taking: Reported on 9/3/2024 5/4/16   Yasmin Oliveira MD   Insulin Aspart (novoLOG) 100  "UNIT/ML injection Inject  under the skin into the appropriate area as directed As Needed for High Blood Sugar. As needed    Yasmin Oliveira MD   Lancharly SoloStar 100 UNIT/ML injection pen Inject  under the skin into the appropriate area as directed Daily. 7/23/24   Yasmin Oliveira MD   vitamin D (ERGOCALCIFEROL) 1.25 MG (13004 UT) capsule capsule Take 1 capsule by mouth 1 (One) Time Per Week. 12/18/23   Yasmin Oliveira MD       Allergies:  Patient has no known allergies.    Objective     Blood pressure 146/69, pulse 67, temperature 97.1 °F (36.2 °C), temperature source Tympanic, resp. rate 18, height 182.9 cm (72\"), weight 95.3 kg (210 lb), SpO2 97%.    Physical Exam   Constitutional: Pt is oriented to person, place, and in no distress.   HENT: Mouth/Throat: Oropharynx is clear.   Cardiovascular: Normal rate, regular rhythm.    Pulmonary/Chest: Effort normal. No respiratory distress. No  wheezes.   Abdominal: Soft. Non-distended.  Skin: Skin is warm and dry.   Psychiatric: Mood, memory, affect and judgment appear normal.     Assessment & Plan     Diagnosis:  History of polyps    Anticipated Surgical Procedure:    Proceed with colonoscopy as scheduled    The following major R/B/A were discussed with the patient, however the list is not all inclusive . Risk:  Bleeding (immediate and delayed), perforation (rupture or tear), reaction to medication, missed lesion/cancer, pain during the procedure, infection, need for surgery, need for ostomy, need for mechanical ventilation (breathing machine), death.  Benefits: removal of polyp/tissue, burn/clip/or inject to stop bleeding, removal of foreign body, dilate any stricture.  Alternatives: Xray or CT, surgery, do nothing with associated risk   The patient was given time to ask question and received explanation, and agrees to proceed as per History and Physical.   No guarantee given or expressed.    EMR Dragon/transcription disclaimer: Much of this encounter " note is an electronic transcription/translation of spoken language to printed text.  The electronic translation of spoken language may permit erroneous, or at times, nonsensical words or phrases to be inadvertently transcribed.  Although I have reviewed the note for such errors, some may still exist.    Mohan Quinn MD  08:13 CDT  10/10/2024

## 2024-10-10 NOTE — PATIENT INSTRUCTIONS
TYPE II DM; steroid induced;  on 8 tid and increase the Lantus  28  daily basal   Hypertension  continue your usual meds and add hydralazine 3 times a day ; if after a couple of days you are still running over 140/90, then increase to 2 of the 10 mg pills 3 times a day;    Cerebral amyloid angiopathy; followed by Jamia Alvarado ; Wound - risk of deterioration/infection

## 2024-10-13 LAB
CYTO UR: NORMAL
LAB AP CASE REPORT: NORMAL
Lab: NORMAL
PATH REPORT.FINAL DX SPEC: NORMAL
PATH REPORT.GROSS SPEC: NORMAL

## 2024-10-14 RX ORDER — POTASSIUM CHLORIDE 750 MG/1
20 TABLET, EXTENDED RELEASE ORAL 2 TIMES DAILY
Qty: 360 TABLET | Refills: 0 | Status: SHIPPED | OUTPATIENT
Start: 2024-10-14

## 2024-10-14 NOTE — PROGRESS NOTES
Last OV 9/27/2024  Next OV 3/27/2025      Requested Prescriptions     Pending Prescriptions Disp Refills    potassium chloride (KLOR-CON) 10 MEQ extended release tablet 360 tablet 0     Sig: Take 2 tablets by mouth 2 times daily

## 2024-10-16 ENCOUNTER — NURSE ONLY (OUTPATIENT)
Dept: UROLOGY | Age: 84
End: 2024-10-16
Payer: MEDICARE

## 2024-10-16 DIAGNOSIS — R79.89 LOW TESTOSTERONE IN MALE: Primary | ICD-10-CM

## 2024-10-16 DIAGNOSIS — E29.1 HYPOGONADISM MALE: ICD-10-CM

## 2024-10-16 PROCEDURE — 96372 THER/PROPH/DIAG INJ SC/IM: CPT | Performed by: NURSE PRACTITIONER

## 2024-10-16 RX ORDER — TESTOSTERONE CYPIONATE 200 MG/ML
150 INJECTION, SOLUTION INTRAMUSCULAR
Qty: 2 ML | Refills: 0 | Status: SHIPPED | OUTPATIENT
Start: 2024-10-16 | End: 2024-11-15

## 2024-10-16 NOTE — PROGRESS NOTES
After obtaining consent from patient and receiving verbal/written orders from MARISELA Alejandra, I gave patient 0.75mL of testosterone cypionate injection in LEFT upper quad. gluteus, patient tolerated well.  Medication was supplied by the patient.     DX: Low Testosterone in Male [R79.89]  NDC: 2415-5918-65  LOT: [id number]  EXP: 07/2027    Patient will follow up in 2 wks for next injection.

## 2024-10-20 PROBLEM — J32.9 SINUSITIS: Status: RESOLVED | Noted: 2024-09-20 | Resolved: 2024-10-20

## 2024-10-22 DIAGNOSIS — E78.2 MIXED HYPERLIPIDEMIA: ICD-10-CM

## 2024-10-22 DIAGNOSIS — I10 ESSENTIAL HYPERTENSION: ICD-10-CM

## 2024-10-22 RX ORDER — LOSARTAN POTASSIUM 100 MG/1
100 TABLET ORAL DAILY
Qty: 90 TABLET | Refills: 1 | Status: SHIPPED | OUTPATIENT
Start: 2024-10-22

## 2024-10-22 RX ORDER — GLIPIZIDE 10 MG/1
10 TABLET, FILM COATED, EXTENDED RELEASE ORAL EVERY MORNING
Qty: 30 TABLET | Refills: 5 | Status: SHIPPED | OUTPATIENT
Start: 2024-10-22

## 2024-10-22 RX ORDER — ATORVASTATIN CALCIUM 20 MG/1
20 TABLET, FILM COATED ORAL NIGHTLY
Qty: 90 TABLET | Refills: 1 | Status: SHIPPED | OUTPATIENT
Start: 2024-10-22

## 2024-10-22 RX ORDER — VERAPAMIL HYDROCHLORIDE 240 MG/1
240 TABLET, FILM COATED, EXTENDED RELEASE ORAL 2 TIMES DAILY
Qty: 180 TABLET | Refills: 0 | Status: SHIPPED | OUTPATIENT
Start: 2024-10-22

## 2024-10-22 NOTE — TELEPHONE ENCOUNTER
Cristian called requesting a refill of the below medication which has been pended for you:     Requested Prescriptions     Pending Prescriptions Disp Refills    atorvastatin (LIPITOR) 20 MG tablet 90 tablet 1     Sig: Take 1 tablet by mouth nightly at bedtime.    glipiZIDE (GLUCOTROL XL) 10 MG extended release tablet 30 tablet 5     Sig: Take 1 tablet by mouth every morning    losartan (COZAAR) 100 MG tablet 90 tablet 1     Sig: Take 1 tablet by mouth daily    verapamil (CALAN SR) 240 MG extended release tablet 180 tablet 0     Sig: Take 1 tablet by mouth 2 times daily       Last Appointment Date: 9/27/2024  Next Appointment Date: 3/27/2025    No Known Allergies

## 2024-10-30 ENCOUNTER — NURSE ONLY (OUTPATIENT)
Dept: UROLOGY | Age: 84
End: 2024-10-30
Payer: MEDICARE

## 2024-10-30 DIAGNOSIS — R79.89 LOW TESTOSTERONE IN MALE: Primary | ICD-10-CM

## 2024-10-30 PROCEDURE — 96372 THER/PROPH/DIAG INJ SC/IM: CPT | Performed by: NURSE PRACTITIONER

## 2024-10-30 NOTE — PROGRESS NOTES
After obtaining consent from patient and receiving verbal/written orders from MARISELA Alejandra, I gave patient 0.75mL of testosterone cypionate injection in RIGHT upper quad. gluteus, patient tolerated well.  Medication was supplied by the patient.     DX: Low Testosterone in Male [R79.89]  NDC: 0568-0919-14  LOT: [id number]  EXP: 07/2027    Patient will follow up in 2 wks for next injection.

## 2024-11-13 ENCOUNTER — NURSE ONLY (OUTPATIENT)
Dept: UROLOGY | Age: 84
End: 2024-11-13

## 2024-11-13 NOTE — PROGRESS NOTES
After obtaining consent from patient and receiving verbal/written orders from MARISELA Alejandra, I gave patient 0.75mL of testosterone cypionate injection in LEFT upper quad. gluteus, patient tolerated well.  Medication was supplied by the patient.     DX: Low Testosterone in Male [R79.89]  NDC: 8888-8998-42  LOT: [id number]  EXP: 07/2027    Patient will follow up in 2 wks for next injection.

## 2024-11-22 DIAGNOSIS — E29.1 HYPOGONADISM MALE: ICD-10-CM

## 2024-11-25 RX ORDER — TESTOSTERONE CYPIONATE 200 MG/ML
INJECTION, SOLUTION INTRAMUSCULAR
Qty: 2 ML | Refills: 0 | Status: SHIPPED | OUTPATIENT
Start: 2024-11-25 | End: 2024-12-25

## 2024-11-26 RX ORDER — INSULIN GLARGINE 100 [IU]/ML
INJECTION, SOLUTION SUBCUTANEOUS
Qty: 15 ML | Refills: 0 | Status: SHIPPED | OUTPATIENT
Start: 2024-11-26

## 2024-11-27 ENCOUNTER — NURSE ONLY (OUTPATIENT)
Dept: UROLOGY | Age: 84
End: 2024-11-27

## 2024-11-27 DIAGNOSIS — E29.1 HYPOGONADISM MALE: Primary | ICD-10-CM

## 2024-11-27 NOTE — PROGRESS NOTES
After obtaining consent from patient and receiving verbal/written orders from MARISELA Alejandra, I gave patient 0.75mL of testosterone cypionate injection in RIGHT upper quad. gluteus, patient tolerated well.  Medication was supplied by the patient.     DX: Low Testosterone in Male [R79.89]      Patient will follow up in 2 wks for next injection.

## 2024-12-11 ENCOUNTER — NURSE ONLY (OUTPATIENT)
Dept: UROLOGY | Age: 84
End: 2024-12-11
Payer: MEDICARE

## 2024-12-11 DIAGNOSIS — R79.89 LOW TESTOSTERONE IN MALE: Primary | ICD-10-CM

## 2024-12-11 PROCEDURE — 96372 THER/PROPH/DIAG INJ SC/IM: CPT | Performed by: NURSE PRACTITIONER

## 2024-12-11 NOTE — PROGRESS NOTES
After obtaining consent from patient and receiving verbal/written orders from MARISELA Alejandra, I gave patient 0.75mL of testosterone cypionate injection in LEFT upper quad. gluteus, patient tolerated well.  Medication was supplied by the patient.     DX: Low Testosterone in Male [R79.89]      Patient will follow up in 2 wks for next injection.

## 2024-12-17 DIAGNOSIS — E29.1 HYPOGONADISM MALE: ICD-10-CM

## 2024-12-18 RX ORDER — TESTOSTERONE CYPIONATE 200 MG/ML
INJECTION, SOLUTION INTRAMUSCULAR
Qty: 2 ML | Refills: 0 | Status: SHIPPED | OUTPATIENT
Start: 2024-12-18 | End: 2025-01-17

## 2024-12-19 ENCOUNTER — TELEPHONE (OUTPATIENT)
Dept: HEMATOLOGY | Age: 84
End: 2024-12-19

## 2024-12-19 NOTE — TELEPHONE ENCOUNTER
Called Patient and reminded patient of their appointment on 12/23/2024 and patient confirmed they would be here. Reminded patient to just come at appointment time, and to not come at the lab appointment time. Reminded patient that we will not check them in any more than 30 minutes before appointment time.  We have now moved to the [facility] [place] that is located between our old office and the ER at the [facility]. Letting the Pt know that our front entrance faces the  [place]. Reminded pt to eat well and be well hydrated for their labs.

## 2024-12-21 DIAGNOSIS — D75.1 SECONDARY ERYTHROCYTOSIS: ICD-10-CM

## 2024-12-21 DIAGNOSIS — D50.8 IRON DEFICIENCY ANEMIA SECONDARY TO INADEQUATE DIETARY IRON INTAKE: Primary | ICD-10-CM

## 2024-12-21 NOTE — PROGRESS NOTES
thyromegaly.      Vascular: No JVD.      Trachea: Trachea normal. No tracheal deviation.   Cardiovascular:      Rate and Rhythm: Normal rate and regular rhythm.      Pulses: Normal pulses.      Heart sounds: Normal heart sounds.   Pulmonary:      Effort: Pulmonary effort is normal. No respiratory distress.      Breath sounds: Normal breath sounds. No wheezing or rales.   Chest:      Chest wall: No tenderness.   Abdominal:      General: Bowel sounds are normal. There is no distension.      Palpations: Abdomen is soft. There is no mass.      Tenderness: There is no abdominal tenderness. There is no guarding.   Musculoskeletal:         General: No tenderness or deformity.      Cervical back: Normal range of motion and neck supple.      Comments: Range of motion within normal limits x4 extremities   Skin:     General: Skin is warm.      Findings: No bruising, erythema or rash.   Neurological:      Mental Status: He is alert and oriented to person, place, and time.      Cranial Nerves: No cranial nerve deficit.      Coordination: Coordination normal.   Psychiatric:         Behavior: Behavior normal.         Thought Content: Thought content normal.         Labs reviewed today:  Lab Results   Component Value Date    WBC 6.52 12/23/2024    HGB 16.9 12/23/2024    HCT 49.3 12/23/2024    MCV 88.2 12/23/2024     (L) 12/23/2024     Lab Results   Component Value Date    NEUTROABS 3.53 12/23/2024     Lab Results   Component Value Date     12/23/2024    K 4.6 12/23/2024     12/23/2024    CO2 24 12/23/2024    BUN 37 (H) 12/23/2024    CREATININE 1.3 (H) 12/23/2024    GLUCOSE 88 12/23/2024    CALCIUM 9.0 12/23/2024    BILITOT 0.6 12/23/2024    ALKPHOS 62 12/23/2024    AST 28 12/23/2024    ALT 33 12/23/2024    LABGLOM 55 (A) 12/23/2024    GFRAA >59 09/02/2022    GLOB 3.0 01/31/2024         ASSESSMENT/PLAN:      1. Erythrocytosis: Suspect secondary to testosterone replacement, Serology studies were negative for

## 2024-12-23 ENCOUNTER — HOSPITAL ENCOUNTER (OUTPATIENT)
Dept: INFUSION THERAPY | Age: 84
Discharge: HOME | End: 2024-12-23
Payer: MEDICARE

## 2024-12-23 ENCOUNTER — OFFICE VISIT (OUTPATIENT)
Dept: HEMATOLOGY | Age: 84
End: 2024-12-23
Payer: MEDICARE

## 2024-12-23 VITALS
OXYGEN SATURATION: 93 % | HEART RATE: 51 BPM | DIASTOLIC BLOOD PRESSURE: 78 MMHG | SYSTOLIC BLOOD PRESSURE: 132 MMHG | HEIGHT: 72 IN | BODY MASS INDEX: 29.66 KG/M2 | WEIGHT: 219 LBS | TEMPERATURE: 98.1 F

## 2024-12-23 DIAGNOSIS — D75.1 SECONDARY ERYTHROCYTOSIS: Primary | ICD-10-CM

## 2024-12-23 DIAGNOSIS — D69.6 THROMBOCYTOPENIA (HCC): ICD-10-CM

## 2024-12-23 DIAGNOSIS — D75.1 SECONDARY ERYTHROCYTOSIS: ICD-10-CM

## 2024-12-23 DIAGNOSIS — D50.8 IRON DEFICIENCY ANEMIA SECONDARY TO INADEQUATE DIETARY IRON INTAKE: ICD-10-CM

## 2024-12-23 LAB
ALBUMIN SERPL-MCNC: 4.3 G/DL (ref 3.5–5.2)
ALP SERPL-CCNC: 62 U/L (ref 40–129)
ALT SERPL-CCNC: 33 U/L (ref 5–41)
ANION GAP SERPL CALCULATED.3IONS-SCNC: 9 MMOL/L (ref 7–19)
AST SERPL-CCNC: 28 U/L (ref 5–40)
BASOPHILS # BLD: 0.04 K/UL (ref 0.01–0.08)
BASOPHILS NFR BLD: 0.6 % (ref 0.1–1.2)
BILIRUB SERPL-MCNC: 0.6 MG/DL (ref 0–1.2)
BUN SERPL-MCNC: 37 MG/DL (ref 8–23)
CALCIUM SERPL-MCNC: 9 MG/DL (ref 8.8–10.2)
CHLORIDE SERPL-SCNC: 103 MMOL/L (ref 98–107)
CO2 SERPL-SCNC: 24 MMOL/L (ref 22–29)
CREAT SERPL-MCNC: 1.3 MG/DL (ref 0.7–1.2)
EOSINOPHIL # BLD: 0.32 K/UL (ref 0.04–0.54)
EOSINOPHIL NFR BLD: 4.9 % (ref 0.7–7)
ERYTHROCYTE [DISTWIDTH] IN BLOOD BY AUTOMATED COUNT: 13 % (ref 11.6–14.4)
FERRITIN SERPL-MCNC: 145.9 NG/ML (ref 30–400)
GLUCOSE SERPL-MCNC: 88 MG/DL (ref 70–99)
HCT VFR BLD AUTO: 49.3 % (ref 40.1–51)
HGB BLD-MCNC: 16.9 G/DL (ref 13.7–17.5)
IRON SATN MFR SERPL: 25 % (ref 14–50)
IRON SERPL-MCNC: 80 UG/DL (ref 59–158)
LYMPHOCYTES # BLD: 1.91 K/UL (ref 1.18–3.74)
LYMPHOCYTES NFR BLD: 29.3 % (ref 19.3–53.1)
MCH RBC QN AUTO: 30.2 PG (ref 25.7–32.2)
MCHC RBC AUTO-ENTMCNC: 34.3 G/DL (ref 32.3–36.5)
MCV RBC AUTO: 88.2 FL (ref 79–92.2)
MONOCYTES # BLD: 0.69 K/UL (ref 0.24–0.82)
MONOCYTES NFR BLD: 10.6 % (ref 4.7–12.5)
NEUTROPHILS # BLD: 3.53 K/UL (ref 1.56–6.13)
NEUTS SEG NFR BLD: 54.1 % (ref 34–71.1)
PLATELET # BLD AUTO: 147 K/UL (ref 163–337)
PMV BLD AUTO: 9.7 FL (ref 7.4–10.4)
POTASSIUM SERPL-SCNC: 4.6 MMOL/L (ref 3.5–5.1)
PROT SERPL-MCNC: 6.7 G/DL (ref 6.4–8.3)
RBC # BLD AUTO: 5.59 M/UL (ref 4.63–6.08)
SODIUM SERPL-SCNC: 136 MMOL/L (ref 136–145)
TIBC SERPL-MCNC: 314 UG/DL (ref 250–400)
WBC # BLD AUTO: 6.52 K/UL (ref 4.23–9.07)

## 2024-12-23 PROCEDURE — 1036F TOBACCO NON-USER: CPT | Performed by: NURSE PRACTITIONER

## 2024-12-23 PROCEDURE — 99214 OFFICE O/P EST MOD 30 MIN: CPT | Performed by: NURSE PRACTITIONER

## 2024-12-23 PROCEDURE — 3075F SYST BP GE 130 - 139MM HG: CPT | Performed by: NURSE PRACTITIONER

## 2024-12-23 PROCEDURE — 36415 COLL VENOUS BLD VENIPUNCTURE: CPT

## 2024-12-23 PROCEDURE — 1159F MED LIST DOCD IN RCRD: CPT | Performed by: NURSE PRACTITIONER

## 2024-12-23 PROCEDURE — 80053 COMPREHEN METABOLIC PANEL: CPT

## 2024-12-23 PROCEDURE — 3078F DIAST BP <80 MM HG: CPT | Performed by: NURSE PRACTITIONER

## 2024-12-23 PROCEDURE — 1123F ACP DISCUSS/DSCN MKR DOCD: CPT | Performed by: NURSE PRACTITIONER

## 2024-12-23 PROCEDURE — G8482 FLU IMMUNIZE ORDER/ADMIN: HCPCS | Performed by: NURSE PRACTITIONER

## 2024-12-23 PROCEDURE — G8417 CALC BMI ABV UP PARAM F/U: HCPCS | Performed by: NURSE PRACTITIONER

## 2024-12-23 PROCEDURE — G8427 DOCREV CUR MEDS BY ELIG CLIN: HCPCS | Performed by: NURSE PRACTITIONER

## 2024-12-23 PROCEDURE — 1126F AMNT PAIN NOTED NONE PRSNT: CPT | Performed by: NURSE PRACTITIONER

## 2024-12-23 PROCEDURE — 85025 COMPLETE CBC W/AUTO DIFF WBC: CPT

## 2024-12-23 ASSESSMENT — ENCOUNTER SYMPTOMS
SHORTNESS OF BREATH: 0
RESPIRATORY NEGATIVE: 1
GASTROINTESTINAL NEGATIVE: 1
BLOOD IN STOOL: 0
NAUSEA: 0
WHEEZING: 0
BACK PAIN: 0
ABDOMINAL PAIN: 0
COUGH: 0
CONSTIPATION: 0
SORE THROAT: 0
EYE PAIN: 0
EYES NEGATIVE: 1
EYE REDNESS: 0
DIARRHEA: 0
EYE DISCHARGE: 0
VOMITING: 0

## 2024-12-26 ENCOUNTER — NURSE ONLY (OUTPATIENT)
Dept: UROLOGY | Age: 84
End: 2024-12-26
Payer: MEDICARE

## 2024-12-26 DIAGNOSIS — E29.1 HYPOGONADISM MALE: Primary | ICD-10-CM

## 2024-12-26 DIAGNOSIS — R79.89 LOW TESTOSTERONE IN MALE: ICD-10-CM

## 2024-12-26 PROCEDURE — 96372 THER/PROPH/DIAG INJ SC/IM: CPT | Performed by: NURSE PRACTITIONER

## 2024-12-26 RX ORDER — TESTOSTERONE CYPIONATE 200 MG/ML
200 INJECTION, SOLUTION INTRAMUSCULAR ONCE
Status: COMPLETED | OUTPATIENT
Start: 2024-12-26 | End: 2024-12-26

## 2024-12-26 RX ADMIN — TESTOSTERONE CYPIONATE 200 MG: 200 INJECTION, SOLUTION INTRAMUSCULAR at 08:27

## 2024-12-26 NOTE — PROGRESS NOTES
After obtaining consent from patient and receiving verbal/written orders from MARISELA Alejandra, I gave patient 0.75mL of testosterone cypionate injection in LEFT upper quad. gluteus, patient tolerated well. Office supplied the medication       DX: Low Testosterone in Male [R79.89]        Patient will follow up in 2 wks for next injection.

## 2024-12-30 ENCOUNTER — HOSPITAL ENCOUNTER (OUTPATIENT)
Dept: INFUSION THERAPY | Age: 84
Setting detail: INFUSION SERIES
Discharge: HOME | End: 2024-12-30
Payer: MEDICARE

## 2024-12-30 VITALS
RESPIRATION RATE: 18 BRPM | SYSTOLIC BLOOD PRESSURE: 149 MMHG | HEART RATE: 57 BPM | DIASTOLIC BLOOD PRESSURE: 72 MMHG | TEMPERATURE: 97.5 F

## 2024-12-30 DIAGNOSIS — D75.1 SECONDARY ERYTHROCYTOSIS: Primary | ICD-10-CM

## 2024-12-30 LAB
BASOPHILS # BLD: 0.1 K/UL (ref 0–0.2)
BASOPHILS NFR BLD: 1.1 % (ref 0–1)
EOSINOPHIL # BLD: 0.3 K/UL (ref 0–0.6)
EOSINOPHIL NFR BLD: 4 % (ref 0–5)
ERYTHROCYTE [DISTWIDTH] IN BLOOD BY AUTOMATED COUNT: 13 % (ref 11.5–14.5)
HCT VFR BLD AUTO: 46 % (ref 42–52)
HGB BLD-MCNC: 15.4 G/DL (ref 14–18)
IMM GRANULOCYTES # BLD: 0 K/UL
LYMPHOCYTES # BLD: 1.7 K/UL (ref 1.1–4.5)
LYMPHOCYTES NFR BLD: 26.6 % (ref 20–40)
MCH RBC QN AUTO: 30.1 PG (ref 27–31)
MCHC RBC AUTO-ENTMCNC: 33.5 G/DL (ref 33–37)
MCV RBC AUTO: 90 FL (ref 80–94)
MONOCYTES # BLD: 0.5 K/UL (ref 0–0.9)
MONOCYTES NFR BLD: 7.9 % (ref 0–10)
NEUTROPHILS # BLD: 3.9 K/UL (ref 1.5–7.5)
NEUTS SEG NFR BLD: 60.1 % (ref 50–65)
PLATELET # BLD AUTO: 136 K/UL (ref 130–400)
PMV BLD AUTO: 10 FL (ref 9.4–12.4)
RBC # BLD AUTO: 5.11 M/UL (ref 4.7–6.1)
WBC # BLD AUTO: 6.6 K/UL (ref 4.8–10.8)

## 2024-12-30 PROCEDURE — 85025 COMPLETE CBC W/AUTO DIFF WBC: CPT

## 2024-12-30 PROCEDURE — 99195 PHLEBOTOMY: CPT

## 2024-12-30 RX ORDER — 0.9 % SODIUM CHLORIDE 0.9 %
500 INTRAVENOUS SOLUTION INTRAVENOUS ONCE
OUTPATIENT
Start: 2024-12-30 | End: 2024-12-30

## 2025-01-02 DIAGNOSIS — E29.1 HYPOGONADISM MALE: ICD-10-CM

## 2025-01-02 DIAGNOSIS — E29.1 HYPOGONADISM MALE: Primary | ICD-10-CM

## 2025-01-02 LAB
ERYTHROCYTE [DISTWIDTH] IN BLOOD BY AUTOMATED COUNT: 13 % (ref 11.5–14.5)
HCT VFR BLD AUTO: 47 % (ref 42–52)
HGB BLD-MCNC: 15.5 G/DL (ref 14–18)
MCH RBC QN AUTO: 29.5 PG (ref 27–31)
MCHC RBC AUTO-ENTMCNC: 33 G/DL (ref 33–37)
MCV RBC AUTO: 89.5 FL (ref 80–94)
PLATELET # BLD AUTO: 151 K/UL (ref 130–400)
PMV BLD AUTO: 9.8 FL (ref 9.4–12.4)
RBC # BLD AUTO: 5.25 M/UL (ref 4.7–6.1)
TESTOST SERPL-MCNC: 831.6 NG/DL (ref 193–740)
WBC # BLD AUTO: 6.2 K/UL (ref 4.8–10.8)

## 2025-01-09 ENCOUNTER — OFFICE VISIT (OUTPATIENT)
Dept: UROLOGY | Age: 85
End: 2025-01-09
Payer: MEDICARE

## 2025-01-09 VITALS — TEMPERATURE: 98.6 F | WEIGHT: 215 LBS | HEIGHT: 72 IN | BODY MASS INDEX: 29.12 KG/M2

## 2025-01-09 DIAGNOSIS — R35.0 BENIGN PROSTATIC HYPERPLASIA WITH URINARY FREQUENCY: ICD-10-CM

## 2025-01-09 DIAGNOSIS — N52.9 ERECTILE DYSFUNCTION, UNSPECIFIED ERECTILE DYSFUNCTION TYPE: ICD-10-CM

## 2025-01-09 DIAGNOSIS — N40.1 BENIGN PROSTATIC HYPERPLASIA WITH URINARY FREQUENCY: ICD-10-CM

## 2025-01-09 DIAGNOSIS — D75.1 SECONDARY ERYTHROCYTOSIS: ICD-10-CM

## 2025-01-09 DIAGNOSIS — E29.1 HYPOGONADISM MALE: Primary | ICD-10-CM

## 2025-01-09 PROCEDURE — 99214 OFFICE O/P EST MOD 30 MIN: CPT | Performed by: NURSE PRACTITIONER

## 2025-01-09 PROCEDURE — 1123F ACP DISCUSS/DSCN MKR DOCD: CPT | Performed by: NURSE PRACTITIONER

## 2025-01-09 PROCEDURE — G8427 DOCREV CUR MEDS BY ELIG CLIN: HCPCS | Performed by: NURSE PRACTITIONER

## 2025-01-09 PROCEDURE — 96372 THER/PROPH/DIAG INJ SC/IM: CPT | Performed by: NURSE PRACTITIONER

## 2025-01-09 PROCEDURE — G8417 CALC BMI ABV UP PARAM F/U: HCPCS | Performed by: NURSE PRACTITIONER

## 2025-01-09 PROCEDURE — 1036F TOBACCO NON-USER: CPT | Performed by: NURSE PRACTITIONER

## 2025-01-09 PROCEDURE — 1159F MED LIST DOCD IN RCRD: CPT | Performed by: NURSE PRACTITIONER

## 2025-01-09 RX ORDER — TESTOSTERONE CYPIONATE 200 MG/ML
150 INJECTION, SOLUTION INTRAMUSCULAR
Qty: 2 ML | Refills: 0 | Status: SHIPPED | OUTPATIENT
Start: 2025-01-09 | End: 2025-02-08

## 2025-01-09 ASSESSMENT — ENCOUNTER SYMPTOMS
ABDOMINAL PAIN: 0
BACK PAIN: 0
VOMITING: 0
NAUSEA: 0
ABDOMINAL DISTENTION: 0

## 2025-01-09 NOTE — PROGRESS NOTES
Cristian Regan is a 84 y.o. male who presents today   Chief Complaint   Patient presents with    Follow-up     I am here today for my testosterone follow up      Hypogonadism:  Patient is here today for symptoms of low testosterone which started year(s) ago.  The patient's last testosterone level was:  Lab Results   Component Value Date    TESTOSTERONE 831.6 (H) 01/02/2025      Recently his hypogonadism symptoms: Stable and unchanged  Current medical Rx for hypogonadism: Testosterone cypionate 200 mg/ML, 0.75 mL every 14 days.   Energy level:  normal               Lab Results   Component Value Date     PSA 0.78 03/13/2024      Lab Results   Component Value Date    WBC 6.2 01/02/2025    HGB 15.5 01/02/2025    HCT 47.0 01/02/2025    MCV 89.5 01/02/2025     01/02/2025       History of secondary polycythemia follows with hematology.  Last phlebotomy on 12/30/2024.  Usually gets a phlebotomy every 3 months       Past Medical History:   Diagnosis Date    BPH with obstruction/lower urinary tract symptoms     Cerebral amyloid angiopathy (CODE) 06/20/2023    Chronic vertigo 08/03/2018    Edema of both lower legs 09/24/2024    Gastroesophageal reflux disease without esophagitis 05/06/2019    Hyperglycemia     Hyperlipidemia     Hypertension     Peripheral edema 05/04/2022    Postoperative wound dehiscence 04/21/2022    Stage 3 chronic kidney disease (HCC) 06/27/2024    Testicular hypofunction     Thrombocytopenia (HCC) 06/02/2022    Traumatic arthritis of left ankle 09/24/2024    Type 2 diabetes mellitus without complication (HCC)        Past Surgical History:   Procedure Laterality Date    ANKLE SURGERY      HERNIA REPAIR      TONSILLECTOMY         Current Outpatient Medications   Medication Sig Dispense Refill    testosterone cypionate (DEPOTESTOTERONE CYPIONATE) 200 MG/ML injection Inject 0.75 mLs into the muscle every 14 days for 30 days. Max Daily Amount: 150 mg 2 mL 0    LANTUS SOLOSTAR 100 UNIT/ML injection pen

## 2025-01-22 ENCOUNTER — NURSE ONLY (OUTPATIENT)
Dept: UROLOGY | Age: 85
End: 2025-01-22

## 2025-01-22 NOTE — PROGRESS NOTES
After obtaining consent from patient and receiving verbal/written orders from MARISELA Dumont, I gave patient 0.75mL of testosterone cypionate injection in RIGHTupper quad. gluteus, patient tolerated well. Patient supplied the medication       DX: Low Testosterone in Male [R79.89]

## 2025-02-03 ENCOUNTER — TRANSCRIBE ORDERS (OUTPATIENT)
Dept: ADMINISTRATIVE | Facility: HOSPITAL | Age: 85
End: 2025-02-03
Payer: MEDICARE

## 2025-02-03 DIAGNOSIS — E85.4 CEREBRAL AMYLOID ANGIOPATHY: Primary | ICD-10-CM

## 2025-02-03 DIAGNOSIS — I68.0 CEREBRAL AMYLOID ANGIOPATHY: Primary | ICD-10-CM

## 2025-02-04 RX ORDER — INSULIN GLARGINE 100 [IU]/ML
INJECTION, SOLUTION SUBCUTANEOUS
Qty: 15 ML | Refills: 0 | Status: SHIPPED | OUTPATIENT
Start: 2025-02-04

## 2025-02-04 NOTE — TELEPHONE ENCOUNTER
Last OV 9/27/2024  Next OV 3/27/2025      Requested Prescriptions     Pending Prescriptions Disp Refills    LANTUS SOLOSTAR 100 UNIT/ML injection pen [Pharmacy Med Name: Lantus SoloStar 100 UNIT/ML Subcutaneous Solution Pen-injector] 15 mL 0     Sig: INJECT 25 UNITS SUBCUTANEOUSLY ONCE DAILY

## 2025-02-05 ENCOUNTER — NURSE ONLY (OUTPATIENT)
Dept: UROLOGY | Age: 85
End: 2025-02-05
Payer: MEDICARE

## 2025-02-05 DIAGNOSIS — R79.89 LOW TESTOSTERONE IN MALE: Primary | ICD-10-CM

## 2025-02-05 PROCEDURE — 96372 THER/PROPH/DIAG INJ SC/IM: CPT | Performed by: NURSE PRACTITIONER

## 2025-02-05 RX ORDER — SPIRONOLACTONE 50 MG/1
TABLET, FILM COATED ORAL
COMMUNITY
Start: 2025-02-04

## 2025-02-05 NOTE — PROGRESS NOTES
After obtaining consent from patient and receiving verbal/written orders from MARISELA Dumont, I gave patient 0.75mL of testosterone cypionate injection in LEFT upper quad. gluteus, patient tolerated well. Patient supplied the medication       DX: Low Testosterone in Male [R79.89]

## 2025-02-13 DIAGNOSIS — I10 ESSENTIAL HYPERTENSION: ICD-10-CM

## 2025-02-13 RX ORDER — VERAPAMIL HYDROCHLORIDE 240 MG/1
240 TABLET, FILM COATED, EXTENDED RELEASE ORAL NIGHTLY
Qty: 90 TABLET | Refills: 1 | Status: SHIPPED | OUTPATIENT
Start: 2025-02-13

## 2025-02-17 ENCOUNTER — HOSPITAL ENCOUNTER (OUTPATIENT)
Dept: MRI IMAGING | Facility: HOSPITAL | Age: 85
Discharge: HOME | End: 2025-02-17
Admitting: PSYCHIATRY & NEUROLOGY
Payer: MEDICARE

## 2025-02-17 DIAGNOSIS — I68.0 CEREBRAL AMYLOID ANGIOPATHY: ICD-10-CM

## 2025-02-17 DIAGNOSIS — E85.4 CEREBRAL AMYLOID ANGIOPATHY: ICD-10-CM

## 2025-02-17 PROCEDURE — 70551 MRI BRAIN STEM W/O DYE: CPT

## 2025-02-18 ENCOUNTER — TELEPHONE (OUTPATIENT)
Dept: UROLOGY | Age: 85
End: 2025-02-18

## 2025-02-18 NOTE — TELEPHONE ENCOUNTER
spoke with patient and let him know to call the office prior to coming in for tomorrows visit due to inclement weather. He voiced understanding.

## 2025-02-25 ENCOUNTER — NURSE ONLY (OUTPATIENT)
Dept: UROLOGY | Age: 85
End: 2025-02-25

## 2025-02-25 DIAGNOSIS — R79.89 LOW TESTOSTERONE IN MALE: Primary | ICD-10-CM

## 2025-02-25 NOTE — PROGRESS NOTES
After obtaining consent from patient and receiving verbal/written orders from MARISELA Dumont, I gave patient 0.75mL of testosterone cypionate injection in RIGHT upper quad. gluteus, patient tolerated well. Patient supplied the medication       DX: Low Testosterone in Male [R79.89]    Patient will come back in 2 weeks for a testosterone injection.

## 2025-03-03 DIAGNOSIS — E29.1 HYPOGONADISM MALE: ICD-10-CM

## 2025-03-03 RX ORDER — TESTOSTERONE CYPIONATE 200 MG/ML
INJECTION, SOLUTION INTRAMUSCULAR
Qty: 2 ML | Refills: 0 | Status: SHIPPED | OUTPATIENT
Start: 2025-03-03 | End: 2025-04-02

## 2025-03-12 ENCOUNTER — NURSE ONLY (OUTPATIENT)
Dept: UROLOGY | Age: 85
End: 2025-03-12
Payer: MEDICARE

## 2025-03-12 DIAGNOSIS — R79.89 LOW TESTOSTERONE IN MALE: Primary | ICD-10-CM

## 2025-03-12 PROCEDURE — 96372 THER/PROPH/DIAG INJ SC/IM: CPT | Performed by: NURSE PRACTITIONER

## 2025-03-12 NOTE — PROGRESS NOTES
After obtaining consent from patient and receiving verbal/written orders from MARISELA Dumont, I gave patient 0.75mL of testosterone cypionate injection in LEFT upper quad. gluteus, patient tolerated well. Patient supplied the medication       DX: Low Testosterone in Male [R79.89]    Patient will come back in 2 weeks for a testosterone injection.

## 2025-03-14 ENCOUNTER — APPOINTMENT (OUTPATIENT)
Dept: GENERAL RADIOLOGY | Facility: HOSPITAL | Age: 85
End: 2025-03-14
Payer: MEDICARE

## 2025-03-14 ENCOUNTER — APPOINTMENT (OUTPATIENT)
Dept: MRI IMAGING | Facility: HOSPITAL | Age: 85
End: 2025-03-14
Payer: MEDICARE

## 2025-03-14 ENCOUNTER — APPOINTMENT (OUTPATIENT)
Dept: CT IMAGING | Facility: HOSPITAL | Age: 85
End: 2025-03-14
Payer: MEDICARE

## 2025-03-14 ENCOUNTER — HOSPITAL ENCOUNTER (OUTPATIENT)
Facility: HOSPITAL | Age: 85
Setting detail: OBSERVATION
Discharge: HOME | End: 2025-03-15
Attending: EMERGENCY MEDICINE | Admitting: FAMILY MEDICINE
Payer: MEDICARE

## 2025-03-14 DIAGNOSIS — R20.0 NUMBNESS OF LEFT HAND: ICD-10-CM

## 2025-03-14 DIAGNOSIS — R47.9 SPEECH DISTURBANCE, UNSPECIFIED TYPE: ICD-10-CM

## 2025-03-14 DIAGNOSIS — R29.90 STROKE-LIKE SYMPTOMS: Primary | ICD-10-CM

## 2025-03-14 DIAGNOSIS — R20.0 LEFT FACIAL NUMBNESS: ICD-10-CM

## 2025-03-14 LAB
ALBUMIN SERPL-MCNC: 4 G/DL (ref 3.5–5.2)
ALBUMIN/GLOB SERPL: 1.6 G/DL
ALP SERPL-CCNC: 59 U/L (ref 39–117)
ALT SERPL W P-5'-P-CCNC: 29 U/L (ref 1–41)
ANION GAP SERPL CALCULATED.3IONS-SCNC: 11 MMOL/L (ref 5–15)
APTT PPP: 28 SECONDS (ref 24.5–36)
AST SERPL-CCNC: 30 U/L (ref 1–40)
BASOPHILS # BLD AUTO: 0.05 10*3/MM3 (ref 0–0.2)
BASOPHILS NFR BLD AUTO: 0.8 % (ref 0–1.5)
BILIRUB SERPL-MCNC: 0.4 MG/DL (ref 0–1.2)
BILIRUB UR QL STRIP: NEGATIVE
BUN SERPL-MCNC: 31 MG/DL (ref 8–23)
BUN/CREAT SERPL: 22.5 (ref 7–25)
CALCIUM SPEC-SCNC: 8.7 MG/DL (ref 8.6–10.5)
CHLORIDE SERPL-SCNC: 108 MMOL/L (ref 98–107)
CLARITY UR: CLEAR
CO2 SERPL-SCNC: 22 MMOL/L (ref 22–29)
COLOR UR: YELLOW
CREAT SERPL-MCNC: 1.38 MG/DL (ref 0.76–1.27)
DEPRECATED RDW RBC AUTO: 43.7 FL (ref 37–54)
EGFRCR SERPLBLD CKD-EPI 2021: 51.1 ML/MIN/1.73
EOSINOPHIL # BLD AUTO: 0.24 10*3/MM3 (ref 0–0.4)
EOSINOPHIL NFR BLD AUTO: 4 % (ref 0.3–6.2)
ERYTHROCYTE [DISTWIDTH] IN BLOOD BY AUTOMATED COUNT: 13.5 % (ref 12.3–15.4)
GLOBULIN UR ELPH-MCNC: 2.5 GM/DL
GLUCOSE BLDC GLUCOMTR-MCNC: 107 MG/DL (ref 70–130)
GLUCOSE BLDC GLUCOMTR-MCNC: 141 MG/DL (ref 70–130)
GLUCOSE SERPL-MCNC: 131 MG/DL (ref 65–99)
GLUCOSE UR STRIP-MCNC: NEGATIVE MG/DL
HCT VFR BLD AUTO: 41.9 % (ref 37.5–51)
HGB BLD-MCNC: 14.4 G/DL (ref 13–17.7)
HGB UR QL STRIP.AUTO: NEGATIVE
HOLD SPECIMEN: NORMAL
HOLD SPECIMEN: NORMAL
IMM GRANULOCYTES # BLD AUTO: 0.01 10*3/MM3 (ref 0–0.05)
IMM GRANULOCYTES NFR BLD AUTO: 0.2 % (ref 0–0.5)
INR PPP: 1 (ref 0.91–1.09)
KETONES UR QL STRIP: ABNORMAL
LEUKOCYTE ESTERASE UR QL STRIP.AUTO: NEGATIVE
LYMPHOCYTES # BLD AUTO: 1.47 10*3/MM3 (ref 0.7–3.1)
LYMPHOCYTES NFR BLD AUTO: 24.4 % (ref 19.6–45.3)
MCH RBC QN AUTO: 30.4 PG (ref 26.6–33)
MCHC RBC AUTO-ENTMCNC: 34.4 G/DL (ref 31.5–35.7)
MCV RBC AUTO: 88.4 FL (ref 79–97)
MONOCYTES # BLD AUTO: 0.57 10*3/MM3 (ref 0.1–0.9)
MONOCYTES NFR BLD AUTO: 9.5 % (ref 5–12)
NEUTROPHILS NFR BLD AUTO: 3.69 10*3/MM3 (ref 1.7–7)
NEUTROPHILS NFR BLD AUTO: 61.1 % (ref 42.7–76)
NITRITE UR QL STRIP: NEGATIVE
PH UR STRIP.AUTO: 5.5 [PH] (ref 5–8)
PLATELET # BLD AUTO: 126 10*3/MM3 (ref 140–450)
PMV BLD AUTO: 10.3 FL (ref 6–12)
POTASSIUM SERPL-SCNC: 3.7 MMOL/L (ref 3.5–5.2)
PROT SERPL-MCNC: 6.5 G/DL (ref 6–8.5)
PROT UR QL STRIP: ABNORMAL
PROTHROMBIN TIME: 13.7 SECONDS (ref 11.8–14.8)
RBC # BLD AUTO: 4.74 10*6/MM3 (ref 4.14–5.8)
SODIUM SERPL-SCNC: 141 MMOL/L (ref 136–145)
SP GR UR STRIP: 1.02 (ref 1–1.03)
UROBILINOGEN UR QL STRIP: ABNORMAL
WBC NRBC COR # BLD AUTO: 6.03 10*3/MM3 (ref 3.4–10.8)
WHOLE BLOOD HOLD COAG: NORMAL
WHOLE BLOOD HOLD SPECIMEN: NORMAL

## 2025-03-14 PROCEDURE — 81003 URINALYSIS AUTO W/O SCOPE: CPT | Performed by: NURSE PRACTITIONER

## 2025-03-14 PROCEDURE — 99204 OFFICE O/P NEW MOD 45 MIN: CPT | Performed by: PSYCHIATRY & NEUROLOGY

## 2025-03-14 PROCEDURE — 70496 CT ANGIOGRAPHY HEAD: CPT

## 2025-03-14 PROCEDURE — 70551 MRI BRAIN STEM W/O DYE: CPT

## 2025-03-14 PROCEDURE — 70450 CT HEAD/BRAIN W/O DYE: CPT

## 2025-03-14 PROCEDURE — 0042T HC CT CEREBRAL PERFUSION W/WO CONTRAST: CPT

## 2025-03-14 PROCEDURE — 72125 CT NECK SPINE W/O DYE: CPT

## 2025-03-14 PROCEDURE — G0378 HOSPITAL OBSERVATION PER HR: HCPCS

## 2025-03-14 PROCEDURE — 25510000001 IOPAMIDOL PER 1 ML: Performed by: EMERGENCY MEDICINE

## 2025-03-14 PROCEDURE — 63710000001 INSULIN GLARGINE PER 5 UNITS: Performed by: FAMILY MEDICINE

## 2025-03-14 PROCEDURE — 85025 COMPLETE CBC W/AUTO DIFF WBC: CPT | Performed by: EMERGENCY MEDICINE

## 2025-03-14 PROCEDURE — 80053 COMPREHEN METABOLIC PANEL: CPT | Performed by: EMERGENCY MEDICINE

## 2025-03-14 PROCEDURE — 99285 EMERGENCY DEPT VISIT HI MDM: CPT

## 2025-03-14 PROCEDURE — 85610 PROTHROMBIN TIME: CPT | Performed by: EMERGENCY MEDICINE

## 2025-03-14 PROCEDURE — 70498 CT ANGIOGRAPHY NECK: CPT

## 2025-03-14 PROCEDURE — 82948 REAGENT STRIP/BLOOD GLUCOSE: CPT

## 2025-03-14 PROCEDURE — 71045 X-RAY EXAM CHEST 1 VIEW: CPT

## 2025-03-14 PROCEDURE — 85730 THROMBOPLASTIN TIME PARTIAL: CPT | Performed by: EMERGENCY MEDICINE

## 2025-03-14 PROCEDURE — 93010 ELECTROCARDIOGRAM REPORT: CPT | Performed by: HOSPITALIST

## 2025-03-14 PROCEDURE — 93005 ELECTROCARDIOGRAM TRACING: CPT | Performed by: NURSE PRACTITIONER

## 2025-03-14 RX ORDER — INSULIN LISPRO 100 [IU]/ML
2-7 INJECTION, SOLUTION INTRAVENOUS; SUBCUTANEOUS
Status: DISCONTINUED | OUTPATIENT
Start: 2025-03-14 | End: 2025-03-15 | Stop reason: HOSPADM

## 2025-03-14 RX ORDER — TEMAZEPAM 15 MG/1
15 CAPSULE ORAL NIGHTLY PRN
Status: DISCONTINUED | OUTPATIENT
Start: 2025-03-14 | End: 2025-03-15 | Stop reason: HOSPADM

## 2025-03-14 RX ORDER — DEXTROSE MONOHYDRATE 25 G/50ML
25 INJECTION, SOLUTION INTRAVENOUS
Status: DISCONTINUED | OUTPATIENT
Start: 2025-03-14 | End: 2025-03-15 | Stop reason: HOSPADM

## 2025-03-14 RX ORDER — SODIUM CHLORIDE 9 MG/ML
40 INJECTION, SOLUTION INTRAVENOUS AS NEEDED
Status: DISCONTINUED | OUTPATIENT
Start: 2025-03-14 | End: 2025-03-15 | Stop reason: HOSPADM

## 2025-03-14 RX ORDER — SODIUM CHLORIDE 0.9 % (FLUSH) 0.9 %
10 SYRINGE (ML) INJECTION AS NEEDED
Status: DISCONTINUED | OUTPATIENT
Start: 2025-03-14 | End: 2025-03-15 | Stop reason: HOSPADM

## 2025-03-14 RX ORDER — IBUPROFEN 600 MG/1
1 TABLET ORAL
Status: DISCONTINUED | OUTPATIENT
Start: 2025-03-14 | End: 2025-03-15 | Stop reason: HOSPADM

## 2025-03-14 RX ORDER — VERAPAMIL HYDROCHLORIDE 240 MG/1
240 TABLET, FILM COATED, EXTENDED RELEASE ORAL
Status: DISCONTINUED | OUTPATIENT
Start: 2025-03-14 | End: 2025-03-15 | Stop reason: HOSPADM

## 2025-03-14 RX ORDER — LOSARTAN POTASSIUM 50 MG/1
100 TABLET ORAL DAILY
Status: DISCONTINUED | OUTPATIENT
Start: 2025-03-14 | End: 2025-03-15 | Stop reason: HOSPADM

## 2025-03-14 RX ORDER — FLUTICASONE PROPIONATE 50 MCG
1 SPRAY, SUSPENSION (ML) NASAL DAILY PRN
Status: DISCONTINUED | OUTPATIENT
Start: 2025-03-14 | End: 2025-03-15 | Stop reason: HOSPADM

## 2025-03-14 RX ORDER — SPIRONOLACTONE 25 MG/1
25 TABLET ORAL DAILY
Status: DISCONTINUED | OUTPATIENT
Start: 2025-03-15 | End: 2025-03-15 | Stop reason: HOSPADM

## 2025-03-14 RX ORDER — IOPAMIDOL 755 MG/ML
125 INJECTION, SOLUTION INTRAVASCULAR
Status: COMPLETED | OUTPATIENT
Start: 2025-03-14 | End: 2025-03-14

## 2025-03-14 RX ORDER — NICOTINE POLACRILEX 4 MG
15 LOZENGE BUCCAL
Status: DISCONTINUED | OUTPATIENT
Start: 2025-03-14 | End: 2025-03-15 | Stop reason: HOSPADM

## 2025-03-14 RX ORDER — ATORVASTATIN CALCIUM 10 MG/1
10 TABLET, FILM COATED ORAL NIGHTLY
Status: DISCONTINUED | OUTPATIENT
Start: 2025-03-14 | End: 2025-03-15

## 2025-03-14 RX ORDER — GLIPIZIDE 5 MG/1
5 TABLET ORAL
Status: DISCONTINUED | OUTPATIENT
Start: 2025-03-15 | End: 2025-03-15 | Stop reason: HOSPADM

## 2025-03-14 RX ORDER — SODIUM CHLORIDE 0.9 % (FLUSH) 0.9 %
10 SYRINGE (ML) INJECTION EVERY 12 HOURS SCHEDULED
Status: DISCONTINUED | OUTPATIENT
Start: 2025-03-14 | End: 2025-03-15 | Stop reason: HOSPADM

## 2025-03-14 RX ORDER — HYDROCHLOROTHIAZIDE 25 MG/1
12.5 TABLET ORAL DAILY
Status: DISCONTINUED | OUTPATIENT
Start: 2025-03-14 | End: 2025-03-15 | Stop reason: HOSPADM

## 2025-03-14 RX ADMIN — IOPAMIDOL 120 ML: 755 INJECTION, SOLUTION INTRAVENOUS at 16:29

## 2025-03-14 RX ADMIN — VERAPAMIL HYDROCHLORIDE 240 MG: 240 TABLET, FILM COATED, EXTENDED RELEASE ORAL at 21:10

## 2025-03-14 RX ADMIN — INSULIN GLARGINE 5 UNITS: 100 INJECTION, SOLUTION SUBCUTANEOUS at 21:11

## 2025-03-14 RX ADMIN — Medication 10 ML: at 21:12

## 2025-03-14 RX ADMIN — ATORVASTATIN CALCIUM 10 MG: 10 TABLET, FILM COATED ORAL at 21:11

## 2025-03-14 NOTE — NURSING NOTE
Pt arrived from ED A&O x4, RA, VSS. Strong  and flexion bilaterally. Very slight slurring of speech. Pt denies pain at this time. Tele placed, call light within reach.

## 2025-03-14 NOTE — H&P
[facility] Medicine Services  HISTORY AND PHYSICAL    Date of Admission: 3/14/2025  Primary Care Physician: Arpit Yip MD    Subjective   Primary Historian: Patient .    Chief Complaint: Strokelike symptoms.    History of Present Illness  Patient is 84-year presented ER with complaint of numbness in the left side of his mouth especially his upper lips extended towards his cheek.  Patient's status left hand also was numb and weak while shopping by working.  Patient thought his sugar was low he ate 2 cupcake at the store, symptoms improved however he drove to ER to be evaluated for strokelike symptoms.  During triage patient Diff[place] speaking.  However now is improving.  Sugar glucose at bedside was 141.  Patient take an aspirin daily.    Patient has a past medical history of agenesis corporal callosum, reflux diabetes, hayfever, cholesterol, colon polyp, hypertension, mitral valve prolapse, PVC, umbilicus hernia.    Review of Systems   Constitutional:  Positive for activity change, appetite change and fatigue. Negative for chills and fever.   HENT:  Negative for hearing loss, nosebleeds, tinnitus and trouble swallowing.    Eyes:  Negative for visual disturbance.   Respiratory:  Negative for cough, chest tightness, shortness of breath and wheezing.    Cardiovascular:  Negative for chest pain, palpitations and leg swelling.   Gastrointestinal:  Negative for abdominal distention, abdominal pain, blood in stool, constipation, diarrhea, nausea and vomiting.   Endocrine: Negative for cold intolerance, heat intolerance, polydipsia, polyphagia and polyuria.   Genitourinary:  Negative for decreased urine volume, difficulty urinating, dysuria, flank pain, frequency and hematuria.   Musculoskeletal:  Negative for arthralgias, joint swelling and myalgias.   Skin:  Negative for rash.   Allergic/Immunologic: Negative for immunocompromised state.   Neurological:  Positive for weakness.  Negative for dizziness, syncope, light-headedness and headaches.   Hematological:  Negative for adenopathy. Does not bruise/bleed easily.   Psychiatric/Behavioral:  Negative for confusion and sleep disturbance. The patient is not nervous/anxious.         Otherwise complete ROS reviewed and negative except as mentioned in the HPI.    Past Medical History:   Past Medical History:   Diagnosis Date    ACC (agenesis of corpus callosum)     Acid reflux     Cerebral amyloid angiopathy     Diabetes mellitus type 2, diet-controlled     Hay fever     High cholesterol     BORDERLINE    Hx of colonic polyp     Hypertension     Mitral valve prolapse     PVC (premature ventricular contraction)     Umbilical hernia      Past Surgical History:  Past Surgical History:   Procedure Laterality Date    CARDIAC CATHETERIZATION      COLONOSCOPY N/A 02/20/2020    Dr. briceno-One 18 mm adenoma polyp in the ascending colon, removed piecemeal using a hot snare. Resected and retrieved. Clip was placed. - The examination was otherwise normal on direct and retroflexion views    COLONOSCOPY N/A 03/04/2021    Dr. Briceno-One 7 mm tubular adenoma polyp at the hepatic flexure, removed with a hot snare. Resected and retrieved. - One 10 mm polyp at 20 cm proximal to the anus -Benign granulation tissue polyp with ulceration and marked chronic active inflammation. B. No adenomatous changes identified    COLONOSCOPY N/A 10/10/2024    Procedure: COLONOSCOPY WITH ANESTHESIA;  Surgeon: Mohan Briceno MD;  Location: [facility] ENDOSCOPY;  Service: Gastroenterology;  Laterality: N/A;  pre hx colon polyp  post polyp, diverticulosis  dr dena parisi    COLONOSCOPY W/ POLYPECTOMY  05/12/2014    Adenomatous polyp at 30 cm, Hyperplastic polyp cecum, Diverticulosis repeat exam in 5 years    FOOT SURGERY Left 2015    R/T FRACTURE, PLATE AND SCREWS     INGUINAL HERNIA REPAIR Left 2010    OTHER SURGICAL HISTORY      VOCAL CORD     TONSILLECTOMY  1991    UMBILICAL  HERNIA REPAIR N/A 01/12/2017    Procedure: UMBILICAL HERNIA REPAIR;  Surgeon: Kavitha Navarro MD;  Location: [facility] OR;  Service:      Social History:  reports that he has never smoked. He has never used smokeless tobacco. He reports that he does not drink alcohol and does not use drugs.    Family History: family history includes Heart disease in his father.       Allergies:  No Known Allergies    Medications:  Prior to Admission medications    Medication Sig Start Date End Date Taking? Authorizing Provider   aspirin 81 MG tablet Take 81 mg by mouth Daily.  Patient not taking: Reported on 9/3/2024    Yasmin Oliveira MD   atorvastatin (LIPITOR) 20 MG tablet Take 0.5 tablets by mouth Daily. PT TAKES 1/2 20 MG TABLET TO EQUAL 10 MG DAILY 5/4/16   Yasmin Oliveira MD   cloNIDine (CATAPRES) 0.1 MG tablet Take 1 tablet by mouth Daily.  Patient not taking: Reported on 9/3/2024 12/28/21   Gwen Spain APRN   fluticasone (FLONASE) 50 MCG/ACT nasal spray 1 spray by Each Nare route Daily As Needed for Rhinitis or Allergies. 5/4/16   Yasmin Oliveira MD   glipiZIDE (GLUCOTROL XL) 10 MG 24 hr tablet Take 1 tablet by mouth Daily.    Emergency, Nurse Epic, RN   hydrochlorothiazide (HYDRODIURIL) 25 MG tablet Take 25 mg by mouth Daily.  Patient not taking: Reported on 9/3/2024 5/4/16   Yasmin Oliveira MD   Insulin Aspart (novoLOG) 100 UNIT/ML injection Inject  under the skin into the appropriate area as directed As Needed for High Blood Sugar. As needed    Yasmin Oliveira MD   Lantus SoloStar 100 UNIT/ML injection pen Inject  under the skin into the appropriate area as directed Daily. 7/23/24   Yasmin Oliveira MD   losartan (COZAAR) 100 MG tablet Take 1 tablet by mouth Daily. 5/4/16   Yasmin Oliveira MD   potassium chloride (K-DUR,KLOR-CON) 10 MEQ CR tablet Take 1 tablet by mouth 2 (Two) Times a Day. 5/4/16   Yasmin Oliveira MD   sodium-potassium-magnesium sulfates (Suprep Bowel  "Prep Kit) 17.5-3.13-1.6 GM/177ML solution oral solution Take as directed by office instructions provided 9/3/24   Kassandra Pollard APRN   spironolactone (ALDACTONE) 25 MG tablet Take 1 tablet by mouth Daily.    ProviderYasmin MD   temazepam (RESTORIL) 15 MG capsule TAKE 2 CAPSULES BY MOUTH NIGHTLY AS NEEDED FOR SLEEP FOR UP TO 30 DAYS 9/30/20   Emergency, Nurse Eugene, RN   Testosterone Cypionate (DEPOTESTOTERONE CYPIONATE) 200 MG/ML injection INJECT 1 ML INTRAMUSCULARLY EVERY OTHER WEEK. 11/22/21   Yasmin Oliveira MD   verapamil SR (CALAN-SR) 240 MG CR tablet Take 1 tablet by mouth 2 (Two) Times a Day. 5/4/16   Yasmin Oliveira MD   vitamin D (ERGOCALCIFEROL) 1.25 MG (62794 UT) capsule capsule Take 1 capsule by mouth 1 (One) Time Per Week. 12/18/23   Yasmin Oliveira MD     I have utilized all available immediate resources to obtain, update, or review the patient's current medications (including all prescriptions, over-the-counter products, herbals, cannabis/cannabidiol products, and vitamin/mineral/dietary (nutritional) supplements).    Objective     Vital Signs: /75   Pulse 70   Temp 98.1 °F (36.7 °C) (Oral)   Resp 18   Ht 182.9 cm (72\")   Wt 102 kg (225 lb)   SpO2 96%   BMI 30.52 kg/m²   Physical Exam  Vitals and nursing note reviewed.   Constitutional:       Comments: Advanced age.   HENT:      Head: Normocephalic.   Eyes:      Conjunctiva/sclera: Conjunctivae normal.      Pupils: Pupils are equal, round, and reactive to light.   Cardiovascular:      Rate and Rhythm: Normal rate and regular rhythm.      Heart sounds: Normal heart sounds.   Pulmonary:      Effort: Pulmonary effort is normal. No respiratory distress.      Breath sounds: Normal breath sounds.      Comments: Patient is on room air.  Abdominal:      General: Bowel sounds are normal. There is no distension.      Palpations: Abdomen is soft.      Tenderness: There is no abdominal tenderness.   Musculoskeletal:    "      General: No swelling.      Cervical back: Neck supple.      Right lower leg: Edema present.      Left lower leg: Edema present.      Comments: +2-3 pitting edema bilateral lower extremities.   Skin:     General: Skin is warm and dry.      Capillary Refill: Capillary refill takes 2 to 3 seconds.      Findings: No rash.   Neurological:      General: No focal deficit present.      Mental Status: He is alert and oriented to person, place, and time.      Motor: Weakness present.      Comments: Cranial 2-12 grossly intact.  Negative pronator drift.  Negative finger touch.  Strength is 5 out of 5 symmetrical.   Psychiatric:         Mood and Affect: Mood normal.         Behavior: Behavior normal.         Thought Content: Thought content normal.         Judgment: Judgment normal.              Results Reviewed:  Lab Results (last 24 hours)       Procedure Component Value Units Date/Time    aPTT [[id number]]  (Normal) Collected: 03/14/25 1607    Specimen: Blood Updated: 03/14/25 1642     PTT 28.0 seconds     Narrative:      PTT = The equivalent PTT values for the therapeutic range of heparin levels at 0.3 to 0.7 U/ml are 77 - 99 seconds.    Protime-INR [[id number]]  (Normal) Collected: 03/14/25 1607    Specimen: Blood Updated: 03/14/25 1642     Protime 13.7 Seconds      INR 1.00    Comprehensive Metabolic Panel [[id number]]  (Abnormal) Collected: 03/14/25 1607    Specimen: Blood Updated: 03/14/25 1636     Glucose 131 mg/dL      BUN 31 mg/dL      Creatinine 1.38 mg/dL      Sodium 141 mmol/L      Potassium 3.7 mmol/L      Comment: Specimen hemolyzed.  Result may be falsely elevated.        Chloride 108 mmol/L      CO2 22.0 mmol/L      Calcium 8.7 mg/dL      Total Protein 6.5 g/dL      Albumin 4.0 g/dL      ALT (SGPT) 29 U/L      Comment: Specimen hemolyzed.  Result may  be falsely elevated.        AST (SGOT) 30 U/L      Comment: Specimen hemolyzed.  Result may be falsely elevated.        Alkaline Phosphatase 59 U/L      Total  Bilirubin 0.4 mg/dL      Globulin 2.5 gm/dL      A/G Ratio 1.6 g/dL      BUN/Creatinine Ratio 22.5     Anion Gap 11.0 mmol/L      eGFR 51.1 mL/min/1.73     Narrative:      GFR Categories in Chronic Kidney Disease (CKD)      GFR Category          GFR (mL/min/1.73)    Interpretation  G1                     90 or greater         Normal or high (1)  G2                      60-89                Mild decrease (1)  G3a                   45-59                Mild to moderate decrease  G3b                   30-44                Moderate to severe decrease  G4                    15-29                Severe decrease  G5                    14 or less           Kidney failure          (1)In the absence of evidence of kidney disease, neither GFR category G1 or G2 fulfill the criteria for CKD.    eGFR calculation 2021 CKD-EPI creatinine equation, which does not include race as a factor    CBC & Differential [[id number]]  (Abnormal) Collected: 03/14/25 1607    Specimen: Blood Updated: 03/14/25 1617    Narrative:      The following orders were created for panel order CBC & Differential.  Procedure                               Abnormality         Status                     ---------                               -----------         ------                     CBC Auto Differential[[id number]]        Abnormal            Final result                 Please view results for these tests on the individual orders.    CBC Auto Differential [[id number]]  (Abnormal) Collected: 03/14/25 1607    Specimen: Blood Updated: 03/14/25 1617     WBC 6.03 10*3/mm3      RBC 4.74 10*6/mm3      Hemoglobin 14.4 g/dL      Hematocrit 41.9 %      MCV 88.4 fL      MCH 30.4 pg      MCHC 34.4 g/dL      RDW 13.5 %      RDW-SD 43.7 fl      MPV 10.3 fL      Platelets 126 10*3/mm3      Neutrophil % 61.1 %      Lymphocyte % 24.4 %      Monocyte % 9.5 %      Eosinophil % 4.0 %      Basophil % 0.8 %      Immature Grans % 0.2 %      Neutrophils, Absolute 3.69 10*3/mm3       Lymphocytes, Absolute 1.47 10*3/mm3      Monocytes, Absolute 0.57 10*3/mm3      Eosinophils, Absolute 0.24 10*3/mm3      Basophils, Absolute 0.05 10*3/mm3      Immature Grans, Absolute 0.01 10*3/mm3     Urinalysis With Culture If Indicated - Urine, Clean Catch [[id number]]  (Abnormal) Collected: 03/14/25 1558    Specimen: Urine, Clean Catch Updated: 03/14/25 1617     Color, UA Yellow     Appearance, UA Clear     pH, UA 5.5     Specific Gravity, UA 1.023     Glucose, UA Negative     Ketones, UA Trace     Bilirubin, UA Negative     Blood, UA Negative     Protein, UA Trace     Leuk Esterase, UA Negative     Nitrite, UA Negative     Urobilinogen, UA 1.0 E.U./dL    Narrative:      In absence of clinical symptoms, the presence of pyuria, bacteria, and/or nitrites on the urinalysis result does not correlate with infection.  Urine microscopic not indicated.    [place] Draw [[id number]] Collected: 03/14/25 1607    Specimen: Blood Updated: 03/14/25 1615    Narrative:      The following orders were created for panel order [place] Draw.  Procedure                               Abnormality         Status                     ---------                               -----------         ------                     Green Top (Gel)[[id number]]                                  Final result               Lavender Top[[id number]]                                     Final result               Red Top[[id number]]                                          Final result               Light Blue Top[[id number]]                                   Final result                 Please view results for these tests on the individual orders.    Green Top (Gel) [[id number]] Collected: 03/14/25 1607    Specimen: Blood Updated: 03/14/25 1615     Extra Tube Hold for add-ons.     Comment: Auto resulted.       Lavender Top [[id number]] Collected: 03/14/25 1607    Specimen: Blood Updated: 03/14/25 1615     Extra Tube hold for add-on     Comment: Auto resulted        Red Top [[id number]] Collected: 03/14/25 1607    Specimen: Blood Updated: 03/14/25 1615     Extra Tube Hold for add-ons.     Comment: Auto resulted.       Light Blue Top [[id number]] Collected: 03/14/25 1607    Specimen: Blood Updated: 03/14/25 1615     Extra Tube Hold for add-ons.     Comment: Auto resulted       POC Glucose Once [[id number]]  (Abnormal) Collected: 03/14/25 1523    Specimen: Blood Updated: 03/14/25 1535     Glucose 141 mg/dL      Comment: : [id number] Agustín Hammer ID: [id number]             Imaging Results (Last 24 Hours)       Procedure Component Value Units Date/Time    CT CEREBRAL PERFUSION WITH & WITHOUT CONTRAST [[id number]] Collected: 03/14/25 1642     Updated: 03/14/25 1647    Narrative:      EXAMINATION: CT CEREBRAL PERFUSION W WO CONTRAST-  3/14/2025 4:43 PM     Indication: Neurologic deficit. Acute stroke suspected.     Exam:      1. Perfusion CT is performed to acquire images tracking the temporal  course of iodinated contrast material passing through the cerebral  circulation. Perfusion parameters, such as cerebral blood flow (CBF),  cerebral blood volume (CBV), mean transit time (MTT), etc. are  calculated by [employer] with additional provided perfusion maps and  estimated stroke volumes.  2. Automated exposure control (AEC) protocols are utilized on the  scanner to ensure dose lowered technique.      Comparison: Noncontrast CT brain and brain angiogram dated 3/14/2025  3:03 PM     Findings:     Normal, symmetric and MTT, TTP, CBV and CBF.      Distribution: No perfusion abnormality demonstrated..     Tmax >6.0 seconds volume: 0 ml     CBF < 30% volume: 0 ml     Mismatch volume: 0 ml      Mismatch ratio: None       Impression:      Impression:  1. Normal, symmetric cerebral perfusion. No discrete infarct detected by  the perfusion software.     This report was signed and finalized on 3/14/2025 4:44 PM by Dr. Triston Grayson MD.       XR Chest 1 View [[id number]] Collected:  03/14/25 1641     Updated: 03/14/25 1645    Narrative:      EXAMINATION: XR CHEST 1 VW-  3/14/2025 4:41 PM     HISTORY: Acute stroke protocol.     FINDINGS: Upright frontal projection of the chest demonstrates no  evidence of acute cardiopulmonary disease. There is no effusion or free  air present. The thoracic aorta and great vessels are ectatic.       Impression:      1. No acute disease.     This report was signed and finalized on 3/14/2025 4:42 PM by Dr. Triston Grayson MD.       CT Angiogram Head w AI Analysis of LVO [[id number]] Collected: 03/14/25 1633     Updated: 03/14/25 1644    Narrative:      EXAMINATION: CT ANGIOGRAM HEAD W AI ANALYSIS OF LVO-  3/14/2025 4:34 PM     HISTORY: Neurologic deficit. Acute stroke suspected.     DOSE: 358.5 mGycm. All CT scans are performed using dose optimization  techniques as appropriate to the performed exam and including at least  one of the following: Automated exposure control, adjustment of the mA  and/or kV according to size, and the use of the iterative reconstruction  technique..     FINDINGS: Multiple contiguous axial images were obtained through the  [place] following intravenous contrast infusion with  reformatted images obtained in the sagittal and coronal projections from  the original data set. AI analysis was also performed for LVO.     Both distal extracranial vertebral arteries are widely patent. The  intracranial segment of both vertebral arteries are also widely patent  to the basilar artery. The posterior inferior cerebellar arteries are  symmetric without evidence of discrete aneurysm or focal steno-occlusive  disease. The basilar artery, superior cerebellar arteries and posterior  cerebral arteries are patent with no focal steno-occlusive disease or  aneurysm.     Both extracranial distal ICAs are widely patent. The petrous, cavernous  and supraclinoid segment of both ICAs are remarkable for calcific  plaquing and mild stenosis involving the  cavernous segment of both ICAs.  The supraclinoid segment of the ICAs are widely patent to the terminus.  The anterior and middle cerebral arteries are widely patent with no  focal steno-occlusive disease or berry aneurysm.       Impression:      1. Calcific plaquing with mild stenosis in the cavernous segment of both  intracranial ICAs. Both ICAs are patent to the terminus. The anterior  and middle cerebral arteries are widely patent with no focal  steno-occlusive disease or berry aneurysm. The posterior circulation is  unremarkable.     This report was signed and finalized on 3/14/2025 4:41 PM by Dr. Triston Grayson MD.       CT Angiogram Neck [[id number]] Collected: 03/14/25 1632     Updated: 03/14/25 1638    Narrative:      CT ANGIOGRAM NECK- 3/14/2025 3:02 PM     HISTORY: Stroke, follow up     DOSE LENGTH PRODUCT: 379.24 mGy.cm. Automated exposure control was also  utilized to decrease patient radiation dose.     EXAM: Axial CT angiogram of the neck after the uneventful administration  of Isovue IV contrast. Sagittal and coronal reformations were also  provided for review. 3D images were created on an independent  workstation to better evaluate potential vascular abnormalities.     COMPARISON: None     FINDINGS:     There is a classic three-vessel branching pattern off the aortic arch  without significant ostial narrowing. There is a normal course and  caliber of the common, internal and external carotid arteries without  evidence of a flow-limiting stenosis. The vertebral arteries originate  from the subclavian arteries without significant ostial narrowing. No  evidence of vertebral artery dissection or pseudoaneurysm.     Lung apices are clear. Thyroid gland is homogeneous. No cervical  adenopathy. No acute bony abnormality. Cervical spine osteoarthritis  changes.       Impression:         1.  No arterial occlusion, flow-limiting stenosis or dissection along  the carotid systems in the neck.  2.  Vertebral  arteries are patent and normal in caliber.     This report was signed and finalized on 3/14/2025 4:35 PM by Dr Jonel Rodrigues.       CT Cervical Spine Without Contrast [[id number]] Collected: 03/14/25 1557     Updated: 03/14/25 1602    Narrative:      CT CERVICAL SPINE WO CONTRAST- 3/14/2025 2:40 PM     HISTORY: numbness in the left hand     COMPARISON: None      DOSE LENGTH PRODUCT: 1114.28 mGy.cm. Automated exposure control was also  utilized to decrease patient radiation dose.     TECHNIQUE: Serial helical tomographic images of the cervical spine were  obtained without the use of intravenous contrast. Additionally, sagittal  and coronal reformatted images were also provided for review.     FINDINGS:     The spine is visualized from the posterior fossa through T1. Normal  alignment across the craniocervical junction.  Preserved cervical  lordosis. No listhesis. No acute fracture is identified. Vertebral body  heights are well-maintained. Loss of disc height with posterior endplate  spurring at multiple levels. No high-grade bony narrowing of the spinal  canal is identified. Posterior elements are intact. Facet arthropathy  with left-sided neuroforaminal narrowing at C3-C4. Uncovertebral  spurring with additional neuroforaminal narrowing at C4-C5, C5-C6,  C6-C7.. Lung apices are clear. Paraspinal soft tissues are  unremarkable..       Impression:      1. No acute osseous injury or malalignment.  2. Advanced osteoarthritis changes including loss of disc height with  endplate spurring and uncovertebral spurring with neuroforaminal  narrowing at multiple levels.           This report was signed and finalized on 3/14/2025 3:59 PM by Dr Jonel Rodrigues.       CT Head Without Contrast [[id number]] Collected: 03/14/25 1552     Updated: 03/14/25 1557    Narrative:      EXAM: CT HEAD WO CONTRAST-      DATE: 3/14/2025 2:40 PM     HISTORY: focal deficit - facial numbness in left side of mouth; h/o  amyloid plaques with  associated microhemorrhage per MRI February 17th       COMPARISON: None available.     DOSE LENGTH PRODUCT: 1114.28 mGy.cm  Automated exposure control was also  utilized to decrease patient radiation dose.     TECHNIQUE: Unenhanced CT images obtained from vertex to skull base with  multiplanar reformats.     FINDINGS:  There is no acute intracranial hemorrhage, midline shift, mass effect,  or hydrocephalus. There is no CT evidence for acute infarct.     There are chronic changes with volume loss and chronic small vessel  ischemic change of the periventricular white matter.      SOFT TISSUES: The scalp soft tissues are unremarkable.        SINUS:The visualized paranasal sinuses and mastoid air cells are clear.      ORBITS: The visualized orbits and globes are unremarkable. There is  bilateral lens extraction.          Impression:      1. Chronic changes and no acute intracranial findings.      This report was signed and finalized on 3/14/2025 3:54 PM by Sharath Orona.             I have personally reviewed and interpreted the radiology studies and ECG obtained at time of admission.     Assessment / Plan   Assessment:   Active Hospital Problems    Diagnosis     **Stroke-like symptoms     High cholesterol     Mitral valve prolapse     Diabetes mellitus type 2, diet-controlled     Umbilical hernia     Acid reflux     HTN (hypertension), benign        Treatment Plan  The patient will be admitted to my service here at .     Strokelike symptoms.  History of agenesis of corpus callosum.  History of brain bleed multiple site in the brain-stopped aspirin previously, this was reason aspirin was stopped by his neurologist.  Stroke protocol.  Neurology consult.  MRI of the brain.  Echocardiogram.  CT scan of the head-Chronic changes and no acute intracranial findings.   CTA of the head-Calcific plaquing with mild stenosis in the cavernous segment of both intracranial ICAs. Both ICAs are patent to the  terminus- anterior and middle cerebral arteries are widely patent with no focal  steno-occlusive disease or berry aneurysm-posterior circulation is unremarkable.   CTA of the neck- No arterial occlusion- flow-limiting stenosis or dissection along the carotid systems in the neck,  Vertebral arteries are patent and normal in caliber.  CT perfusion study-Normal- symmetric cerebral perfusion- No discrete infarct detected by  the perfusion software.    Hyperlipidemia/hypertension/history of mitral valve prolapse.  Lipitor . Cut back hydrochlorothiazide due to increasing creatinine.  Cozaar . Spironolactone.  Verapamil SR.    Diabetes.  Glucotrol . Lantus 5 units twice daily for now.  Low sliding scale.  Hemoglobin C.    Advanced arthritis changes of C-spine.  CT scan of cervical spine-Chronic changes and no acute intracranial findings.  Chest x-ray-No acute disease.     Renal sufficiency.  Will follow.  Baseline creatinine 6/20/2024 1.5    Insomnia.  Restoril nightly as needed.    Allergic rhinitis.  Flonase    Advanced age.  84 years old.    SCD.    Nutrition.  Cardiac/diabetic diet.    Deconditioning.  PT and OT consult.    Medical Decision Making  Number and Complexity of problems: Strokelike symptoms/hyperlipidemia/hypertension.  Differential Diagnosis: None    Conditions and Status        Condition is unchanged.     [facility] Data  External documents reviewed: None  Cardiac tracing (EKG, telemetry) interpretation: Sinus rhythm PVCs.  Radiology interpretation: CT scan  Labs reviewed: Laboratory  Any tests that were considered but not ordered: Laboratory in AM     Decision rules/scores evaluated (example [id number], Wells, etc): None     Discussed with: Patient and daughter     Care Planning  Shared decision making: Patient and daughter  Code status and discussions: Full code    Disposition  Social Determinants of Health that impact treatment or disposition: From home  Estimated length of stay is 1 to 3 days.     I  confirmed that the patient's advanced care plan is present, code status is documented, and a surrogate decision maker is listed in the patient's medical record.     The patient's surrogate decision maker is daughter, Rach Naik.     The patient was seen and examined by me on 3/14/2025 at 1720.    Electronically signed by Alejandro Macias MD, 03/14/25, 17:53 CDT.

## 2025-03-14 NOTE — ED PROVIDER NOTES
"Subjective   History of Present Illness  84-year-old male patient presents to the ED via POV with complaints of numbness to the left side of his mouth (specifically the upper and lower lips extending slightly towards the cheek) and the left hand while walking around [facility].  He states that he thought his \"sugar was low\" and he ate \"two cupcakes\" while in the store.  He states that his symptoms improved and he drove himself to the ED.  Triage note indicates some difficulty speaking.  Patient states that \"it was not easy to talk\".  Point-of-care glucose at bedside 141.  He takes aspirin 81 mg daily.  Denies any anticoagulant use. He is AAO.  He has a history of type 2 diabetes, managed with oral agent (glipizide) and insulin (NovoLog, Lantus).    He denies any pain, to include head, neck and chest pain.  Denies any vision changes.  No dizziness.  No nausea or vomiting.  Denies any recent falls or injury.  He has bilateral lower extremity edema.  He reports a history of this, but states that it has been worse this past week.  Takes spironolactone, but no loop diuretic daily.  No fever or chills.  Denies any recent illness.  He states that he has been having \"trouble with his blood pressure lately\".  /68 upon arrival with known history of hypertension. His care is followed by \"Sivan Robledo in [place]\".  He is also followed by [facility].  Chart review indicates a recent MRI of the brain without contrast on February 17th for evaluation of amyloid plaques.  He lives with a \"female friend\".        Review of Systems   Neurological:  Positive for speech difficulty and numbness.       Past Medical History:   Diagnosis Date    ACC (agenesis of corpus callosum)     Acid reflux     Cerebral amyloid angiopathy     Diabetes mellitus type 2, diet-controlled     Hay fever     High cholesterol     BORDERLINE    Hx of colonic polyp     Hypertension     Mitral valve prolapse     PVC (premature ventricular " contraction)     Umbilical hernia        No Known Allergies    Past Surgical History:   Procedure Laterality Date    CARDIAC CATHETERIZATION      COLONOSCOPY N/A 02/20/2020    Dr. briceno-One 18 mm adenoma polyp in the ascending colon, removed piecemeal using a hot snare. Resected and retrieved. Clip was placed. - The examination was otherwise normal on direct and retroflexion views    COLONOSCOPY N/A 03/04/2021    Dr. Briceno-One 7 mm tubular adenoma polyp at the hepatic flexure, removed with a hot snare. Resected and retrieved. - One 10 mm polyp at 20 cm proximal to the anus -Benign granulation tissue polyp with ulceration and marked chronic active inflammation. B. No adenomatous changes identified    COLONOSCOPY N/A 10/10/2024    Procedure: COLONOSCOPY WITH ANESTHESIA;  Surgeon: Mohan Briceno MD;  Location:  [facility];  Service: Gastroenterology;  Laterality: N/A;  pre hx colon polyp  post polyp, diverticulosis  dr dena parisi    COLONOSCOPY W/ POLYPECTOMY  05/12/2014    Adenomatous polyp at 30 cm, Hyperplastic polyp cecum, Diverticulosis repeat exam in 5 years    FOOT SURGERY Left 2015    R/T FRACTURE, PLATE AND SCREWS     INGUINAL HERNIA REPAIR Left 2010    OTHER SURGICAL HISTORY      VOCAL CORD     TONSILLECTOMY  1991    UMBILICAL HERNIA REPAIR N/A 01/12/2017    Procedure: UMBILICAL HERNIA REPAIR;  Surgeon: Kavitha Navarro MD;  Location: [facility] OR;  Service:        Family History   Problem Relation Age of Onset    Heart disease Father     Colon cancer Neg Hx     Colon polyps Neg Hx        Social History     Socioeconomic History    Marital status:    Tobacco Use    Smoking status: Never    Smokeless tobacco: Never   Vaping Use    Vaping status: Never Used   Substance and Sexual Activity    Alcohol use: No    Drug use: No    Sexual activity: Defer           Objective   Physical Exam  Vitals and nursing note reviewed.   Constitutional:       General: He is awake. He is not in acute  distress.     Appearance: He is not ill-appearing, toxic-appearing or diaphoretic.   HENT:      Head: Normocephalic and atraumatic.      Right Ear: External ear normal.      Left Ear: External ear normal.      Nose: Nose normal.      Mouth/Throat:      Mouth: Mucous membranes are moist.      Pharynx: Oropharynx is clear.   Eyes:      Extraocular Movements: Extraocular movements intact.      Conjunctiva/sclera: Conjunctivae normal.      Pupils: Pupils are equal, round, and reactive to light.   Cardiovascular:      Rate and Rhythm: Normal rate and regular rhythm.      Heart sounds: Normal heart sounds.   Pulmonary:      Effort: Pulmonary effort is normal. No respiratory distress.      Breath sounds: Normal breath sounds. No stridor.   Abdominal:      Palpations: Abdomen is soft.      Tenderness: There is no abdominal tenderness.   Musculoskeletal:      Cervical back: Normal range of motion and neck supple. No rigidity. No pain with movement or spinous process tenderness.      Comments: Moves all extremities independently and equally.    Skin:     General: Skin is warm and dry.      Capillary Refill: Capillary refill takes less than 2 seconds.   Neurological:      Mental Status: He is alert and oriented to person, place, and time.      GCS: GCS eye subscore is 4. GCS verbal subscore is 5. GCS motor subscore is 6.      Cranial Nerves: No dysarthria or facial asymmetry.      Sensory: Sensation is intact.      Motor: No weakness or pronator drift.      Coordination: Finger-Nose-Finger Test normal.   Psychiatric:         Mood and Affect: Mood normal.         Behavior: Behavior normal. Behavior is cooperative.         Procedures       Lab Results (last 24 hours)       Procedure Component Value Units Date/Time    POC Glucose Once [[id number]]  (Abnormal) Collected: 03/14/25 1523    Specimen: Blood Updated: 03/14/25 1535     Glucose 141 mg/dL      Comment: : [id number] Agust[employer] ID: [id number]       Urinalysis  With Culture If Indicated - Urine, Clean Catch [[id number]]  (Abnormal) Collected: 03/14/25 1558    Specimen: Urine, Clean Catch Updated: 03/14/25 1617     Color, UA Yellow     Appearance, UA Clear     pH, UA 5.5     Specific Gravity, UA 1.023     Glucose, UA Negative     Ketones, UA Trace     Bilirubin, UA Negative     Blood, UA Negative     Protein, UA Trace     Leuk Esterase, UA Negative     Nitrite, UA Negative     Urobilinogen, UA 1.0 E.U./dL    Narrative:      In absence of clinical symptoms, the presence of pyuria, bacteria, and/or nitrites on the urinalysis result does not correlate with infection.  Urine microscopic not indicated.    aPTT [[id number]]  (Normal) Collected: 03/14/25 1607    Specimen: Blood Updated: 03/14/25 1642     PTT 28.0 seconds     Narrative:      PTT = The equivalent PTT values for the therapeutic range of heparin levels at 0.3 to 0.7 U/ml are 77 - 99 seconds.    CBC & Differential [[id number]]  (Abnormal) Collected: 03/14/25 1607    Specimen: Blood Updated: 03/14/25 1617    Narrative:      The following orders were created for panel order CBC & Differential.  Procedure                               Abnormality         Status                     ---------                               -----------         ------                     CBC Auto Differential[[id number]]        Abnormal            Final result                 Please view results for these tests on the individual orders.    Comprehensive Metabolic Panel [[id number]]  (Abnormal) Collected: 03/14/25 1607    Specimen: Blood Updated: 03/14/25 1636     Glucose 131 mg/dL      BUN 31 mg/dL      Creatinine 1.38 mg/dL      Sodium 141 mmol/L      Potassium 3.7 mmol/L      Comment: Specimen hemolyzed.  Result may be falsely elevated.        Chloride 108 mmol/L      CO2 22.0 mmol/L      Calcium 8.7 mg/dL      Total Protein 6.5 g/dL      Albumin 4.0 g/dL      ALT (SGPT) 29 U/L      Comment: Specimen hemolyzed.  Result may  be falsely elevated.         AST (SGOT) 30 U/L      Comment: Specimen hemolyzed.  Result may be falsely elevated.        Alkaline Phosphatase 59 U/L      Total Bilirubin 0.4 mg/dL      Globulin 2.5 gm/dL      A/G Ratio 1.6 g/dL      BUN/Creatinine Ratio 22.5     Anion Gap 11.0 mmol/L      eGFR 51.1 mL/min/1.73     Narrative:      GFR Categories in Chronic Kidney Disease (CKD)      GFR Category          GFR (mL/min/1.73)    Interpretation  G1                     90 or greater         Normal or high (1)  G2                      60-89                Mild decrease (1)  G3a                   45-59                Mild to moderate decrease  G3b                   30-44                Moderate to severe decrease  G4                    15-29                Severe decrease  G5                    14 or less           Kidney failure          (1)In the absence of evidence of kidney disease, neither GFR category G1 or G2 fulfill the criteria for CKD.    eGFR calculation 2021 CKD-EPI creatinine equation, which does not include race as a factor    Protime-INR [[id number]]  (Normal) Collected: 03/14/25 1607    Specimen: Blood Updated: 03/14/25 1642     Protime 13.7 Seconds      INR 1.00    CBC Auto Differential [[id number]]  (Abnormal) Collected: 03/14/25 1607    Specimen: Blood Updated: 03/14/25 1617     WBC 6.03 10*3/mm3      RBC 4.74 10*6/mm3      Hemoglobin 14.4 g/dL      Hematocrit 41.9 %      MCV 88.4 fL      MCH 30.4 pg      MCHC 34.4 g/dL      RDW 13.5 %      RDW-SD 43.7 fl      MPV 10.3 fL      Platelets 126 10*3/mm3      Neutrophil % 61.1 %      Lymphocyte % 24.4 %      Monocyte % 9.5 %      Eosinophil % 4.0 %      Basophil % 0.8 %      Immature Grans % 0.2 %      Neutrophils, Absolute 3.69 10*3/mm3      Lymphocytes, Absolute 1.47 10*3/mm3      Monocytes, Absolute 0.57 10*3/mm3      Eosinophils, Absolute 0.24 10*3/mm3      Basophils, Absolute 0.05 10*3/mm3      Immature Grans, Absolute 0.01 10*3/mm3                   ED Course  ED Course as of  03/16/25 0822   Fri Mar 14, 2025   1518 EXAMINATION: MRI BRAIN WO CONTRAST-  2/17/2025 5:07 PM     HISTORY: Cerebral amyloid angiopathy.     IMPRESSION:  1. Radiographic findings of amyloid angiopathy. There are too numerous  to count sites of blooming throughout both hemispheres with relative  sparing of the brainstem and cerebellum. Overall I feel these areas of  hemorrhage are stable from the previous exam with no definite new sites  of hemorrhage appreciated.  2. There are several subtle areas of linear hemosiderin deposition  within the frontal vertex bilaterally stable from the previous exam  suggesting sites of previous hemorrhage. There is mild small vessel  disease. Gliosis within the right frontal vertex is also present likely  related to a previous site of hemorrhage and stable.  3. Atrophy of the brain. No mass effect or shift of the midline.  4. Mucous retention cysts or polyps within the right maxillary sinus.  The visualized paranasal sinuses and mastoid air cells are otherwise  normally aerated.      [TD]   1552 ED attending (Yarelis) consulted regarding presentation and work-up. Plan: Will evaluate patient at the bedside [TD]   5025 I was just informed about this patient presents to the ED this gentleman came in with facial numbness speech impediment and some numbness in the left arm was seen by our ARNP who had ordered a CT of the head and CT of the cervical spine and then was trying to check this patient out to me and saw the patient he is having facial numbness speech impediment and is complaining of left arm numbness and not working well his NIH score will be at least 2 there is a questionable history of brain bleed in the past not sure whether it is true or not so I am going to call a code stroke on him. [TS]   1613 Consult with Dr. Lambert (Neurology). Patient is not a candidate for TPA. Plan: If imaging studies are negative, obtain MRI Brain WO.  [TD]   1616 CT Cervical Spine Without  "Contrast  IMPRESSION:  1. No acute osseous injury or malalignment.  2. Advanced osteoarthritis changes including loss of disc height with  endplate spurring and uncovertebral spurring with neuroforaminal  narrowing at multiple levels.   [TD]   1616 CT Head Without Contrast  IMPRESSION:  1. Chronic changes and no acute intracranial findings.    [TD]   1616 ECG 12 Lead Altered Mental Status  Sinus rhythm with PVCs 65bpm   [TD]   1654 Consult with Dr. Lambert. Plan: MRI Brain Without [TD]   1658 ED attending (Yarelis) consulted regarding Dr. Lambert's evaluation. Plan: Consult Hospitalist for admission.  [TD]   1702 Consult with Hospitalist Dr. Macias. Plan: Okay to admit.  [TD]      ED Course User Index  [TD] Tori Valenzuela APRN  [TS] Cem Santoyo MD                                Total (NIH Stroke Scale): 0                      Medical Decision Making  84-year-old male patient presents to the ED via POV with complaints of numbness to the left side of his mouth (specifically the upper and lower lips extending slightly towards the cheek) and the left hand while walking around [facility].  He states that he thought his \"sugar was low\" and he ate \"two cupcakes\" while in the store.  He states that his symptoms improved and he drove himself to the ED.  Triage note indicates some difficulty speaking.  Patient states that \"it was not easy to talk\".  Point-of-care glucose at bedside 141.  He takes aspirin 81 mg daily.  Denies any anticoagulant use. He is AAO.  He has a history of type 2 diabetes, managed with oral agent (glipizide) and insulin (NovoLog, Lantus).  He denies any pain, to include head, neck and chest pain.  Denies any vision changes.  No dizziness.  No nausea or vomiting.  Denies any recent falls or injury.  He has bilateral lower extremity edema.  He reports a history of this, but states that it has been worse this past week.  Takes spironolactone, but no loop diuretic daily.  No fever or chills.  Denies any " "recent illness.  He states that he has been having \"trouble with his blood pressure lately\".  /68 upon arrival with known history of hypertension. His care is followed by \"Sivan Robledo in [place]\".  He is also followed by [facility].  Chart review indicates a recent MRI of the brain without contrast on February 17th for evaluation of amyloid plaques.  He lives with a \"female friend\".  Based on the patient's symptoms and examination findings, potential differential diagnoses include but are not limited to:  hypoglycemia, neuropathy, electrolyte imbalance, endocrine dysfunction, TIA, acute stroke, CAA (patient's history and recent MRI results suggests a possible relationship to his current symptoms), SAH (low suspicion), brain tumor (considered, low suspicion)       Course of treatment in the ED:  Labs Reviewed  URINALYSIS W/ CULTURE IF INDICATED - Abnormal; Notable for the following components:     Ketones, UA                   Trace (*)               Protein, UA                   Trace (*)            All other components within normal limits         Narrative: In absence of clinical symptoms, the presence of pyuria, bacteria, and/or nitrites on the urinalysis result does not correlate with infection.                  Urine microscopic not indicated.  COMPREHENSIVE METABOLIC PANEL - Abnormal; Notable for the following components:     Glucose                       131 (*)                BUN                           31 (*)                 Creatinine                    1.38 (*)               Chloride                      108 (*)                eGFR                          51.1 (*)            All other components within normal limits         Narrative: GFR Categories in Chronic Kidney Disease (CKD)                                      GFR Category          GFR (mL/min/1.73)    Interpretation                  G1                     90 or greater         Normal or high (1)                  G2              "         60-89                Mild decrease (1)                  G3a                   45-59                Mild to moderate decrease                  G3b                   30-44                Moderate to severe decrease                  G4                    15-29                Severe decrease                  G5                    14 or less           Kidney failure                                          (1)In the absence of evidence of kidney disease, neither GFR category G1 or G2 fulfill the criteria for CKD.                                    eGFR calculation 2021 CKD-EPI creatinine equation, which does not include race as a factor  CBC WITH AUTO DIFFERENTIAL - Abnormal; Notable for the following components:     Platelets                     126 (*)             All other components within normal limits  POCT GLUCOSE FINGERSTICK - Abnormal; Notable for the following components:     Glucose                       141 (*)             All other components within normal limits  APTT - Normal         Narrative: PTT = The equivalent PTT values for the therapeutic range of heparin levels at 0.3 to 0.7 U/ml are 77 - 99 seconds.  PROTIME-INR - Normal  RAINBOW DRAW         Narrative: The following orders were created for panel order [place] Draw.                  Procedure                               Abnormality         Status                                     ---------                               -----------         ------                                     Green Top (Gel)[[id number]]                                  Final result                               Lavender Top[[id number]]                                     Final result                               Red Top[[id number]]                                          Final result                               Light Blue Top[[id number]]                                   Final result                                                 Please view results for these  tests on the individual orders.  POCT GLUCOSE FINGERSTICK  TYPE AND SCREEN  GREEN TOP  LAVENDER TOP  RED TOP  LIGHT BLUE TOP  CBC AND DIFFERENTIAL      Medications  sodium chloride 0.9 % flush 10 mL (has no administration in time range)  sodium chloride 0.9 % flush 10 mL (has no administration in time range)  iopamidol (ISOVUE-370) 76 % injection 125 mL (120 mL Intravenous Given 3/14/25 1629)      XR Chest 1 View   Final Result    1. No acute disease.         This report was signed and finalized on 3/14/2025 4:42 PM by Dr. Triston Grayson MD.          CT CEREBRAL PERFUSION WITH & WITHOUT CONTRAST   Final Result    Impression:    1. Normal, symmetric cerebral perfusion. No discrete infarct detected by    the perfusion software.         This report was signed and finalized on 3/14/2025 4:44 PM by Dr. Triston Grayson MD.          CT Angiogram Head w AI Analysis of LVO   Final Result    1. Calcific plaquing with mild stenosis in the cavernous segment of both    intracranial ICAs. Both ICAs are patent to the terminus. The anterior    and middle cerebral arteries are widely patent with no focal    steno-occlusive disease or berry aneurysm. The posterior circulation is    unremarkable.         This report was signed and finalized on 3/14/2025 4:41 PM by Dr. Triston Grayson MD.          CT Angiogram Neck   Final Result         1.  No arterial occlusion, flow-limiting stenosis or dissection along    the carotid systems in the neck.    2.  Vertebral arteries are patent and normal in caliber.         This report was signed and finalized on 3/14/2025 4:35 PM by Dr Jonel Rodrigues.          CT Head Without Contrast   Final Result    1. Chronic changes and no acute intracranial findings.          This report was signed and finalized on 3/14/2025 3:54 PM by Sharath Orona.          CT Cervical Spine Without Contrast   Final Result    1. No acute osseous injury or malalignment.    2. Advanced osteoarthritis  changes including loss of disc height with    endplate spurring and uncovertebral spurring with neuroforaminal    narrowing at multiple levels.                   This report was signed and finalized on 3/14/2025 3:59 PM by Dr Jonel Rodrigues.          MRI Brain Without Contrast    (Results Pending)      This 84-year-old male patient presented to the ED with complaints of facial numbness (left upper and lower lips) and left hand numbness while walking around a store. He also reported some difficulty speaking, although he could not fully characterize this complaint. He initially thought his symptoms were related to hypoglycemia, as he ate two cupcakes to correct perceived low blood sugar. The symptoms improved, and he self-transported to the ED. Upon arrival, he stated that the numbness was present, but faint, and mostly resolved. He denied any pain, dizziness, or vision changes. He was independently ambulatory and there were no significant findings on physical exam, such as facial asymmetry, pronator drift, speech abnormalities, or weakness. His past medical history includes type 2 diabetes, hypertension, and a history of amyloid angiopathy, with a recent MRI of the brain (February 17th) showing findings of cerebral amyloid angiopathy and stable prior hemorrhages.    In the ED, the patient was also evaluated by the ED attending at bedside and Neurology who performed a remote telehealth examination. Neurological findings based on repeat examination include mild deficits. His NIH Stroke Scale score was assessed at 2. Imaging studies included a CT of the head and cervical spine, which showed chronic changes and no acute findings (no intracranial hemorrhage or osseous injury). An MRI brain without contrast was recommended to further evaluate his neurological status, particularly to rule out CVA given the history of cerebral amyloid angiopathy.    Initial laboratory findings included a glucose of 141 (consistent with a  previous diagnosis of type 2 diabetes and recent glucose consumption), mild renal impairment with a creatinine of 1.38 and a BUN of 31, which indicates some degree of renal dysfunction, with history of similar findings. No significant abnormalities were found in his urinalysis or clotting profile. His blood pressure was elevated at 163/66 mmHg, which is consistent with his history of hypertension.    No medications were required while patient was in the ED. A consultation with Neurology and Hospitalist was made for further evaluation and admission. The patient agreed with the plan to be admitted for further observation and work-up.    Problems Addressed:  Left facial numbness: complicated acute illness or injury  Numbness of left hand: complicated acute illness or injury  Speech disturbance, unspecified type: complicated acute illness or injury  Stroke-like symptoms: complicated acute illness or injury    Amount and/or Complexity of Data Reviewed  External Data Reviewed: labs, radiology, ECG and notes.  Labs: ordered. Decision-making details documented in ED Course.  Radiology: ordered. Decision-making details documented in ED Course.  ECG/medicine tests: ordered and independent interpretation performed. Decision-making details documented in ED Course.    Risk  Prescription drug management.  Decision regarding hospitalization.        Final diagnoses:   Left facial numbness   Numbness of left hand   Stroke-like symptoms   Speech disturbance, unspecified type       ED Disposition  ED Disposition       ED Disposition   Decision to Admit    Condition   --    Comment   Level of Care: Telemetry [5]   Diagnosis: Stroke-like symptoms [257040]   Admitting Physician: TU RAY [[id number]]   Attending Physician: TU RAY [[id number]]                    Tori Valenzuela, VILMA  03/16/25 0899

## 2025-03-15 ENCOUNTER — APPOINTMENT (OUTPATIENT)
Dept: CARDIOLOGY | Facility: HOSPITAL | Age: 85
End: 2025-03-15
Payer: MEDICARE

## 2025-03-15 ENCOUNTER — APPOINTMENT (OUTPATIENT)
Dept: ULTRASOUND IMAGING | Facility: HOSPITAL | Age: 85
End: 2025-03-15
Payer: MEDICARE

## 2025-03-15 VITALS
WEIGHT: 225 LBS | OXYGEN SATURATION: 97 % | HEIGHT: 72 IN | BODY MASS INDEX: 30.48 KG/M2 | SYSTOLIC BLOOD PRESSURE: 139 MMHG | DIASTOLIC BLOOD PRESSURE: 70 MMHG | TEMPERATURE: 97.9 F | HEART RATE: 58 BPM | RESPIRATION RATE: 18 BRPM

## 2025-03-15 LAB
ALBUMIN SERPL-MCNC: 3.7 G/DL (ref 3.5–5.2)
ALBUMIN/GLOB SERPL: 1.7 G/DL
ALP SERPL-CCNC: 52 U/L (ref 39–117)
ALT SERPL W P-5'-P-CCNC: 25 U/L (ref 1–41)
ANION GAP SERPL CALCULATED.3IONS-SCNC: 11 MMOL/L (ref 5–15)
AORTIC DIMENSIONLESS INDEX: 0.74 (DI)
AST SERPL-CCNC: 22 U/L (ref 1–40)
AV MEAN PRESS GRAD SYS DOP V1V2: 8 MMHG
AV VMAX SYS DOP: 201 CM/SEC
BH CV ECHO MEAS - AO MAX PG: 16.2 MMHG
BH CV ECHO MEAS - AO ROOT DIAM: 2.8 CM
BH CV ECHO MEAS - AO V2 VTI: 41.1 CM
BH CV ECHO MEAS - AVA(I,D): 2.33 CM2
BH CV ECHO MEAS - EDV(CUBED): 149.7 ML
BH CV ECHO MEAS - EDV(MOD-SP2): 81.7 ML
BH CV ECHO MEAS - EDV(MOD-SP4): 134 ML
BH CV ECHO MEAS - EF(MOD-SP2): 65 %
BH CV ECHO MEAS - EF(MOD-SP4): 63.3 %
BH CV ECHO MEAS - ESV(CUBED): 23.9 ML
BH CV ECHO MEAS - ESV(MOD-SP2): 28.6 ML
BH CV ECHO MEAS - ESV(MOD-SP4): 49.2 ML
BH CV ECHO MEAS - FS: 45.8 %
BH CV ECHO MEAS - IVS/LVPW: 0.9 CM
BH CV ECHO MEAS - IVSD: 1.01 CM
BH CV ECHO MEAS - LA DIMENSION: 4.4 CM
BH CV ECHO MEAS - LAT PEAK E' VEL: 10.3 CM/SEC
BH CV ECHO MEAS - LV DIASTOLIC VOL/BSA (35-75): 59.9 CM2
BH CV ECHO MEAS - LV MASS(C)D: 218.7 GRAMS
BH CV ECHO MEAS - LV MAX PG: 6.5 MMHG
BH CV ECHO MEAS - LV MEAN PG: 4 MMHG
BH CV ECHO MEAS - LV SYSTOLIC VOL/BSA (12-30): 22 CM2
BH CV ECHO MEAS - LV V1 MAX: 126.5 CM/SEC
BH CV ECHO MEAS - LV V1 VTI: 30.4 CM
BH CV ECHO MEAS - LVIDD: 5.3 CM
BH CV ECHO MEAS - LVIDS: 2.9 CM
BH CV ECHO MEAS - LVOT AREA: 3.1 CM2
BH CV ECHO MEAS - LVOT DIAM: 2 CM
BH CV ECHO MEAS - LVPWD: 1.12 CM
BH CV ECHO MEAS - MED PEAK E' VEL: 7.8 CM/SEC
BH CV ECHO MEAS - MV A MAX VEL: 100.4 CM/SEC
BH CV ECHO MEAS - MV DEC SLOPE: 647 CM/SEC2
BH CV ECHO MEAS - MV DEC TIME: 0.2 SEC
BH CV ECHO MEAS - MV E MAX VEL: 118.5 CM/SEC
BH CV ECHO MEAS - MV E/A: 1.18
BH CV ECHO MEAS - MV MAX PG: 6.5 MMHG
BH CV ECHO MEAS - MV MEAN PG: 2 MMHG
BH CV ECHO MEAS - MV P1/2T: 60.7 MSEC
BH CV ECHO MEAS - MV V2 VTI: 43.4 CM
BH CV ECHO MEAS - MVA(P1/2T): 3.6 CM2
BH CV ECHO MEAS - MVA(VTI): 2.2 CM2
BH CV ECHO MEAS - PA V2 MAX: 138 CM/SEC
BH CV ECHO MEAS - SV(LVOT): 95.5 ML
BH CV ECHO MEAS - SV(MOD-SP2): 53.1 ML
BH CV ECHO MEAS - SV(MOD-SP4): 84.8 ML
BH CV ECHO MEAS - SVI(LVOT): 42.7 ML/M2
BH CV ECHO MEAS - SVI(MOD-SP2): 23.8 ML/M2
BH CV ECHO MEAS - SVI(MOD-SP4): 37.9 ML/M2
BH CV ECHO MEAS - TAPSE (>1.6): 2.7 CM
BH CV ECHO MEASUREMENTS AVERAGE E/E' RATIO: 13.09
BH CV ECHO SHUNT ASSESSMENT PERFORMED (HIDDEN SCRIPTING): 1
BH CV XLRA - TDI S': 16.5 CM/SEC
BILIRUB SERPL-MCNC: 0.7 MG/DL (ref 0–1.2)
BUN SERPL-MCNC: 20 MG/DL (ref 8–23)
BUN/CREAT SERPL: 21.1 (ref 7–25)
CALCIUM SPEC-SCNC: 8.6 MG/DL (ref 8.6–10.5)
CHLORIDE SERPL-SCNC: 107 MMOL/L (ref 98–107)
CHOLEST SERPL-MCNC: 169 MG/DL (ref 0–200)
CO2 SERPL-SCNC: 21 MMOL/L (ref 22–29)
CREAT SERPL-MCNC: 0.95 MG/DL (ref 0.76–1.27)
DEPRECATED RDW RBC AUTO: 42.5 FL (ref 37–54)
EGFRCR SERPLBLD CKD-EPI 2021: 79.9 ML/MIN/1.73
ERYTHROCYTE [DISTWIDTH] IN BLOOD BY AUTOMATED COUNT: 13.2 % (ref 12.3–15.4)
FOLATE SERPL-MCNC: 11 NG/ML (ref 4.78–24.2)
GLOBULIN UR ELPH-MCNC: 2.2 GM/DL
GLUCOSE BLDC GLUCOMTR-MCNC: 183 MG/DL (ref 70–130)
GLUCOSE BLDC GLUCOMTR-MCNC: 71 MG/DL (ref 70–130)
GLUCOSE BLDC GLUCOMTR-MCNC: 85 MG/DL (ref 70–130)
GLUCOSE SERPL-MCNC: 76 MG/DL (ref 65–99)
HBA1C MFR BLD: 5.9 % (ref 4.8–5.6)
HCT VFR BLD AUTO: 43.3 % (ref 37.5–51)
HDLC SERPL-MCNC: 28 MG/DL (ref 40–60)
HGB BLD-MCNC: 14.8 G/DL (ref 13–17.7)
LDLC SERPL CALC-MCNC: 118 MG/DL (ref 0–100)
LDLC/HDLC SERPL: 4.15 {RATIO}
LEFT ATRIUM VOLUME INDEX: 32.1 ML/M2
LEFT ATRIUM VOLUME: 72 ML
LV EF BIPLANE MOD: 63.9 %
MAGNESIUM SERPL-MCNC: 1.8 MG/DL (ref 1.6–2.4)
MCH RBC QN AUTO: 30.1 PG (ref 26.6–33)
MCHC RBC AUTO-ENTMCNC: 34.2 G/DL (ref 31.5–35.7)
MCV RBC AUTO: 88 FL (ref 79–97)
PLATELET # BLD AUTO: 125 10*3/MM3 (ref 140–450)
PMV BLD AUTO: 10.4 FL (ref 6–12)
POTASSIUM SERPL-SCNC: 3.3 MMOL/L (ref 3.5–5.2)
PROT SERPL-MCNC: 5.9 G/DL (ref 6–8.5)
QT INTERVAL: 436 MS
QTC INTERVAL: 453 MS
RBC # BLD AUTO: 4.92 10*6/MM3 (ref 4.14–5.8)
SODIUM SERPL-SCNC: 139 MMOL/L (ref 136–145)
TRIGL SERPL-MCNC: 124 MG/DL (ref 0–150)
TSH SERPL DL<=0.05 MIU/L-ACNC: 2.07 UIU/ML (ref 0.27–4.2)
VIT B12 BLD-MCNC: 528 PG/ML (ref 211–946)
VLDLC SERPL-MCNC: 23 MG/DL (ref 5–40)
WBC NRBC COR # BLD AUTO: 5.44 10*3/MM3 (ref 3.4–10.8)

## 2025-03-15 PROCEDURE — 63710000001 INSULIN LISPRO (HUMAN) PER 5 UNITS: Performed by: FAMILY MEDICINE

## 2025-03-15 PROCEDURE — 93306 TTE W/DOPPLER COMPLETE: CPT | Performed by: INTERNAL MEDICINE

## 2025-03-15 PROCEDURE — 93880 EXTRACRANIAL BILAT STUDY: CPT

## 2025-03-15 PROCEDURE — 82607 VITAMIN B-12: CPT | Performed by: FAMILY MEDICINE

## 2025-03-15 PROCEDURE — 82746 ASSAY OF FOLIC ACID SERUM: CPT | Performed by: FAMILY MEDICINE

## 2025-03-15 PROCEDURE — 80053 COMPREHEN METABOLIC PANEL: CPT | Performed by: FAMILY MEDICINE

## 2025-03-15 PROCEDURE — G0378 HOSPITAL OBSERVATION PER HR: HCPCS

## 2025-03-15 PROCEDURE — 82948 REAGENT STRIP/BLOOD GLUCOSE: CPT

## 2025-03-15 PROCEDURE — 84443 ASSAY THYROID STIM HORMONE: CPT | Performed by: FAMILY MEDICINE

## 2025-03-15 PROCEDURE — 92523 SPEECH SOUND LANG COMPREHEN: CPT

## 2025-03-15 PROCEDURE — 25510000001 PERFLUTREN 6.52 MG/ML SUSPENSION: Performed by: FAMILY MEDICINE

## 2025-03-15 PROCEDURE — 83036 HEMOGLOBIN GLYCOSYLATED A1C: CPT | Performed by: FAMILY MEDICINE

## 2025-03-15 PROCEDURE — 80061 LIPID PANEL: CPT | Performed by: FAMILY MEDICINE

## 2025-03-15 PROCEDURE — 93306 TTE W/DOPPLER COMPLETE: CPT

## 2025-03-15 PROCEDURE — 93880 EXTRACRANIAL BILAT STUDY: CPT | Performed by: STUDENT IN AN ORGANIZED HEALTH CARE EDUCATION/TRAINING PROGRAM

## 2025-03-15 PROCEDURE — 85027 COMPLETE CBC AUTOMATED: CPT | Performed by: FAMILY MEDICINE

## 2025-03-15 PROCEDURE — 97165 OT EVAL LOW COMPLEX 30 MIN: CPT

## 2025-03-15 PROCEDURE — 83735 ASSAY OF MAGNESIUM: CPT | Performed by: FAMILY MEDICINE

## 2025-03-15 PROCEDURE — 63710000001 INSULIN GLARGINE PER 5 UNITS: Performed by: FAMILY MEDICINE

## 2025-03-15 PROCEDURE — 97161 PT EVAL LOW COMPLEX 20 MIN: CPT

## 2025-03-15 RX ORDER — POTASSIUM CHLORIDE 750 MG/1
40 CAPSULE, EXTENDED RELEASE ORAL 2 TIMES DAILY
Status: DISCONTINUED | OUTPATIENT
Start: 2025-03-15 | End: 2025-03-15 | Stop reason: HOSPADM

## 2025-03-15 RX ORDER — ATORVASTATIN CALCIUM 40 MG/1
80 TABLET, FILM COATED ORAL NIGHTLY
Status: DISCONTINUED | OUTPATIENT
Start: 2025-03-15 | End: 2025-03-15 | Stop reason: HOSPADM

## 2025-03-15 RX ADMIN — GLIPIZIDE 5 MG: 5 TABLET ORAL at 08:02

## 2025-03-15 RX ADMIN — INSULIN GLARGINE 5 UNITS: 100 INJECTION, SOLUTION SUBCUTANEOUS at 08:02

## 2025-03-15 RX ADMIN — POTASSIUM CHLORIDE 40 MEQ: 750 CAPSULE, EXTENDED RELEASE ORAL at 08:02

## 2025-03-15 RX ADMIN — INSULIN LISPRO 2 UNITS: 100 INJECTION, SOLUTION INTRAVENOUS; SUBCUTANEOUS at 11:31

## 2025-03-15 RX ADMIN — LOSARTAN POTASSIUM 100 MG: 50 TABLET, FILM COATED ORAL at 08:02

## 2025-03-15 RX ADMIN — SPIRONOLACTONE 25 MG: 25 TABLET ORAL at 08:02

## 2025-03-15 RX ADMIN — VERAPAMIL HYDROCHLORIDE 240 MG: 240 TABLET, FILM COATED, EXTENDED RELEASE ORAL at 08:01

## 2025-03-15 RX ADMIN — PERFLUTREN 6.52 MG: 6.52 INJECTION, SUSPENSION INTRAVENOUS at 12:28

## 2025-03-15 RX ADMIN — HYDROCHLOROTHIAZIDE 12.5 MG: 25 TABLET ORAL at 08:02

## 2025-03-15 RX ADMIN — Medication 10 ML: at 08:02

## 2025-03-15 NOTE — DISCHARGE SUMMARY
[facility] Medicine Services  DISCHARGE SUMMARY       Date of Admission: 3/14/2025  Date of Discharge:  3/15/2025  Primary Care Physician: Arpit Yip MD    Presenting Problem/History of Present Illness:      Final Discharge Diagnoses:  Active Hospital Problems    Diagnosis     **Stroke-like symptoms     High cholesterol     Mitral valve prolapse     Diabetes mellitus type 2, diet-controlled     Umbilical hernia     Acid reflux     HTN (hypertension), benign        Consults: Neurology .      Pertinent Test Results:   Results for orders placed during the hospital encounter of 09/01/20    Adult Stress Echo W/ Cont or Stress Agent if Necessary Per Protocol    Interpretation Summary  Dobutamine stress echocardiogram is low risk for ischemia.      Imaging Results (All)       Procedure Component Value Units Date/Time    MRI Brain Without Contrast [[id number]] Collected: 03/14/25 1938     Updated: 03/14/25 1948    Narrative:      EXAMINATION: MRI BRAIN WO CONTRAST-  3/14/2025 7:38 PM     HISTORY: Stroke protocol.     FINDINGS: Today's exam is compared to previous study of 2/17/2025.     There is no evidence of diffusion restriction on diffusion weighted  imaging to suggest acute ischemia or infarct.     There is atrophy of the brain with prominence of subarachnoid spaces and  ventricular enlargement. There are scattered foci of abnormal T2/FLAIR  signal involving the periventricular and higher white matter tracts.  There is gyriform increased signal within the right frontal vertex and  what appears to be the precentral gyrus is stable from the previous exam  suggesting gliosis.     Normal flow voids demonstrated within the [place] of [place]. The orbits  are unremarkable. There is a stable extra-axial fluid collection within  the left frontal convexly suggesting an arachnoid cyst.     As noted previously there are innumerable foci of abnormal signal with  blooming on gradient  sequencing throughout both hemispheres consistent  with amyloid angiopathy. Stable appearance from the previous exam. No  definite new sites of hemorrhage are demonstrated. There is relative  sparing of the posterior cranial fossa.     Mucous retention cysts or polyps are noted within the right maxillary  sinus. The visualized paranasal sinuses and mastoid air cells are  otherwise normally aerated.       Impression:      1. 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 No definite new sites of hemorrhage are present. There is  relative sparing of the posterior cranial fossa.  2. Mild atrophy. Scattered foci of abnormal T2 signal are present in the  periventricular and higher white matter tracts. There is gyriform linear  increased T2 signal in the right precentral gyrus stable from the  previous exam. This may represent a site of previous ischemia. No mass  effect or shift of the midline.  3. No evidence of diffusion restriction on diffusion-weighted imaging to  suggest acute ischemia or infarct.  4. Stable arachnoid cyst within the left frontal extra-axial convexity.   5. Chronic appearing sinus disease.     This report was signed and finalized on 3/14/2025 7:45 PM by Dr. Triston Grayson MD.       CT CEREBRAL PERFUSION WITH & WITHOUT CONTRAST [[id number]] Collected: 03/14/25 1642     Updated: 03/14/25 1647    Narrative:      EXAMINATION: CT CEREBRAL PERFUSION W WO CONTRAST-  3/14/2025 4:43 PM     Indication: Neurologic deficit. Acute stroke suspected.     Exam:      1. Perfusion CT is performed to acquire images tracking the temporal  course of iodinated contrast material passing through the cerebral  circulation. Perfusion parameters, such as cerebral blood flow (CBF),  cerebral blood volume (CBV), mean transit time (MTT), etc. are  calculated by [employer] with additional provided perfusion maps and  estimated stroke  volumes.  2. Automated exposure control (AEC) protocols are utilized on the  scanner to ensure dose lowered technique.      Comparison: Noncontrast CT brain and brain angiogram dated 3/14/2025  3:03 PM     Findings:     Normal, symmetric and MTT, TTP, CBV and CBF.      Distribution: No perfusion abnormality demonstrated..     Tmax >6.0 seconds volume: 0 ml     CBF < 30% volume: 0 ml     Mismatch volume: 0 ml      Mismatch ratio: None       Impression:      Impression:  1. Normal, symmetric cerebral perfusion. No discrete infarct detected by  the perfusion software.     This report was signed and finalized on 3/14/2025 4:44 PM by Dr. Triston Grayson MD.       XR Chest 1 View [[id number]] Collected: 03/14/25 1641     Updated: 03/14/25 1645    Narrative:      EXAMINATION: XR CHEST 1 VW-  3/14/2025 4:41 PM     HISTORY: Acute stroke protocol.     FINDINGS: Upright frontal projection of the chest demonstrates no  evidence of acute cardiopulmonary disease. There is no effusion or free  air present. The thoracic aorta and great vessels are ectatic.       Impression:      1. No acute disease.     This report was signed and finalized on 3/14/2025 4:42 PM by Dr. Triston Grayson MD.       CT Angiogram Head w AI Analysis of LVO [[id number]] Collected: 03/14/25 1633     Updated: 03/14/25 1644    Narrative:      EXAMINATION: CT ANGIOGRAM HEAD W AI ANALYSIS OF LVO-  3/14/2025 4:34 PM     HISTORY: Neurologic deficit. Acute stroke suspected.     DOSE: 358.5 mGycm. All CT scans are performed using dose optimization  techniques as appropriate to the performed exam and including at least  one of the following: Automated exposure control, adjustment of the mA  and/or kV according to size, and the use of the iterative reconstruction  technique..     FINDINGS: Multiple contiguous axial images were obtained through the  [place] of [place] following intravenous contrast infusion with  reformatted images obtained in the sagittal and coronal  projections from  the original data set. AI analysis was also performed for LVO.     Both distal extracranial vertebral arteries are widely patent. The  intracranial segment of both vertebral arteries are also widely patent  to the basilar artery. The posterior inferior cerebellar arteries are  symmetric without evidence of discrete aneurysm or focal steno-occlusive  disease. The basilar artery, superior cerebellar arteries and posterior  cerebral arteries are patent with no focal steno-occlusive disease or  aneurysm.     Both extracranial distal ICAs are widely patent. The petrous, cavernous  and supraclinoid segment of both ICAs are remarkable for calcific  plaquing and mild stenosis involving the cavernous segment of both ICAs.  The supraclinoid segment of the ICAs are widely patent to the terminus.  The anterior and middle cerebral arteries are widely patent with no  focal steno-occlusive disease or berry aneurysm.       Impression:      1. Calcific plaquing with mild stenosis in the cavernous segment of both  intracranial ICAs. Both ICAs are patent to the terminus. The anterior  and middle cerebral arteries are widely patent with no focal  steno-occlusive disease or berry aneurysm. The posterior circulation is  unremarkable.     This report was signed and finalized on 3/14/2025 4:41 PM by Dr. Triston Grayson MD.       CT Angiogram Neck [[id number]] Collected: 03/14/25 1632     Updated: 03/14/25 1638    Narrative:      CT ANGIOGRAM NECK- 3/14/2025 3:02 PM     HISTORY: Stroke, follow up     DOSE LENGTH PRODUCT: 379.24 mGy.cm. Automated exposure control was also  utilized to decrease patient radiation dose.     EXAM: Axial CT angiogram of the neck after the uneventful administration  of Isovue IV contrast. Sagittal and coronal reformations were also  provided for review. 3D images were created on an independent  workstation to better evaluate potential vascular abnormalities.     COMPARISON: None     FINDINGS:      There is a classic three-vessel branching pattern off the aortic arch  without significant ostial narrowing. There is a normal course and  caliber of the common, internal and external carotid arteries without  evidence of a flow-limiting stenosis. The vertebral arteries originate  from the subclavian arteries without significant ostial narrowing. No  evidence of vertebral artery dissection or pseudoaneurysm.     Lung apices are clear. Thyroid gland is homogeneous. No cervical  adenopathy. No acute bony abnormality. Cervical spine osteoarthritis  changes.       Impression:         1.  No arterial occlusion, flow-limiting stenosis or dissection along  the carotid systems in the neck.  2.  Vertebral arteries are patent and normal in caliber.     This report was signed and finalized on 3/14/2025 4:35 PM by Dr Jonel Rodrigues.       CT Cervical Spine Without Contrast [[id number]] Collected: 03/14/25 1557     Updated: 03/14/25 1602    Narrative:      CT CERVICAL SPINE WO CONTRAST- 3/14/2025 2:40 PM     HISTORY: numbness in the left hand     COMPARISON: None      DOSE LENGTH PRODUCT: 1114.28 mGy.cm. Automated exposure control was also  utilized to decrease patient radiation dose.     TECHNIQUE: Serial helical tomographic images of the cervical spine were  obtained without the use of intravenous contrast. Additionally, sagittal  and coronal reformatted images were also provided for review.     FINDINGS:     The spine is visualized from the posterior fossa through T1. Normal  alignment across the craniocervical junction.  Preserved cervical  lordosis. No listhesis. No acute fracture is identified. Vertebral body  heights are well-maintained. Loss of disc height with posterior endplate  spurring at multiple levels. No high-grade bony narrowing of the spinal  canal is identified. Posterior elements are intact. Facet arthropathy  with left-sided neuroforaminal narrowing at C3-C4. Uncovertebral  spurring with additional  neuroforaminal narrowing at C4-C5, C5-C6,  C6-C7.. Lung apices are clear. Paraspinal soft tissues are  unremarkable..       Impression:      1. No acute osseous injury or malalignment.  2. Advanced osteoarthritis changes including loss of disc height with  endplate spurring and uncovertebral spurring with neuroforaminal  narrowing at multiple levels.           This report was signed and finalized on 3/14/2025 3:59 PM by Dr Jonel Rodrigues.       CT Head Without Contrast [[id number]] Collected: 03/14/25 1552     Updated: 03/14/25 1557    Narrative:      EXAM: CT HEAD WO CONTRAST-      DATE: 3/14/2025 2:40 PM     HISTORY: focal deficit - facial numbness in left side of mouth; h/o  amyloid plaques with associated microhemorrhage per MRI February 17th       COMPARISON: None available.     DOSE LENGTH PRODUCT: 1114.28 mGy.cm  Automated exposure control was also  utilized to decrease patient radiation dose.     TECHNIQUE: Unenhanced CT images obtained from vertex to skull base with  multiplanar reformats.     FINDINGS:  There is no acute intracranial hemorrhage, midline shift, mass effect,  or hydrocephalus. There is no CT evidence for acute infarct.     There are chronic changes with volume loss and chronic small vessel  ischemic change of the periventricular white matter.      SOFT TISSUES: The scalp soft tissues are unremarkable.        SINUS:The visualized paranasal sinuses and mastoid air cells are clear.      ORBITS: The visualized orbits and globes are unremarkable. There is  bilateral lens extraction.          Impression:      1. Chronic changes and no acute intracranial findings.      This report was signed and finalized on 3/14/2025 3:54 PM by Sharath Orona.             LAB RESULTS:      Lab 03/15/25  0618 03/14/25  1607   WBC 5.44 6.03   HEMOGLOBIN 14.8 14.4   HEMATOCRIT 43.3 41.9   PLATELETS 125* 126*   NEUTROS ABS  --  3.69   IMMATURE GRANS (ABS)  --  0.01   LYMPHS ABS  --  1.47   MONOS ABS  --  0.57   EOS  ABS  --  0.24   MCV 88.0 88.4   PROTIME  --  13.7   APTT  --  28.0         Lab 03/15/25  0618 03/14/25  1607   SODIUM 139 141   POTASSIUM 3.3* 3.7   CHLORIDE 107 108*   CO2 21.0* 22.0   ANION GAP 11.0 11.0   BUN 20 31*   CREATININE 0.95 1.38*   EGFR 79.9 51.1*   GLUCOSE 76 131*   CALCIUM 8.6 8.7   MAGNESIUM 1.8  --    HEMOGLOBIN A1C 5.90*  --    TSH 2.070  --          Lab 03/15/25  0618 03/14/25  1607   TOTAL PROTEIN 5.9* 6.5   ALBUMIN 3.7 4.0   GLOBULIN 2.2 2.5   ALT (SGPT) 25 29   AST (SGOT) 22 30   BILIRUBIN 0.7 0.4   ALK PHOS 52 59         Lab 03/14/25  1607   PROTIME 13.7   INR 1.00         Lab 03/15/25  0618   CHOLESTEROL 169   LDL CHOL 118*   HDL CHOL 28*   TRIGLYCERIDES 124             Brief Urine Lab Results  (Last result in the past 365 days)        Color   Clarity   Blood   Leuk Est   Nitrite   Protein   CREAT   Urine HCG        03/14/25 1558 Yellow   Clear   Negative   Negative   Negative   Trace                 Microbiology Results (last 10 days)       ** No results found for the last 240 hours. **        History of Present Illness  Patient is 84-year presented ER with complaint of numbness in the left side of his mouth especially his upper lips extended towards his cheek.  Patient's status left hand also was numb and weak while shopping by working.  Patient thought his sugar was low he ate 2 cupcake at the store, symptoms improved however he drove to ER to be evaluated for strokelike symptoms.  During triage patient Diff[place] speaking.  However now is improving.  Sugar glucose at bedside was 141.  Patient take an aspirin daily.     Patient has a past medical history of agenesis corporal callosum, reflux diabetes, hayfever, cholesterol, colon polyp, hypertension, mitral valve prolapse, PVC, umbilicus hernia.    Hospital Course:   Strokelike symptoms.  History of agenesis of corpus callosum.  History of brain bleed multiple site in the brain-stopped aspirin previously, this was reason aspirin was stopped  by his neurologist.  Stroke protocol.  Neurology consult.  MRI of the brain.  Echocardiogram.  CT scan of the head-Chronic changes and no acute intracranial findings.   CTA of the head-Calcific plaquing with mild stenosis in the cavernous segment of both intracranial ICAs. Both ICAs are patent to the terminus- anterior and middle cerebral arteries are widely patent with no focal  steno-occlusive disease or berry aneurysm-posterior circulation is unremarkable.   CTA of the neck- No arterial occlusion- flow-limiting stenosis or dissection along the carotid systems in the neck,  Vertebral arteries are patent and normal in caliber.  CT perfusion study-Normal- symmetric cerebral perfusion- No discrete infarct detected by  the perfusion software.  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 within both hemispheres essentially stable from the previous exam of 1 month earlier- No definite new sites of hemorrhage are present- relative sparing of the posterior cranial fossa, Mild atrophy, Scattered foci of abnormal T2 signal are present in the periventricular and higher white matter tracts- gyriform linear increased T2 signal in the right precentral gyrus stable from the  previous exam- may represent a site of previous ischemia- No mass effect or shift of the midline, No evidence of diffusion restriction on diffusion-weighted imaging to suggest acute ischemia or infarct, Stable arachnoid cyst within the left frontal extra-axial convexity, Chronic appearing sinus disease.     Recommendation from neurology-Dr. Lambert teleneurologist and I have seen Mr. Kay. Once he has had he has TTE and ultrasound of his carotids completed we are okay with him going home. He will need to follow-up with his vascular neurologist at [facility] as previously arranged. He knows not to be on any type of antiplatelets or anticoagulants.  Discussed with Donna VILLEGAS for neurology-could discharge after carotids done no  "need to wait for the results, neurology will follow.  Patient wants to go home before the storm hits.     Hyperlipidemia/hypertension/history of mitral valve prolapse.  Keep home dose Lipitor, due to increased clearance of verapamil. Cut back hydrochlorothiazide due to increasing creatinine.  Cozaar . Spironolactone.  Verapamil SR.    Hypokalemia . p.o. potassium.  Magnesium is normal.     Diabetes.  Glucotrol . Lantus 5 units twice daily for now.  Low sliding scale.  Hemoglobin C.     Advanced arthritis changes of C-spine.  CT scan of cervical spine-Chronic changes and no acute intracranial findings.  Chest x-ray-No acute disease.      Renal sufficiency.  Will follow.  Baseline creatinine 6/20/2024 1.5     Insomnia.  Restoril nightly as needed.     Allergic rhinitis.  Flonase     Advanced age.  84 years old.     SCD.     Nutrition.  Cardiac/diabetic diet.     Deconditioning.  PT and OT consult.      Vital signs stable, afebrile.  Plan to discharge patient home today.  Follow-up with his PCP in 1 week.  Follow his neurologist as soon as able.    Physical Exam on Discharge:  /70 (BP Location: Left arm, Patient Position: Sitting)   Pulse 58   Temp 97.9 °F (36.6 °C) (Oral)   Resp 18   Ht 182.9 cm (72\")   Wt 102 kg (225 lb)   SpO2 97%   BMI 30.52 kg/m²   Physical Exam  Vitals and nursing note reviewed.   Constitutional:       Comments: Advanced age.   HENT:      Head: Normocephalic.   Eyes:      Conjunctiva/sclera: Conjunctivae normal.      Pupils: Pupils are equal, round, and reactive to light.   Cardiovascular:      Rate and Rhythm: Normal rate and regular rhythm.      Heart sounds: Normal heart sounds.   Pulmonary:      Effort: Pulmonary effort is normal. No respiratory distress.      Breath sounds: Normal breath sounds.      Comments: Patient is on room air.  Abdominal:      General: Bowel sounds are normal. There is no distension.      Palpations: Abdomen is soft.      Tenderness: There is no abdominal " tenderness.   Musculoskeletal:         General: No swelling.      Cervical back: Neck supple.      Right lower leg: Edema present.      Left lower leg: Edema present.      Comments: +1-2 pitting edema bilateral lower extremities, improving with elevation.   Skin:     General: Skin is warm and dry.      Capillary Refill: Capillary refill takes 2 to 3 seconds.      Findings: No rash.   Neurological:      General: No focal deficit present.      Mental Status: He is alert and oriented to person, place, and time.      Motor: Weakness present.      Comments: Cranial 2-12 grossly intact.  Negative pronator drift.  Negative finger touch.  Strength is 5 out of 5 symmetrical.   Psychiatric:         Mood and Affect: Mood normal.         Behavior: Behavior normal.         Thought Content: Thought content normal.         Judgment: Judgment normal.        Condition on Discharge: Stable.    Discharge Disposition: Discharge home with family.  Home or Self Care    Discharge Medications:     Discharge Medications        New Medications        Instructions Start Date   hydroCHLOROthiazide 25 MG tablet   25 mg, Oral, Daily             Continue These Medications        Instructions Start Date   atorvastatin 20 MG tablet  Commonly known as: LIPITOR   10 mg, Oral, Daily, PT TAKES 1/2 20 MG TABLET TO EQUAL 10 MG DAILY      fluticasone 50 MCG/ACT nasal spray  Commonly known as: FLONASE   1 spray, Each Nare, Daily PRN      glipizide 10 MG 24 hr tablet  Commonly known as: GLUCOTROL XL   10 mg, Oral, Daily      Insulin Aspart 100 UNIT/ML injection  Commonly known as: novoLOG   Subcutaneous, As Needed, As needed      Lantus SoloStar 100 UNIT/ML injection pen  Generic drug: Insulin Glargine   Subcutaneous, Daily      losartan 100 MG tablet  Commonly known as: COZAAR   100 mg, Oral, Daily      potassium chloride 10 MEQ CR tablet  Commonly known as: KLOR-CON M10   10 mEq, Oral, 2 Times Daily      sodium-potassium-magnesium sulfates 17.5-3.13-1.6  GM/177ML solution oral solution  Commonly known as: Suprep Bowel Prep Kit   Take as directed by office instructions provided      spironolactone 25 MG tablet  Commonly known as: ALDACTONE   25 mg, Oral, Daily      temazepam 15 MG capsule  Commonly known as: RESTORIL   TAKE 2 CAPSULES BY MOUTH NIGHTLY AS NEEDED FOR SLEEP FOR UP TO 30 DAYS      verapamil  MG CR tablet  Commonly known as: CALAN-SR   240 mg, Oral, 2 Times Daily      vitamin D 1.25 MG (40023 UT) capsule capsule  Commonly known as: ERGOCALCIFEROL   1 capsule, Oral, Weekly             Stop These Medications      aspirin 81 MG tablet     cloNIDine 0.1 MG tablet  Commonly known as: CATAPRES     Testosterone Cypionate 200 MG/ML injection  Commonly known as: DEPOTESTOTERONE CYPIONATE              Discharge Diet:   Diet Instructions       Advance Diet As Tolerated -Target Diet: Cardiac/diabetic      Target Diet: Cardiac/diabetic            Activity at Discharge:   Activity Instructions       Activity as Tolerated              Follow-up Appointments:   Follow-up with PCP 1 week.  Follow with his neurologist outpatient soon as able.  Electronically signed by Alejandro Macias MD, 03/15/25, 12:42 CDT.    Time: Greater than 30 minutes.

## 2025-03-15 NOTE — CASE MANAGEMENT/SOCIAL WORK
Discharge Planning Assessment   [place]     Patient Name: Mike Kay  MRN: [id number]  Today's Date: 3/15/2025    Admit Date: 3/14/2025        Discharge Needs Assessment       Row Name 03/15/25 1227       Living Environment    People in Home alone    Current Living Arrangements home    Potentially Unsafe Housing Conditions none    Primary Care Provided by self    Provides Primary Care For no one    Able to Return to Prior Arrangements yes       Resource/Environmental Concerns    Resource/Environmental Concerns none    Transportation Concerns none       Transition Planning    Patient/Family Anticipates Transition to home    Patient/Family Anticipated Services at Transition none    Transportation Anticipated family or friend will provide       Discharge Needs Assessment    Equipment Currently Used at Home none    Concerns to be Addressed no discharge needs identified    Anticipated Changes Related to Illness none    Equipment Needed After Discharge none    Discharge Coordination/Progress Pt lives alone and is independent prior to being in hospital. Pt plans to d/c home when ready and is not requesting any DME at this time. SW will follow and assist if needed.                    Discharge Plan    No documentation.                      Demographic Summary    No documentation.                  Functional Status    No documentation.                  Psychosocial    No documentation.                  Abuse/Neglect    No documentation.                  Legal    No documentation.                  Substance Abuse    No documentation.                  Patient Forms    No documentation.                     Anjel Page

## 2025-03-15 NOTE — THERAPY DISCHARGE NOTE
Patient Name: Mike Kay  : 1940    MRN: [id number]                              Today's Date: 3/15/2025       Admit Date: 3/14/2025    Visit Dx:     ICD-10-CM ICD-9-CM   1. Stroke-like symptoms  R29.90 781.99   2. Left facial numbness  R20.0 782.0   3. Numbness of left hand  R20.0 782.0   4. Speech disturbance, unspecified type  R47.9 784.59     Patient Active Problem List   Diagnosis    Hx of adenomatous colonic polyps    HTN (hypertension), benign    Stroke-like symptoms    High cholesterol    Mitral valve prolapse    Diabetes mellitus type 2, diet-controlled    Umbilical hernia    Acid reflux     Past Medical History:   Diagnosis Date    ACC (agenesis of corpus callosum)     Acid reflux     Cerebral amyloid angiopathy     Diabetes mellitus type 2, diet-controlled     Hay fever     High cholesterol     BORDERLINE    Hx of colonic polyp     Hypertension     Mitral valve prolapse     PVC (premature ventricular contraction)     Umbilical hernia      Past Surgical History:   Procedure Laterality Date    CARDIAC CATHETERIZATION      COLONOSCOPY N/A 2020    Dr. briceno-One 18 mm adenoma polyp in the ascending colon, removed piecemeal using a hot snare. Resected and retrieved. Clip was placed. - The examination was otherwise normal on direct and retroflexion views    COLONOSCOPY N/A 2021    Dr. Briceno-One 7 mm tubular adenoma polyp at the hepatic flexure, removed with a hot snare. Resected and retrieved. - One 10 mm polyp at 20 cm proximal to the anus -Benign granulation tissue polyp with ulceration and marked chronic active inflammation. B. No adenomatous changes identified    COLONOSCOPY N/A 10/10/2024    Procedure: COLONOSCOPY WITH ANESTHESIA;  Surgeon: Mohan Briceno MD;  Location: [facility] ENDOSCOPY;  Service: Gastroenterology;  Laterality: N/A;  pre hx colon polyp  post polyp, diverticulosis  dr dena parisi    COLONOSCOPY W/ POLYPECTOMY  2014    Adenomatous polyp at 30 cm,  Hyperplastic polyp cecum, Diverticulosis repeat exam in 5 years    FOOT SURGERY Left 2015    R/T FRACTURE, PLATE AND SCREWS     INGUINAL HERNIA REPAIR Left 2010    OTHER SURGICAL HISTORY      VOCAL CORD     TONSILLECTOMY  1991    UMBILICAL HERNIA REPAIR N/A 01/12/2017    Procedure: UMBILICAL HERNIA REPAIR;  Surgeon: Kavitha Navarro MD;  Location: [facility] OR;  Service:       General Information       Row Name 03/15/25 0729          Physical Therapy Time and Intention    Document Type evaluation  L mouth numbness, L hand numbness/weakness. Dx: r/o CVA  -HOANG     Mode of Treatment physical therapy  -HOANG       Row Name 03/15/25 0729          General Information    Patient Profile Reviewed yes  -HOANG     Prior Level of Function independent:;all household mobility;community mobility;ADL's  -HOANG     Existing Precautions/Restrictions no known precautions/restrictions  -HOANG     Barriers to Rehab medically complex  -HOANG       Row Name 03/15/25 0729          Living Environment    Current Living Arrangements home  -HOANG     People in Home friend(s)  -HOANG       Row Name 03/15/25 0729          Home Main Entrance    Number of Stairs, Main Entrance none  -HOANG       Row Name 03/15/25 0729          Stairs Within Home, Primary    Number of Stairs, Within Home, Primary none  -HOANG       Row Name 03/15/25 0729          Cognition    Orientation Status (Cognition) oriented x 4  -HOANG               User Key  (r) = Recorded By, (t) = Taken By, (c) = Cosigned By      Initials Name Provider Type    Stanley Mariano, PT DPT Physical Therapist                   Mobility       Row Name 03/15/25 0729          Bed Mobility    Comment, (Bed Mobility) in chair  -HOANG       Row Name 03/15/25 0729          Sit-Stand Transfer    Sit-Stand [place] (Transfers) supervision  -HOANG       Row Name 03/15/25 0729          Gait/Stairs (Locomotion)    [place] Level (Gait) supervision  -HOANG     Distance in Feet (Gait) 300  -HOANG               User Key  (r) = Recorded By,  (t) = Taken By, (c) = Cosigned By      Initials Name Provider Type    Stanley Mariano, PT DPT Physical Therapist                   Obj/Interventions       [facility] Name 03/15/25 0729          Range of Motion Comprehensive    General Range of Motion bilateral lower extremity ROM WFL  -HOANG       Row Name 03/15/25 0729          Strength Comprehensive (MMT)    General Manual Muscle Testing (MMT) Assessment no strength deficits identified  -HOANG       Row Name 03/15/25 0729          Motor Skills    Motor Skills coordination  -HOANG     Coordination bilateral;lower extremity;heel to [place];WFL  -HOANG       Row Name 03/15/25 0729          Balance    Balance Assessment sitting dynamic balance;standing dynamic balance  -HOANG     Dynamic Sitting Balance supervision  -HOANG     Position, Sitting Balance unsupported;sitting in chair  -HOANG     Dynamic Standing Balance supervision  -HOANG     Position/Device Used, Standing Balance unsupported  -HOANG       Row Name 03/15/25 0729          Sensory Assessment (Somatosensory)    Sensory Assessment (Somatosensory) LE sensation intact  -HOANG               User Key  (r) = Recorded By, (t) = Taken By, (c) = Cosigned By      Initials Name Provider Type    Stanley Mariano, PT DPT Physical Therapist                   Goals/Plan    No documentation.                  Clinical Impression       [facility] Name 03/15/25 0729          Pain    Pretreatment Pain Rating 0/10 - no pain  -HOANG       Row Name 03/15/25 0729          Plan of Care Review    Plan of Care Reviewed With patient;child  -HOANG     Outcome Evaluation PT eval complete. He is alert and oriented x 4, daughter in room. Mr. Kay was independent in his mobility and ADL's prior to admit. Today he demos equal B LE AROM, strength, sensation and coordination to testing. He stands and ambulates ~ 300 ft w supervision from PT. He reports being at his baseline mobility and he had no further PT needs, questions or concerns. PT to sign off. Recommend d.c home with his  friend.  -HOANG       Row Name 03/15/25 0729          Therapy Assessment/Plan (PT)    Criteria for Skilled Interventions Met (PT) no;does not meet criteria for skilled intervention  -HOANG     Therapy Frequency (PT) evaluation only  -HOANG       Row Name 03/15/25 0729          Positioning and Restraints    Pre-Treatment Position sitting in chair/recliner  -HOANG     Post Treatment Position chair  -HOANG     In Chair notified nsg;sitting;call light within reach;encouraged to call for assist;with family/caregiver  -HOANG               User Key  (r) = Recorded By, (t) = Taken By, (c) = Cosigned By      Initials Name Provider Type    Stanley Mariano, PT DPT Physical Therapist                   Outcome Measures       Row Name 03/15/25 0729 03/15/25 0416       How much help from another person do you currently need...    Turning from your back to your side while in flat bed without using bedrails? 4  -HOANG --  -SH    Moving from lying on back to sitting on the side of a flat bed without bedrails? 4  -HOANG --  -SH    Moving to and from a bed to a chair (including a wheelchair)? 4  -HOANG --  -SH    Standing up from a chair using your arms (e.g., wheelchair, bedside chair)? 4  -HOANG --  -SH    Climbing 3-5 steps with a railing? 4  -HOANG --  -SH    To walk in hospital room? 4  -HOANG --  -SH    AM-PAC 6 Clicks Score (PT) 24  -HOANG --  -SH    Highest Level of Mobility Goal 8 --> Walked 250 feet or more  -HOANG --  -SH      [facility] Name 03/15/25 0729          Modified Smith Scale    Modified Smith Scale 0 - No Symptoms at all.  -HOANG       [facility] Name 03/15/25 0729          Functional Assessment    Outcome Measure Options AM-PAC 6 Clicks Basic Mobility (PT);Modified [employer]  -HOANG               User Key  (r) = Recorded By, (t) = Taken By, (c) = Cosigned By      Initials Name Provider Type    Stanley Mariano, PT DPT Physical Therapist    SH Holstein, Sarai, RN Registered Nurse                  Physical Therapy Education       Title: PT OT SLP Therapies (Resolved)        Topic: Physical Therapy (Resolved)       Point: Mobility training (Resolved)       Learning Progress Summary            Patient Acceptance, INDY ANTONIARASTA by HOANG at 3/15/2025 0729    Comment: benefits of activity, gait safety, 1 time PT eval   Family Acceptance, E, ANTONIA,RASTA by HOANG at 3/15/2025 0729    Comment: benefits of activity, gait safety, 1 time PT eval                                      User Key       Initials Effective Dates Name Provider Type Discipline    HOANG 02/03/23 -  Stanley Mota, PT DPT Physical Therapist PT                  PT Recommendation and Plan     Outcome Evaluation: PT eval complete. He is alert and oriented x 4, daughter in room. Mr. Kay was independent in his mobility and ADL's prior to admit. Today he demos equal B LE AROM, strength, sensation and coordination to testing. He stands and ambulates ~ 300 ft w supervision from PT. He reports being at his baseline mobility and he had no further PT needs, questions or concerns. PT to sign off. Recommend d.c home with his friend.     Time Calculation:         PT Charges       Row Name 03/15/25 0800             Time Calculation    Start Time 0729  -HOANG      Stop Time 0758  -HOANG      Time Calculation (min) 29 min  -HOANG      PT Received On 03/15/25  -HOANG                User Key  (r) = Recorded By, (t) = Taken By, (c) = Cosigned By      Initials Name Provider Type    Stanley Mariano, PT DPT Physical Therapist                  Therapy Charges for Today       Code Description Service Date Service Provider Modifiers Qty    [id number]  PT EVAL LOW COMPLEXITY 2 3/15/2025 Stanley Mota PT DPT GP 1            PT G-Codes  Outcome Measure Options: AM-PAC 6 Clicks Basic Mobility (PT), Modified [employer]  AM-PAC 6 Clicks Score (PT): 24  Modified [employer] Scale: 0 - No Symptoms at all.    PT Discharge Summary  Anticipated Discharge Disposition (PT): home, home with assist    Stanley Mota PT DPT  3/15/2025

## 2025-03-15 NOTE — THERAPY DISCHARGE NOTE
Acute Care - Speech Language Pathology Initial Evaluation/Discharge  Psychiatric     Patient Name: Mike Kay  : 1940  MRN: [id number]  Today's Date: 3/15/2025               Admit Date: 3/14/2025     Speech-language evaluation complete per stroke protocol. Pt is alert and oriented x4 sitting upright in chair with his daughter present at bedside throughout evaluation. They both report to have no concerns with speech and explain they feel speech is at baseline. No difficulty noted with receptive or expressive language. He was able to follow all commands and answer questions appropriately. He speech was clear and intelligible at the phrase, word, and conversation level. No motor speech deficits present   during diadochokinesis. Pt's speech and language are noted to be WNL and he does not warrant skilled intervention. SLP will sign off. Please re-consult if concerns arise.    Kristopher Singh, JUAN-SLP 3/15/2025 12:49 CDT                Visit Dx:    ICD-10-CM ICD-9-CM   1. Stroke-like symptoms  R29.90 781.99   2. Left facial numbness  R20.0 782.0   3. Numbness of left hand  R20.0 782.0   4. Speech disturbance, unspecified type  R47.9 784.59     Patient Active Problem List   Diagnosis    Hx of adenomatous colonic polyps    HTN (hypertension), benign    Stroke-like symptoms    High cholesterol    Mitral valve prolapse    Diabetes mellitus type 2, diet-controlled    Umbilical hernia    Acid reflux     Past Medical History:   Diagnosis Date    ACC (agenesis of corpus callosum)     Acid reflux     Cerebral amyloid angiopathy     Diabetes mellitus type 2, diet-controlled     Hay fever     High cholesterol     BORDERLINE    Hx of colonic polyp     Hypertension     Mitral valve prolapse     PVC (premature ventricular contraction)     Umbilical hernia      Past Surgical History:   Procedure Laterality Date    CARDIAC CATHETERIZATION      COLONOSCOPY N/A 2020    Dr. velásquez-One 18 mm adenoma polyp in the ascending  colon, removed piecemeal using a hot snare. Resected and retrieved. Clip was placed. - The examination was otherwise normal on direct and retroflexion views    COLONOSCOPY N/A 03/04/2021    Dr. Briceno-One 7 mm tubular adenoma polyp at the hepatic flexure, removed with a hot snare. Resected and retrieved. - One 10 mm polyp at 20 cm proximal to the anus -Benign granulation tissue polyp with ulceration and marked chronic active inflammation. B. No adenomatous changes identified    COLONOSCOPY N/A 10/10/2024    Procedure: COLONOSCOPY WITH ANESTHESIA;  Surgeon: Mohan Briceno MD;  Location: [facility] ENDOSCOPY;  Service: Gastroenterology;  Laterality: N/A;  pre hx colon polyp  post polyp, diverticulosis  dr dena parisi    COLONOSCOPY W/ POLYPECTOMY  05/12/2014    Adenomatous polyp at 30 cm, Hyperplastic polyp cecum, Diverticulosis repeat exam in 5 years    FOOT SURGERY Left 2015    R/T FRACTURE, PLATE AND SCREWS     INGUINAL HERNIA REPAIR Left 2010    OTHER SURGICAL HISTORY      VOCAL CORD     TONSILLECTOMY  1991    UMBILICAL HERNIA REPAIR N/A 01/12/2017    Procedure: UMBILICAL HERNIA REPAIR;  Surgeon: Kavitha Navarro MD;  Location: [facility] OR;  Service:        SLP Recommendation and Plan  SLP Diagnosis: functional speech/language skills, functional cognitive-linguistic skills (03/15/25 1016)        [facility] [place] for Skilled Therapy Interventions Met: no problems identified which require skilled intervention (03/15/25 1016)  Anticipated Discharge Disposition (SLP): No further SLP services warranted (03/15/25 1245)                          Reason for Discharge: all goals and outcomes met, no further needs identified (03/15/25 1245)                Progress: no change (03/15/25 1241)    SLP EVALUATION (Last 72 Hours)       SLP [facility] [place]       Row Name 03/15/25 1016                   Communication Assessment/Intervention    Document Type evaluation  -CS        Patient Observations alert;cooperative;agree to therapy   -CS        Patient/Family/Caregiver Comments/Observations Daughter present  -CS        Patient Effort excellent  -CS           General Information    Patient Profile Reviewed yes  -CS        Pertinent History Of Current Problem Complaints of L sided numbness  -CS        Precautions/Limitations, Vision WFL  -CS        Precautions/Limitations, Hearing WFL  -CS        Plans/Goals Discussed with patient;agreed upon  -CS        Barriers to Rehab none identified  -CS           Comprehension Assessment/Intervention    Comprehension Assessment/Intervention Auditory Comprehension;Reading Comprehension  -CS           Auditory Comprehension Assessment/Intervention    Auditory Comprehension (Communication) WFL  -CS        Narrative Discourse WFL  -CS           Reading Comprehension Assessment/Intervention    Reading Comprehension (Communication) WFL  -CS        Paragraph Level WFL  -CS           Expression Assessment/Intervention    Expression Assessment/Intervention verbal expression;graphic expression  -CS           Verbal Expression Assessment/Intervention    Verbal Expression WFL  -CS        Conversational Discourse/Fluency WFL  -CS           Graphic Expression Assessment/Intervention    Graphic Expression WFL  -CS        Sentence Formulation WFL  -CS           Oral Musculature and Cranial Nerve Assessment    Oral Motor General Assessment WFL  -CS           Motor Speech Assessment/Intervention    Motor Speech Function WFL  -CS        Conversational Speech (Communication) WFL  -CS        Speech intelligibility 100%;in quiet environment;in connected speech;with unfamiliar listener  -CS           Cursory Voice Assessment/Intervention    Quality and Resonance (Voice) WFL  -CS           Cognitive Assessment Intervention- SLP    Cognitive Function (Cognition) WFL  -CS        Orientation Status (Cognition) WFL  -CS        Memory (Cognitive) WFL  -CS        Attention (Cognitive) WFL  -CS        Thought Organization (Cognitive) WFL   -CS        Reasoning (Cognitive) WFL  -CS        Problem Solving (Cognitive) WFL  -CS        Functional Math (Cognitive) WFL  -CS        Executive Function (Cognition) WFL  -CS        Pragmatics (Communication) WFL  -CS           SLP Evaluation Clinical Impressions    SLP Diagnosis functional speech/language skills;functional cognitive-linguistic skills  -CS        SLC Criteria for Skilled Therapy Interventions Met no problems identified which require skilled intervention  -CS           Recommendations    Anticipated Discharge Disposition (SLP) No further SLP services warranted  -CS           SLP Discharge Summary    Discharge Destination home  -CS                  User Key  (r) = Recorded By, (t) = Taken By, (c) = Cosigned By      Initials Name Effective Dates    CS Kristopher Singh CCC-SLP 05/07/24 -                        EDUCATION  The patient has been educated in the following areas:   Communication Impairment.                    Time Calculation:    Time Calculation- SLP       Row Name 03/15/25 1246             Time Calculation- SLP    SLP Start Time 1016  -CS      SLP Stop Time 1120  -CS      SLP Time Calculation (min) 64 min  -CS      SLP Received On 03/15/25  -CS         Untimed Charges    SLP Eval/Re-eval  ST Eval Speech and Production w/ Language - 74241  -CS      [id number] Eval Speech and Production w/ Language Minutes 64  -CS         Total Minutes    Untimed Charges Total Minutes 64  -CS       Total Minutes 64  -CS                User Key  (r) = Recorded By, (t) = Taken By, (c) = Cosigned By      Initials Name Provider Type    CS Kristopher Singh CCC-SLP Speech and Language Pathologist                    Therapy Charges for Today       Code Description Service Date Service Provider Modifiers Qty    [id number] HC ST EVAL SPEECH AND PROD W LANG  4 3/15/2025 Kristopher Singh CCC-SLP GN 1                     SLP Discharge Summary  Anticipated Discharge Disposition (SLP): No further SLP services warranted  Reason  for Discharge: all goals and outcomes met, no further needs identified  Progress Toward Achieving Short/long Term Goals: all goals met within established timelines  Discharge Destination: home    Kristopher Singh CCC-SLP  3/15/2025

## 2025-03-15 NOTE — PLAN OF CARE
Speech-language evaluation complete per stroke protocol. Pt is alert and oriented x4 sitting upright in chair with his daughter present at bedside throughout evaluation. They both report to have no concerns with speech and explain they feel speech is at baseline. No difficulty noted with receptive or expressive language. He was able to follow all commands and answer questions appropriately. He speech was clear and intelligible at the phrase, word, and conversation level. No motor speech deficits present during diadochokinesis. Pt's speech and language are noted to be WNL and he does not warrant skilled intervention. SLP will sign off. Please re-consult if concerns arise.

## 2025-03-15 NOTE — CONSULTS
[facility]   Teleneurology Note    Patient Name: Mike Kay  : 1940  MRN: [id number]  Primary Care Physician: Arpit Yip MD  Referring Site: [facility]   Teleneurology Initial Data           Neurologist Evaluation Date: 25 Neurologist Evaluation Time: 1640   Date Last Known Well: 25 Time Last Known Well: 1200     History     Chief Complaint: Around noon started feeling numbness in the left side of the mouth and the left hand while walking around the room looking initially thought my sugar was low and so I ate 2 cupcakes but did not improve and so I drove myself to the emergency room.  HPI: 84-year-old right-handed white male with a history of central amyloid angiopathy was at the [facility] around noon when he started noticing tingling and numbness on the left side of the mouth and left upper extremity and felt lightheaded and initially thought that his sugar was low and so ate 2 cupcakes but that did not help his condition and so he drove himself to the emergency room.  The patient underwent a full stroke protocol and after it was noted that the CT angiogram of the head and neck with contrast did not show any acute changes and did not show any evidence of a retrievable thrombus I was able to see the patient around 4:40 PM after the completion of the CT perfusion scan through the telemedicine device.  Patient is awake and alert and oriented x 4 and able to give me a good history and there is no facial asymmetry and speech impediment and no visual changes noted.  He passed the bedside swallow.  He follows one-step two-step and three-step commands.  There is no drift noticeable in the upper and lower extremities and the finger-to-nose coordination and heel-to-shin testing are normal.  He still had some altered sensation in the left upper and lower extremities compared to the right but he was able to stand up by the side of the bed independently and the Romberg was  negative.  The NIH stroke scale is only 1.  Patient is not a candidate for any acute thrombolytic therapy and he should not be placed on strong blood thinners as he has a history of central amyloid angiopathy which tends to increase chances of intracranial hemorrhage.  Patient will be brought in for further stroke workup including an urgent MRI of the brain to be done tonight.  His last MRI performed a month ago revealed central amyloid angiopathy.  For that reason the patient is currently not on any blood thinners.    Stroke Risk Factors/ Pertinent Data     Stroke risk factors: diabetes, prior stroke/ TIA, sleep apnea, obesity/ physical inactivity  Anticoagulants prior to arrival: none  Antiplatelets prior to arrival: none  Statins prior to arrival: atorvastatin (Lipitor)     Scoring Scales     Modified [place] Scale  Pre-Stroke Modified Cisco Scale: 0 - No Symptoms at all.  Intracerebral Hemmorhage (ICH) Score  [place] Coma Score: 13-15  Age>=80: yes  [place] Coma Scale  Best Eye Response: Spontaneous  Best Verbal Response: Oriented  Best Motor Response: Follows commands  [place] Coma Scale Score: 15    NIH Stroke Scale     NIHSS Performed Date: 03/14/25 NIHSS Performed Time: 1640   Interval: baseline  1a. Level of Consciousness: 0-->Alert, keenly responsive  1b. LOC Questions: 0-->Answers both questions correctly  1c. LOC Commands: 0-->Performs both tasks correctly  2. Best Gaze: 0-->Normal  3. Visual: 0-->No visual loss  4. Facial Palsy: 0-->Normal symmetrical movements  5a. Motor Arm, Left: 0-->No drift, limb holds 90 (or 45) degrees for full 10 secs  5b. Motor Arm, Right: 0-->No drift, limb holds 90 (or 45) degrees for full 10 secs  6a. Motor Leg, Left: 0-->No drift, leg holds 30 degree position for full 5 secs  6b. Motor Leg, Right: 0-->No drift, leg holds 30 degree position for full 5 secs  7. Limb Ataxia: 0-->Absent  8. Sensory: 1-->Mild-to-moderate sensory loss, patient feels pinprick is less sharp or is  dull on the affected side, or there is a loss of superficial pain with pinprick, but patient is aware of being touched  9. Best Language: 0-->No aphasia, normal  10. Dysarthria: 0-->Normal  11. Extinction and Inattention (formerly Neglect): 0-->No abnormality  Total (NIH Stroke Scale): 1     Review of Systems     Review of Systems   Constitutional: Negative.    HENT: Negative.     Eyes: Negative.    Respiratory: Negative.     Cardiovascular: Negative.    Gastrointestinal: Negative.    Endocrine: Negative.    Genitourinary: Negative.    Musculoskeletal: Negative.    Skin: Negative.    Allergic/Immunologic: Negative.    Neurological:  Positive for dizziness and numbness.   Hematological: Negative.    Psychiatric/Behavioral: Negative.       The evaluation at the [facility] by Dr. Antonio Richardson, vascular neurologist is as follows:      Mike Kay is a 84 y.o. 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 He works as an  and was having trouble with the details in his work and turned up to appointments at the wrong time. His initial scans had extensive, multifocal cerebral edema and hundred of microhemorrhages. He was treated with corticosteroids with a robust improvement in the edema. He is symptomatically much improved and is now off immunosuppressive medications. He is watching his blood pressure, close to goal on today's check.   Interval events, Aug 2024: Blood pressure elevated today - log from the last month shows 117-172/, most readings over . Feels like he is having some bleeding in his eye - ophthalmologist looked at his eyes and thought there might have been a little vitreal bleeding, but a follow-up exam apparently looked ok. Has some trouble sleeping, especially flat, due to orthopedic pain. Wife notes daytime sleepiness. He is able to name all his grandchildren easily, memory is at  baseline. No suggestion of relapse.   Feb 2025: Memory still very strong, no concern for relapse. His wife feels he is less patient. We discussed mood symptoms at some length, but they aren't ready to try a medication.      ROS negative except as documented.      Worked as an , now retired.   Driving, bumpers and mirrors are intact.          Patient Active Problem List   Diagnosis    Cerebral amyloid angiopathy (CMS/[facility])    Type 2 diabetes mellitus with hyperglycemia (CMS/[facility])    Cerebral vasculitis    Essential (primary) hypertension    Secondary polycythemia    CKD stage 3a, GFR 45-59 ml/min (CMS/[facility])                Current Outpatient Medications on File Prior to Visit   Medication Sig Dispense Refill    amLODIPine 5 mg tablet (NORVASC) Take 1 tablet (5 mg total) by mouth daily. 90 tablet 0    atorvastatin 20 mg tablet (LIPITOR) Take by mouth every night.        ergocalciferol (vitamin D2) 1,250 mcg (50,000 unit) capsule (VITAMIN D2) Take 1 capsule (50,000 Units total) by mouth weekly.        fluticasone propionate 50 mcg/actuation nasal spray,suspension (FLONASE) Administer into each nostril.        FreeStyle Lancets 28 gauge USE 1 TO CHECK GLUCOSE THREE TIMES DAILY        FreeStyle Lite Meter kit USE TO CHECK GLUCOSE THREE TIMES DAILY        FreeStyle Lite Strips USE STRIP TO CHECK GLUCOSE THREE TIMES DAILY        glipiZIDE ER 10 mg tablet, extended release 24 hr (GLUCOTROL XL) Take 1 tablet (10 mg total) by mouth every morning.        hydrALAZINE 25 mg tablet (APRESOLINE) Take 1 tablet (25 mg total) by mouth every 6 hours as needed (Take 1 pill every 6 hours as needed for BP greater than 140/90). 180 tablet 1    Lantus Solostar U-100 Insulin 100 unit/mL (3 mL) subcutaneous pen Inject 24 Units under the skin daily at noon.        meclizine 25 mg tablet (ANTIVERT) Take 1 tablet (25 mg total) by mouth. As needed        NovoLOG FlexPen U-100 Insulin aspart 100 unit/mL (3 mL) subcutaneous As needed         olmesartan 40 mg tablet (BENICAR) Take 1 tablet (40 mg total) by mouth daily. 90 tablet 3    spironolactone 25 mg tablet (ALDACTONE) Take 1 tablet (25 mg total) by mouth daily. 90 tablet 1    temazepam 15 mg capsule (RESTORIL) 2 at hs as needed for sleep        testosterone cypionate 200 mg/mL intramuscular oil (DEPO-TESTOSTERONE) every 14 days.        verapamiL ER (SR) 240 mg tablet,extended release (CALAN-SR) Take 1 tablet (240 mg total) by mouth 2 times a day.        [] pantoprazole 20 mg tablet,delayed release (PROTONIX) Take 2 tablets (40 mg total) by mouth daily. 60 tablet 11      No current facility-administered medications on file prior to visit.      Family history  No family history of CAA        Social History            Socioeconomic History    Marital status: Single       Spouse name: Not on file    Number of children: Not on file    Years of education: Not on file    Highest education level: Not on file   Occupational History    Not on file   Tobacco Use    Smoking status: Never    Smokeless tobacco: Never   Substance and Sexual Activity    Alcohol use: Not on file    Drug use: Not on file    Sexual activity: Not on file   Other Topics Concern    Not on file   Social History Narrative    Not on file      Social Determinants of Health           Financial Resource Strain: Low Risk  (2024)     Received from [facility]     Overall Financial Resource Strain (CARDIA)      Difficulty of Paying Living Expenses: Not very hard   Food Insecurity: No Food Insecurity (2024)     Received from [facility]     Hunger Vital Sign      Worried About Running Out of Food in the Last Year: Never true      Ran Out of Food in the Last Year: Never true   Transportation Needs: Unknown (2024)     Received from [facility]     MELONIE -  Transportation      Lack of Transportation (Medical): Not on file      Lack of Transportation (Non-Medical): No   Physical Activity: Sufficiently Active (6/27/2024)     Received from [facility]     Exercise Vital Sign      Days of Exercise per Week: 7 days      Minutes of Exercise per Session: 50 min   Stress: Not on file   Social Connections: Unknown (10/11/2023)     Received from [facility], [facility]     Family and Community Support      Help with Day-to-Day Activities: Not on file      Lonely or Isolated: Not on file   Interpersonal Safety: Low Risk  (11/8/2024)     [facility] Interpersonal Safety      Does anyone neglect, hurt or threaten you: No   Housing Stability: Unknown (6/27/2024)     Received from [facility]     Housing Stability Vital Sign      Unable to Pay for Housing in the Last Year: Not on file      Number of Places Lived in the Last Year: Not on file      Unstable Housing in the Last Year: No         Physical examination      Vitals:     02/26/25 0834   BP: 139/54   Pulse: (!) 48      .Body mass index is 28.74 kg/m².  Gen: awake and alert, well-dressed, well nourished, pleasantly conversive, able to provide a coherent history.  Not distressed, normal affect.  HEENT: atraumatic, non-icteric, no occipital tenderness, no congestion or mucosal erosions or erythema.      Neurological exam:  Speech/language: normal speech - no dysarthria on labial, lingual or gutteral sounds. No aphasia - able to name, repeat, read and write.  Normal prosody.  Cranial nerves: roughly normal visual acuity, EOMI, PERRLA, VFF.  Facial sensation, symmetric and strength are intact.  Approximately normal hearing. Tongue and uvula are midline and do not deviate with activation.  Shoulder shrug and SCM strength normal.  Motor: Symmetric, no drift or orbiting.  No fasciculations,  tremors or focal atrophy.   Sensory: intact to soft touch, joint position sense.   Reflexes: DTRs intact and symmetric, no pathological reflexes observed.    Cerebellar: finger to nose and heel to shin are normal, able to maintain a seated position without truncal instability.    Gait: stable, narrow based. Walks without assistive devices.      Imaging:  MRI brain personally reviewed with the patient.      Impression: Convincing history and imaging for inflammatory CAA. We discussed at some length a range of potential treatments, including long term immunosuppression, but we settled on careful observation off treatment. He is aware of the symptoms of a relapse and we will act quickly if there is any sign of new symptoms. Would like to optimize blood pressure. Given reported difficulty sleeping, will refer for a sleep study.      Plan:  - avoid all blood thinners.  Anticoagulation is contraindicated unless felt to be acutely life-saving.  Not a tPA candidate.  Any provider initiating antiplatelet therapy should contact us prior to doing so if possible.  - avoid head trauma.  Fall precautions discussed at length.  Avoid trauma-prone hobbies.  - strict blood pressure control.  Goal BP <120/80.  Monitor blood pressure at home daily and bring log to this clinic and to PCP for medication optimization.    - discussed the utility of acetylcholinesterase inhibitor, may consider in the future  - Anticipatory guidance: discussed the association of cerebral amyloid angiopathy with Alzheimer's disease and the importance of planning for disease progression, including living will, considering long term care insurance or other planning for when additional assistance may be needed and driving safety.    - Discussed the symptoms of intracerebral hemorrhage and the importance of early emergent treatment.    - Encouraged to maintain an active lifestyle, both physically and socially and to eat a balanced diet rich in natural  antioxidants.    - All questions answered.       Follow-up in six months.     Antonio Richardson MD PhD  Vascular Neurology Attending.  Objective   Exam     Exam performed with the help of support staff from the referring site  Neurological Exam  Mental Status  Awake, alert and oriented to person, place and time. Oriented to person, place, time and situation. Recent and remote memory are intact. Speech is normal. Language is fluent with no aphasia. Attention and concentration are normal. Fund of knowledge is appropriate for level of education.    Cranial Nerves  CN I: Sense of smell is normal.  CN II: Visual acuity is normal. Visual fields full to confrontation.  CN III, IV, VI: Extraocular movements intact bilaterally. Normal lids and orbits bilaterally. Pupils equal round and reactive to light bilaterally.  CN V: Facial sensation is normal.  CN VII: Full and symmetric facial movement.  CN IX, X: Palate elevates symmetrically. Normal gag reflex.  CN XI: Shoulder shrug strength is normal.  CN XII: Tongue midline without atrophy or fasciculations.    Motor  Normal muscle bulk throughout. No fasciculations present. Normal muscle tone. No abnormal involuntary movements. Strength is 5/5 throughout all four extremities.    Sensory  Left hemisensory loss.   Numbness noticeable in the left upper and lower extremities compared to the right..    Coordination    Finger-to-nose, rapid alternating movements and heel-to-shin normal bilaterally without dysmetria.    Gait  Casual gait is normal including stance, stride, and arm swing. Romberg is absent.    He passed the bedside swallow.  Result Review    Results   The CT angiogram of the head and neck with contrast was reviewed as follows:    CT ANGIOGRAM NECK- 3/14/2025 3:02 PM     HISTORY: Stroke, follow up     DOSE LENGTH PRODUCT: 379.24 mGy.cm. Automated exposure control was also  utilized to decrease patient radiation dose.     EXAM: Axial CT angiogram of the neck after the  uneventful administration  of Isovue IV contrast. Sagittal and coronal reformations were also  provided for review. 3D images were created on an independent ...         1.  No arterial occlusion, flow-limiting stenosis or dissection along  the carotid systems in the neck.  2.  Vertebral arteries are patent and normal in caliber.     This report was signed and finalized on 3/14/2025 4:35 PM by Dr Jonel Rodrigues.        Study Result    Narrative & Impression   EXAMINATION: CT ANGIOGRAM HEAD W AI ANALYSIS OF LVO-  3/14/2025 4:34 PM     HISTORY: Neurologic deficit. Acute stroke suspected.     DOSE: 358.5 mGycm. All CT scans are performed using dose optimization  techniques as appropriate to the performed exam and including at least  one of the following: Automated exposure control, adjustment of the mA  and/or kV according to size, and the use of the iterative reconstruction  technique..     FINDINGS: Multiple contiguous axial images were obtained through the  [place] [facility] [place] following intravenous contrast infusion with  reformatted images obtained in the sagittal and coronal projections from  the original data set. AI analysis was also performed for LVO.     Both distal extracranial vertebral arteries are widely patent. The  intracranial segment of both vertebral arteries are also widely patent  to the basilar artery. The posterior inferior cerebellar arteries are  symmetric without evidence of discrete aneurysm or focal steno-occlusive  disease. The basilar artery, superior cerebellar arteries and posterior  cerebral arteries are patent with no focal steno-occlusive disease or  aneurysm.     Both extracranial distal ICAs are widely patent. The petrous, cavernous  and supraclinoid segment of both ICAs are remarkable for calcific  plaquing and mild stenosis involving the cavernous segment of both ICAs.  The supraclinoid segment of the ICAs are widely patent to the terminus.  The anterior and middle cerebral arteries  are widely patent with no  focal steno-occlusive disease or berry aneurysm.     IMPRESSION:  1. Calcific plaquing with mild stenosis in the cavernous segment of both  intracranial ICAs. Both ICAs are patent to the terminus. The anterior  and middle cerebral arteries are widely patent with no focal  steno-occlusive disease or berry aneurysm. The posterior circulation is  unremarkable.      CT CEREBRAL PERFUSION W WO CONTRAST-  3/14/2025 4:43 PM     Indication: Neurologic deficit. Acute stroke suspected.     Exam:      1. Perfusion CT is performed to acquire images tracking the temporal  course of iodinated contrast material passing through the cerebral  circulation. Perfusion parameters, such as cerebral blood flow (CBF),  cerebral blood volume (CBV), mean transit time (MTT), etc. are  calculated by [employer] with additional provided perfusion maps and  estimated stroke volumes.  2. Automated exposure control (AEC) protocols are utilized on the  scanner to ensure dose lowered technique.      Comparison: Noncontrast CT brain and brain angiogram dated 3/14/2025  3:03 PM     Findings:     Normal, symmetric and MTT, TTP, CBV and CBF.      Distribution: No perfusion abnormality demonstrated..     Tmax >6.0 seconds volume: 0 ml     CBF < 30% volume: 0 ml     Mismatch volume: 0 ml      Mismatch ratio: None     IMPRESSION:  Impression:  1. Normal, symmetric cerebral perfusion. No discrete infarct detected by  the perfusion software.      MRI BRAIN WO CONTRAST-  2/17/2025 5:07 PM     HISTORY: Cerebral amyloid angiopathy.     FINDINGS: Today's exam is compared to previous study of 2/22/2024.  Multiplanar fast spin-echo imaging sequences were obtained of the brain  on a high-field magnet without gadolinium enhancement and compared to  previous exam of 2/22/2024.     There is diffuse atrophy of the brain with prominence of the  subarachnoid spaces and ventricular enlargement. Scattered foci of  increased T2 signal are noted  involving the periventricular and higher  white matter tracks suggesting small vessel ischemic disease. There is  nonspecific gliosis within the sylvie. There is a focus of gyriform  increased T2 signal within the high right frontal vertex suggesting  gliosis. There are subtle areas of linear hemosiderin deposition within  the vertex bilaterally within the frontal lobes stable from the previous  exam.     There is a stable extra-axial fluid collection within the left frontal  region stable from the previous exam and likely representing an  arachnoid cyst.     There are radiographic findings of amyloid angiopathy with too numerous  to count foci of blooming on gradient sequencing within both hemispheres  essentially stable from the previous exam. There is sparing of the  brainstem and cerebellum. No definite new sites of hemorrhage or  blooming demonstrated.     The orbits are unremarkable. The visualized paranasal sinuses and  mastoid air cells are remarkable for mucous retention cysts or polyps  within the right maxillary sinus. No air-fluid levels are present.     IMPRESSION:  1. Radiographic findings of amyloid angiopathy. There are too numerous  to count sites of blooming throughout both hemispheres with relative  sparing of the brainstem and cerebellum. Overall I feel these areas of  hemorrhage are stable from the previous exam with no definite new sites  of hemorrhage appreciated.  2. There are several subtle areas of linear hemosiderin deposition  within the frontal vertex bilaterally stable from the previous exam  suggesting sites of previous hemorrhage. There is mild small vessel  disease. Gliosis within the right frontal vertex is also present likely  related to a previous site of hemorrhage and stable.  3. Atrophy of the brain. No mass effect or shift of the midline.  4. Mucous retention cysts or polyps within the right maxillary sinus.  The visualized paranasal sinuses and mastoid air cells are  otherwise  normally aerated.     This report was signed and finalized on 2/18/2025 10:14 AM by Dr. Triston Grayson MD.      MRI BRAIN WO CONTRAST-  3/14/2025 7:38 PM     HISTORY: Stroke protocol.     FINDINGS: Today's exam is compared to previous study of 2/17/2025.     There is no evidence of diffusion restriction on diffusion weighted  imaging to suggest acute ischemia or infarct.     There is atrophy of the brain with prominence of subarachnoid spaces and  ventricular enlargement. There are scattered foci of abnormal T2/FLAIR  signal involving the periventricular and higher white matter tracts.  There is gyriform increased signal within the right frontal vertex and  what appears to be the precentral gyrus is stable from the previous exam  suggesting gliosis.     Normal flow voids demonstrated within the [place]. The orbits  are unremarkable. There is a stable extra-axial fluid collection within  the left frontal convexly suggesting an arachnoid cyst.     As noted previously there are innumerable foci of abnormal signal with  blooming on gradient sequencing throughout both hemispheres consistent  with amyloid angiopathy. Stable appearance from the previous exam. No  definite new sites of hemorrhage are demonstrated. There is relative  sparing of the posterior cranial fossa.     Mucous retention cysts or polyps are noted within the right maxillary  sinus. The visualized paranasal sinuses and mastoid air cells are  otherwise normally aerated.     IMPRESSION:  1. 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 No definite new sites of hemorrhage are present. There is  relative sparing of the posterior cranial fossa.  2. Mild atrophy. Scattered foci of abnormal T2 signal are present in the  periventricular and higher white matter tracts. There is gyriform linear  increased T2 signal in the right precentral gyrus  stable from the  previous exam. This may represent a site of previous ischemia. No mass  effect or shift of the midline.  3. No evidence of diffusion restriction on diffusion-weighted imaging to  suggest acute ischemia or infarct.  4. Stable arachnoid cyst within the left frontal extra-axial convexity.   5. Chronic appearing sinus disease.  Personal review of CNS imaging:(Official report by radiologist pending)  Imaging  CT Imaging Review: CT Imaging reviewed, NO acute infarct/ hemorrhage seen  CTA Imaging Review: CTA Imaging reviewed, NO large vessel occlusion or severe stenosis seen  CT Perfusion Review: CT perfusion reviewed, NORMAL    Thrombolytic   Thrombolytics: not applicable     Assessment & Plan   Assessment/ Plan     Assessment:  Acute Stroke Evaluation: Stroke diagnosis uncertain- the risks of IV thrombolytic outweigh the benefits of treatment       Plan:  The patient will be observed under the hospitalist service on cardiac telemetry monitoring.  As he passed the bedside swallow he can resume his home medications as before and he should be on a carbohydrate controlled cardiac diet.  As recommended by the neurologist Dr. Antonio Richardson at the [facility] in [facility], [place], operated by [facility] he should not be taking any kind of blood thinners and if any strong blood thinners are suggested then his office needs to be notified as the patient carries a diagnosis of inflammatory central amyloid angiopathy which places the patient at a very high risk of intracerebral hemorrhage.  Also the recommendation was to keep the systolic blood pressure between 1 20-1 40 and the diastolic between 70-80.  Atorvastatin 80 mg at bedtime.  Urgent MRI of the brain with a stroke protocol has been requested from the emergency room today.  Physical therapy Occupational Therapy and speech pathology to evaluate him tomorrow.  Transthoracic echocardiogram with bubble study for tomorrow to look for PFO.  Lab work for tomorrow should  include a TSH sedimentation rate hemoglobin A1c lipid profile and vitamin B12 and folic acid levels.  He will be followed up tomorrow by VILMA Velasquez along with the inpatient teleneurology service.      Final Impression and Plan: Patient with most probable transient ischemic attack.  Also has a history of central amyloid angiopathy for which reason no strong blood thinners should be advocated other than baby aspirin.    Disposition     Disposition: The patient will remain at the referring institution for further evaluation and management    Medical Decision Making  Medical Data Reviewed: Data reviewed including: clinical labs, radiology and/or medical tests, Obtaining/ reviewing old medical records, Obtaining case history from another source, Independent review of CNS images  Length of visit: 30 minutes in this critical care evaluation and management of acute stroke symptoms in the emergency room setting    IMarco Antonio MD, saw the patient on 03/14/25 at [facility] for an initial in-patient or emergency room telememedicine face to face consult using interactive technology for 30 minutes. The location of the patient was [place]. I was located at [facility] .    I have proceeded with this evaluation at the request of the referring practitioner as it is felt to be an emergency setting and no appropriate specialist is available to perform this evaluation. The originating hospital has reported that this is the correct patient and has obtained consent from the patient/surrogate to perform this telemedicine evaluation(including obtaining history, performing examination and reviewing data provided by the patient an/or originating site of care provider)    I have introduced myself to the patient, provided my credentials, disclosed my location, and determined that, based on review of the patient's chart and discussion with the patient's primary team, telemedicine via a HIPAA compliant, real-time,  face-to-face two-way, interactive audio and video platform is an appropriate and effective means of providing the service.    The patient/surrogate has a right to refuse this evaluation as they have been explained risks including potential loss of confidentiality, benefits, alternatives, and the potential need for subsequent face-to-face care. In this evaluation, we will be providing recommendations only.  The ultimate decision to follow or not to follow these recommendations will be left to the bedside treating/requesting practitioner.    The patient/surrogate has been notified that other healthcare professionals including technical person may be involved in this A/V evaluation.  All laws concerning confidentiality and patient access to medical records and copies of medical records apply to telemedicine.  The patient/surrogate has received the originating site's Health Notice of Privacy Practices.    Marco Antonio Lambert MD

## 2025-03-15 NOTE — PLAN OF CARE
Goal Outcome Evaluation:      Pt A/Ox4. VSS on RA. IV removed. D/c education completed. Safety maintained. Family will transport self.

## 2025-03-15 NOTE — PLAN OF CARE
Goal Outcome Evaluation:  Plan of Care Reviewed With: patient        Progress: improving     VSS on RA. A/Ox4. NIH 0. No neuro changes noted. No c/o pain. Up SBA to BR. Voiding. CPOX. Tele-NSR. Passed swallow screen in ER, per MD diet given. SCDs. Dc'd glucose checks q6, in range x2. Call light in reach, safety maintained.

## 2025-03-15 NOTE — PROGRESS NOTES
Neurology Progress Note      Chief Complaint: Numbness to the left side of the mouth and left upper extremity  Length of Stay:  0   Subjective     Subjective:  3/15/2025: Patient re-evaluated with Dr. Lambert with teleneurology this morning.  Patient was initially seen by Dr. Lambert late yesterday evening.  Please see previous consult note by Dr. Lambert for full details.  Patient's daughter Rach is present for the exam this morning.  Patient states that he is back to his baseline.  He has no acute complaints this morning.  No neurological deficits noted on exam.  Patient does have a history of inflammatory CAA and is followed by vascular neurologist Dr. Richardson at [facility].  Patient is aware that he is not to have any type of antiplatelets or anticoagulation unless approved by neurology.  Patient is currently on Lipitor 80 mg daily.  Patient has had an MRI of his brain completed that did not show any acute changes since his previous MRI of the brain performed about a month ago.  No acute infarct was noted.  The CAA is stable in appearance.  Patient denies any painful neuropathy.  Patient is aware that he needs to keep his blood pressure controlled and monitors this a couple times a day at home.  Patient is aware that with CAA he can have seizures, headache, confusion, numbness, excetra.  Patient is anxious to go home because the weather is supposed to get bed within the next couple of hours.  Patient has been encouraged to stay in order for us to obtain ultrasound of the carotid arteries to assess soft plaque/plaque morphology that was seen on CTA of the neck.  Also TTE is needed to assess for any type of cardiomyopathy or other abnormalities.   Medications:  Current Facility-Administered Medications   Medication Dose Route Frequency Provider Last Rate Last Admin    atorvastatin (LIPITOR) tablet 80 mg  80 mg Oral Nightly Alejandro Macias MD        dextrose (D50W) (25 g/50 mL) IV injection 25 g  25  g Intravenous Q15 Min PRN Alejandro Macias MD        dextrose (GLUTOSE) oral gel 15 g  15 g Oral Q15 Min PRN Alejandro Macias MD        fluticasone (FLONASE) 50 MCG/ACT nasal spray 1 spray  1 spray Each Nare Daily PRN Alejandro Macias MD        glipizide (GLUCOTROL) tablet 5 mg  5 mg Oral BID AC Alejandro Macias MD   5 mg at 03/15/25 0802    glucagon (GLUCAGEN) injection 1 mg  1 mg Intramuscular Q15 Min PRN Alejandro Macias MD        hydroCHLOROthiazide tablet 12.5 mg  12.5 mg Oral Daily Alejandro Macias MD   12.5 mg at 03/15/25 0802    insulin glargine (LANTUS, SEMGLEE) injection 5 Units  5 Units Subcutaneous Q12H Alejandro Macias MD   5 Units at 03/15/25 0802    Insulin Lispro (humaLOG) injection 2-7 Units  2-7 Units Subcutaneous 4x Daily AC & at Bedtime Alejandro Macias MD        losartan (COZAAR) tablet 100 mg  100 mg Oral Daily Alejandro Macias MD   100 mg at 03/15/25 0802    potassium chloride (MICRO-K/KLOR-CON) CR capsule  40 mEq Oral BID Alejandro Macias MD   40 mEq at 03/15/25 0802    sodium chloride 0.9 % flush 10 mL  10 mL Intravenous PRN Alejandro Macias MD        sodium chloride 0.9 % flush 10 mL  10 mL Intravenous PRN Alejandro Macias MD        sodium chloride 0.9 % flush 10 mL  10 mL Intravenous Q12H Alejandro Macias MD   10 mL at 03/15/25 0802    sodium chloride 0.9 % flush 10 mL  10 mL Intravenous PRN Alejandro Macias MD        sodium chloride 0.9 % infusion 40 mL  40 mL Intravenous PRN Alejandro Macias MD        spironolactone (ALDACTONE) tablet 25 mg  25 mg Oral Daily Alejandro Macias MD   25 mg at 03/15/25 0802    temazepam (RESTORIL) capsule 15 mg  15 mg Oral Nightly PRN Alejandro Macias MD        verapamil SR (CALAN-SR) CR tablet 240 mg  240 mg Oral Q24H Alejandro Macias MD   240 mg at 03/15/25 0801             Objective      Vital Signs  Temp:  [97.4 °F (36.3 °C)-98.3 °F (36.8 °C)] 98.3 °F (36.8 °C)  Heart Rate:  [60-72] 65  Resp:  [13-20] 18  BP: (130-169)/(49-84) 141/69    Physical Exam:    HEENT:  neck is  supple  CVS:  RRR  Lungs:  CTA - B/L  Abd:  NT/ND  Ext:  2+ edema noted to BLEs with R > L. Chronic venous stasis changes/hemosiderin staining noted to bilateral lower extremities.  Skin:  no rashes    Pertinent Neuro Exam:  No acute neurological changes noted since last assessment.  Neuro exam is nonfocal.  No neurological deficits noted.  NIH stroke scale currently 0.    Last nurse assessment:  Interval:  (shift change)  1a. Level of Consciousness: 0-->Alert, keenly responsive  1b. LOC Questions: 0-->Answers both questions correctly  1c. LOC Commands: 0-->Performs both tasks correctly  2. Best Gaze: 0-->Normal  3. Visual: 0-->No visual loss  4. Facial Palsy: 0-->Normal symmetrical movements  5a. Motor Arm, Left: 0-->No drift, limb holds 90 (or 45) degrees for full 10 secs  5b. Motor Arm, Right: 0-->No drift, limb holds 90 (or 45) degrees for full 10 secs  6a. Motor Leg, Left: 0-->No drift, leg holds 30 degree position for full 5 secs  6b. Motor Leg, Right: 0-->No drift, leg holds 30 degree position for full 5 secs  7. Limb Ataxia: 0-->Absent  8. Sensory: 0-->Normal, no sensory loss  9. Best Language: 0-->No aphasia, normal  10. Dysarthria: 0-->Normal  11. Extinction and Inattention (formerly Neglect): 0-->No abnormality    Total (NIH Stroke Scale): 0       Results Review:      Labs:  Result Review:  I have personally reviewed the results from the time of this admission to 3/15/2025 10:57 CDT and agree with these findings:  [x]  Laboratory list / accordion  []  Microbiology  [x]  Radiology  [x]  EKG/Telemetry   [x]  Cardiology/Vascular   []  Pathology  [x]  Old records  []  Other:  Most notable findings include: see above.  A1c 5.9%.  .      Imaging:  MRI Brain Without Contrast  Result Date: 3/14/2025  EXAMINATION: MRI BRAIN WO CONTRAST-  3/14/2025 7:38 PM  HISTORY: Stroke protocol.  FINDINGS: Today's exam is compared to previous study of 2/17/2025.  There is no evidence of diffusion restriction on diffusion  weighted imaging to suggest acute ischemia or infarct.  There is atrophy of the brain with prominence of subarachnoid spaces and ventricular enlargement. There are scattered foci of abnormal T2/FLAIR signal involving the periventricular and higher white matter tracts. There is gyriform increased signal within the right frontal vertex and what appears to be the precentral gyrus is stable from the previous exam suggesting gliosis.  Normal flow voids demonstrated within the [place]. The orbits are unremarkable. There is a stable extra-axial fluid collection within the left frontal convexly suggesting an arachnoid cyst.  As noted previously there are innumerable foci of abnormal signal with blooming on gradient sequencing throughout both hemispheres consistent with amyloid angiopathy. Stable appearance from the previous exam. No definite new sites of hemorrhage are demonstrated. There is relative sparing of the posterior cranial fossa.  Mucous retention cysts or polyps are noted within the right maxillary sinus. The visualized paranasal sinuses and mastoid air cells are otherwise normally aerated.      Impression: 1. Amyloid angiopathy with innumerable foci of blooming within both hemispheres essentially stable from the previous exam of 1 month earlier. No definite new sites of hemorrhage are present. There is relative sparing of the posterior cranial fossa. 2. Mild atrophy. Scattered foci of abnormal T2 signal are present in the periventricular and higher white matter tracts. There is gyriform linear increased T2 signal in the right precentral gyrus stable from the previous exam. This may represent a site of previous ischemia. No mass effect or shift of the midline. 3. No evidence of diffusion restriction on diffusion-weighted imaging to suggest acute ischemia or infarct. 4. Stable arachnoid cyst within the left frontal extra-axial convexity. 5. Chronic appearing sinus disease.  This report was signed and  finalized on 3/14/2025 7:45 PM by Dr. Triston Grayson MD.          Assessment/Plan     Hospital Problem List      Stroke-like symptoms    HTN (hypertension), benign    High cholesterol    Mitral valve prolapse    Diabetes mellitus type 2, diet-controlled    Umbilical hernia    Acid reflux    Impression:  Inflammatory cerebral amyloid angiopathy--- MRI of the brain is without acute infarct/ischemia  Inflammatory CAA is stable from MRI of about a month ago.  Patient is currently back to his baseline without any neurological deficits or complaints.  NIH stroke scale of 0.  Type 2 diabetes mellitus controlled with A1c of 5.9%  Hyperlipidemia--LDL of 118.   Essential hypertension--blood pressure currently 141/69.  Chronic kidney disease stage III AA--stable currently EGFR of 79.9 and creatinine of 0.95    Plan:  Obtain ultrasound of the carotid arteries to further assess soft plaque/plaque morphology per Dr. Lambert's recommendation after reviewing CTA of the neck.   TTE has been ordered to assess for any cardiomyopathy or other significant cardiac issues  With inflammatory cerebral amyloid angiopathy patient is aware that he is to avoid all blood thinners.  Anticoagulation is contraindicated unless felt to be acutely lifesaving.  Not a tPA candidate.  Any provider initiating antiplatelet therapy should contact neurology prior to doing so if at all possible.  Additionally, with CAA patient has been advised that hemay very well have headaches, confusion, numbness, seizures, etc and having these symptoms does not necessary always necessitate coming to the emergency room.  Coming to the ER for further [place] at risk for receiving some type of anticoagulation or antiplatelet medication that could be detrimental to him.  Continue to monitor blood pressure.  Goal blood pressures less than 120/80.  Patient to monitor at home daily and bring his log back to the clinic/PCP for medication optimization.  Continue Lipitor 80 mg  daily.  Patient advised to follow-up with vascular neurologist Dr. Richardson at [facility] as previously arranged.  Okay from neurological standpoint for patient to go home after he has had DEANNA and ultrasound of the carotid arteries completed.  The reading of these tests does not need to be completed before he is discharged.  Neurology is signing off.  Please reach out if we can be of further assistance.      Medical Decision Making    Number/Complexity of Problems  Moderate  1 undiagnosed new problem with uncertain prognosis -   1 acute illness with systemic symptoms -   High  1 acute or chronic illness that pose a threat to life/body function -   High     MDM Data  Moderate - 1/3 categories  Extensive - 2/3 categories    Category 1: 3 of the following  Review of external notes  Review of results  Ordering of each unique test  Independent historian  Category 2:  Independent interpretation of test (ex: imaging)  Category 3:  Discussion of management with another provider    Extensive--discussed with patient, daughter, hospitalist, and reviewed [place] records.      Treatment Plan  Moderate - Prescription Drug management  High  Drug therapy requiring intensive monitoring for toxicity  Decision regarding hospitalization or escalation of care  De-escalate care/DNR decisions  Mod       Donna Gonzales, APRN  03/15/25  10:49 CDT

## 2025-03-15 NOTE — PLAN OF CARE
Goal Outcome Evaluation:  Plan of Care Reviewed With: patient, child           Outcome Evaluation: PT eval complete. He is alert and oriented x 4, daughter in room. Mr. Kay was independent in his mobility and ADL's prior to admit. Today he demos equal B LE AROM, strength, sensation and coordination to testing. He stands and ambulates ~ 300 ft w supervision from PT. He reports being at his baseline mobility and he had no further PT needs, questions or concerns. PT to sign off. Recommend d.c home with his friend.    Anticipated Discharge Disposition (PT): home, home with assist

## 2025-03-15 NOTE — PLAN OF CARE
Goal Outcome Evaluation:  Plan of Care Reviewed With: patient        Progress: no change  Outcome Evaluation: OT eval completed. Pt in fowlers upon therapist arrival; A&Ox4; No c/o pain; Pt's daughter also present. Pt reports I with all BADLs including fxl ambulation at baseline. Today, Pt donned B socks while seated I. Pt performed all sit<>stand transfers and community distance fxl ambulation I utilizing no AD. No focal neuro deficits identified. Skilled OT intervention not indicated at this time. Recommend home at discharge. OT to sign off.    Anticipated Discharge Disposition (OT): home

## 2025-03-15 NOTE — THERAPY DISCHARGE NOTE
Acute Care - Occupational Therapy Discharge  [facility]    Patient Name: Mike Kay  : 1940    MRN: [id number]                              Today's Date: 3/15/2025       Admit Date: 3/14/2025    Visit Dx:     ICD-10-CM ICD-9-CM   1. Stroke-like symptoms  R29.90 781.99   2. Left facial numbness  R20.0 782.0   3. Numbness of left hand  R20.0 782.0   4. Speech disturbance, unspecified type  R47.9 784.59     Patient Active Problem List   Diagnosis    Hx of adenomatous colonic polyps    HTN (hypertension), benign    Stroke-like symptoms    High cholesterol    Mitral valve prolapse    Diabetes mellitus type 2, diet-controlled    Umbilical hernia    Acid reflux     Past Medical History:   Diagnosis Date    ACC (agenesis of corpus callosum)     Acid reflux     Cerebral amyloid angiopathy     Diabetes mellitus type 2, diet-controlled     Hay fever     High cholesterol     BORDERLINE    Hx of colonic polyp     Hypertension     Mitral valve prolapse     PVC (premature ventricular contraction)     Umbilical hernia      Past Surgical History:   Procedure Laterality Date    CARDIAC CATHETERIZATION      COLONOSCOPY N/A 2020    Dr. briceno-One 18 mm adenoma polyp in the ascending colon, removed piecemeal using a hot snare. Resected and retrieved. Clip was placed. - The examination was otherwise normal on direct and retroflexion views    COLONOSCOPY N/A 2021    Dr. Briceno-One 7 mm tubular adenoma polyp at the hepatic flexure, removed with a hot snare. Resected and retrieved. - One 10 mm polyp at 20 cm proximal to the anus -Benign granulation tissue polyp with ulceration and marked chronic active inflammation. B. No adenomatous changes identified    COLONOSCOPY N/A 10/10/2024    Procedure: COLONOSCOPY WITH ANESTHESIA;  Surgeon: Mohan Briceno MD;  Location: [facility] ENDOSCOPY;  Service: Gastroenterology;  Laterality: N/A;  pre hx colon polyp  post polyp, diverticulosis  dr dena parisi    COLONOSCOPY W/  POLYPECTOMY  05/12/2014    Adenomatous polyp at 30 cm, Hyperplastic polyp cecum, Diverticulosis repeat exam in 5 years    FOOT SURGERY Left 2015    R/T FRACTURE, PLATE AND SCREWS     INGUINAL HERNIA REPAIR Left 2010    OTHER SURGICAL HISTORY      VOCAL CORD     TONSILLECTOMY  1991    UMBILICAL HERNIA REPAIR N/A 01/12/2017    Procedure: UMBILICAL HERNIA REPAIR;  Surgeon: Kavitha Navarro MD;  Location: [facility] OR;  Service:       General Information       Row Name 03/15/25 0739          OT Time and Intention    Document Type evaluation  cc: numbness in the left side of his mouth especially his upper lips extended towards his cheek, L hand numbness/weakness. Dx: Stroke-like symptoms. H/o High cholesterol, Mitral valve prolapse, Type 2 DM, Umbilical hernia, Acid reflux, HTN  -LS     Mode of Treatment occupational therapy  -LS     Patient Effort good  -LS       Row Name 03/15/25 0739          General Information    Patient Profile Reviewed yes  -LS     Prior Level of Function independent:;ADL's;all household mobility;community mobility;home management;cooking;cleaning;driving;shopping  -LS     Barriers to Rehab medically complex  -LS       Row Name 03/15/25 0739          Occupational Profile    Environmental Supports and Barriers (Occupational Profile) Walk in shower. Other AD/DME: multiple canes  -LS       Row Name 03/15/25 0739          Living Environment    Current Living Arrangements home  -LS     People in Home friend(s)  -LS       Row Name 03/15/25 0739          Home Main Entrance    Number of Stairs, Main Entrance none  -LS       Row Name 03/15/25 0739          Stairs Within Home, Primary    Number of Stairs, Within Home, Primary none  -LS       Row Name 03/15/25 0739          Cognition    Orientation Status (Cognition) oriented x 4  -LS               User Key  (r) = Recorded By, (t) = Taken By, (c) = Cosigned By      Initials Name Provider Type    LS Marga Zaragoza, OTR/L Occupational Therapist                    Mobility/ADL's       Row Name 03/15/25 0739          Transfers    Transfers sit-stand transfer;stand-sit transfer  -LS       Row Name 03/15/25 0739          Sit-Stand Transfer    Sit-Stand [place] (Transfers) independent  -LS       Row Name 03/15/25 0739          Stand-Sit Transfer    Stand-Sit [place] (Transfers) independent  -LS       Row Name 03/15/25 0739          Functional Mobility    Functional Mobility- Ind. Level independent  -LS       Row Name 03/15/25 0739          Activities of Daily Living    BADL Assessment/Intervention lower body dressing  -LS       Row Name 03/15/25 0739          Lower Body Dressing Assessment/Training    [place] Level (Lower Body Dressing) don;socks;independent  -LS     Position (Lower Body Dressing) unsupported sitting  -LS               User Key  (r) = Recorded By, (t) = Taken By, (c) = Cosigned By      Initials Name Provider Type    Marga Hses OTR/L Occupational Therapist                   Obj/Interventions       Row Name 03/15/25 0739          Sensory Assessment (Somatosensory)    Sensory Assessment (Somatosensory) UE sensation intact  -[facility] Name 03/15/25 0739          Vision Assessment/Intervention    Visual Impairment/Limitations WNL  -LS       Row Name 03/15/25 0739          Range of Motion Comprehensive    General Range of Motion bilateral upper extremity ROM WFL  -LS       Row Name 03/15/25 0739          Strength Comprehensive (MMT)    Comment, General Manual Muscle Testing (MMT) Assessment BUE strength grossly 5/5  -LS       [facility] Name 03/15/25 0739          Motor Skills    Motor Skills coordination  -LS     Coordination bilateral;upper extremity;WFL  -[facility] Name 03/15/25 0739          Balance    Balance Assessment sitting static balance;sitting dynamic balance;standing static balance;standing dynamic balance  -LS     Static Sitting Balance independent  -LS     Dynamic Sitting Balance independent  -LS     Position, Sitting Balance  unsupported;sitting edge of bed  -LS     Static Standing Balance independent  -LS     Dynamic Standing Balance independent  -LS     Position/Device Used, Standing Balance unsupported  -LS               User Key  (r) = Recorded By, (t) = Taken By, (c) = Cosigned By      Initials Name Provider Type    Marga Hess OTR/L Occupational Therapist                   Goals/Plan    No documentation.                  Clinical Impression       Row Name 03/15/25 0739          Pain Assessment    Pretreatment Pain Rating 0/10 - no pain  -LS     Posttreatment Pain Rating 0/10 - no pain  -LS       Row Name 03/15/25 0739          Plan of Care Review    Plan of Care Reviewed With patient  -LS     Progress no change  -LS     Outcome Evaluation OT eval completed. Pt in fowlers upon therapist arrival; A&Ox4; No c/o pain; Pt's daughter also present. Pt reports I with all BADLs including fxl ambulation at baseline. Today, Pt donned B socks while seated I. Pt performed all sit<>stand transfers and community distance fxl ambulation I utilizing no AD. No focal neuro deficits identified. Skilled OT intervention not indicated at this time. Recommend home at discharge. OT to sign off.  -       Row Name 03/15/25 0739          Therapy Assessment/Plan (OT)    Criteria for Skilled Therapeutic Interventions Met (OT) no;does not meet criteria for skilled intervention  -     Therapy Frequency (OT) evaluation only  -       Row Name 03/15/25 0739          Therapy Plan Review/Discharge Plan (OT)    Anticipated Discharge Disposition (OT) home  -       Row Name 03/15/25 0739          Positioning and Restraints    Pre-Treatment Position sitting in chair/recliner  -LS     Post Treatment Position chair  -LS     In Chair sitting;call light within reach;encouraged to call for assist;with family/caregiver  -               User Key  (r) = Recorded By, (t) = Taken By, (c) = Cosigned By      Initials Name Provider Type    Marga Hess, OTR/L  Occupational Therapist                   Outcome Measures       Row Name 03/15/25 0917          How much help from another is currently needed...    Putting on and taking off regular lower body clothing? 4  -LS     Bathing (including washing, rinsing, and drying) 4  -LS     Toileting (which includes using toilet bed pan or urinal) 4  -LS     Putting on and taking off regular upper body clothing 4  -LS     Taking care of personal grooming (such as brushing teeth) 4  -LS     Eating meals 4  -LS     AM-PAC 6 Clicks Score (OT) 24  -LS       Row Name 03/15/25 0851 03/15/25 0729       How much help from another person do you currently need...    Turning from your back to your side while in flat bed without using bedrails? 4  -SS 4  -HOANG    Moving from lying on back to sitting on the side of a flat bed without bedrails? 4  -SS 4  -HOANG    Moving to and from a bed to a chair (including a wheelchair)? 4  -SS 4  -HOANG    Standing up from a chair using your arms (e.g., wheelchair, bedside chair)? 4  -SS 4  -HOANG    Climbing 3-5 steps with a railing? 4  -SS 4  -HOANG    To walk in hospital room? 4  -SS 4  -HOANG    AM-PAC 6 Clicks Score (PT) 24  -SS 24  -HOANG    Highest Level of Mobility Goal 8 --> Walked 250 feet or more  -SS 8 --> Walked 250 feet or more  -HOANG      Row Name 03/15/25 0416          How much help from another person do you currently need...    Turning from your back to your side while in flat bed without using bedrails? --  -SH     Moving from lying on back to sitting on the side of a flat bed without bedrails? --  -SH     Moving to and from a bed to a chair (including a wheelchair)? --  -SH     Standing up from a chair using your arms (e.g., wheelchair, bedside chair)? --  -SH     Climbing 3-5 steps with a railing? --  -SH     To walk in hospital room? --  -SH     AM-PAC 6 Clicks Score (PT) --  -SH     Highest Level of Mobility Goal --  -SH       Row Name 03/15/25 0917 03/15/25 0729       Modified [place] Scale    Pre-Stroke  Modified [place] Scale 0 - No Symptoms at all.  -LS --    Modified [place] Scale 0 - No Symptoms at all.  -LS 0 - No Symptoms at all.  -HOANG      Row Name 03/15/25 0917 03/15/25 0729       Functional Assessment    Outcome Measure Options AM-PAC 6 Clicks Daily Activity (OT);Modified [place]  -LS AM-PAC 6 Clicks Basic Mobility (PT);Modified [employer]  -HOANG              User Key  (r) = Recorded By, (t) = Taken By, (c) = Cosigned By      Initials Name Provider Type    Stanley Mariano, PT DPT Physical Therapist    Marga Hess, OTR/L Occupational Therapist    SH Holstein, Sarai, RN Registered Nurse    Marianela Randall RN Registered Nurse                  Occupational Therapy Education       Title: PT OT SLP Therapies (Done)       Topic: Occupational Therapy (Done)       Point: ADL training (Done)       Learning Progress Summary            Patient Acceptance, E, VU by  at 3/15/2025 0920                                      User Key       Initials Effective Dates Name Provider Type Discipline     06/20/22 -  Marga Zaragoza, OTR/L Occupational Therapist OT                  OT Recommendation and Plan  Therapy Frequency (OT): evaluation only  Plan of Care Review  Plan of Care Reviewed With: patient  Progress: no change  Outcome Evaluation: OT eval completed. Pt in fowlers upon therapist arrival; A&Ox4; No c/o pain; Pt's daughter also present. Pt reports I with all BADLs including fxl ambulation at baseline. Today, Pt donned B socks while seated I. Pt performed all sit<>stand transfers and community distance fxl ambulation I utilizing no AD. No focal neuro deficits identified. Skilled OT intervention not indicated at this time. Recommend home at discharge. OT to sign off.  Plan of Care Reviewed With: patient  Outcome Evaluation: OT eval completed. Pt in fowlers upon therapist arrival; A&Ox4; No c/o pain; Pt's daughter also present. Pt reports I with all BADLs including fxl ambulation at baseline. Today, Pt donned B socks  while seated I. Pt performed all sit<>stand transfers and community distance fxl ambulation I utilizing no AD. No focal neuro deficits identified. Skilled OT intervention not indicated at this time. Recommend home at discharge. OT to sign off.     Time Calculation:         Time Calculation- OT       Row Name 03/15/25 0917             Time Calculation- OT    OT Start Time 0739  -LS      OT Stop Time 0805  -      OT Time Calculation (min) 26 min  -      OT Non-Billable Time (min) 26 min  -      OT Received On 03/15/25  -                User Key  (r) = Recorded By, (t) = Taken By, (c) = Cosigned By      Initials Name Provider Type     Marga Zaragoza OTR/L Occupational Therapist                  Therapy Charges for Today       Code Description Service Date Service Provider Modifiers Qty    [id number]  OT EVAL LOW COMPLEXITY 2 3/15/2025 Marga Zaragoza OTR/L GO 1               OT Discharge Summary  Anticipated Discharge Disposition (OT): home  Reason for Discharge: Independent  Outcomes Achieved: Other (eval only)  Discharge Destination: Home    BLANCA Holloway/RAMIRO  3/15/2025

## 2025-03-17 ENCOUNTER — TELEPHONE (OUTPATIENT)
Dept: PRIMARY CARE CLINIC | Age: 85
End: 2025-03-17

## 2025-03-17 NOTE — TELEPHONE ENCOUNTER
Care Transitions Initial Follow Up Call    Outreach made within 2 business days of discharge: Yes    Patient: Cristian Regan   Patient : 1940   MRN: [id number]    Reason for Admission: Stroke like symptoms  Discharge Date: 03/15/25       Spoke with: Cristian    Discharge department/facility: [facility] Interactive Patient Contact:  Was patient able to fill all prescriptions: Yes  Was patient instructed to bring all medications to the follow-up visit: Yes  Is patient taking all medications as directed in the discharge summary? Yes  Does patient understand their discharge instructions: Yes  Does patient have questions or concerns that need addressed prior to 7-14 day follow up office visit: no    Additional needs identified to be addressed with provider  No needs identified             Scheduled appointment with PCP within 7-14 days    Follow Up  Future Appointments   Date Time Provider Department Center   3/18/2025  3:30 PM Demar Wright MD [facility]   3/26/2025  9:00 AM NURSE, LPS [facility] N [facility]   3/27/2025  9:45 AM Demar Wright MD [facility]   3/31/2025  9:45 AM Roseanna Jeter APRN N [facility]   3/31/2025  9:45 AM SCHEDULE, [facility]   2025  9:30 AM Oksana Calvert APRN - CNP N [facility]       oJ Brown MA

## 2025-03-18 ENCOUNTER — OFFICE VISIT (OUTPATIENT)
Dept: INTERNAL MEDICINE | Age: 85
End: 2025-03-18

## 2025-03-18 VITALS
HEIGHT: 72 IN | SYSTOLIC BLOOD PRESSURE: 138 MMHG | BODY MASS INDEX: 30.2 KG/M2 | DIASTOLIC BLOOD PRESSURE: 72 MMHG | HEART RATE: 62 BPM | WEIGHT: 223 LBS | OXYGEN SATURATION: 94 %

## 2025-03-18 DIAGNOSIS — N40.1 BENIGN PROSTATIC HYPERPLASIA WITH URINARY FREQUENCY: ICD-10-CM

## 2025-03-18 DIAGNOSIS — N18.31 STAGE 3A CHRONIC KIDNEY DISEASE (HCC): ICD-10-CM

## 2025-03-18 DIAGNOSIS — G45.9 TIA (TRANSIENT ISCHEMIC ATTACK): ICD-10-CM

## 2025-03-18 DIAGNOSIS — E11.22 TYPE 2 DIABETES MELLITUS WITH CHRONIC KIDNEY DISEASE, WITHOUT LONG-TERM CURRENT USE OF INSULIN, UNSPECIFIED CKD STAGE (HCC): ICD-10-CM

## 2025-03-18 DIAGNOSIS — I68.0 CEREBRAL AMYLOID ANGIOPATHY (CODE): Primary | ICD-10-CM

## 2025-03-18 DIAGNOSIS — E78.2 MIXED HYPERLIPIDEMIA: ICD-10-CM

## 2025-03-18 DIAGNOSIS — E29.1 HYPOGONADISM MALE: ICD-10-CM

## 2025-03-18 DIAGNOSIS — E11.9 TYPE 2 DIABETES MELLITUS WITHOUT COMPLICATION, WITHOUT LONG-TERM CURRENT USE OF INSULIN: ICD-10-CM

## 2025-03-18 DIAGNOSIS — R35.0 BENIGN PROSTATIC HYPERPLASIA WITH URINARY FREQUENCY: ICD-10-CM

## 2025-03-18 DIAGNOSIS — I10 ESSENTIAL HYPERTENSION: ICD-10-CM

## 2025-03-18 LAB
ALBUMIN SERPL-MCNC: 4.2 G/DL (ref 3.5–5.2)
ALP SERPL-CCNC: 63 U/L (ref 40–129)
ALT SERPL-CCNC: 34 U/L (ref 10–50)
ANION GAP SERPL CALCULATED.3IONS-SCNC: 10 MMOL/L (ref 8–16)
AST SERPL-CCNC: 26 U/L (ref 10–50)
BILIRUB SERPL-MCNC: 0.4 MG/DL (ref 0.2–1.2)
BUN SERPL-MCNC: 23 MG/DL (ref 8–23)
CALCIUM SERPL-MCNC: 8.9 MG/DL (ref 8.8–10.2)
CHLORIDE SERPL-SCNC: 105 MMOL/L (ref 98–107)
CHOLEST SERPL-MCNC: 130 MG/DL (ref 0–199)
CO2 SERPL-SCNC: 25 MMOL/L (ref 22–29)
CREAT SERPL-MCNC: 1.4 MG/DL (ref 0.7–1.2)
GLUCOSE SERPL-MCNC: 141 MG/DL (ref 70–99)
HBA1C MFR BLD: 5.9 % (ref 4–5.6)
HDLC SERPL-MCNC: 27 MG/DL (ref 40–60)
LDLC SERPL CALC-MCNC: 50 MG/DL
POTASSIUM SERPL-SCNC: 4.1 MMOL/L (ref 3.5–5.1)
PROT SERPL-MCNC: 6.3 G/DL (ref 6.4–8.3)
PSA SERPL-MCNC: 0.72 NG/ML (ref 0–4)
SODIUM SERPL-SCNC: 140 MMOL/L (ref 136–145)
TRIGL SERPL-MCNC: 265 MG/DL (ref 0–149)

## 2025-03-18 RX ORDER — AMLODIPINE BESYLATE 5 MG/1
5 TABLET ORAL DAILY
COMMUNITY
Start: 2025-02-14 | End: 2025-05-16

## 2025-03-18 SDOH — ECONOMIC STABILITY: FOOD INSECURITY: WITHIN THE PAST 12 MONTHS, YOU WORRIED THAT YOUR FOOD WOULD RUN OUT BEFORE YOU GOT MONEY TO BUY MORE.: NEVER TRUE

## 2025-03-18 SDOH — ECONOMIC STABILITY: FOOD INSECURITY: WITHIN THE PAST 12 MONTHS, THE FOOD YOU BOUGHT JUST DIDN'T LAST AND YOU DIDN'T HAVE MONEY TO GET MORE.: NEVER TRUE

## 2025-03-18 ASSESSMENT — ENCOUNTER SYMPTOMS
SINUS PRESSURE: 0
EYE DISCHARGE: 0
NAUSEA: 0
BLOOD IN STOOL: 0
TROUBLE SWALLOWING: 0
EYE ITCHING: 0
ABDOMINAL DISTENTION: 0
SHORTNESS OF BREATH: 0
VOMITING: 0
SORE THROAT: 0
ABDOMINAL PAIN: 0
BACK PAIN: 0
WHEEZING: 0

## 2025-03-18 ASSESSMENT — PATIENT HEALTH QUESTIONNAIRE - PHQ9
SUM OF ALL RESPONSES TO PHQ QUESTIONS 1-9: 0
1. LITTLE INTEREST OR PLEASURE IN DOING THINGS: NOT AT ALL
2. FEELING DOWN, DEPRESSED OR HOPELESS: NOT AT ALL

## 2025-03-18 NOTE — PROGRESS NOTES
Post-Discharge Transitional Care Management Services      Cristian Regan   YOB: 1940    Date of Visit:  3/18/2025  30 Day Post-Discharge Date: 4/15/2025    No Known Allergies  Outpatient Medications Marked as Taking for the 3/18/25 encounter (Office Visit) with Demar Wright MD   Medication Sig Dispense Refill    amLODIPine (NORVASC) 5 MG tablet Take 1 tablet by mouth daily      testosterone cypionate (DEPOTESTOTERONE CYPIONATE) 200 MG/ML injection INJECT 0.75 ML INTRAMUSCULARLY ONCE EVERY 14 DAYS 2 mL 0    verapamil (CALAN SR) 240 MG extended release tablet Take 1 tablet by mouth nightly 90 tablet 1    spironolactone (ALDACTONE) 50 MG tablet       LANTUS SOLOSTAR 100 UNIT/ML injection pen INJECT 25 UNITS SUBCUTANEOUSLY ONCE DAILY 15 mL 0    atorvastatin (LIPITOR) 20 MG tablet Take 1 tablet by mouth nightly at bedtime. 90 tablet 1    glipiZIDE (GLUCOTROL XL) 10 MG extended release tablet Take 1 tablet by mouth every morning 30 tablet 5    losartan (COZAAR) 100 MG tablet Take 1 tablet by mouth daily 90 tablet 1    cloNIDine (CATAPRES) 0.1 MG tablet Take 1 tablet by mouth daily Dr. Johnson    1993      olmesartan (BENICAR) 40 MG tablet Take 1 tablet by mouth daily      spironolactone (ALDACTONE) 25 MG tablet TAKE 1 TABLET EVERY DAY (Patient taking differently: Take 2 tablets by mouth daily) 90 tablet 3    vitamin D (ERGOCALCIFEROL) 1.25 MG (05833 UT) CAPS capsule Take 1 capsule by mouth once a week 30 capsule 3    fluticasone (FLONASE) 50 MCG/ACT nasal spray USE 1 SPRAY IN EACH NOSTRIL TWICE DAILY 48 g 3    potassium chloride (KLOR-CON M) 10 MEQ extended release tablet One daily 90 tablet 1    pantoprazole (PROTONIX) 40 MG tablet Take 1 tablet by mouth daily 90 tablet 3    meclizine (ANTIVERT) 25 MG tablet Take 1 tablet by mouth 3 times daily as needed for Dizziness 30 tablet 0    hydrALAZINE (APRESOLINE) 10 MG tablet 4 tablets three times a day 360 tablet 1    Continuous Blood Gluc

## 2025-03-26 ENCOUNTER — TELEPHONE (OUTPATIENT)
Dept: HEMATOLOGY | Age: 85
End: 2025-03-26

## 2025-03-26 NOTE — TELEPHONE ENCOUNTER
Called Patient and reminded patient of their appointment on 03/31/2025 and patient confirmed they would be here. Reminded patient to just come at appointment time, and to not come at the lab appointment time. Reminded patient that we will not check them in any more than 30 minutes before appointment time.  We have now moved to the [facility] [place] that is located between our old office and the ER at the [facility]. Letting the Pt know that our front entrance faces the  [employer] ball [place]. Reminded pt to eat well and be well hydrated for their labs.

## 2025-03-27 DIAGNOSIS — D75.1 SECONDARY ERYTHROCYTOSIS: Primary | ICD-10-CM

## 2025-03-27 NOTE — PROGRESS NOTES
Progress Note      Pt Name: Cristian Regan  YOB: 1940  MRN: [id number]    Date of evaluation: 03/31/2025  History Obtained From:  patient, electronic medical record    CHIEF COMPLAINT:    Chief Complaint   Patient presents with    Follow-up     Secondary erythrocytosis     HISTORY OF PRESENT ILLNESS:    Cristian Regan is a 84 y.o.  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  Current recommendation is for conservative lab monitoring for his thrombocytopenia and phlebotomy would be warranted for hemoglobin >18 or hematocrit >55 and/or symptomatic (he intermittently donates blood to the [employer]).  Cristian returns today in scheduled follow-up for evaluation, lab monitoring and further treatment recommendations.    History of Present Illness  No new health concerns are reported. Testosterone therapy was discontinued approximately 3 weeks ago, following advice from a hospitalist during a recent emergency room visit on 03/14/2025. The hospitalist expressed concern about the potential risk of stroke associated with testosterone use. Admission to the emergency room was due to perceived hypoglycemia, characterized by numbness on the left side of the mouth and hand while walking. Despite consuming a sweet item, blood glucose level was recorded at 140 upon arrival at the hospital. Comprehensive diagnostic tests, including brain scans and CT scans, were conducted, all of which returned normal results. A history of chronic numbness in the left hand is noted, attributed to stacked vertebrae in the upper shoulders.       Today's clinic visit to include physical assessment, review of systems, any radiograph or lab findings that were available and plan of care are documented below.     HEMATOLOGIC HISTORY:   Diagnosis:  Mild thrombocytopenia  Erythrocytosis  History of iron deficiency

## 2025-03-31 ENCOUNTER — OFFICE VISIT (OUTPATIENT)
Dept: HEMATOLOGY | Age: 85
End: 2025-03-31
Payer: MEDICARE

## 2025-03-31 ENCOUNTER — HOSPITAL ENCOUNTER (OUTPATIENT)
Dept: INFUSION THERAPY | Age: 85
Discharge: HOME | End: 2025-03-31
Payer: MEDICARE

## 2025-03-31 VITALS
WEIGHT: 224.5 LBS | OXYGEN SATURATION: 96 % | BODY MASS INDEX: 30.41 KG/M2 | DIASTOLIC BLOOD PRESSURE: 72 MMHG | SYSTOLIC BLOOD PRESSURE: 132 MMHG | TEMPERATURE: 97.2 F | HEIGHT: 72 IN | HEART RATE: 51 BPM

## 2025-03-31 DIAGNOSIS — D75.1 SECONDARY ERYTHROCYTOSIS: Primary | ICD-10-CM

## 2025-03-31 DIAGNOSIS — D75.1 SECONDARY ERYTHROCYTOSIS: ICD-10-CM

## 2025-03-31 DIAGNOSIS — D69.6 THROMBOCYTOPENIA: ICD-10-CM

## 2025-03-31 LAB
ALBUMIN SERPL-MCNC: 4.3 G/DL (ref 3.5–5.2)
ALP SERPL-CCNC: 70 U/L (ref 40–129)
ALT SERPL-CCNC: 25 U/L (ref 5–41)
ANION GAP SERPL CALCULATED.3IONS-SCNC: 12 MMOL/L (ref 7–19)
AST SERPL-CCNC: 22 U/L (ref 5–40)
BASOPHILS # BLD: 0.06 K/UL (ref 0–0.2)
BASOPHILS NFR BLD: 0.8 % (ref 0–1)
BILIRUB SERPL-MCNC: 0.7 MG/DL (ref 0–1.2)
BUN SERPL-MCNC: 25 MG/DL (ref 8–23)
CALCIUM SERPL-MCNC: 8.8 MG/DL (ref 8.8–10.2)
CHLORIDE SERPL-SCNC: 102 MMOL/L (ref 98–107)
CO2 SERPL-SCNC: 25 MMOL/L (ref 22–29)
CREAT SERPL-MCNC: 1.2 MG/DL (ref 0.7–1.2)
EOSINOPHIL # BLD: 0.25 K/UL (ref 0–0.6)
EOSINOPHIL NFR BLD: 3.2 % (ref 0–5)
ERYTHROCYTE [DISTWIDTH] IN BLOOD BY AUTOMATED COUNT: 12.6 % (ref 11.5–14.5)
GLUCOSE SERPL-MCNC: 133 MG/DL (ref 70–99)
HCT VFR BLD AUTO: 46.1 % (ref 42–52)
HGB BLD-MCNC: 16.1 G/DL (ref 14–18)
LYMPHOCYTES # BLD: 2.02 K/UL (ref 1.1–4.5)
LYMPHOCYTES NFR BLD: 25.9 % (ref 20–40)
MCH RBC QN AUTO: 31 PG (ref 27–31)
MCHC RBC AUTO-ENTMCNC: 34.9 G/DL (ref 33–37)
MCV RBC AUTO: 88.7 FL (ref 80–94)
MONOCYTES # BLD: 0.74 K/UL (ref 0–0.9)
MONOCYTES NFR BLD: 9.5 % (ref 1–10)
NEUTROPHILS # BLD: 4.71 K/UL (ref 1.5–7.5)
NEUTS SEG NFR BLD: 60.3 % (ref 50–65)
PLATELET # BLD AUTO: 148 K/UL (ref 130–400)
PMV BLD AUTO: 10.5 FL (ref 9.4–12.4)
POTASSIUM SERPL-SCNC: 4.1 MMOL/L (ref 3.5–5.1)
PROT SERPL-MCNC: 6.8 G/DL (ref 6.4–8.3)
RBC # BLD AUTO: 5.2 M/UL (ref 4.7–6.1)
SODIUM SERPL-SCNC: 139 MMOL/L (ref 136–145)
WBC # BLD AUTO: 7.8 K/UL (ref 4.8–10.8)

## 2025-03-31 PROCEDURE — 85025 COMPLETE CBC W/AUTO DIFF WBC: CPT

## 2025-03-31 PROCEDURE — 3078F DIAST BP <80 MM HG: CPT | Performed by: NURSE PRACTITIONER

## 2025-03-31 PROCEDURE — G8427 DOCREV CUR MEDS BY ELIG CLIN: HCPCS | Performed by: NURSE PRACTITIONER

## 2025-03-31 PROCEDURE — 1123F ACP DISCUSS/DSCN MKR DOCD: CPT | Performed by: NURSE PRACTITIONER

## 2025-03-31 PROCEDURE — G8417 CALC BMI ABV UP PARAM F/U: HCPCS | Performed by: NURSE PRACTITIONER

## 2025-03-31 PROCEDURE — 80053 COMPREHEN METABOLIC PANEL: CPT

## 2025-03-31 PROCEDURE — 1126F AMNT PAIN NOTED NONE PRSNT: CPT | Performed by: NURSE PRACTITIONER

## 2025-03-31 PROCEDURE — 1036F TOBACCO NON-USER: CPT | Performed by: NURSE PRACTITIONER

## 2025-03-31 PROCEDURE — 3075F SYST BP GE 130 - 139MM HG: CPT | Performed by: NURSE PRACTITIONER

## 2025-03-31 PROCEDURE — 99212 OFFICE O/P EST SF 10 MIN: CPT

## 2025-03-31 PROCEDURE — 99213 OFFICE O/P EST LOW 20 MIN: CPT | Performed by: NURSE PRACTITIONER

## 2025-03-31 PROCEDURE — 36415 COLL VENOUS BLD VENIPUNCTURE: CPT

## 2025-04-01 ENCOUNTER — OFFICE VISIT (OUTPATIENT)
Dept: INTERNAL MEDICINE | Age: 85
End: 2025-04-01
Payer: MEDICARE

## 2025-04-01 VITALS
OXYGEN SATURATION: 97 % | SYSTOLIC BLOOD PRESSURE: 128 MMHG | BODY MASS INDEX: 30.34 KG/M2 | HEIGHT: 72 IN | HEART RATE: 62 BPM | WEIGHT: 224 LBS | DIASTOLIC BLOOD PRESSURE: 56 MMHG

## 2025-04-01 DIAGNOSIS — E11.9 TYPE 2 DIABETES MELLITUS WITHOUT COMPLICATION, WITHOUT LONG-TERM CURRENT USE OF INSULIN: Primary | ICD-10-CM

## 2025-04-01 DIAGNOSIS — I10 ESSENTIAL HYPERTENSION: ICD-10-CM

## 2025-04-01 DIAGNOSIS — I68.0 CEREBRAL AMYLOID ANGIOPATHY (CODE): ICD-10-CM

## 2025-04-01 DIAGNOSIS — N18.30 STAGE 3 CHRONIC KIDNEY DISEASE, UNSPECIFIED WHETHER STAGE 3A OR 3B CKD (HCC): ICD-10-CM

## 2025-04-01 DIAGNOSIS — F51.01 PRIMARY INSOMNIA: ICD-10-CM

## 2025-04-01 PROCEDURE — 1036F TOBACCO NON-USER: CPT | Performed by: INTERNAL MEDICINE

## 2025-04-01 PROCEDURE — 3074F SYST BP LT 130 MM HG: CPT | Performed by: INTERNAL MEDICINE

## 2025-04-01 PROCEDURE — G8427 DOCREV CUR MEDS BY ELIG CLIN: HCPCS | Performed by: INTERNAL MEDICINE

## 2025-04-01 PROCEDURE — 99214 OFFICE O/P EST MOD 30 MIN: CPT | Performed by: INTERNAL MEDICINE

## 2025-04-01 PROCEDURE — 1159F MED LIST DOCD IN RCRD: CPT | Performed by: INTERNAL MEDICINE

## 2025-04-01 PROCEDURE — G8417 CALC BMI ABV UP PARAM F/U: HCPCS | Performed by: INTERNAL MEDICINE

## 2025-04-01 PROCEDURE — 3044F HG A1C LEVEL LT 7.0%: CPT | Performed by: INTERNAL MEDICINE

## 2025-04-01 PROCEDURE — 1123F ACP DISCUSS/DSCN MKR DOCD: CPT | Performed by: INTERNAL MEDICINE

## 2025-04-01 PROCEDURE — 3078F DIAST BP <80 MM HG: CPT | Performed by: INTERNAL MEDICINE

## 2025-04-01 RX ORDER — HYDROCHLOROTHIAZIDE 25 MG/1
25 TABLET ORAL EVERY MORNING
Qty: 90 TABLET | Refills: 1 | Status: SHIPPED | OUTPATIENT
Start: 2025-04-01

## 2025-04-01 RX ORDER — HYDRALAZINE HYDROCHLORIDE 25 MG/1
25 TABLET, FILM COATED ORAL PRN
Qty: 90 TABLET | Refills: 1 | Status: SHIPPED | OUTPATIENT
Start: 2025-04-01

## 2025-04-01 RX ORDER — HYDRALAZINE HYDROCHLORIDE 25 MG/1
25 TABLET, FILM COATED ORAL EVERY MORNING
COMMUNITY
Start: 2025-02-14 | End: 2025-04-01 | Stop reason: SDUPTHER

## 2025-04-01 RX ORDER — TEMAZEPAM 15 MG/1
CAPSULE ORAL
Qty: 60 CAPSULE | Refills: 0 | Status: SHIPPED | OUTPATIENT
Start: 2025-04-01 | End: 2025-07-02

## 2025-04-01 RX ORDER — MECLIZINE HYDROCHLORIDE 25 MG/1
25 TABLET ORAL 3 TIMES DAILY PRN
Qty: 30 TABLET | Refills: 1 | Status: SHIPPED | OUTPATIENT
Start: 2025-04-01

## 2025-04-01 ASSESSMENT — ENCOUNTER SYMPTOMS
BLOOD IN STOOL: 0
TROUBLE SWALLOWING: 0
SINUS PRESSURE: 0
SORE THROAT: 0
EYE DISCHARGE: 0
EYE ITCHING: 0
WHEEZING: 0
SHORTNESS OF BREATH: 0
BACK PAIN: 0
ABDOMINAL PAIN: 0
ABDOMINAL DISTENTION: 0
NAUSEA: 0
VOMITING: 0

## 2025-04-01 NOTE — PROGRESS NOTES
(NORVASC) 5 MG tablet Take 1 tablet by mouth daily      verapamil (CALAN SR) 240 MG extended release tablet Take 1 tablet by mouth nightly 90 tablet 1    LANTUS SOLOSTAR 100 UNIT/ML injection pen INJECT 25 UNITS SUBCUTANEOUSLY ONCE DAILY 15 mL 0    atorvastatin (LIPITOR) 20 MG tablet Take 1 tablet by mouth nightly at bedtime. 90 tablet 1    glipiZIDE (GLUCOTROL XL) 10 MG extended release tablet Take 1 tablet by mouth every morning 30 tablet 5    losartan (COZAAR) 100 MG tablet Take 1 tablet by mouth daily 90 tablet 1    olmesartan (BENICAR) 40 MG tablet Take 1 tablet by mouth daily      spironolactone (ALDACTONE) 25 MG tablet TAKE 1 TABLET EVERY DAY 90 tablet 3    vitamin D (ERGOCALCIFEROL) 1.25 MG (49530 UT) CAPS capsule Take 1 capsule by mouth once a week 30 capsule 3    fluticasone (FLONASE) 50 MCG/ACT nasal spray USE 1 SPRAY IN EACH NOSTRIL TWICE DAILY 48 g 3    potassium chloride (KLOR-CON M) 10 MEQ extended release tablet One daily 90 tablet 1    pantoprazole (PROTONIX) 40 MG tablet Take 1 tablet by mouth daily 90 tablet 3    meclizine (ANTIVERT) 25 MG tablet Take 1 tablet by mouth 3 times daily as needed for Dizziness 30 tablet 0    Continuous Blood Gluc  (DEXCOM G6 ) VERONICA One g6 reciever refill annually 1 each 0    Continuous Blood Gluc Sensor (DEXCOM G6 SENSOR) MISC 1 box sensors refill every 30 days 1 each 5    Continuous Blood Gluc Transmit (DEXCOM G6 TRANSMITTER) MISC 1 transmitter refilled every 90 days 1 each 3    NOVOLOG FLEXPEN 100 UNIT/ML injection pen Inject into the skin as needed Sliding scale      nystatin (MYCOSTATIN) 240315 UNIT/GM cream APPLY  CREAM TOPICALLY TWICE DAILY 30 g 2    temazepam (RESTORIL) 15 MG capsule TAKE 2 CAPSULES BY MOUTH AT BEDTIME AS NEEDED FOR SLEEP 60 capsule 0     No current facility-administered medications for this visit.     No Known Allergies      Subjective:     Review of Systems   Constitutional:  Negative for activity change, appetite change,

## 2025-04-02 ASSESSMENT — ENCOUNTER SYMPTOMS
EYES NEGATIVE: 1
BACK PAIN: 0
CONSTIPATION: 0
SORE THROAT: 0
VOMITING: 0
EYE REDNESS: 0
EYE DISCHARGE: 0
DIARRHEA: 0
ABDOMINAL PAIN: 0
EYE PAIN: 0
BLOOD IN STOOL: 0
SHORTNESS OF BREATH: 0
GASTROINTESTINAL NEGATIVE: 1
WHEEZING: 0
NAUSEA: 0
RESPIRATORY NEGATIVE: 1
COUGH: 0

## 2025-04-14 DIAGNOSIS — I10 ESSENTIAL HYPERTENSION: ICD-10-CM

## 2025-04-14 RX ORDER — GLIPIZIDE 10 MG/1
10 TABLET, FILM COATED, EXTENDED RELEASE ORAL EVERY MORNING
Qty: 90 TABLET | Refills: 1 | Status: SHIPPED | OUTPATIENT
Start: 2025-04-14

## 2025-04-14 RX ORDER — VERAPAMIL HYDROCHLORIDE 240 MG/1
240 TABLET, FILM COATED, EXTENDED RELEASE ORAL 2 TIMES DAILY
Qty: 180 TABLET | Refills: 1 | Status: SHIPPED | OUTPATIENT
Start: 2025-04-14

## 2025-04-16 NOTE — TELEPHONE ENCOUNTER
Last OV 4/1/2025  Next OV 10/6/2025      Requested Prescriptions     Pending Prescriptions Disp Refills    vitamin D (ERGOCALCIFEROL) 1.25 MG (10980 UT) CAPS capsule 30 capsule 3     Sig: Take 1 capsule by mouth once a week

## 2025-04-17 RX ORDER — ERGOCALCIFEROL 1.25 MG/1
50000 CAPSULE, LIQUID FILLED ORAL WEEKLY
Qty: 30 CAPSULE | Refills: 3 | Status: SHIPPED | OUTPATIENT
Start: 2025-04-17

## 2025-04-22 RX ORDER — INSULIN GLARGINE 100 [IU]/ML
INJECTION, SOLUTION SUBCUTANEOUS
Qty: 15 ML | Refills: 1 | Status: SHIPPED | OUTPATIENT
Start: 2025-04-22

## 2025-04-22 NOTE — TELEPHONE ENCOUNTER
Last OV 4/1/2025  Next OV 10/6/2025      Requested Prescriptions     Pending Prescriptions Disp Refills    LANTUS SOLOSTAR 100 UNIT/ML injection pen [Pharmacy Med Name: Lantus SoloStar 100 UNIT/ML Subcutaneous Solution Pen-injector] 15 mL 1     Sig: INJECT 25 UNITS SUBCUTANEOUSLY ONCE DAILY

## 2025-04-23 DIAGNOSIS — E55.9 VITAMIN D DEFICIENCY: ICD-10-CM

## 2025-04-23 DIAGNOSIS — K21.9 GASTROESOPHAGEAL REFLUX DISEASE WITHOUT ESOPHAGITIS: Primary | ICD-10-CM

## 2025-04-23 DIAGNOSIS — R42 CHRONIC VERTIGO: ICD-10-CM

## 2025-04-23 RX ORDER — PANTOPRAZOLE SODIUM 40 MG/1
40 TABLET, DELAYED RELEASE ORAL DAILY
Qty: 90 TABLET | Refills: 1 | Status: SHIPPED | OUTPATIENT
Start: 2025-04-23

## 2025-04-23 RX ORDER — ERGOCALCIFEROL 1.25 MG/1
50000 CAPSULE, LIQUID FILLED ORAL WEEKLY
Qty: 12 CAPSULE | Refills: 1 | Status: SHIPPED | OUTPATIENT
Start: 2025-04-23

## 2025-04-23 RX ORDER — MECLIZINE HYDROCHLORIDE 25 MG/1
25 TABLET ORAL 3 TIMES DAILY PRN
Qty: 270 TABLET | Refills: 1 | Status: SHIPPED | OUTPATIENT
Start: 2025-04-23

## 2025-04-29 ENCOUNTER — OFFICE VISIT (OUTPATIENT)
Dept: VASCULAR SURGERY | Facility: CLINIC | Age: 85
End: 2025-04-29
Payer: MEDICARE

## 2025-04-29 VITALS
SYSTOLIC BLOOD PRESSURE: 130 MMHG | HEART RATE: 60 BPM | OXYGEN SATURATION: 95 % | DIASTOLIC BLOOD PRESSURE: 78 MMHG | WEIGHT: 217 LBS | HEIGHT: 72 IN | BODY MASS INDEX: 29.39 KG/M2

## 2025-04-29 DIAGNOSIS — I89.0 LYMPHEDEMA: Primary | ICD-10-CM

## 2025-04-29 DIAGNOSIS — I10 HTN (HYPERTENSION), BENIGN: ICD-10-CM

## 2025-04-29 DIAGNOSIS — E11.9 DIABETES MELLITUS TYPE 2, DIET-CONTROLLED: ICD-10-CM

## 2025-04-29 DIAGNOSIS — E78.00 HIGH CHOLESTEROL: ICD-10-CM

## 2025-04-29 RX ORDER — AMLODIPINE BESYLATE 5 MG/1
5 TABLET ORAL DAILY
COMMUNITY
Start: 2025-02-14 | End: 2025-05-17

## 2025-04-29 RX ORDER — HYDRALAZINE HYDROCHLORIDE 25 MG/1
25 TABLET, FILM COATED ORAL AS NEEDED
COMMUNITY
Start: 2025-02-14

## 2025-04-29 RX ORDER — OLMESARTAN MEDOXOMIL 40 MG/1
40 TABLET ORAL DAILY
COMMUNITY
Start: 2024-09-06 | End: 2026-02-15

## 2025-04-29 RX ORDER — MECLIZINE HYDROCHLORIDE 25 MG/1
25 TABLET ORAL AS NEEDED
COMMUNITY
Start: 2025-04-23

## 2025-04-29 RX ORDER — PANTOPRAZOLE SODIUM 40 MG/1
40 TABLET, DELAYED RELEASE ORAL DAILY
COMMUNITY
Start: 2025-04-23

## 2025-04-29 NOTE — LETTER
May 2, 2025     Arpit Yip MD  [address] [place] [address] [address]  [place] [address]    Patient: Mike Kay   YOB: 1940   Date of Visit: 4/29/2025     Dear Arpit Yip MD:       Thank you for referring Miek Kay to me for evaluation. Below are the relevant portions of my assessment and plan of care.    If you have questions, please do not hesitate to call me. I look forward to following Mike along with you.         Sincerely,        Constantine Rodríguez DO        CC: No Recipients    Constantine Rodríguez DO  05/02/25 1319  Sign when Signing Visit  04/29/2025    Arpit Yip MD  [address] [place] [address] [address]  [place] [address]      Mike Kay  1940    Chief Complaint   Patient presents with   • Carotid occlusive disease     Has leg swelling also mentioned neuropathy type pain in feet and also here to talk about his carotid scan results.        Dear Arpit Yip MD     HPI  I had the pleasure of seeing your patient Mike Kay in the office today.  Thank you kindly for this consultation.  As you recall, Mike Kay is a 84 y.o.  male who you are currently following for routine health maintenance. he was recently seen in ED with complaints of numbness to left side of his mouth and more weakness to his left hand.  He has some radiculopathy to the left due to cervical spine issues.  He also has complaints of leg swelling. He does see physician in [place] regarding blood pressure.  He is on 4 blood pressure medications in addition to aldactone and HCTZ.  He has history of brain bleed.  He did have noninvasive testing performed which I did review in office.       Past Medical History:   Diagnosis Date   • ACC (agenesis of corpus callosum)    • Acid reflux    • Cerebral amyloid angiopathy    • Diabetes mellitus type 2, diet-controlled    • Hay fever    • High cholesterol     BORDERLINE   • Hx of colonic polyp    • Hypertension    • Mitral valve  prolapse    • PVC (premature ventricular contraction)    • Umbilical hernia        Past Surgical History:   Procedure Laterality Date   • CARDIAC CATHETERIZATION     • COLONOSCOPY N/A 02/20/2020    Dr. briceno-One 18 mm adenoma polyp in the ascending colon, removed piecemeal using a hot snare. Resected and retrieved. Clip was placed. - The examination was otherwise normal on direct and retroflexion views   • COLONOSCOPY N/A 03/04/2021    Dr. Briceno-One 7 mm tubular adenoma polyp at the hepatic flexure, removed with a hot snare. Resected and retrieved. - One 10 mm polyp at 20 cm proximal to the anus -Benign granulation tissue polyp with ulceration and marked chronic active inflammation. B. No adenomatous changes identified   • COLONOSCOPY N/A 10/10/2024    Procedure: COLONOSCOPY WITH ANESTHESIA;  Surgeon: Mohan Briceno MD;  Location: [facility] ENDOSCOPY;  Service: Gastroenterology;  Laterality: N/A;  pre hx colon polyp  post polyp, diverticulosis  dr dena parisi   • COLONOSCOPY W/ POLYPECTOMY  05/12/2014    Adenomatous polyp at 30 cm, Hyperplastic polyp cecum, Diverticulosis repeat exam in 5 years   • FOOT SURGERY Left 2015    R/T FRACTURE, PLATE AND SCREWS    • INGUINAL HERNIA REPAIR Left 2010   • OTHER SURGICAL HISTORY      VOCAL CORD    • TONSILLECTOMY  1991   • UMBILICAL HERNIA REPAIR N/A 01/12/2017    Procedure: UMBILICAL HERNIA REPAIR;  Surgeon: Kavitha Navarro MD;  Location: [facility] OR;  Service:        Family History   Problem Relation Age of Onset   • Heart disease Father    • Colon cancer Neg Hx    • Colon polyps Neg Hx        Social History     Socioeconomic History   • Marital status:    Tobacco Use   • Smoking status: Never   • Smokeless tobacco: Never   Vaping Use   • Vaping status: Never Used   Substance and Sexual Activity   • Alcohol use: No   • Drug use: No   • Sexual activity: Defer       No Known Allergies      Current Outpatient Medications:   •  amLODIPine (NORVASC) 5 MG tablet, Take  1 tablet by mouth Daily., Disp: , Rfl:   •  atorvastatin (LIPITOR) 20 MG tablet, Take 0.5 tablets by mouth Daily. PT TAKES 1/2 20 MG TABLET TO EQUAL 10 MG DAILY, Disp: , Rfl:   •  fluticasone (FLONASE) 50 MCG/ACT nasal spray, 1 spray by Each Nare route Daily As Needed for Rhinitis or Allergies., Disp: , Rfl:   •  glipiZIDE (GLUCOTROL XL) 10 MG 24 hr tablet, Take 1 tablet by mouth Daily., Disp: , Rfl:   •  hydrALAZINE (APRESOLINE) 25 MG tablet, Take 1 tablet by mouth As Needed (when BP is over 140)., Disp: , Rfl:   •  hydrochlorothiazide (HYDRODIURIL) 25 MG tablet, Take 1 tablet by mouth Daily., Disp: , Rfl:   •  Insulin Aspart (novoLOG) 100 UNIT/ML injection, Inject  under the skin into the appropriate area as directed As Needed for High Blood Sugar. As needed, Disp: , Rfl:   •  Lantus SoloStar 100 UNIT/ML injection pen, Inject  under the skin into the appropriate area as directed Daily., Disp: , Rfl:   •  meclizine (ANTIVERT) 25 MG tablet, Take 1 tablet by mouth As Needed for Dizziness., Disp: , Rfl:   •  olmesartan (BENICAR) 40 MG tablet, Take 1 tablet by mouth Daily., Disp: , Rfl:   •  pantoprazole (PROTONIX) 40 MG EC tablet, Take 1 tablet by mouth Daily., Disp: , Rfl:   •  spironolactone (ALDACTONE) 25 MG tablet, Take 1 tablet by mouth Daily., Disp: , Rfl:   •  temazepam (RESTORIL) 15 MG capsule, TAKE 2 CAPSULES BY MOUTH NIGHTLY AS NEEDED FOR SLEEP FOR UP TO 30 DAYS, Disp: , Rfl:   •  verapamil SR (CALAN-SR) 240 MG CR tablet, Take 1 tablet by mouth 2 (Two) Times a Day., Disp: , Rfl:   •  vitamin D (ERGOCALCIFEROL) 1.25 MG (63545 UT) capsule capsule, Take 1 capsule by mouth 1 (One) Time Per Week., Disp: , Rfl:   •  sodium-potassium-magnesium sulfates (Suprep Bowel Prep Kit) 17.5-3.13-1.6 GM/177ML solution oral solution, Take as directed by office instructions provided (Patient not taking: Reported on 4/29/2025), Disp: 177 mL, Rfl: 0    Review of Systems   Constitutional: Negative.    HENT: Negative.     Eyes:  "Negative.    Respiratory: Negative.     Cardiovascular:  Positive for leg swelling.   Gastrointestinal: Negative.    Endocrine: Negative.    Genitourinary: Negative.    Musculoskeletal: Negative.    Skin:  Positive for color change.   Allergic/Immunologic: Negative.    Neurological: Negative.    Hematological: Negative.    Psychiatric/Behavioral: Negative.     All other systems reviewed and are negative.      /78   Pulse 60   Ht 182.9 cm (72\")   Wt 98.4 kg (217 lb)   SpO2 95%   BMI 29.43 kg/m²     Physical Exam  Vitals and nursing note reviewed.   Constitutional:       Appearance: Normal appearance. He is well-developed and overweight.   HENT:      Head: Normocephalic and atraumatic.   Eyes:      General: No scleral icterus.     Pupils: Pupils are equal, round, and reactive to light.   Neck:      Thyroid: No thyromegaly.      Vascular: No carotid bruit or JVD.   Cardiovascular:      Rate and Rhythm: Normal rate and regular rhythm.      Pulses:           Carotid pulses are 2+ on the right side and 2+ on the left side.       Femoral pulses are 2+ on the right side and 2+ on the left side.       Popliteal pulses are 2+ on the right side and 2+ on the left side.        Dorsalis pedis pulses are 2+ on the right side and 2+ on the left side.        Posterior tibial pulses are 2+ on the right side and 2+ on the left side.      Heart sounds: Normal heart sounds.   Pulmonary:      Effort: Pulmonary effort is normal.      Breath sounds: Normal breath sounds.   Abdominal:      General: Bowel sounds are normal. There is no distension or abdominal bruit.      Palpations: Abdomen is soft. There is no mass.      Tenderness: There is no abdominal tenderness.   Musculoskeletal:         General: Swelling present. Normal range of motion.      Cervical back: Neck supple.      Comments: hyperpigmentation   Lymphadenopathy:      Cervical: No cervical adenopathy.   Skin:     General: Skin is warm and dry.   Neurological:      " Mental Status: He is alert and oriented to person, place, and time.      Cranial Nerves: No cranial nerve deficit.      Sensory: No sensory deficit.   Psychiatric:         Mood and Affect: Mood normal.         Behavior: Behavior normal.         Thought Content: Thought content normal.         Judgment: Judgment normal.         Patient Active Problem List   Diagnosis   • Hx of adenomatous colonic polyps   • HTN (hypertension), benign   • Stroke-like symptoms   • High cholesterol   • Mitral valve prolapse   • Diabetes mellitus type 2, diet-controlled   • Umbilical hernia   • Acid reflux        Diagnosis Plan   1. Lymphedema        2. HTN (hypertension), benign        3. High cholesterol        4. Diabetes mellitus type 2, diet-controlled            Plan: After thoroughly evaluating Mike Kay, I believe the best course of action is to remain conservative from vascular surgery standpoint.  He does have swelling to his lower extremities.  I do not see any significant evidence of varicose veins.  He does take Norvasc which could be contributing to his leg swelling.  I would like him to continue with compression stockings on a daily basis and keep his legs elevated when he is not on them.  I do think he would be a great candidate for home lymphedema pumps.  He will come in tomorrow for evaluation.  I will see him back in 6 months to see how he is doing with pumps as well as compression.   I did discuss vascular risk factors as it pertains to the progression of vascular disease including controlling his hypertension and cholesterol.  His blood pressures stable in office today.  He does see a doctor at [place] for management of blood pressure.  Body mass index is 29.43 kg/m². The patient is to continue taking their medications as previously discussed.   This was all discussed in full with complete understanding.  Thank you for allowing me to participate in the care of your patient.  Please do not hesitate to call  with any questions or concerns.  We will keep you aware of any further encounters with Mike Kay.        Sincerely yours,         DO Phi Ha, Arpit Mccloud MD

## 2025-04-29 NOTE — PROGRESS NOTES
04/29/2025    Arpit Yip MD  [address] [place] [address] [address]  [place] [address]      Mike Kay  1940    Chief Complaint   Patient presents with    Carotid occlusive disease     Has leg swelling also mentioned neuropathy type pain in feet and also here to talk about his carotid scan results.        Dear Arpit Yip MD     HPI  I had the pleasure of seeing your patient Mike Kay in the office today.  Thank you kindly for this consultation.  As you recall, Mike Kay is a 84 y.o.  male who you are currently following for routine health maintenance. he was recently seen in ED with complaints of numbness to left side of his mouth and more weakness to his left hand.  He has some radiculopathy to the left due to cervical spine issues.  He also has complaints of leg swelling. He does see physician in [place] regarding blood pressure.  He is on 4 blood pressure medications in addition to aldactone and HCTZ.  He has history of brain bleed.  He did have noninvasive testing performed which I did review in office.       Past Medical History:   Diagnosis Date    ACC (agenesis of corpus callosum)     Acid reflux     Cerebral amyloid angiopathy     Diabetes mellitus type 2, diet-controlled     Hay fever     High cholesterol     BORDERLINE    Hx of colonic polyp     Hypertension     Mitral valve prolapse     PVC (premature ventricular contraction)     Umbilical hernia        Past Surgical History:   Procedure Laterality Date    CARDIAC CATHETERIZATION      COLONOSCOPY N/A 02/20/2020    Dr. briceno-One 18 mm adenoma polyp in the ascending colon, removed piecemeal using a hot snare. Resected and retrieved. Clip was placed. - The examination was otherwise normal on direct and retroflexion views    COLONOSCOPY N/A 03/04/2021    Dr. Briceno-One 7 mm tubular adenoma polyp at the hepatic flexure, removed with a hot snare. Resected and retrieved. - One 10 mm polyp at 20 cm proximal to the anus  -Benign granulation tissue polyp with ulceration and marked chronic active inflammation. B. No adenomatous changes identified    COLONOSCOPY N/A 10/10/2024    Procedure: COLONOSCOPY WITH ANESTHESIA;  Surgeon: Mohan Quinn MD;  Location:  [facility] ENDOSCOPY;  Service: Gastroenterology;  Laterality: N/A;  pre hx colon polyp  post polyp, diverticulosis  dr dena parisi    COLONOSCOPY W/ POLYPECTOMY  05/12/2014    Adenomatous polyp at 30 cm, Hyperplastic polyp cecum, Diverticulosis repeat exam in 5 years    FOOT SURGERY Left 2015    R/T FRACTURE, PLATE AND SCREWS     INGUINAL HERNIA REPAIR Left 2010    OTHER SURGICAL HISTORY      VOCAL CORD     TONSILLECTOMY  1991    UMBILICAL HERNIA REPAIR N/A 01/12/2017    Procedure: UMBILICAL HERNIA REPAIR;  Surgeon: Kavitha Navarro MD;  Location:  [facility] OR;  Service:        Family History   Problem Relation Age of Onset    Heart disease Father     Colon cancer Neg Hx     Colon polyps Neg Hx        Social History     Socioeconomic History    Marital status:    Tobacco Use    Smoking status: Never    Smokeless tobacco: Never   Vaping Use    Vaping status: Never Used   Substance and Sexual Activity    Alcohol use: No    Drug use: No    Sexual activity: Defer       No Known Allergies      Current Outpatient Medications:     amLODIPine (NORVASC) 5 MG tablet, Take 1 tablet by mouth Daily., Disp: , Rfl:     atorvastatin (LIPITOR) 20 MG tablet, Take 0.5 tablets by mouth Daily. PT TAKES 1/2 20 MG TABLET TO EQUAL 10 MG DAILY, Disp: , Rfl:     fluticasone (FLONASE) 50 MCG/ACT nasal spray, 1 spray by Each Nare route Daily As Needed for Rhinitis or Allergies., Disp: , Rfl:     glipiZIDE (GLUCOTROL XL) 10 MG 24 hr tablet, Take 1 tablet by mouth Daily., Disp: , Rfl:     hydrALAZINE (APRESOLINE) 25 MG tablet, Take 1 tablet by mouth As Needed (when BP is over 140)., Disp: , Rfl:     hydrochlorothiazide (HYDRODIURIL) 25 MG tablet, Take 1 tablet by mouth Daily., Disp: , Rfl:     Insulin  "Aspart (novoLOG) 100 UNIT/ML injection, Inject  under the skin into the appropriate area as directed As Needed for High Blood Sugar. As needed, Disp: , Rfl:     Lantus SoloStar 100 UNIT/ML injection pen, Inject  under the skin into the appropriate area as directed Daily., Disp: , Rfl:     meclizine (ANTIVERT) 25 MG tablet, Take 1 tablet by mouth As Needed for Dizziness., Disp: , Rfl:     olmesartan (BENICAR) 40 MG tablet, Take 1 tablet by mouth Daily., Disp: , Rfl:     pantoprazole (PROTONIX) 40 MG EC tablet, Take 1 tablet by mouth Daily., Disp: , Rfl:     spironolactone (ALDACTONE) 25 MG tablet, Take 1 tablet by mouth Daily., Disp: , Rfl:     temazepam (RESTORIL) 15 MG capsule, TAKE 2 CAPSULES BY MOUTH NIGHTLY AS NEEDED FOR SLEEP FOR UP TO 30 DAYS, Disp: , Rfl:     verapamil SR (CALAN-SR) 240 MG CR tablet, Take 1 tablet by mouth 2 (Two) Times a Day., Disp: , Rfl:     vitamin D (ERGOCALCIFEROL) 1.25 MG (20606 UT) capsule capsule, Take 1 capsule by mouth 1 (One) Time Per Week., Disp: , Rfl:     sodium-potassium-magnesium sulfates (Suprep Bowel Prep Kit) 17.5-3.13-1.6 GM/177ML solution oral solution, Take as directed by office instructions provided (Patient not taking: Reported on 4/29/2025), Disp: 177 mL, Rfl: 0    Review of Systems   Constitutional: Negative.    HENT: Negative.     Eyes: Negative.    Respiratory: Negative.     Cardiovascular:  Positive for leg swelling.   Gastrointestinal: Negative.    Endocrine: Negative.    Genitourinary: Negative.    Musculoskeletal: Negative.    Skin:  Positive for color change.   Allergic/Immunologic: Negative.    Neurological: Negative.    Hematological: Negative.    Psychiatric/Behavioral: Negative.     All other systems reviewed and are negative.      /78   Pulse 60   Ht 182.9 cm (72\")   Wt 98.4 kg (217 lb)   SpO2 95%   BMI 29.43 kg/m²     Physical Exam  Vitals and nursing note reviewed.   Constitutional:       Appearance: Normal appearance. He is well-developed " and overweight.   HENT:      Head: Normocephalic and atraumatic.   Eyes:      General: No scleral icterus.     Pupils: Pupils are equal, round, and reactive to light.   Neck:      Thyroid: No thyromegaly.      Vascular: No carotid bruit or JVD.   Cardiovascular:      Rate and Rhythm: Normal rate and regular rhythm.      Pulses:           Carotid pulses are 2+ on the right side and 2+ on the left side.       Femoral pulses are 2+ on the right side and 2+ on the left side.       Popliteal pulses are 2+ on the right side and 2+ on the left side.        Dorsalis pedis pulses are 2+ on the right side and 2+ on the left side.        Posterior tibial pulses are 2+ on the right side and 2+ on the left side.      Heart sounds: Normal heart sounds.   Pulmonary:      Effort: Pulmonary effort is normal.      Breath sounds: Normal breath sounds.   Abdominal:      General: Bowel sounds are normal. There is no distension or abdominal bruit.      Palpations: Abdomen is soft. There is no mass.      Tenderness: There is no abdominal tenderness.   Musculoskeletal:         General: Swelling present. Normal range of motion.      Cervical back: Neck supple.      Comments: hyperpigmentation   Lymphadenopathy:      Cervical: No cervical adenopathy.   Skin:     General: Skin is warm and dry.   Neurological:      Mental Status: He is alert and oriented to person, place, and time.      Cranial Nerves: No cranial nerve deficit.      Sensory: No sensory deficit.   Psychiatric:         Mood and Affect: Mood normal.         Behavior: Behavior normal.         Thought Content: Thought content normal.         Judgment: Judgment normal.         Patient Active Problem List   Diagnosis    Hx of adenomatous colonic polyps    HTN (hypertension), benign    Stroke-like symptoms    High cholesterol    Mitral valve prolapse    Diabetes mellitus type 2, diet-controlled    Umbilical hernia    Acid reflux        Diagnosis Plan   1. Lymphedema        2. HTN  (hypertension), benign        3. High cholesterol        4. Diabetes mellitus type 2, diet-controlled            Plan: After thoroughly evaluating Mike Kay, I believe the best course of action is to remain conservative from vascular surgery standpoint.  He does have swelling to his lower extremities.  I do not see any significant evidence of varicose veins.  He does take Norvasc which could be contributing to his leg swelling.  I would like him to continue with compression stockings on a daily basis and keep his legs elevated when he is not on them.  I do think he would be a great candidate for home lymphedema pumps.  He will come in tomorrow for evaluation.  I will see him back in 6 months to see how he is doing with pumps as well as compression.   I did discuss vascular risk factors as it pertains to the progression of vascular disease including controlling his hypertension and cholesterol.  His blood pressures stable in office today.  He does see a doctor at [place] for management of blood pressure.  Body mass index is 29.43 kg/m². The patient is to continue taking their medications as previously discussed.   This was all discussed in full with complete understanding.  Thank you for allowing me to participate in the care of your patient.  Please do not hesitate to call with any questions or concerns.  We will keep you aware of any further encounters with Mike Kay.        Sincerely yours,         DO Phi Ha Joseph Matt, MD

## 2025-04-30 ENCOUNTER — PATIENT ROUNDING (BHMG ONLY) (OUTPATIENT)
Dept: VASCULAR SURGERY | Facility: CLINIC | Age: 85
End: 2025-04-30
Payer: MEDICARE

## 2025-04-30 NOTE — PROGRESS NOTES
April 30, 2025    Hello, may I speak with Mike Kay?    My name is ENOCH      I am  with [facility]  [address], [address]  [place] [address] [phone].    Before we get started may I verify your date of birth? 1940    I am calling to officially welcome you to our practice and ask about your recent visit. Is this a good time to talk? yes    Tell me about your visit with us. What things went well?  IT WAS GOOD       We're always looking for ways to make our patients' experiences even better. Do you have recommendations on ways we may improve?  no    Overall were you satisfied with your first visit to our practice? yes       I appreciate you taking the time to speak with me today. Is there anything else I can do for you? no      Thank you, and have a great day.

## 2025-06-26 ENCOUNTER — TELEPHONE (OUTPATIENT)
Dept: HEMATOLOGY | Age: 85
End: 2025-06-26

## 2025-06-26 NOTE — TELEPHONE ENCOUNTER
I called patient and reminded patient of their appt on 07/01/2025 and patient confirmed they would be here. I also let patient know that we have moved into our new cancer facility and asked patient if they were aware of where we were now located, and patient voiced understanding of our new location. Patient knows NOT to arrive early (We CANNOT check anyone in early anymore) and that we will get labs at the time of the follow up appointment (That time was given to patient) and NOT the lab appointment time. I also made patient aware that our labs are not fasting, so they are allowed to eat but please drink plenty of water to hydrate properly before coming to these appointments because this will make their lab draw much easier. Patient voiced understanding to everything discussed. I advised patient that starting June 9th, our office will be implementing a phone tree system when they call our office to please listen to all prompts and select the prompt they need and leave a voicemail if no one answers and someone in office will return their call before the end of the business day. I also made them aware to please not leave multiple voicemails because this will cause more delay in returning their call in a timely manner.

## 2025-06-30 DIAGNOSIS — D69.6 THROMBOCYTOPENIA: ICD-10-CM

## 2025-06-30 DIAGNOSIS — D75.1 SECONDARY ERYTHROCYTOSIS: Primary | ICD-10-CM

## 2025-07-01 DIAGNOSIS — F51.01 PRIMARY INSOMNIA: ICD-10-CM

## 2025-07-01 RX ORDER — TEMAZEPAM 15 MG/1
CAPSULE ORAL
Qty: 60 CAPSULE | Refills: 0 | Status: SHIPPED | OUTPATIENT
Start: 2025-07-01 | End: 2025-07-01

## 2025-07-01 NOTE — TELEPHONE ENCOUNTER
Cristian P McCallon called to request a refill on his medication.      Last office visit : 4/1/2025   Next office visit : 10/6/2025     Last UDS:   Benzodiazepine Screen, Urine   Date Value Ref Range Status   06/27/2024 pos  Final     Buprenorphine Urine   Date Value Ref Range Status   06/27/2024 neg  Final     Cocaine Metabolite Screen, Urine   Date Value Ref Range Status   06/27/2024 neg  Final     Gabapentin Screen, Urine   Date Value Ref Range Status   06/27/2024 neg  Final     Oxycodone Screen, Ur   Date Value Ref Range Status   06/27/2024 neg  Final     Propoxyphene Screen, Urine   Date Value Ref Range Status   06/27/2024 neg  Final     THC Screen, Urine   Date Value Ref Range Status   06/27/2024 neg  Final     Tricyclic Antidepressants, Urine   Date Value Ref Range Status   06/27/2024 neg  Final       Last Regan: 03/2025  Medication Contract: due next ov      Requested Prescriptions     Pending Prescriptions Disp Refills    temazepam (RESTORIL) 15 MG capsule [Pharmacy Med Name: Temazepam 15 MG Oral Capsule] 60 capsule 0     Sig: TAKE 2 CAPSULES BY MOUTH AT BEDTIME AS NEEDED FOR SLEEP         Please approve or refuse this medication.   Honye Bautista MA

## 2025-07-09 ENCOUNTER — OFFICE VISIT (OUTPATIENT)
Dept: UROLOGY | Age: 85
End: 2025-07-09
Payer: MEDICARE

## 2025-07-09 VITALS — WEIGHT: 218 LBS | HEIGHT: 72 IN | BODY MASS INDEX: 29.53 KG/M2 | TEMPERATURE: 97.5 F

## 2025-07-09 DIAGNOSIS — D75.1 SECONDARY ERYTHROCYTOSIS: ICD-10-CM

## 2025-07-09 DIAGNOSIS — N40.1 BENIGN PROSTATIC HYPERPLASIA WITH URINARY FREQUENCY: Primary | ICD-10-CM

## 2025-07-09 DIAGNOSIS — R79.89 LOW TESTOSTERONE IN MALE: ICD-10-CM

## 2025-07-09 DIAGNOSIS — R35.0 BENIGN PROSTATIC HYPERPLASIA WITH URINARY FREQUENCY: Primary | ICD-10-CM

## 2025-07-09 LAB
APPEARANCE FLUID: CLEAR
BILIRUBIN, POC: NORMAL
BLOOD URINE, POC: NORMAL
CLARITY, POC: CLEAR
COLOR, POC: YELLOW
GLUCOSE URINE, POC: NORMAL MG/DL
KETONES, POC: NORMAL MG/DL
LEUKOCYTE EST, POC: NORMAL
NITRITE, POC: NORMAL
PH, POC: 5.5
PROTEIN, POC: NORMAL MG/DL
SPECIFIC GRAVITY, POC: 1.02
UROBILINOGEN, POC: 0.2 MG/DL

## 2025-07-09 PROCEDURE — 1160F RVW MEDS BY RX/DR IN RCRD: CPT | Performed by: NURSE PRACTITIONER

## 2025-07-09 PROCEDURE — G8417 CALC BMI ABV UP PARAM F/U: HCPCS | Performed by: NURSE PRACTITIONER

## 2025-07-09 PROCEDURE — G8427 DOCREV CUR MEDS BY ELIG CLIN: HCPCS | Performed by: NURSE PRACTITIONER

## 2025-07-09 PROCEDURE — 81002 URINALYSIS NONAUTO W/O SCOPE: CPT | Performed by: NURSE PRACTITIONER

## 2025-07-09 PROCEDURE — 99215 OFFICE O/P EST HI 40 MIN: CPT | Performed by: NURSE PRACTITIONER

## 2025-07-09 PROCEDURE — 1036F TOBACCO NON-USER: CPT | Performed by: NURSE PRACTITIONER

## 2025-07-09 PROCEDURE — 1159F MED LIST DOCD IN RCRD: CPT | Performed by: NURSE PRACTITIONER

## 2025-07-09 PROCEDURE — 1123F ACP DISCUSS/DSCN MKR DOCD: CPT | Performed by: NURSE PRACTITIONER

## 2025-07-09 ASSESSMENT — ENCOUNTER SYMPTOMS
ABDOMINAL PAIN: 0
NAUSEA: 0
ABDOMINAL DISTENTION: 0
BACK PAIN: 0
VOMITING: 0

## 2025-07-09 NOTE — PROGRESS NOTES
Cristian Regan is a 84 y.o., male, Established patient who presents today   Chief Complaint   Patient presents with    Follow-up     I am here for 6 month follow up  I had my labs     Hypogonadism:  Patient is here today for symptoms of low testosterone which started year(s) ago.  His last injection was given on 3/12/2025.  Testosterone was discontinued after the hospitalist from [facility] expressed concern about the risk of stroke with testosterone therapy.  The patient's last testosterone level was:  Lab Results   Component Value Date    TESTOSTERONE 831.6 (H) 01/02/2025    TESTOSTERONE 419.4 07/10/2024    TESTOSTERONE 680.9 06/21/2024      Recently his hypogonadism symptoms: Stable. Slightly more fatigue since stopping therapy, but overall okay  Current medical Rx for hypogonadism: None  Energy level:  slightly decreased     Lab Results   Component Value Date    WBC 7.80 03/31/2025    HGB 16.1 03/31/2025    HCT 46.1 03/31/2025    MCV 88.7 03/31/2025     03/31/2025       Lab Results   Component Value Date    PSA 0.72 03/18/2025    PSA 0.78 03/13/2024    PSA 1.18 09/22/2023    PSA 0.82 09/21/2022    PSA 0.42 05/24/2021    PSA 0.47 05/08/2020    PSA 0.41 05/02/2019    PSA 0.45 06/25/2018     History of secondary polycythemia follows with hematology.      REVIEW OF SYSTEMS:  Review of Systems   Constitutional:  Negative for chills and fever.   Gastrointestinal:  Negative for abdominal distention, abdominal pain, nausea and vomiting.   Genitourinary:  Negative for difficulty urinating, dysuria, flank pain, frequency, hematuria and urgency.   Musculoskeletal:  Negative for back pain and gait problem.   Psychiatric/Behavioral:  Negative for agitation and confusion.        PHYSICAL EXAM:  Temp 97.5 °F (36.4 °C) (Temporal)   Ht 1.829 m (6')   Wt 98.9 kg (218 lb)   BMI 29.57 kg/m²   Physical Exam  Vitals and nursing note reviewed.   Constitutional:       General: He is not in acute distress.     Appearance: Normal

## 2025-07-30 RX ORDER — SPIRONOLACTONE 25 MG/1
25 TABLET ORAL DAILY
Qty: 90 TABLET | Refills: 3 | Status: SHIPPED | OUTPATIENT
Start: 2025-07-30

## 2025-07-30 NOTE — TELEPHONE ENCOUNTER
Last OV 4/1/2025  Next OV 10/6/2025      Requested Prescriptions     Pending Prescriptions Disp Refills    spironolactone (ALDACTONE) 25 MG tablet 90 tablet 3     Sig: Take 1 tablet by mouth daily

## 2025-08-14 RX ORDER — INSULIN GLARGINE 100 [IU]/ML
INJECTION, SOLUTION SUBCUTANEOUS
Qty: 15 ML | Refills: 2 | Status: SHIPPED | OUTPATIENT
Start: 2025-08-14

## 2025-08-19 DIAGNOSIS — E11.65 TYPE 2 DIABETES MELLITUS WITH HYPERGLYCEMIA, UNSPECIFIED WHETHER LONG TERM INSULIN USE (HCC): Primary | ICD-10-CM

## 2025-08-19 RX ORDER — INSULIN ASPART 100 [IU]/ML
8 INJECTION, SOLUTION INTRAVENOUS; SUBCUTANEOUS AS NEEDED
Qty: 5 ADJUSTABLE DOSE PRE-FILLED PEN SYRINGE | Refills: 3 | Status: SHIPPED | OUTPATIENT
Start: 2025-08-19 | End: 2025-08-20

## 2025-08-20 DIAGNOSIS — E11.65 TYPE 2 DIABETES MELLITUS WITH HYPERGLYCEMIA, UNSPECIFIED WHETHER LONG TERM INSULIN USE (HCC): ICD-10-CM

## 2025-08-20 RX ORDER — INSULIN ASPART 100 [IU]/ML
8 INJECTION, SOLUTION INTRAVENOUS; SUBCUTANEOUS 3 TIMES DAILY PRN
Qty: 5 ADJUSTABLE DOSE PRE-FILLED PEN SYRINGE | Refills: 3 | Status: SHIPPED | OUTPATIENT
Start: 2025-08-20

## 2025-09-04 DIAGNOSIS — F51.01 PRIMARY INSOMNIA: ICD-10-CM

## 2025-09-04 RX ORDER — TEMAZEPAM 15 MG/1
CAPSULE ORAL
Qty: 60 CAPSULE | Refills: 0 | Status: SHIPPED | OUTPATIENT
Start: 2025-09-04 | End: 2025-10-04

## (undated) DEVICE — CUFF,BP,DISP,1 TUBE,ADULT,HP: Brand: MEDLINE

## (undated) DEVICE — ADHS LIQ MASTISOL 2/3ML

## (undated) DEVICE — SENSR O2 OXIMAX FNGR A/ 18IN NONSTR

## (undated) DEVICE — THE SINGLE USE ETRAP – POLYP TRAP IS USED FOR SUCTION RETRIEVAL OF ENDOSCOPICALLY REMOVED POLYPS.: Brand: ETRAP

## (undated) DEVICE — SYR LUERLOK 20CC

## (undated) DEVICE — YANKAUER,BULB TIP WITH VENT: Brand: ARGYLE

## (undated) DEVICE — TBG SMPL FLTR LINE NASL 02/C02 A/ BX/100

## (undated) DEVICE — PAD MINOR UNIVERSAL: Brand: MEDLINE INDUSTRIES, INC.

## (undated) DEVICE — ELECTRD BLD EDGE/INSUL1P 2.4X5.1MM STRL

## (undated) DEVICE — ANTIBACTERIAL UNDYED BRAIDED (POLYGLACTIN 910), SYNTHETIC ABSORBABLE SUTURE: Brand: COATED VICRYL

## (undated) DEVICE — Device: Brand: SINGLE USE ELECTROSURGICAL SNARE SD-400

## (undated) DEVICE — MASK,OXYGEN,MED CONC,ADLT,7' TUB, UC: Brand: PENDING

## (undated) DEVICE — NDL HYPO PRECISIONGLIDE REG 22G 1 1/2

## (undated) DEVICE — Device: Brand: DEFENDO AIR/WATER/SUCTION AND BIOPSY VALVE

## (undated) DEVICE — SNAR POLYP SENSATION MICRO OVL 13 240X40

## (undated) DEVICE — GLV SURG BIOGEL LTX PF 6 1/2

## (undated) DEVICE — DRSNG SURESITE WNDW 2.38X2.75

## (undated) DEVICE — ENDOCUFF VISION PRP SM 10.4

## (undated) DEVICE — SPNG GZ 2S 2X2 8PLY STRL PK/2

## (undated) DEVICE — PK TURNOVER RM ADV

## (undated) DEVICE — THE CHANNEL CLEANING BRUSH IS A NYLON FLEXI BRUSH ATTACHED TO A FLEXIBLE PLASTIC SHEATH DESIGNED TO SAFELY REMOVE DEBRIS FROM FLEXIBLE ENDOSCOPES.

## (undated) DEVICE — 3M™ STERI-STRIP™ REINFORCED ADHESIVE SKIN CLOSURES, R1547, 1/2 IN X 4 IN (12 MM X 100 MM), 6 STRIPS/ENVELOPE: Brand: 3M™ STERI-STRIP™

## (undated) DEVICE — TRY PREP SCRB VAG PVP